# Patient Record
Sex: FEMALE | Race: WHITE | Employment: OTHER | URBAN - METROPOLITAN AREA
[De-identification: names, ages, dates, MRNs, and addresses within clinical notes are randomized per-mention and may not be internally consistent; named-entity substitution may affect disease eponyms.]

---

## 2018-05-06 PROBLEM — R07.9 CHEST PAIN: Status: ACTIVE | Noted: 2018-05-06

## 2018-05-10 ENCOUNTER — TELEPHONE (OUTPATIENT)
Dept: CARDIOLOGY CLINIC | Age: 58
End: 2018-05-10

## 2018-06-06 ENCOUNTER — OFFICE VISIT (OUTPATIENT)
Dept: CARDIOLOGY CLINIC | Age: 58
End: 2018-06-06

## 2018-06-06 VITALS
BODY MASS INDEX: 28.19 KG/M2 | OXYGEN SATURATION: 94 % | HEART RATE: 66 BPM | SYSTOLIC BLOOD PRESSURE: 100 MMHG | WEIGHT: 153.2 LBS | HEIGHT: 62 IN | DIASTOLIC BLOOD PRESSURE: 70 MMHG

## 2018-06-06 DIAGNOSIS — I21.4 NSTEMI (NON-ST ELEVATED MYOCARDIAL INFARCTION) (HCC): Primary | ICD-10-CM

## 2018-06-06 DIAGNOSIS — I47.1 PAROXYSMAL SVT (SUPRAVENTRICULAR TACHYCARDIA) (HCC): ICD-10-CM

## 2018-06-06 DIAGNOSIS — J44.9 CHRONIC OBSTRUCTIVE PULMONARY DISEASE, UNSPECIFIED COPD TYPE (HCC): ICD-10-CM

## 2018-06-06 DIAGNOSIS — R07.9 CHEST PAIN, UNSPECIFIED TYPE: ICD-10-CM

## 2018-06-06 DIAGNOSIS — I10 ESSENTIAL HYPERTENSION: ICD-10-CM

## 2018-06-06 DIAGNOSIS — I95.1 ORTHOSTATIC HYPOTENSION: ICD-10-CM

## 2018-06-06 PROCEDURE — 93000 ELECTROCARDIOGRAM COMPLETE: CPT | Performed by: NURSE PRACTITIONER

## 2018-06-06 PROCEDURE — 3017F COLORECTAL CA SCREEN DOC REV: CPT | Performed by: NURSE PRACTITIONER

## 2018-06-06 PROCEDURE — 99214 OFFICE O/P EST MOD 30 MIN: CPT | Performed by: NURSE PRACTITIONER

## 2018-06-06 PROCEDURE — G8427 DOCREV CUR MEDS BY ELIG CLIN: HCPCS | Performed by: NURSE PRACTITIONER

## 2018-06-06 PROCEDURE — 1036F TOBACCO NON-USER: CPT | Performed by: NURSE PRACTITIONER

## 2018-06-06 PROCEDURE — 3023F SPIROM DOC REV: CPT | Performed by: NURSE PRACTITIONER

## 2018-06-06 PROCEDURE — G8598 ASA/ANTIPLAT THER USED: HCPCS | Performed by: NURSE PRACTITIONER

## 2018-06-06 PROCEDURE — G8419 CALC BMI OUT NRM PARAM NOF/U: HCPCS | Performed by: NURSE PRACTITIONER

## 2018-06-06 PROCEDURE — 1111F DSCHRG MED/CURRENT MED MERGE: CPT | Performed by: NURSE PRACTITIONER

## 2018-06-06 PROCEDURE — G8926 SPIRO NO PERF OR DOC: HCPCS | Performed by: NURSE PRACTITIONER

## 2018-06-06 RX ORDER — METOPROLOL SUCCINATE 25 MG/1
12.5 TABLET, EXTENDED RELEASE ORAL DAILY
Qty: 30 TABLET | Refills: 0
Start: 2018-06-06 | End: 2018-08-03 | Stop reason: SDUPTHER

## 2018-06-20 PROCEDURE — 93228 REMOTE 30 DAY ECG REV/REPORT: CPT | Performed by: INTERNAL MEDICINE

## 2018-07-10 DIAGNOSIS — R07.9 CHEST PAIN, UNSPECIFIED TYPE: Primary | ICD-10-CM

## 2018-08-03 ENCOUNTER — OFFICE VISIT (OUTPATIENT)
Dept: INTERNAL MEDICINE CLINIC | Age: 58
End: 2018-08-03

## 2018-08-03 VITALS
SYSTOLIC BLOOD PRESSURE: 114 MMHG | WEIGHT: 151 LBS | BODY MASS INDEX: 27.79 KG/M2 | RESPIRATION RATE: 16 BRPM | OXYGEN SATURATION: 90 % | HEART RATE: 86 BPM | HEIGHT: 62 IN | DIASTOLIC BLOOD PRESSURE: 66 MMHG

## 2018-08-03 DIAGNOSIS — E78.00 PURE HYPERCHOLESTEROLEMIA: ICD-10-CM

## 2018-08-03 DIAGNOSIS — K21.9 GASTROESOPHAGEAL REFLUX DISEASE WITHOUT ESOPHAGITIS: ICD-10-CM

## 2018-08-03 DIAGNOSIS — I10 ESSENTIAL HYPERTENSION: Primary | ICD-10-CM

## 2018-08-03 DIAGNOSIS — Z13.1 SCREENING FOR DIABETES MELLITUS: ICD-10-CM

## 2018-08-03 DIAGNOSIS — E55.9 VITAMIN D DEFICIENCY: ICD-10-CM

## 2018-08-03 DIAGNOSIS — Z11.59 ENCOUNTER FOR HEPATITIS C SCREENING TEST FOR LOW RISK PATIENT: ICD-10-CM

## 2018-08-03 DIAGNOSIS — J44.9 CHRONIC OBSTRUCTIVE PULMONARY DISEASE, UNSPECIFIED COPD TYPE (HCC): ICD-10-CM

## 2018-08-03 DIAGNOSIS — Z11.4 SCREENING FOR HIV (HUMAN IMMUNODEFICIENCY VIRUS): ICD-10-CM

## 2018-08-03 PROCEDURE — 1036F TOBACCO NON-USER: CPT | Performed by: NURSE PRACTITIONER

## 2018-08-03 PROCEDURE — 99205 OFFICE O/P NEW HI 60 MIN: CPT | Performed by: NURSE PRACTITIONER

## 2018-08-03 PROCEDURE — G8598 ASA/ANTIPLAT THER USED: HCPCS | Performed by: NURSE PRACTITIONER

## 2018-08-03 PROCEDURE — 3017F COLORECTAL CA SCREEN DOC REV: CPT | Performed by: NURSE PRACTITIONER

## 2018-08-03 PROCEDURE — G8427 DOCREV CUR MEDS BY ELIG CLIN: HCPCS | Performed by: NURSE PRACTITIONER

## 2018-08-03 PROCEDURE — 3023F SPIROM DOC REV: CPT | Performed by: NURSE PRACTITIONER

## 2018-08-03 PROCEDURE — G8926 SPIRO NO PERF OR DOC: HCPCS | Performed by: NURSE PRACTITIONER

## 2018-08-03 PROCEDURE — G8419 CALC BMI OUT NRM PARAM NOF/U: HCPCS | Performed by: NURSE PRACTITIONER

## 2018-08-03 RX ORDER — PROMETHAZINE HYDROCHLORIDE 12.5 MG/1
12.5 TABLET ORAL EVERY 6 HOURS PRN
Qty: 30 TABLET | Refills: 0
Start: 2018-08-03 | End: 2018-11-06 | Stop reason: ALTCHOICE

## 2018-08-03 RX ORDER — ATORVASTATIN CALCIUM 40 MG/1
40 TABLET, FILM COATED ORAL NIGHTLY
Qty: 30 TABLET | Refills: 0 | Status: SHIPPED | OUTPATIENT
Start: 2018-08-03 | End: 2018-08-21 | Stop reason: SDUPTHER

## 2018-08-03 RX ORDER — ALBUTEROL SULFATE 90 UG/1
2-4 AEROSOL, METERED RESPIRATORY (INHALATION) EVERY 6 HOURS PRN
Qty: 1 INHALER | Refills: 0 | Status: SHIPPED | OUTPATIENT
Start: 2018-08-03 | End: 2018-09-21 | Stop reason: SDUPTHER

## 2018-08-03 RX ORDER — ERGOCALCIFEROL (VITAMIN D2) 1250 MCG
50000 CAPSULE ORAL WEEKLY
Qty: 4 CAPSULE | Refills: 3 | Status: SHIPPED | OUTPATIENT
Start: 2018-08-03 | End: 2018-11-06 | Stop reason: SDUPTHER

## 2018-08-03 RX ORDER — BUDESONIDE AND FORMOTEROL FUMARATE DIHYDRATE 160; 4.5 UG/1; UG/1
2 AEROSOL RESPIRATORY (INHALATION) 2 TIMES DAILY
Qty: 1 INHALER | Status: CANCELLED | OUTPATIENT
Start: 2018-08-03

## 2018-08-03 RX ORDER — PANTOPRAZOLE SODIUM 40 MG/1
40 TABLET, DELAYED RELEASE ORAL DAILY
Qty: 30 TABLET | Refills: 3 | Status: SHIPPED | OUTPATIENT
Start: 2018-08-03 | End: 2018-11-06 | Stop reason: SDUPTHER

## 2018-08-03 RX ORDER — METOPROLOL SUCCINATE 25 MG/1
12.5 TABLET, EXTENDED RELEASE ORAL DAILY
Qty: 30 TABLET | Refills: 0 | Status: SHIPPED | OUTPATIENT
Start: 2018-08-03 | End: 2018-08-21 | Stop reason: SDUPTHER

## 2018-08-03 RX ORDER — ACETAMINOPHEN 325 MG/1
650 TABLET ORAL EVERY 6 HOURS PRN
Qty: 120 TABLET | Status: CANCELLED | OUTPATIENT
Start: 2018-08-03

## 2018-08-03 ASSESSMENT — ENCOUNTER SYMPTOMS
SHORTNESS OF BREATH: 1
CONSTIPATION: 1
DIARRHEA: 1
BACK PAIN: 1

## 2018-08-03 ASSESSMENT — PATIENT HEALTH QUESTIONNAIRE - PHQ9
SUM OF ALL RESPONSES TO PHQ QUESTIONS 1-9: 0
1. LITTLE INTEREST OR PLEASURE IN DOING THINGS: 0
2. FEELING DOWN, DEPRESSED OR HOPELESS: 0
SUM OF ALL RESPONSES TO PHQ9 QUESTIONS 1 & 2: 0

## 2018-08-03 NOTE — PROGRESS NOTES
18    Chief Complaint   Patient presents with   [address] no longer covered by insurance    Headache     headaches with sharp pain intermittent, chronic    Joint Pain     hips,back neck-chronic pain    Hypertension    Health Maintenance     needs pap and mammogram       HPI:   Murray Casillas is a 61 y.o. female is here today for management of multiple chronic conditions and to establish care with new PCP. PMH:  Previous PCP was Dr Jessica Krueger, located on [facility]    -Patient reports she has problems with hearing; c/o headaches for the past few months, pain usually in the back of her head. Patient also reports she has had frequent falls in the past year, cannot quantify. -Reports she is has been having problems with being short of breath, especially when active/climbing stairs. She is supposed to be on Spiriva but has not taken since May.  -Patient also reports having chronic pain in her knees, hips, back and neck, has not seen an Orthopedic provider before.     Health Maintenance:  Need: mammogram, pap smear, vision check    Past Medical History:   Diagnosis Date    Back pain     COPD (chronic obstructive pulmonary disease) (HCC)     Foot pain     from bone spurs    GERD (gastroesophageal reflux disease)     Headache     sharp pain going through head    Hip pain     Hyperlipidemia     Hypertension      (normal spontaneous vaginal delivery)     Osteoporosis     Vision abnormalities        Reviewed all progress notes and lab results if available from previous providers    Past Medical History:   Diagnosis Date    Back pain     COPD (chronic obstructive pulmonary disease) (HCC)     Foot pain     from bone spurs    GERD (gastroesophageal reflux disease)     Headache     sharp pain going through head    Hip pain     Hyperlipidemia     Hypertension      (normal spontaneous vaginal delivery)     Osteoporosis     Vision abnormalities      Social History     Social History    Marital status:      Spouse name: N/A    Number of children: N/A    Years of education: N/A     Occupational History    Not on file.      Social History Main Topics    Smoking status: Former Smoker     Packs/day: 1.00     Years: 37.00     Types: Cigarettes     Quit date: 1/1/2017    Smokeless tobacco: Never Used    Alcohol use No    Drug use: No    Sexual activity: Not Currently     Other Topics Concern    Not on file     Social History Narrative    No narrative on file     Past Surgical History:   Procedure Laterality Date    CARDIAC CATHETERIZATION      CHOLECYSTECTOMY  1999     Family History   Problem Relation Age of Onset    Heart Failure Mother         heart disease    Hypertension Mother     Emphysema Mother     Heart Failure Father         heart disease    COPD Father     Hypertension Paternal Grandmother      Allergies   Allergen Reactions    Omeprazole     Eggs Or Egg-Derived Products [Eggs Or Egg-Derived Products] Hives and Nausea And Vomiting    Eggs [Egg White] Nausea And Vomiting    Flexeril [Cyclobenzaprine Hcl] Rash    Levofloxacin Rash       Current Outpatient Prescriptions   Medication Sig Dispense Refill    pantoprazole (PROTONIX) 40 MG tablet Take 1 tablet by mouth daily 30 tablet 3    albuterol sulfate  (90 Base) MCG/ACT inhaler Inhale 2-4 puffs into the lungs every 6 hours as needed for Wheezing 1 Inhaler 0    ergocalciferol (ERGOCALCIFEROL) 73548 units capsule Take 1 capsule by mouth once a week 4 capsule 3    promethazine (PHENERGAN) 12.5 MG tablet Take 1 tablet by mouth every 6 hours as needed for Nausea 30 tablet 0    Handicap Placard MISC by Does not apply route Handicap Placard for 5 years 1 each 0    diphenhydrAMINE-APAP, sleep, (TYLENOL PM EXTRA STRENGTH)  MG tablet Take 1 tablet by mouth nightly as needed for Sleep      sertraline (ZOLOFT) 100 MG tablet Take 1 tablet by mouth daily 30 tablet 3    aspirin 81 MG chewable tablet Take 1 tablet by mouth daily 30 tablet 3    atorvastatin (LIPITOR) 40 MG tablet Take 1 tablet by mouth nightly 30 tablet 3    metoprolol succinate (TOPROL XL) 25 MG extended release tablet Take 0.5 tablets by mouth daily 30 tablet 1    sucralfate (CARAFATE) 1 GM tablet Take 1 tablet by mouth 2 times daily 60 tablet 0    acetaminophen (TYLENOL) 325 MG tablet Take 650 mg by mouth every 6 hours as needed for Pain       No current facility-administered medications for this visit. Review of Systems   Respiratory: Positive for shortness of breath. Cardiovascular: Positive for chest pain (chronic, followed by cardiology). Gastrointestinal: Positive for constipation and diarrhea. Musculoskeletal: Positive for arthralgias (knees, hips, hands and shoulders) and back pain. Allergic/Immunologic: Positive for food allergies (eggs). Neurological: Positive for tremors (??about 6 months) and headaches (past few months, almost daily). Psychiatric/Behavioral: Positive for dysphoric mood. /66   Pulse 86   Resp 16   Ht 5' 2\" (1.575 m)   Wt 151 lb (68.5 kg)   SpO2 90%   Breastfeeding? No   BMI 27.62 kg/m²     Physical Exam   Constitutional: She is oriented to person, place, and time. HENT:   Mouth/Throat: Oropharynx is clear and moist.   Eyes:       Cardiovascular: Normal rate and normal heart sounds. Pulmonary/Chest: Effort normal and breath sounds normal.   Abdominal: Soft. Musculoskeletal:        Lumbar back: She exhibits decreased range of motion. Neurological: She is alert and oriented to person, place, and time. Skin: Skin is warm and dry. Psychiatric: Her speech is normal. Her mood appears anxious. ASSESSMENT/PLAN:    1. Essential hypertension  -continue metoprolol as prescribed    2. Chronic obstructive pulmonary disease, unspecified COPD type (HCC)    - albuterol sulfate  (90 Base) MCG/ACT inhaler;  Inhale 2-4

## 2018-08-03 NOTE — PATIENT INSTRUCTIONS
Patient Education        Learning About COPD  What is COPD? COPD is a lung disease that makes it hard to breathe. COPD stands for chronic obstructive pulmonary disease. It is caused by damage to the lungs over many years, usually from smoking. COPD is a mix of two diseases: chronic bronchitis and emphysema. Other things that may put you at risk for COPD include breathing chemical fumes, dust, or air pollution over a long period of time. Secondhand smoke is also bad. In chronic bronchitis, the airways that carry air to the lungs (bronchial tubes) get inflamed and make a lot of mucus. This can narrow or block the airways, making it hard for you to breathe. In emphysema, the air sacs in your lungs are damaged and lose their stretch. Less air gets in and out of your lungs, which makes you feel short of breath. What can you expect when you have COPD? COPD gets worse over time. You cannot undo the damage to your lungs. Over time, you may find that:  · You get short of breath even when you do simple things like get dressed or fix a meal.  · It is hard to eat or exercise. · You lose weight and feel weaker. But there are things you can do to prevent more damage and feel better. What are the symptoms? The main symptoms are:  · A cough that will not go away. · Mucus that comes up when you cough. · Shortness of breath that gets worse with activity. At times, your symptoms may suddenly flare up and get much worse. This is a called a COPD exacerbation (say \"egg-MICHAELA--BAY-julianne\"). When this happens, your usual symptoms quickly get worse and stay bad. This can be dangerous. You may have to go to the hospital.  How can you keep COPD from getting worse? Don't smoke. That is the best way to keep COPD from getting worse. If you already smoke, it is never too late to stop. If you need help quitting, talk to your doctor about stop-smoking programs and medicines.  These can increase your chances of quitting for about your disease and how to manage it, help with diet and other changes, and emotional support. · Eat regular, healthy meals. Use bronchodilators about 1 hour before you eat to make it easier to eat. Eat several small meals instead of three large ones. Drink beverages at the end of the meal. Avoid foods that are hard to chew. Follow-up care is a key part of your treatment and safety. Be sure to make and go to all appointments, and call your doctor if you are having problems. It's also a good idea to know your test results and keep a list of the medicines you take. Where can you learn more? Go to https://Make Meaningpepiceweb.[email or web address] org and sign in to your Justinmind account. Enter V314 in the RIVS box to learn more about \"Learning About COPD. \"     If you do not have an account, please click on the \"Sign Up Now\" link. Current as of: December 6, 2017  Content Version: 11.6  © 3661-7351 [employer]. Care instructions adapted under license by [facility] ([facility]). If you have questions about a medical condition or this instruction, always ask your healthcare professional. [address] any warranty or liability for your use of this information. Patient Education        DASH Diet: Care Instructions  Your Care Instructions    The DASH diet is an eating plan that can help lower your blood pressure. DASH stands for Dietary Approaches to Stop Hypertension. Hypertension is high blood pressure. The DASH diet focuses on eating foods that are high in calcium, potassium, and magnesium. These nutrients can lower blood pressure. The foods that are highest in these nutrients are fruits, vegetables, low-fat dairy products, nuts, seeds, and legumes. But taking calcium, potassium, and magnesium supplements instead of eating foods that are high in those nutrients does not have the same effect. The DASH diet also includes whole grains, fish, and poultry.   The DASH diet is one of several lifestyle changes your doctor may recommend to lower your high blood pressure. Your doctor may also want you to decrease the amount of sodium in your diet. Lowering sodium while following the DASH diet can lower blood pressure even further than just the DASH diet alone. Follow-up care is a key part of your treatment and safety. Be sure to make and go to all appointments, and call your doctor if you are having problems. It's also a good idea to know your test results and keep a list of the medicines you take. How can you care for yourself at home? Following the DASH diet  · Eat 4 to 5 servings of fruit each day. A serving is 1 medium-sized piece of fruit, ½ cup chopped or canned fruit, 1/4 cup dried fruit, or 4 ounces (½ cup) of fruit juice. Choose fruit more often than fruit juice. · Eat 4 to 5 servings of vegetables each day. A serving is 1 cup of lettuce or raw leafy vegetables, ½ cup of chopped or cooked vegetables, or 4 ounces (½ cup) of vegetable juice. Choose vegetables more often than vegetable juice. · Get 2 to 3 servings of low-fat and fat-free dairy each day. A serving is 8 ounces of milk, 1 cup of yogurt, or 1 ½ ounces of cheese. · Eat 6 to 8 servings of grains each day. A serving is 1 slice of bread, 1 ounce of dry cereal, or ½ cup of cooked rice, pasta, or cooked cereal. Try to choose whole-grain products as much as possible. · Limit lean meat, poultry, and fish to 2 servings each day. A serving is 3 ounces, about the size of a deck of cards. · Eat 4 to 5 servings of nuts, seeds, and legumes (cooked dried beans, lentils, and split peas) each week. A serving is 1/3 cup of nuts, 2 tablespoons of seeds, or ½ cup of cooked beans or peas. · Limit fats and oils to 2 to 3 servings each day. A serving is 1 teaspoon of vegetable oil or 2 tablespoons of salad dressing. · Limit sweets and added sugars to 5 servings or less a week.  A serving is 1 tablespoon jelly or jam, ½ cup sorbet, or 1 cup of

## 2018-08-07 DIAGNOSIS — J44.9 CHRONIC OBSTRUCTIVE PULMONARY DISEASE, UNSPECIFIED COPD TYPE (HCC): Primary | ICD-10-CM

## 2018-08-10 DIAGNOSIS — Z11.59 ENCOUNTER FOR HEPATITIS C SCREENING TEST FOR LOW RISK PATIENT: ICD-10-CM

## 2018-08-10 DIAGNOSIS — E55.9 VITAMIN D DEFICIENCY: ICD-10-CM

## 2018-08-10 DIAGNOSIS — Z11.4 SCREENING FOR HIV (HUMAN IMMUNODEFICIENCY VIRUS): ICD-10-CM

## 2018-08-10 DIAGNOSIS — Z13.1 SCREENING FOR DIABETES MELLITUS: ICD-10-CM

## 2018-08-10 DIAGNOSIS — E78.00 PURE HYPERCHOLESTEROLEMIA: ICD-10-CM

## 2018-08-10 LAB
A/G RATIO: 1.9 (ref 1.1–2.2)
ALBUMIN SERPL-MCNC: 4.5 G/DL (ref 3.4–5)
ALP BLD-CCNC: 132 U/L (ref 40–129)
ALT SERPL-CCNC: 15 U/L (ref 10–40)
ANION GAP SERPL CALCULATED.3IONS-SCNC: 11 MMOL/L (ref 3–16)
AST SERPL-CCNC: 16 U/L (ref 15–37)
BILIRUB SERPL-MCNC: 0.4 MG/DL (ref 0–1)
BUN BLDV-MCNC: 7 MG/DL (ref 7–20)
CALCIUM SERPL-MCNC: 9.4 MG/DL (ref 8.3–10.6)
CHLORIDE BLD-SCNC: 100 MMOL/L (ref 99–110)
CHOLESTEROL, FASTING: 202 MG/DL (ref 0–199)
CO2: 30 MMOL/L (ref 21–32)
CREAT SERPL-MCNC: 0.7 MG/DL (ref 0.6–1.1)
GFR AFRICAN AMERICAN: >60
GFR NON-AFRICAN AMERICAN: >60
GLOBULIN: 2.4 G/DL
GLUCOSE FASTING: 110 MG/DL (ref 70–99)
HDLC SERPL-MCNC: 35 MG/DL (ref 40–60)
HEPATITIS C ANTIBODY INTERPRETATION: NORMAL
HIV AG/AB: NORMAL
HIV ANTIGEN: NORMAL
HIV-1 ANTIBODY: NORMAL
HIV-2 AB: NORMAL
LDL CHOLESTEROL CALCULATED: 123 MG/DL
POTASSIUM SERPL-SCNC: 4.4 MMOL/L (ref 3.5–5.1)
SODIUM BLD-SCNC: 141 MMOL/L (ref 136–145)
TOTAL PROTEIN: 6.9 G/DL (ref 6.4–8.2)
TRIGLYCERIDE, FASTING: 221 MG/DL (ref 0–150)
VITAMIN D 25-HYDROXY: 23.6 NG/ML
VLDLC SERPL CALC-MCNC: 44 MG/DL

## 2018-08-21 ENCOUNTER — OFFICE VISIT (OUTPATIENT)
Dept: INTERNAL MEDICINE CLINIC | Age: 58
End: 2018-08-21

## 2018-08-21 VITALS
HEIGHT: 62 IN | HEART RATE: 74 BPM | SYSTOLIC BLOOD PRESSURE: 125 MMHG | DIASTOLIC BLOOD PRESSURE: 67 MMHG | WEIGHT: 151 LBS | OXYGEN SATURATION: 91 % | BODY MASS INDEX: 27.79 KG/M2 | RESPIRATION RATE: 18 BRPM

## 2018-08-21 DIAGNOSIS — Z12.39 SCREENING FOR BREAST CANCER: ICD-10-CM

## 2018-08-21 DIAGNOSIS — H54.7 VISUAL PROBLEMS: ICD-10-CM

## 2018-08-21 DIAGNOSIS — Z12.4 SCREENING FOR CERVICAL CANCER: ICD-10-CM

## 2018-08-21 DIAGNOSIS — I10 ESSENTIAL HYPERTENSION: Primary | ICD-10-CM

## 2018-08-21 DIAGNOSIS — F41.9 ANXIETY: ICD-10-CM

## 2018-08-21 DIAGNOSIS — M17.0 PRIMARY OSTEOARTHRITIS OF BOTH KNEES: ICD-10-CM

## 2018-08-21 DIAGNOSIS — E78.00 PURE HYPERCHOLESTEROLEMIA: ICD-10-CM

## 2018-08-21 PROCEDURE — 1036F TOBACCO NON-USER: CPT | Performed by: NURSE PRACTITIONER

## 2018-08-21 PROCEDURE — G8598 ASA/ANTIPLAT THER USED: HCPCS | Performed by: NURSE PRACTITIONER

## 2018-08-21 PROCEDURE — G8419 CALC BMI OUT NRM PARAM NOF/U: HCPCS | Performed by: NURSE PRACTITIONER

## 2018-08-21 PROCEDURE — 3017F COLORECTAL CA SCREEN DOC REV: CPT | Performed by: NURSE PRACTITIONER

## 2018-08-21 PROCEDURE — G8427 DOCREV CUR MEDS BY ELIG CLIN: HCPCS | Performed by: NURSE PRACTITIONER

## 2018-08-21 PROCEDURE — 99214 OFFICE O/P EST MOD 30 MIN: CPT | Performed by: NURSE PRACTITIONER

## 2018-08-21 RX ORDER — SERTRALINE HYDROCHLORIDE 100 MG/1
100 TABLET, FILM COATED ORAL DAILY
Qty: 30 TABLET | Refills: 3 | Status: SHIPPED | OUTPATIENT
Start: 2018-08-21 | End: 2018-11-06 | Stop reason: SDUPTHER

## 2018-08-21 RX ORDER — ASPIRIN 81 MG/1
81 TABLET, CHEWABLE ORAL DAILY
Qty: 30 TABLET | Refills: 3 | Status: SHIPPED | OUTPATIENT
Start: 2018-08-21 | End: 2019-01-24 | Stop reason: SDUPTHER

## 2018-08-21 RX ORDER — METOPROLOL SUCCINATE 25 MG/1
12.5 TABLET, EXTENDED RELEASE ORAL DAILY
Qty: 30 TABLET | Refills: 1 | Status: SHIPPED | OUTPATIENT
Start: 2018-08-21 | End: 2018-11-06 | Stop reason: SDUPTHER

## 2018-08-21 RX ORDER — ATORVASTATIN CALCIUM 40 MG/1
40 TABLET, FILM COATED ORAL NIGHTLY
Qty: 30 TABLET | Refills: 3 | Status: SHIPPED | OUTPATIENT
Start: 2018-08-21 | End: 2018-11-06 | Stop reason: SDUPTHER

## 2018-08-21 NOTE — PROGRESS NOTES
>60     GFR  08/10/2018 >60     Calcium 08/10/2018 9.4     Total Protein 08/10/2018 6.9     Alb 08/10/2018 4.5     Albumin/Globulin Ratio 08/10/2018 1.9     Total Bilirubin 08/10/2018 0.4     Alkaline Phosphatase 08/10/2018 132*    ALT 08/10/2018 15     AST 08/10/2018 16     Globulin 08/10/2018 2.4     Hep C Ab Interp 08/10/2018 Non-reactive     Cholesterol, Fasting 08/10/2018 202*    Triglyceride, Fasting 08/10/2018 221*    HDL 08/10/2018 35*    LDL Calculated 08/10/2018 123*    VLDL Cholesterol Calcula* 08/10/2018 44     Vit D, 25-Hydroxy 08/10/2018 23.6*    HIV Ag/Ab 08/10/2018 Non-Reactive     HIV-1 Antibody 08/10/2018 Non-Reactive     HIV ANTIGEN 08/10/2018 Non-Reactive     HIV-2 Ab 08/10/2018 Non-Reactive        Physical Exam   Constitutional: She is oriented to person, place, and time. HENT:   Mouth/Throat: Oropharynx is clear and moist.   Cardiovascular: Normal rate and regular rhythm. Pulmonary/Chest: Effort normal and breath sounds normal.   Abdominal: Soft. Bowel sounds are normal.   Musculoskeletal:        Right knee: She exhibits decreased range of motion. Left knee: She exhibits decreased range of motion. Neurological: She is alert and oriented to person, place, and time. Psychiatric: Her speech is normal. Her mood appears anxious. ASSESSMENT/PLAN:    Diagnoses and all orders for this visit:    Essential hypertension  -     aspirin 81 MG chewable tablet; Take 1 tablet by mouth daily  -     metoprolol succinate (TOPROL XL) 25 MG extended release tablet; Take 0.5 tablets by mouth daily    Primary osteoarthritis of both knees  -     Anson Fleming MD (Hip and Knee)    Pure hypercholesterolemia  -     atorvastatin (LIPITOR) 40 MG tablet; Take 1 tablet by mouth nightly    Visual problems  -     [place] [facility] [place] - Bereket Dasilva DO (BEE)    Anxiety  -     sertraline (ZOLOFT) 100 MG tablet;  Take 1 tablet by mouth daily    Screening for breast

## 2018-08-21 NOTE — PATIENT INSTRUCTIONS
meals using beans and peas. Add garbanzo or kidney beans to salads. Make burritos and tacos with mashed cisneros beans or black beans. Where can you learn more? Go to [email or web address] and sign in to your WhatsApp account. Enter K625 in the [employer] box to learn more about \"DASH Diet: Care Instructions. \"     If you do not have an account, please click on the \"Sign Up Now\" link. Current as of: December 6, 2017  Content Version: 11.7  © 5432-0163 [employer]. Care instructions adapted under license by [facility] ([facility]). If you have questions about a medical condition or this instruction, always ask your healthcare professional. [address] any warranty or liability for your use of this information.

## 2018-08-22 ASSESSMENT — ENCOUNTER SYMPTOMS: BACK PAIN: 1

## 2018-08-27 ENCOUNTER — OFFICE VISIT (OUTPATIENT)
Dept: ORTHOPEDIC SURGERY | Age: 58
End: 2018-08-27

## 2018-08-27 ENCOUNTER — HOSPITAL ENCOUNTER (OUTPATIENT)
Dept: WOMENS IMAGING | Age: 58
Discharge: HOME | End: 2018-08-27
Attending: NURSE PRACTITIONER | Admitting: NURSE PRACTITIONER

## 2018-08-27 VITALS
DIASTOLIC BLOOD PRESSURE: 81 MMHG | SYSTOLIC BLOOD PRESSURE: 150 MMHG | BODY MASS INDEX: 27.79 KG/M2 | WEIGHT: 151 LBS | HEIGHT: 62 IN | RESPIRATION RATE: 17 BRPM | HEART RATE: 66 BPM

## 2018-08-27 DIAGNOSIS — M25.552 BILATERAL HIP PAIN: ICD-10-CM

## 2018-08-27 DIAGNOSIS — M25.551 BILATERAL HIP PAIN: ICD-10-CM

## 2018-08-27 DIAGNOSIS — Z12.39 SCREENING FOR BREAST CANCER: ICD-10-CM

## 2018-08-27 DIAGNOSIS — M54.16 LUMBAR RADICULOPATHY: Primary | ICD-10-CM

## 2018-08-27 PROCEDURE — 3017F COLORECTAL CA SCREEN DOC REV: CPT | Performed by: ORTHOPAEDIC SURGERY

## 2018-08-27 PROCEDURE — 99243 OFF/OP CNSLTJ NEW/EST LOW 30: CPT | Performed by: ORTHOPAEDIC SURGERY

## 2018-08-27 PROCEDURE — G8427 DOCREV CUR MEDS BY ELIG CLIN: HCPCS | Performed by: ORTHOPAEDIC SURGERY

## 2018-08-27 PROCEDURE — G8419 CALC BMI OUT NRM PARAM NOF/U: HCPCS | Performed by: ORTHOPAEDIC SURGERY

## 2018-08-27 RX ORDER — METHYLPREDNISOLONE 4 MG/1
TABLET ORAL
Qty: 1 KIT | Refills: 0 | Status: SHIPPED | OUTPATIENT
Start: 2018-08-27 | End: 2018-09-02

## 2018-08-27 NOTE — PROGRESS NOTES
CHIEF COMPLAINT: Bilateral hip pain. History:   Virginia Bonilla is a 60 y.o. White female who presents today complaining of bilateral hip pain. Patient was referred by JOSE ANGEL Cobb CNP. She is a poor historian. She states that the pain began over 1 year ago. Patient did not have a history of injury. Patient rates the pain as a 10/10. She states pain is located buttock, lower back, radiates down her leg. Hip pain is described as aching and shooting. Pain is aggravated by being on her feet, getting up, stairs, sitting for long time. Patient states hip pain is relieved by steroids. Patient does have a history of back pain. She does note burning in her feet. Patient has not had PT. The patient has not taken NSAIDs. The patient has not had an injection. The patient's occupation is on disability      Outside reports reviewed: none.     Past Medical History:   Diagnosis Date    Back pain     COPD (chronic obstructive pulmonary disease) (HCC)     Foot pain     from bone spurs    GERD (gastroesophageal reflux disease)     Headache     sharp pain going through head    Hip pain     Hyperlipidemia     Hypertension      (normal spontaneous vaginal delivery)     Osteoporosis     Vision abnormalities        Past Surgical History:   Procedure Laterality Date    CARDIAC CATHETERIZATION      CHOLECYSTECTOMY         Family History   Problem Relation Age of Onset    Heart Failure Mother         heart disease    Hypertension Mother     Emphysema Mother     Heart Failure Father         heart disease    COPD Father     Hypertension Paternal Grandmother        Social History     Social History    Marital status:      Spouse name: N/A    Number of children: N/A    Years of education: N/A     Social History Main Topics    Smoking status: Former Smoker     Packs/day: 1.00     Years: 37.00     Types: Cigarettes     Quit date: 2017    Smokeless tobacco: Never Used    Alcohol

## 2018-08-31 ENCOUNTER — TELEPHONE (OUTPATIENT)
Dept: INTERNAL MEDICINE CLINIC | Age: 58
End: 2018-08-31

## 2018-09-06 ENCOUNTER — OFFICE VISIT (OUTPATIENT)
Dept: CARDIOLOGY CLINIC | Age: 58
End: 2018-09-06

## 2018-09-06 VITALS
WEIGHT: 151 LBS | HEIGHT: 62 IN | OXYGEN SATURATION: 94 % | BODY MASS INDEX: 27.79 KG/M2 | SYSTOLIC BLOOD PRESSURE: 128 MMHG | HEART RATE: 84 BPM | DIASTOLIC BLOOD PRESSURE: 84 MMHG

## 2018-09-06 DIAGNOSIS — I25.10 CORONARY ARTERY DISEASE INVOLVING NATIVE CORONARY ARTERY OF NATIVE HEART WITHOUT ANGINA PECTORIS: ICD-10-CM

## 2018-09-06 DIAGNOSIS — R07.9 CHEST PAIN, UNSPECIFIED TYPE: Primary | ICD-10-CM

## 2018-09-06 DIAGNOSIS — R42 DIZZINESS: ICD-10-CM

## 2018-09-06 DIAGNOSIS — I47.1 PAROXYSMAL SVT (SUPRAVENTRICULAR TACHYCARDIA) (HCC): ICD-10-CM

## 2018-09-06 DIAGNOSIS — J44.9 CHRONIC OBSTRUCTIVE PULMONARY DISEASE, UNSPECIFIED COPD TYPE (HCC): ICD-10-CM

## 2018-09-06 DIAGNOSIS — I10 ESSENTIAL HYPERTENSION: ICD-10-CM

## 2018-09-06 DIAGNOSIS — R00.2 PALPITATIONS: ICD-10-CM

## 2018-09-06 PROCEDURE — G8419 CALC BMI OUT NRM PARAM NOF/U: HCPCS | Performed by: NURSE PRACTITIONER

## 2018-09-06 PROCEDURE — 99214 OFFICE O/P EST MOD 30 MIN: CPT | Performed by: NURSE PRACTITIONER

## 2018-09-06 PROCEDURE — G8926 SPIRO NO PERF OR DOC: HCPCS | Performed by: NURSE PRACTITIONER

## 2018-09-06 PROCEDURE — G8427 DOCREV CUR MEDS BY ELIG CLIN: HCPCS | Performed by: NURSE PRACTITIONER

## 2018-09-06 PROCEDURE — 1036F TOBACCO NON-USER: CPT | Performed by: NURSE PRACTITIONER

## 2018-09-06 PROCEDURE — 3023F SPIROM DOC REV: CPT | Performed by: NURSE PRACTITIONER

## 2018-09-06 PROCEDURE — 3017F COLORECTAL CA SCREEN DOC REV: CPT | Performed by: NURSE PRACTITIONER

## 2018-09-06 PROCEDURE — G8598 ASA/ANTIPLAT THER USED: HCPCS | Performed by: NURSE PRACTITIONER

## 2018-09-06 NOTE — PROGRESS NOTES
[address]  Office Visit    Bob Barraza  1960 September 6, 2018    CC:   Chief Complaint   Patient presents with    3 Month Follow-Up     copd,hld,htn/ pt states she is have some chest paint that comes and goes,SOB with normal activity     HPI:  The patient is 60 y.o. female with a past medical history significant for CAD/NSTEMI, PSVT,  HTN, hyperlipidemia and COPD who is here for follow up. Hospitalized from 5/6/2018-5/9/2018 with chest pain and elevated troponin and ECG ST-T wave changes. L heart cath showed nonobstructive CAD. Echo showed LVEF 55%. She had event monitor placed d/t recorded -200 bpm in hospital- 30 day event monitor,NSR, no sustained arrhythmia and 2.4 sec pause (asx). . Last seen in 6/2018 in office and noted to be orthostatic. Toprol was decreased at that time. Here for follow up today. Continues to have episodes of transient chest discomfort that is \"here and gone\" and associated with heart racing infrequently. Lasts few seconds and is self limiting. No chest pain with exertion. Has chronic SOBOE which is unchanged (improves with use of inhalers). No cough. Having pain in hips and lower back limiting her activity. Denies orthopnea/PND, cough, dizziness, syncope, edema , weight change or claudication. No regular exercise. Has remained off cigs x 2 years. Scheduled to begin PT next week. Scheduled to see Dr. Hillary Booth and have PFT's performed later this month. Review of Systems:  Constitutional: Denies weakness, night sweats or fever. + chronic fatigue  HEENT: Denies new visual changes, ringing in ears, nosebleeds, nasal congestion  Respiratory: + chronic SOBOE (unchanged)  Cardiovascular: see HPI  GI: Denies N/V, diarrhea, constipation, abdominal pain, change in bowel habits, melena or hematochezia  : Denies urinary frequency, urgency, incontinence, hematuria or dysuria.   Skin: Denies rash, hives, or cyanosis  Musculoskeletal: + generalized muscle/joint pain; R arm/shoulder pain with ROM of R shioulder  Neurological: No TIA/stroke symptoms  Psychiatric: Denies anxiety, insomnia or depression     Past Medical History:   Diagnosis Date    Back pain     COPD (chronic obstructive pulmonary disease) (HCC)     Foot pain     from bone spurs    GERD (gastroesophageal reflux disease)     Headache     sharp pain going through head    Hip pain     Hyperlipidemia     Hypertension      (normal spontaneous vaginal delivery)     Osteoporosis     Vision abnormalities      Past Surgical History:   Procedure Laterality Date    CARDIAC CATHETERIZATION      CHOLECYSTECTOMY       Family History   Problem Relation Age of Onset    Heart Failure Mother         heart disease    Hypertension Mother     Emphysema Mother     Heart Failure Father         heart disease    COPD Father     Hypertension Paternal Grandmother      Social History   Substance Use Topics    Smoking status: Former Smoker     Packs/day: 1.00     Years: 37.00     Types: Cigarettes     Quit date: 2017    Smokeless tobacco: Never Used    Alcohol use No       Allergies   Allergen Reactions    Omeprazole     Eggs Or Egg-Derived Products [Eggs Or Egg-Derived Products] Hives and Nausea And Vomiting    Eggs [Egg White] Nausea And Vomiting    Flexeril [Cyclobenzaprine Hcl] Rash    Levofloxacin Rash     Current Outpatient Prescriptions   Medication Sig Dispense Refill    acetaminophen (TYLENOL) 500 MG tablet Take 2 tablets by mouth every 6 hours as needed for Pain Do not take with other medications containing acetaminophen. 30 tablet 0    lidocaine (LIDODERM) 5 % Place 1 patch onto the skin daily 12 hours on, 12 hours off.  10 patch 0    methocarbamol (ROBAXIN) 500 MG tablet Take 2 tablets by mouth nightly as needed (pain) 30 tablet 0    sertraline (ZOLOFT) 100 MG tablet Take 1 tablet by mouth daily 30 tablet 3    aspirin 81 MG chewable tablet Take 1 tablet by mouth daily 30 tablet 3    atorvastatin (LIPITOR) 40 MG tablet Take 1 tablet by mouth nightly 30 tablet 3    metoprolol succinate (TOPROL XL) 25 MG extended release tablet Take 0.5 tablets by mouth daily 30 tablet 1    pantoprazole (PROTONIX) 40 MG tablet Take 1 tablet by mouth daily 30 tablet 3    albuterol sulfate  (90 Base) MCG/ACT inhaler Inhale 2-4 puffs into the lungs every 6 hours as needed for Wheezing 1 Inhaler 0    ergocalciferol (ERGOCALCIFEROL) 02710 units capsule Take 1 capsule by mouth once a week 4 capsule 3    promethazine (PHENERGAN) 12.5 MG tablet Take 1 tablet by mouth every 6 hours as needed for Nausea 30 tablet 0    Handicap Placard MISC by Does not apply route Handicap Placard for 5 years 1 each 0    sucralfate (CARAFATE) 1 GM tablet Take 1 tablet by mouth 2 times daily 60 tablet 0    diphenhydrAMINE-APAP, sleep, (TYLENOL PM EXTRA STRENGTH)  MG tablet Take 1 tablet by mouth nightly as needed for Sleep       No current facility-administered medications for this visit. Physical Exam:   /84 (Site: Left Arm, Position: Sitting, Cuff Size: Small Adult)   Pulse 84   Ht 5' 2\" (1.575 m)   Wt 151 lb (68.5 kg)   SpO2 94%   BMI 27.62 kg/m²   Wt Readings from Last 2 Encounters:   09/06/18 151 lb (68.5 kg)   09/02/18 152 lb 5.4 oz (69.1 kg)     Constitutional: She is oriented to person, place, and time. She appears anxious and frail in appearance. In no acute distress. Anxious  HEENT: Normocephalic and atraumatic. Sclerae anicteric. No xanthelasmas. EOM's intact. Neck: Neck supple but cervical neck tenderness. No JVD present. Carotids without bruits. No thyromegaly present. No lymphadenopathy present. Cardiovascular: RRR, normal S1 and S2; no murmur/gallop or rub  Pulmonary/Chest: Effort normal.  Lungs clear to auscultation. Chest wall nontender  Abdominal: soft, nontender, nondistended. + bowel sounds; no hepatomegaly  Extremities: No edema or cyanosis.  Pulses are 2+ radial/pedal . Cap refill brisk. R shoulder and forearm pain with ROM R shoulder. R shoulder tender to touch  Neurological: No focal deficit. Skin: Skin is warm and dry. Psychiatric: She has a normal mood and affect. Her speech is normal and behavior is normal.     Lab Review:   Lab Results   Component Value Date    TRIG 124 05/07/2018    HDL 35 08/10/2018    LDLCALC 123 08/10/2018    LABVLDL 44 08/10/2018     Lab Results   Component Value Date     08/10/2018    K 4.4 08/10/2018    K 4.3 05/07/2018     08/10/2018    CO2 30 08/10/2018    BUN 7 08/10/2018    CREATININE 0.7 08/10/2018    GLUCOSE 126 06/12/2018    CALCIUM 9.4 08/10/2018      Lab Results   Component Value Date    WBC 8.4 06/12/2018    HGB 14.4 06/12/2018    HCT 41.0 06/12/2018    MCV 93.2 06/12/2018     06/12/2018 5/2018: 30 day event monitor: no sustained arrhythmia; 2.4 sec pause (asx); SR    Echo 5/9/2018:  Normal left ventricle size, wall thickness and systolic function with an  estimated ejection fraction of 55%.   No regional wall motion abnormalities are seen.   Diastolic filling parameters suggest normal diastolic filing pressure.   Mild mitral regurgitation is present. 5/7/2018 LHC:  1.  Patent right coronary artery with mild disease of the posterior descending artery branch of the RCA. 2.  Patent left main trunk. 3.  Mild nonobstructive plaque buildup in proximal LAD. 4.  Mild 30% plaque of the obtuse marginal branch of the circumflex artery. 5.  Normal left ventricular systolic function with estimated EF of 50% to 65%.     5/6/2018 CTA pulmonary: no evidence of PE; no acute abnormality of thoracic aorta    Assessment:    1. Chest pain, unspecified type  -chest pain atypical for angina  -suspect pain is more musculoskeletal in nature; very atypical for angina    2.  Coronary artery disease involving native coronary artery of native heart without angina pectoris  -NSTEMI in 5/2018  -cardiac cath in 5/2018:

## 2018-09-18 ENCOUNTER — OFFICE VISIT (OUTPATIENT)
Dept: GYNECOLOGY | Age: 58
End: 2018-09-18

## 2018-09-18 ENCOUNTER — TELEPHONE (OUTPATIENT)
Dept: PULMONOLOGY | Age: 58
End: 2018-09-18

## 2018-09-18 VITALS
WEIGHT: 154 LBS | HEIGHT: 62 IN | OXYGEN SATURATION: 98 % | BODY MASS INDEX: 28.34 KG/M2 | HEART RATE: 67 BPM | SYSTOLIC BLOOD PRESSURE: 126 MMHG | RESPIRATION RATE: 16 BRPM | DIASTOLIC BLOOD PRESSURE: 69 MMHG

## 2018-09-18 DIAGNOSIS — Z01.419 WELL WOMAN EXAM WITH ROUTINE GYNECOLOGICAL EXAM: Primary | ICD-10-CM

## 2018-09-18 PROCEDURE — 99386 PREV VISIT NEW AGE 40-64: CPT | Performed by: OBSTETRICS & GYNECOLOGY

## 2018-09-18 NOTE — PROGRESS NOTES
Subjective:      Patient ID: Barbara Archuleta is a 60 y.o. female. Gynecologic Exam     Other     pts here for annual gyn exam.  Pt's up to date regarding mammogram and colonoscopy. No bleeding. Review of Systems Pertinent review of systems items discussed above. All others systems items not discussed above were negative. Objective:   Physical Exam   Constitutional: She is oriented to person, place, and time. She appears well-developed and well-nourished. HENT:   Head: Normocephalic and atraumatic. Neck: No tracheal deviation present. No thyromegaly present. Cardiovascular: Normal rate, regular rhythm and normal heart sounds. No murmur heard. Pulmonary/Chest: Effort normal and breath sounds normal. No respiratory distress. She has no wheezes. She has no rales. Right breast exhibits no mass, no nipple discharge and no skin change. Left breast exhibits no mass, no nipple discharge and no skin change. Abdominal: Soft. She exhibits no distension and no mass. There is no tenderness. There is no rebound. Genitourinary: Rectum normal, vagina normal and uterus normal. Rectal exam shows no external hemorrhoid, no mass and guaiac negative stool. No breast tenderness (no masses), discharge or bleeding. There is no lesion on the right labia. There is no lesion on the left labia. Uterus is not deviated, not enlarged, not fixed and not tender. Cervix exhibits no motion tenderness, no discharge and no friability. Right adnexum displays no mass and no tenderness. Left adnexum displays no mass and no tenderness. No foreign body in the vagina. No vaginal discharge found. Genitourinary Comments: Pap performed. Musculoskeletal: Normal range of motion. Lymphadenopathy:     She has no cervical adenopathy. Neurological: She is alert and oriented to person, place, and time. Assessment:      Normal gyn exam      Plan:      F/u annual gyn exam.  Call with results.         Ricky Khan MD

## 2018-09-24 LAB
HPV COMMENT: NORMAL
HPV TYPE 16: NOT DETECTED
HPV TYPE 18: NOT DETECTED
HPVOH (OTHER TYPES): NOT DETECTED

## 2018-09-25 ENCOUNTER — HOSPITAL ENCOUNTER (OUTPATIENT)
Dept: PHYSICAL THERAPY | Age: 58
Setting detail: THERAPIES SERIES
Discharge: HOME | End: 2018-09-25
Payer: COMMERCIAL

## 2018-09-25 PROCEDURE — 97110 THERAPEUTIC EXERCISES: CPT

## 2018-09-25 PROCEDURE — G0283 ELEC STIM OTHER THAN WOUND: HCPCS

## 2018-09-27 ENCOUNTER — HOSPITAL ENCOUNTER (OUTPATIENT)
Dept: PHYSICAL THERAPY | Age: 58
Setting detail: THERAPIES SERIES
Discharge: HOME | End: 2018-09-27
Payer: COMMERCIAL

## 2018-09-27 PROCEDURE — G0283 ELEC STIM OTHER THAN WOUND: HCPCS

## 2018-09-27 PROCEDURE — 97110 THERAPEUTIC EXERCISES: CPT

## 2018-09-27 NOTE — FLOWSHEET NOTE
Physical Therapy Daily Treatment Note  Date:  2018    Patient Name:  John Wallace    :  1960  MRN: [id number]  Restrictions/Precautions:  COPD, osteopoerosis  Pertinent Medical History:  Medical/Treatment Diagnosis Information:  · Diagnosis: Lumbar radiculopathy  · Treatment Diagnosis: decreased abiolty to do adl's  Insurance/Certification information:  PT Insurance Information: [facility]  Physician Information:  Referring Practitioner: Dr. Villafana [facility] of care signed (Y/N):  routed  Visit# / total visits:   Pain level: -9/10     G-Code (if applicable):  E5154DR,     Date / Visit # G-Code Applied:  /  PT G-Codes  Functional Assessment Tool Used: revised osw  Score: 33  Functional Limitation: Mobility: Walking and moving around  Mobility: Walking and Moving Around Current Status (): At least 60 percent but less than 80 percent impaired, limited or restricted  Mobility: Walking and Moving Around Goal Status (): At least 40 percent but less than 60 percent impaired, limited or restricted    Progress Note: []  Yes  []  No  Next due by: Visit #10      History of Injury[de-identified] Pt is a 59 y/o female with a hx of lbp for years with an increase in pain in her lb and bilateral hips and le's for the past month or so with no cause. She c/o  constant pain in her bilateral lb and into her hips with shooting pain in spine and legs. She can't lay on her sides without severe pain. She is also having shooting  pain in her legs. She also says she feels like she is going to fall. Pt  is waking due to pay. She has taken steroids and they helped a little. She says nothing has helped other then this. She says she is up and down at home and is disabled due to copd. Subjective:     Pt states, \" I have no idea why my back and hips are sore \"  18: States her hips and legs are sore today. Was getting some pain on the R side of her upper thigh earlier.   18 Patient reports back and extremely restless, moving constantly !!     Electronically signed by:  Daria Trinidad PTA

## 2018-10-02 ENCOUNTER — HOSPITAL ENCOUNTER (OUTPATIENT)
Dept: PHYSICAL THERAPY | Age: 58
Setting detail: THERAPIES SERIES
Discharge: HOME | End: 2018-10-02
Payer: COMMERCIAL

## 2018-10-02 ENCOUNTER — APPOINTMENT (OUTPATIENT)
Dept: PHYSICAL THERAPY | Age: 58
End: 2018-10-02
Payer: COMMERCIAL

## 2018-10-02 PROCEDURE — 97110 THERAPEUTIC EXERCISES: CPT

## 2018-10-02 PROCEDURE — G0283 ELEC STIM OTHER THAN WOUND: HCPCS

## 2018-10-02 NOTE — FLOWSHEET NOTE
improves,  Try Shy next rx  Pt also extremely restless, moving constantly !!     Electronically signed by:  Yolanda Lee, PT

## 2018-10-03 ENCOUNTER — OFFICE VISIT (OUTPATIENT)
Dept: PULMONOLOGY | Age: 58
End: 2018-10-03
Payer: COMMERCIAL

## 2018-10-03 ENCOUNTER — HOSPITAL ENCOUNTER (OUTPATIENT)
Dept: PULMONOLOGY | Age: 58
Discharge: HOME | End: 2018-10-03
Payer: COMMERCIAL

## 2018-10-03 VITALS
RESPIRATION RATE: 16 BRPM | SYSTOLIC BLOOD PRESSURE: 110 MMHG | HEART RATE: 82 BPM | DIASTOLIC BLOOD PRESSURE: 78 MMHG | WEIGHT: 151 LBS | HEIGHT: 62 IN | OXYGEN SATURATION: 94 % | BODY MASS INDEX: 27.79 KG/M2 | TEMPERATURE: 98.2 F

## 2018-10-03 DIAGNOSIS — J44.9 CHRONIC OBSTRUCTIVE PULMONARY DISEASE (HCC): ICD-10-CM

## 2018-10-03 DIAGNOSIS — Z87.891 FORMER SMOKER: ICD-10-CM

## 2018-10-03 DIAGNOSIS — J44.9 COPD, SEVERE (HCC): Primary | ICD-10-CM

## 2018-10-03 PROCEDURE — G8484 FLU IMMUNIZE NO ADMIN: HCPCS | Performed by: INTERNAL MEDICINE

## 2018-10-03 PROCEDURE — 3023F SPIROM DOC REV: CPT | Performed by: INTERNAL MEDICINE

## 2018-10-03 PROCEDURE — G8926 SPIRO NO PERF OR DOC: HCPCS | Performed by: INTERNAL MEDICINE

## 2018-10-03 PROCEDURE — 94729 DIFFUSING CAPACITY: CPT | Performed by: INTERNAL MEDICINE

## 2018-10-03 PROCEDURE — 3017F COLORECTAL CA SCREEN DOC REV: CPT | Performed by: INTERNAL MEDICINE

## 2018-10-03 PROCEDURE — 94726 PLETHYSMOGRAPHY LUNG VOLUMES: CPT | Performed by: INTERNAL MEDICINE

## 2018-10-03 PROCEDURE — 94060 EVALUATION OF WHEEZING: CPT | Performed by: INTERNAL MEDICINE

## 2018-10-03 PROCEDURE — 94729 DIFFUSING CAPACITY: CPT

## 2018-10-03 PROCEDURE — 94726 PLETHYSMOGRAPHY LUNG VOLUMES: CPT

## 2018-10-03 PROCEDURE — 94060 EVALUATION OF WHEEZING: CPT

## 2018-10-03 PROCEDURE — G8598 ASA/ANTIPLAT THER USED: HCPCS | Performed by: INTERNAL MEDICINE

## 2018-10-03 PROCEDURE — 94664 DEMO&/EVAL PT USE INHALER: CPT

## 2018-10-03 PROCEDURE — 6370000000 HC RX 637 (ALT 250 FOR IP): Performed by: INTERNAL MEDICINE

## 2018-10-03 PROCEDURE — 1036F TOBACCO NON-USER: CPT | Performed by: INTERNAL MEDICINE

## 2018-10-03 PROCEDURE — G8427 DOCREV CUR MEDS BY ELIG CLIN: HCPCS | Performed by: INTERNAL MEDICINE

## 2018-10-03 PROCEDURE — 94640 AIRWAY INHALATION TREATMENT: CPT

## 2018-10-03 PROCEDURE — G8419 CALC BMI OUT NRM PARAM NOF/U: HCPCS | Performed by: INTERNAL MEDICINE

## 2018-10-03 PROCEDURE — 99204 OFFICE O/P NEW MOD 45 MIN: CPT | Performed by: INTERNAL MEDICINE

## 2018-10-03 RX ORDER — ALBUTEROL SULFATE 90 UG/1
4 AEROSOL, METERED RESPIRATORY (INHALATION) ONCE
Status: COMPLETED | OUTPATIENT
Start: 2018-10-03 | End: 2018-10-03

## 2018-10-03 RX ORDER — ALBUTEROL SULFATE 2.5 MG/3ML
2.5 SOLUTION RESPIRATORY (INHALATION) EVERY 6 HOURS PRN
Qty: 120 VIAL | Refills: 5 | Status: SHIPPED | OUTPATIENT
Start: 2018-10-03 | End: 2018-10-07

## 2018-10-03 RX ADMIN — ALBUTEROL SULFATE 4 PUFF: 90 AEROSOL, METERED RESPIRATORY (INHALATION) at 10:31

## 2018-10-03 NOTE — PROGRESS NOTES
REASON FOR CONSULTATION/CC:    Chief Complaint   Patient presents with    COPD     NP referred by Carina Valdez for COPD        Consult at request of   [facility] [address], APRN - CNP for shortness of breath     PCP: [facility], APRN - CNP    HISTORY OF PRESENT ILLNESS: Myron Goode is a 60y.o. year old female with a history of tobacoco abuse who presents :        \"I have problems with breathing and I have copd\"      dyspnea on exertion, on level ground and stairs. complains of wheezing and cough but not phlegm . She has albuterol daily to bid and using and states Spiriva was not formulary. albuterol is helpful      Tobacco:   Quit 2 years ago. History   Smoking Status    Former Smoker    Packs/day: 1.00    Years: 37.00    Types: Cigarettes    Quit date: 2017   Smokeless Tobacco    Never Used          PAST MEDICAL HISTORY:  Past Medical History:   Diagnosis Date    Back pain     COPD (chronic obstructive pulmonary disease) (HCC)     Foot pain     from bone spurs    GERD (gastroesophageal reflux disease)     Headache     sharp pain going through head    Hip pain     Hyperlipidemia     Hypertension      (normal spontaneous vaginal delivery)     Osteoporosis     Vision abnormalities        PAST SURGICAL HISTORY:  Past Surgical History:   Procedure Laterality Date    CARDIAC CATHETERIZATION      CHOLECYSTECTOMY         FAMILY HISTORY:  family history includes COPD in her father; Emphysema in her mother; Heart Failure in her father and mother; Hypertension in her mother and paternal grandmother. SOCIAL HISTORY:   reports that she quit smoking about 21 months ago. Her smoking use included Cigarettes. She has a 37.00 pack-year smoking history. She has never used smokeless tobacco.      ALLERGIES:  Patient is allergic to omeprazole; other; eggs or egg-derived products [eggs or egg-derived products]; eggs [egg white]; flexeril [cyclobenzaprine hcl]; and levofloxacin.     REVIEW OF SYSTEMS:  Constitutional: Negative for fever    HENT: Negative for sore throat  Eyes: Negative for redness   Respiratory: ++ for dyspnea, - cough  Cardiovascular: Negative for chest pain  Gastrointestinal: Negative for vomiting, diarrhea   Genitourinary: Negative for hematuria   Musculoskeletal: Negative for arthralgias   Skin: Negative for rash  Neurological: Negative for syncope  Hematological: Negative for adenopathy  Psychiatric/Behavorial: Negative for anxiety    Objective:   PHYSICAL EXAM:  Blood pressure 110/78, pulse 82, temperature 98.2 °F (36.8 °C), temperature source Oral, resp. rate 16, height 5' 2\" (1.575 m), weight 151 lb (68.5 kg), SpO2 94 %, not currently breastfeeding.'  Gen: No distress. Eyes: PERRL. No sclera icterus. No conjunctival injection. ENT: No discharge. Pharynx clear. External appearance of ears and nose normal.  Neck: Trachea midline. No obvious mass. Resp: No accessory muscle use. No crackles. No wheezes. No rhonchi. CV: Regular rate. Regular rhythm. No murmur or rub. No edema. GI: Non-tender. Non-distended. No hernia. Skin: Warm, dry, normal texture and turgor. No nodule on exposed extremities. Lymph: No cervical LAD. No supraclavicular LAD. M/S: No cyanosis. No clubbing. No joint deformity. Neuro: Moves all four extremities. Psych: Oriented x 3. No anxiety. Awake. Alert. Intact judgement and insight.     Current Outpatient Prescriptions   Medication Sig Dispense Refill    Fluticasone-Umeclidin-Vilant (TRELEGY ELLIPTA) 100-62.5-25 MCG/INH AEPB Inhale 1 puff into the lungs daily 1 each 5    albuterol (PROVENTIL) (2.5 MG/3ML) 0.083% nebulizer solution Take 3 mLs by nebulization every 6 hours as needed for Wheezing 120 vial 5    VENTOLIN  (90 Base) MCG/ACT inhaler INHALE TWO TO FOUR PUFFS BY MOUTH EVERY 6 HOURS AS NEEDED FOR WHEEZING 1 Inhaler 0    acetaminophen (TYLENOL) 500 MG tablet Take 2 tablets by mouth every 6 hours as needed for Pain Do not take with other medications containing acetaminophen. 30 tablet 0    lidocaine (LIDODERM) 5 % Place 1 patch onto the skin daily 12 hours on, 12 hours off. 10 patch 0    sertraline (ZOLOFT) 100 MG tablet Take 1 tablet by mouth daily 30 tablet 3    aspirin 81 MG chewable tablet Take 1 tablet by mouth daily 30 tablet 3    atorvastatin (LIPITOR) 40 MG tablet Take 1 tablet by mouth nightly 30 tablet 3    metoprolol succinate (TOPROL XL) 25 MG extended release tablet Take 0.5 tablets by mouth daily 30 tablet 1    pantoprazole (PROTONIX) 40 MG tablet Take 1 tablet by mouth daily 30 tablet 3    ergocalciferol (ERGOCALCIFEROL) 11947 units capsule Take 1 capsule by mouth once a week 4 capsule 3    promethazine (PHENERGAN) 12.5 MG tablet Take 1 tablet by mouth every 6 hours as needed for Nausea 30 tablet 0    Handicap Placard MISC by Does not apply route Handicap Placard for 5 years 1 each 0    sucralfate (CARAFATE) 1 GM tablet Take 1 tablet by mouth 2 times daily 60 tablet 0    diphenhydrAMINE-APAP, sleep, (TYLENOL PM EXTRA STRENGTH)  MG tablet Take 1 tablet by mouth nightly as needed for Sleep      methocarbamol (ROBAXIN) 500 MG tablet Take 2 tablets by mouth nightly as needed (pain) 30 tablet 0     No current facility-administered medications for this visit.         Data Reviewed:   CBC and Renal reviewed  Last CBC  Lab Results   Component Value Date    WBC 8.4 06/12/2018    RBC 4.40 06/12/2018    HGB 14.4 06/12/2018    MCV 93.2 06/12/2018     06/12/2018     Last Renal  Lab Results   Component Value Date     08/10/2018    K 4.4 08/10/2018    K 4.3 05/07/2018     08/10/2018    CO2 30 08/10/2018    CO2 28 06/12/2018    CO2 24 05/07/2018    BUN 7 08/10/2018    CREATININE 0.7 08/10/2018    GLUCOSE 126 06/12/2018    CALCIUM 9.4 08/10/2018       Last ABG  POC Blood Gas: No results found for: POCPH, POCPCO2, POCPO2, POCHCO3, NBEA, [id number]  No results for input(s): PH, PCO2, PO2, HCO3, BE, O2SAT

## 2018-10-03 NOTE — LETTER
Problem List Items Addressed This Visit     Former smoker     Quit smoking 1 year ago. Will need lung cancer screening CAT scan yearly. Last CT chest in May 2018. No signs of malignancy. COPD, severe ([address] - Primary     Pulmonary impression test completed today. FEV1 36%. I will start Trelegy, refer to pulmonary rehab and complete a 6 walk to assess for exertional hypoxemia. Nebulizer and albuterol nebulizer given. Relevant Medications    Fluticasone-Umeclidin-Vilant (TRELEGY ELLIPTA) 100-62.5-25 MCG/INH AEPB    albuterol (PROVENTIL) (2.5 MG/3ML) 0.083% nebulizer solution    Other Relevant Orders    Ambulatory referral to Pulmonary Rehab    [address] [facility] [place] [facility] [place] [facility] [place] [address] [facility]  Phone: [phone]  Fax: [phone]    Laineysegundosarah Livia*        October 3, 2018       Patient: Romana Marek   MR Number: [id number]   YOB: 1960   Date of Visit: 10/3/2018       Dear Dr. Aleksandra Dickerson and Mary Woodruff, APRN - CNP     Thank you for the request for consultation for Greta Whiting to me for the evaluation of shortness of breath . Below are the relevant portions of my assessment and plan of care. Problem List Items Addressed This Visit     Former smoker     Quit smoking 1 year ago. Will need lung cancer screening CAT scan yearly. Last CT chest in May 2018. No signs of malignancy. COPD, severe ([address] - Primary     Pulmonary impression test completed today. FEV1 36%. I will start Trelegy, refer to pulmonary rehab and complete a 6 walk to assess for exertional hypoxemia. Nebulizer and albuterol nebulizer given.          Relevant Medications    Fluticasone-Umeclidin-Vilant (TRELEGY ELLIPTA) 100-62.5-25 MCG/INH AEPB    albuterol (PROVENTIL) (2.5 MG/3ML) 0.083% nebulizer solution    Other Relevant Orders    Ambulatory referral to Pulmonary Rehab    6 MIN WALK TEST

## 2018-10-04 ENCOUNTER — HOSPITAL ENCOUNTER (OUTPATIENT)
Dept: PHYSICAL THERAPY | Age: 58
Setting detail: THERAPIES SERIES
Discharge: HOME | End: 2018-10-04
Payer: COMMERCIAL

## 2018-10-04 ENCOUNTER — TELEPHONE (OUTPATIENT)
Dept: PULMONOLOGY | Age: 58
End: 2018-10-04

## 2018-10-04 PROCEDURE — G0283 ELEC STIM OTHER THAN WOUND: HCPCS

## 2018-10-04 PROCEDURE — 97110 THERAPEUTIC EXERCISES: CPT

## 2018-10-04 RX ORDER — BUDESONIDE AND FORMOTEROL FUMARATE DIHYDRATE 160; 4.5 UG/1; UG/1
2 AEROSOL RESPIRATORY (INHALATION) 2 TIMES DAILY
Qty: 1 INHALER | Refills: 6 | Status: SHIPPED | OUTPATIENT
Start: 2018-10-04 | End: 2019-01-10 | Stop reason: SDUPTHER

## 2018-10-07 ENCOUNTER — APPOINTMENT (OUTPATIENT)
Dept: GENERAL RADIOLOGY | Age: 58
End: 2018-10-07
Payer: COMMERCIAL

## 2018-10-07 ENCOUNTER — HOSPITAL ENCOUNTER (EMERGENCY)
Age: 58
Discharge: HOME | End: 2018-10-07
Attending: EMERGENCY MEDICINE
Payer: COMMERCIAL

## 2018-10-07 VITALS
DIASTOLIC BLOOD PRESSURE: 60 MMHG | WEIGHT: 151.24 LBS | HEART RATE: 70 BPM | OXYGEN SATURATION: 95 % | HEIGHT: 62 IN | SYSTOLIC BLOOD PRESSURE: 119 MMHG | TEMPERATURE: 98.7 F | BODY MASS INDEX: 27.83 KG/M2 | RESPIRATION RATE: 17 BRPM

## 2018-10-07 DIAGNOSIS — J44.1 COPD EXACERBATION (HCC): ICD-10-CM

## 2018-10-07 DIAGNOSIS — R07.9 CHEST PAIN, UNSPECIFIED TYPE: Primary | ICD-10-CM

## 2018-10-07 LAB
ANION GAP SERPL CALCULATED.3IONS-SCNC: 14 MMOL/L (ref 3–16)
BASOPHILS ABSOLUTE: 0.1 K/UL (ref 0–0.2)
BASOPHILS RELATIVE PERCENT: 1 %
BUN BLDV-MCNC: 9 MG/DL (ref 7–20)
CALCIUM SERPL-MCNC: 9.3 MG/DL (ref 8.3–10.6)
CHLORIDE BLD-SCNC: 104 MMOL/L (ref 99–110)
CO2: 24 MMOL/L (ref 21–32)
CREAT SERPL-MCNC: <0.5 MG/DL (ref 0.6–1.1)
EOSINOPHILS ABSOLUTE: 0.2 K/UL (ref 0–0.6)
EOSINOPHILS RELATIVE PERCENT: 2.8 %
GFR AFRICAN AMERICAN: >60
GFR NON-AFRICAN AMERICAN: >60
GLUCOSE BLD-MCNC: 109 MG/DL (ref 70–99)
HCT VFR BLD CALC: 40.7 % (ref 36–48)
HEMOGLOBIN: 13.6 G/DL (ref 12–16)
LYMPHOCYTES ABSOLUTE: 1.8 K/UL (ref 1–5.1)
LYMPHOCYTES RELATIVE PERCENT: 19.9 %
MCH RBC QN AUTO: 32.1 PG (ref 26–34)
MCHC RBC AUTO-ENTMCNC: 33.4 G/DL (ref 31–36)
MCV RBC AUTO: 96.2 FL (ref 80–100)
MONOCYTES ABSOLUTE: 0.5 K/UL (ref 0–1.3)
MONOCYTES RELATIVE PERCENT: 6.1 %
NEUTROPHILS ABSOLUTE: 6.3 K/UL (ref 1.7–7.7)
NEUTROPHILS RELATIVE PERCENT: 70.2 %
PDW BLD-RTO: 15.4 % (ref 12.4–15.4)
PLATELET # BLD: 234 K/UL (ref 135–450)
PMV BLD AUTO: 7.8 FL (ref 5–10.5)
POTASSIUM SERPL-SCNC: 4.1 MMOL/L (ref 3.5–5.1)
RBC # BLD: 4.23 M/UL (ref 4–5.2)
SEDIMENTATION RATE, ERYTHROCYTE: 14 MM/HR (ref 0–30)
SODIUM BLD-SCNC: 142 MMOL/L (ref 136–145)
TROPONIN: <0.01 NG/ML
TROPONIN: <0.01 NG/ML
WBC # BLD: 8.9 K/UL (ref 4–11)

## 2018-10-07 PROCEDURE — 80048 BASIC METABOLIC PNL TOTAL CA: CPT

## 2018-10-07 PROCEDURE — 85652 RBC SED RATE AUTOMATED: CPT

## 2018-10-07 PROCEDURE — 85025 COMPLETE CBC W/AUTO DIFF WBC: CPT

## 2018-10-07 PROCEDURE — 6370000000 HC RX 637 (ALT 250 FOR IP): Performed by: PHYSICIAN ASSISTANT

## 2018-10-07 PROCEDURE — 94640 AIRWAY INHALATION TREATMENT: CPT

## 2018-10-07 PROCEDURE — 93010 ELECTROCARDIOGRAM REPORT: CPT | Performed by: INTERNAL MEDICINE

## 2018-10-07 PROCEDURE — 71045 X-RAY EXAM CHEST 1 VIEW: CPT

## 2018-10-07 PROCEDURE — 93005 ELECTROCARDIOGRAM TRACING: CPT | Performed by: EMERGENCY MEDICINE

## 2018-10-07 PROCEDURE — 99285 EMERGENCY DEPT VISIT HI MDM: CPT

## 2018-10-07 PROCEDURE — 94664 DEMO&/EVAL PT USE INHALER: CPT

## 2018-10-07 PROCEDURE — 84484 ASSAY OF TROPONIN QUANT: CPT

## 2018-10-07 PROCEDURE — 36415 COLL VENOUS BLD VENIPUNCTURE: CPT

## 2018-10-07 RX ORDER — ASPIRIN 81 MG/1
324 TABLET, CHEWABLE ORAL ONCE
Status: DISCONTINUED | OUTPATIENT
Start: 2018-10-07 | End: 2018-10-08 | Stop reason: HOSPADM

## 2018-10-07 RX ORDER — IPRATROPIUM BROMIDE AND ALBUTEROL SULFATE 2.5; .5 MG/3ML; MG/3ML
1 SOLUTION RESPIRATORY (INHALATION) ONCE
Status: COMPLETED | OUTPATIENT
Start: 2018-10-07 | End: 2018-10-07

## 2018-10-07 RX ORDER — PREDNISONE 20 MG/1
60 TABLET ORAL ONCE
Status: COMPLETED | OUTPATIENT
Start: 2018-10-07 | End: 2018-10-07

## 2018-10-07 RX ORDER — DOXYCYCLINE HYCLATE 100 MG
100 TABLET ORAL 2 TIMES DAILY
Qty: 20 TABLET | Refills: 0 | Status: SHIPPED | OUTPATIENT
Start: 2018-10-07 | End: 2018-10-17

## 2018-10-07 RX ORDER — PREDNISONE 20 MG/1
40 TABLET ORAL DAILY
Qty: 8 TABLET | Refills: 0 | Status: SHIPPED | OUTPATIENT
Start: 2018-10-07 | End: 2018-10-11

## 2018-10-07 RX ORDER — ALBUTEROL SULFATE 90 UG/1
2 AEROSOL, METERED RESPIRATORY (INHALATION) EVERY 6 HOURS PRN
Qty: 1 INHALER | Refills: 2 | Status: SHIPPED | OUTPATIENT
Start: 2018-10-07 | End: 2019-01-10 | Stop reason: SDUPTHER

## 2018-10-07 RX ADMIN — PREDNISONE 60 MG: 20 TABLET ORAL at 23:12

## 2018-10-07 RX ADMIN — IPRATROPIUM BROMIDE AND ALBUTEROL SULFATE 1 AMPULE: .5; 3 SOLUTION RESPIRATORY (INHALATION) at 21:20

## 2018-10-07 ASSESSMENT — PAIN DESCRIPTION - PAIN TYPE: TYPE: ACUTE PAIN

## 2018-10-07 ASSESSMENT — ENCOUNTER SYMPTOMS
COUGH: 1
SHORTNESS OF BREATH: 1
VOMITING: 0
ABDOMINAL PAIN: 0

## 2018-10-07 ASSESSMENT — PAIN SCALES - GENERAL: PAINLEVEL_OUTOF10: 9

## 2018-10-07 ASSESSMENT — PAIN DESCRIPTION - LOCATION: LOCATION: CHEST

## 2018-10-08 LAB
EKG ATRIAL RATE: 79 BPM
EKG DIAGNOSIS: NORMAL
EKG P AXIS: 55 DEGREES
EKG P-R INTERVAL: 120 MS
EKG Q-T INTERVAL: 374 MS
EKG QRS DURATION: 68 MS
EKG QTC CALCULATION (BAZETT): 428 MS
EKG R AXIS: 69 DEGREES
EKG T AXIS: 79 DEGREES
EKG VENTRICULAR RATE: 79 BPM

## 2018-10-08 NOTE — ED PROVIDER NOTES
lung       [facility]:  New Prescriptions    ALBUTEROL SULFATE HFA (PROVENTIL HFA) 108 (90 BASE) MCG/ACT INHALER    Inhale 2 puffs into the lungs every 6 hours as needed for Wheezing or Shortness of Breath    DOXYCYCLINE HYCLATE (VIBRA-TABS) 100 MG TABLET    Take 1 tablet by mouth 2 times daily for 10 days    PREDNISONE (DELTASONE) 20 MG TABLET    Take 2 tablets by mouth daily for 4 days    SPACER/AERO-HOLDING CHAMBERS (E-Z SPACER) AMITA    1 Device by Does not apply route daily as needed (sob)       (Please note that portions of this note were completed with a voice recognition program.  Efforts were made to edit the dictations but occasionally words are mis-transcribed.)    Jamison Calderon, [address], [address]  10/07/18 [id number]

## 2018-10-26 ENCOUNTER — TELEPHONE (OUTPATIENT)
Dept: CARDIOLOGY CLINIC | Age: 58
End: 2018-10-26

## 2018-10-31 ENCOUNTER — APPOINTMENT (OUTPATIENT)
Dept: GENERAL RADIOLOGY | Age: 58
End: 2018-10-31
Payer: COMMERCIAL

## 2018-10-31 ENCOUNTER — HOSPITAL ENCOUNTER (EMERGENCY)
Age: 58
Discharge: HOME | End: 2018-10-31
Payer: COMMERCIAL

## 2018-10-31 VITALS
SYSTOLIC BLOOD PRESSURE: 102 MMHG | RESPIRATION RATE: 16 BRPM | WEIGHT: 152.34 LBS | HEART RATE: 64 BPM | TEMPERATURE: 97.8 F | OXYGEN SATURATION: 96 % | DIASTOLIC BLOOD PRESSURE: 56 MMHG | BODY MASS INDEX: 28.03 KG/M2 | HEIGHT: 62 IN

## 2018-10-31 DIAGNOSIS — R07.9 CHEST PAIN, UNSPECIFIED TYPE: Primary | ICD-10-CM

## 2018-10-31 LAB
A/G RATIO: 1.6 (ref 1.1–2.2)
ALBUMIN SERPL-MCNC: 4.6 G/DL (ref 3.4–5)
ALP BLD-CCNC: 126 U/L (ref 40–129)
ALT SERPL-CCNC: 20 U/L (ref 10–40)
ANION GAP SERPL CALCULATED.3IONS-SCNC: 12 MMOL/L (ref 3–16)
AST SERPL-CCNC: 20 U/L (ref 15–37)
BACTERIA: ABNORMAL /HPF
BASOPHILS ABSOLUTE: 0 K/UL (ref 0–0.2)
BASOPHILS RELATIVE PERCENT: 0.6 %
BILIRUB SERPL-MCNC: 0.5 MG/DL (ref 0–1)
BILIRUBIN URINE: NEGATIVE
BLOOD, URINE: NEGATIVE
BUN BLDV-MCNC: 8 MG/DL (ref 7–20)
CALCIUM SERPL-MCNC: 9.5 MG/DL (ref 8.3–10.6)
CHLORIDE BLD-SCNC: 102 MMOL/L (ref 99–110)
CLARITY: ABNORMAL
CO2: 27 MMOL/L (ref 21–32)
COLOR: YELLOW
CREAT SERPL-MCNC: 0.6 MG/DL (ref 0.6–1.1)
EKG ATRIAL RATE: 75 BPM
EKG DIAGNOSIS: NORMAL
EKG Q-T INTERVAL: 384 MS
EKG QRS DURATION: 64 MS
EKG QTC CALCULATION (BAZETT): 437 MS
EKG R AXIS: 70 DEGREES
EKG T AXIS: 97 DEGREES
EKG VENTRICULAR RATE: 78 BPM
EOSINOPHILS ABSOLUTE: 0.2 K/UL (ref 0–0.6)
EOSINOPHILS RELATIVE PERCENT: 3.4 %
EPITHELIAL CELLS, UA: ABNORMAL /HPF
GFR AFRICAN AMERICAN: >60
GFR NON-AFRICAN AMERICAN: >60
GLOBULIN: 2.8 G/DL
GLUCOSE BLD-MCNC: 151 MG/DL (ref 70–99)
GLUCOSE URINE: NEGATIVE MG/DL
HCT VFR BLD CALC: 43.6 % (ref 36–48)
HEMOGLOBIN: 14.6 G/DL (ref 12–16)
KETONES, URINE: NEGATIVE MG/DL
LEUKOCYTE ESTERASE, URINE: ABNORMAL
LYMPHOCYTES ABSOLUTE: 1.6 K/UL (ref 1–5.1)
LYMPHOCYTES RELATIVE PERCENT: 22.2 %
MCH RBC QN AUTO: 32.3 PG (ref 26–34)
MCHC RBC AUTO-ENTMCNC: 33.5 G/DL (ref 31–36)
MCV RBC AUTO: 96.3 FL (ref 80–100)
MICROSCOPIC EXAMINATION: YES
MONOCYTES ABSOLUTE: 0.5 K/UL (ref 0–1.3)
MONOCYTES RELATIVE PERCENT: 6.8 %
MUCUS: ABNORMAL /LPF
NEUTROPHILS ABSOLUTE: 4.7 K/UL (ref 1.7–7.7)
NEUTROPHILS RELATIVE PERCENT: 67 %
NITRITE, URINE: NEGATIVE
PDW BLD-RTO: 15.4 % (ref 12.4–15.4)
PH UA: 5
PLATELET # BLD: 242 K/UL (ref 135–450)
PMV BLD AUTO: 8 FL (ref 5–10.5)
POTASSIUM SERPL-SCNC: 4.7 MMOL/L (ref 3.5–5.1)
PRO-BNP: 97 PG/ML (ref 0–124)
PROTEIN UA: NEGATIVE MG/DL
RBC # BLD: 4.53 M/UL (ref 4–5.2)
RENAL EPITHELIAL, UA: ABNORMAL /HPF
SODIUM BLD-SCNC: 141 MMOL/L (ref 136–145)
SPECIFIC GRAVITY UA: 1.02
TOTAL PROTEIN: 7.4 G/DL (ref 6.4–8.2)
TROPONIN: 0.02 NG/ML
TROPONIN: 0.02 NG/ML
TROPONIN: <0.01 NG/ML
URINE REFLEX TO CULTURE: YES
URINE TYPE: ABNORMAL
UROBILINOGEN, URINE: 0.2 E.U./DL
WBC # BLD: 7 K/UL (ref 4–11)
WBC UA: ABNORMAL /HPF (ref 0–5)

## 2018-10-31 PROCEDURE — 84484 ASSAY OF TROPONIN QUANT: CPT

## 2018-10-31 PROCEDURE — 80053 COMPREHEN METABOLIC PANEL: CPT

## 2018-10-31 PROCEDURE — 87086 URINE CULTURE/COLONY COUNT: CPT

## 2018-10-31 PROCEDURE — 93005 ELECTROCARDIOGRAM TRACING: CPT | Performed by: EMERGENCY MEDICINE

## 2018-10-31 PROCEDURE — 85025 COMPLETE CBC W/AUTO DIFF WBC: CPT

## 2018-10-31 PROCEDURE — 83880 ASSAY OF NATRIURETIC PEPTIDE: CPT

## 2018-10-31 PROCEDURE — 93010 ELECTROCARDIOGRAM REPORT: CPT | Performed by: INTERNAL MEDICINE

## 2018-10-31 PROCEDURE — 99285 EMERGENCY DEPT VISIT HI MDM: CPT

## 2018-10-31 PROCEDURE — 71045 X-RAY EXAM CHEST 1 VIEW: CPT

## 2018-10-31 PROCEDURE — 6370000000 HC RX 637 (ALT 250 FOR IP): Performed by: NURSE PRACTITIONER

## 2018-10-31 PROCEDURE — 96375 TX/PRO/DX INJ NEW DRUG ADDON: CPT

## 2018-10-31 PROCEDURE — 93005 ELECTROCARDIOGRAM TRACING: CPT | Performed by: NURSE PRACTITIONER

## 2018-10-31 PROCEDURE — 6360000002 HC RX W HCPCS: Performed by: NURSE PRACTITIONER

## 2018-10-31 PROCEDURE — 81001 URINALYSIS AUTO W/SCOPE: CPT

## 2018-10-31 PROCEDURE — 96374 THER/PROPH/DIAG INJ IV PUSH: CPT

## 2018-10-31 RX ORDER — MORPHINE SULFATE 2 MG/ML
4 INJECTION, SOLUTION INTRAMUSCULAR; INTRAVENOUS ONCE
Status: COMPLETED | OUTPATIENT
Start: 2018-10-31 | End: 2018-10-31

## 2018-10-31 RX ORDER — ONDANSETRON 2 MG/ML
4 INJECTION INTRAMUSCULAR; INTRAVENOUS ONCE
Status: COMPLETED | OUTPATIENT
Start: 2018-10-31 | End: 2018-10-31

## 2018-10-31 RX ADMIN — ONDANSETRON 4 MG: 2 INJECTION INTRAMUSCULAR; INTRAVENOUS at 10:46

## 2018-10-31 RX ADMIN — MORPHINE SULFATE 4 MG: 2 INJECTION, SOLUTION INTRAMUSCULAR; INTRAVENOUS at 10:46

## 2018-10-31 RX ADMIN — LIDOCAINE HYDROCHLORIDE: 20 SOLUTION ORAL; TOPICAL at 10:13

## 2018-10-31 ASSESSMENT — PAIN SCALES - GENERAL
PAINLEVEL_OUTOF10: 0
PAINLEVEL_OUTOF10: 0
PAINLEVEL_OUTOF10: 9
PAINLEVEL_OUTOF10: 10
PAINLEVEL_OUTOF10: 5

## 2018-10-31 ASSESSMENT — PAIN DESCRIPTION - PAIN TYPE: TYPE: ACUTE PAIN

## 2018-10-31 ASSESSMENT — ENCOUNTER SYMPTOMS
COUGH: 1
GASTROINTESTINAL NEGATIVE: 1
SHORTNESS OF BREATH: 1

## 2018-10-31 ASSESSMENT — PAIN DESCRIPTION - LOCATION: LOCATION: CHEST

## 2018-10-31 ASSESSMENT — HEART SCORE: ECG: 0

## 2018-10-31 NOTE — ED PROVIDER NOTES
normal. She has no wheezes. Musculoskeletal: Normal range of motion. Neurological: She is alert and oriented to person, place, and time. Skin: Skin is warm and dry. Capillary refill takes less than 2 seconds. She is not diaphoretic. Nursing note and vitals reviewed. DIAGNOSTIC RESULTS     EKG: All EKG's are interpreted by the Emergency Department Physician who either signs or Co-signs this chart in the absence of a cardiologist.  EKG #1 interpreted per Dr. Fátima Gudino reviewed per myself sinus rhythm. Ventricular rate 78. P waves are buried but present. She has flattening of the T waves. No evidence of STEMI. Compared to October 7, 2018 P waves were very discernible and was a normal sinus rhythm. EKG number 2/13/01 interpreted per Dr. Fátima Gudino reviewed per myself sinus rhythm. No evidence of STEMI. Narrow complex. Ventricular rate 65, WI interval 120, QRS 68, . EKG #3:1610, interpreted per Dr. Rhodes and reviewed per myself sinus rhythm. Narrow complex. Negative for ST elevation or depression. No evidence of T wave inversion. No evidence of J-point elevation. No evidence of STEMI. No evidence of ischemia. Ventricular rate 60, WI interval 124, QRS 70, . RADIOLOGY:   Non-plain film images such as CT, Ultrasound and MRI are read by the radiologist. Plain radiographic images are visualized and preliminarilyinterpreted by the emergency physician with the below findings:    Interpretation per the Radiologist below,if available at the time of this note:    XR CHEST PORTABLE   Final Result   No evidence of acute cardiopulmonary disease.                LABS:  Labs Reviewed   COMPREHENSIVE METABOLIC PANEL - Abnormal; Notable for the following:        Result Value    Glucose 151 (*)     All other components within normal limits    Narrative:     Performed at:  [place] [address] [place], [place]   Phone [phone]   URINE RT REFLEX TO CULTURE - MD  [address] [facility] [address] [facility]    Schedule an appointment as soon as possible for a visit in 1 day      [facility] Emergency Department  [address]  [phone]    As needed, If symptoms worsen      DISCHARGE MEDICATIONS:  Current Discharge Medication List          (Please note that portions of this note were completed with a voice recognition program.  Efforts were made to edit the dictations but occasionally words are mis-transcribed.)    Dayron Leong, [facility], APRN - CNP  10/31/18 [id number]

## 2018-11-01 LAB
EKG ATRIAL RATE: 60 BPM
EKG ATRIAL RATE: 65 BPM
EKG DIAGNOSIS: NORMAL
EKG DIAGNOSIS: NORMAL
EKG P AXIS: 17 DEGREES
EKG P AXIS: 19 DEGREES
EKG P-R INTERVAL: 120 MS
EKG P-R INTERVAL: 124 MS
EKG Q-T INTERVAL: 422 MS
EKG Q-T INTERVAL: 456 MS
EKG QRS DURATION: 68 MS
EKG QRS DURATION: 70 MS
EKG QTC CALCULATION (BAZETT): 438 MS
EKG QTC CALCULATION (BAZETT): 456 MS
EKG R AXIS: 49 DEGREES
EKG R AXIS: 59 DEGREES
EKG T AXIS: 47 DEGREES
EKG T AXIS: 67 DEGREES
EKG VENTRICULAR RATE: 60 BPM
EKG VENTRICULAR RATE: 65 BPM
URINE CULTURE, ROUTINE: NORMAL

## 2018-11-01 PROCEDURE — 93010 ELECTROCARDIOGRAM REPORT: CPT | Performed by: INTERNAL MEDICINE

## 2018-11-02 ENCOUNTER — TELEPHONE (OUTPATIENT)
Dept: INTERNAL MEDICINE CLINIC | Age: 58
End: 2018-11-02

## 2018-11-02 DIAGNOSIS — R11.0 NAUSEA: Primary | ICD-10-CM

## 2018-11-02 RX ORDER — ONDANSETRON 4 MG/1
4 TABLET, FILM COATED ORAL EVERY 8 HOURS PRN
Qty: 20 TABLET | Refills: 0 | Status: SHIPPED | OUTPATIENT
Start: 2018-11-02 | End: 2019-02-06 | Stop reason: SDUPTHER

## 2018-11-02 NOTE — TELEPHONE ENCOUNTER
Will send Zofran to pharmacy. If symptoms worsen, patient needs to return to ED for further evaluation.

## 2018-11-06 ENCOUNTER — OFFICE VISIT (OUTPATIENT)
Dept: INTERNAL MEDICINE CLINIC | Age: 58
End: 2018-11-06
Payer: COMMERCIAL

## 2018-11-06 VITALS
BODY MASS INDEX: 28.22 KG/M2 | SYSTOLIC BLOOD PRESSURE: 116 MMHG | RESPIRATION RATE: 12 BRPM | WEIGHT: 151.8 LBS | HEART RATE: 71 BPM | OXYGEN SATURATION: 94 % | DIASTOLIC BLOOD PRESSURE: 67 MMHG

## 2018-11-06 DIAGNOSIS — K21.9 GASTROESOPHAGEAL REFLUX DISEASE WITHOUT ESOPHAGITIS: ICD-10-CM

## 2018-11-06 DIAGNOSIS — M54.50 CHRONIC MIDLINE LOW BACK PAIN WITHOUT SCIATICA: ICD-10-CM

## 2018-11-06 DIAGNOSIS — G89.29 CHRONIC MIDLINE LOW BACK PAIN WITHOUT SCIATICA: ICD-10-CM

## 2018-11-06 DIAGNOSIS — I10 ESSENTIAL HYPERTENSION: Primary | ICD-10-CM

## 2018-11-06 DIAGNOSIS — F41.9 ANXIETY: ICD-10-CM

## 2018-11-06 DIAGNOSIS — E78.00 PURE HYPERCHOLESTEROLEMIA: ICD-10-CM

## 2018-11-06 PROCEDURE — G8598 ASA/ANTIPLAT THER USED: HCPCS | Performed by: NURSE PRACTITIONER

## 2018-11-06 PROCEDURE — G8427 DOCREV CUR MEDS BY ELIG CLIN: HCPCS | Performed by: NURSE PRACTITIONER

## 2018-11-06 PROCEDURE — 3017F COLORECTAL CA SCREEN DOC REV: CPT | Performed by: NURSE PRACTITIONER

## 2018-11-06 PROCEDURE — G8484 FLU IMMUNIZE NO ADMIN: HCPCS | Performed by: NURSE PRACTITIONER

## 2018-11-06 PROCEDURE — 1036F TOBACCO NON-USER: CPT | Performed by: NURSE PRACTITIONER

## 2018-11-06 PROCEDURE — 99214 OFFICE O/P EST MOD 30 MIN: CPT | Performed by: NURSE PRACTITIONER

## 2018-11-06 PROCEDURE — G8419 CALC BMI OUT NRM PARAM NOF/U: HCPCS | Performed by: NURSE PRACTITIONER

## 2018-11-06 RX ORDER — SERTRALINE HYDROCHLORIDE 100 MG/1
100 TABLET, FILM COATED ORAL DAILY
Qty: 30 TABLET | Refills: 3 | Status: SHIPPED | OUTPATIENT
Start: 2018-11-06 | End: 2019-02-06 | Stop reason: SDUPTHER

## 2018-11-06 RX ORDER — PANTOPRAZOLE SODIUM 40 MG/1
40 TABLET, DELAYED RELEASE ORAL DAILY
Qty: 30 TABLET | Refills: 3 | Status: SHIPPED | OUTPATIENT
Start: 2018-11-06 | End: 2019-02-06 | Stop reason: SDUPTHER

## 2018-11-06 RX ORDER — METOPROLOL SUCCINATE 25 MG/1
12.5 TABLET, EXTENDED RELEASE ORAL DAILY
Qty: 30 TABLET | Refills: 1 | Status: SHIPPED | OUTPATIENT
Start: 2018-11-06 | End: 2019-02-06 | Stop reason: SDUPTHER

## 2018-11-06 RX ORDER — METHOCARBAMOL 500 MG/1
500 TABLET, FILM COATED ORAL NIGHTLY PRN
Qty: 30 TABLET | Refills: 0 | Status: SHIPPED
Start: 2018-11-06 | End: 2019-05-20 | Stop reason: DRUGHIGH

## 2018-11-06 RX ORDER — ATORVASTATIN CALCIUM 40 MG/1
40 TABLET, FILM COATED ORAL NIGHTLY
Qty: 30 TABLET | Refills: 3 | Status: SHIPPED | OUTPATIENT
Start: 2018-11-06 | End: 2019-02-06 | Stop reason: SDUPTHER

## 2018-11-06 RX ORDER — ERGOCALCIFEROL (VITAMIN D2) 1250 MCG
50000 CAPSULE ORAL WEEKLY
Qty: 4 CAPSULE | Refills: 3 | Status: SHIPPED | OUTPATIENT
Start: 2018-11-06 | End: 2019-06-07 | Stop reason: SDUPTHER

## 2018-11-06 RX ORDER — LIDOCAINE 50 MG/G
1 PATCH TOPICAL DAILY
Qty: 10 PATCH | Refills: 0 | Status: ON HOLD | OUTPATIENT
Start: 2018-11-06 | End: 2020-08-25

## 2018-11-06 ASSESSMENT — ENCOUNTER SYMPTOMS
VOMITING: 0
ABDOMINAL PAIN: 1
SORE THROAT: 0
RHINORRHEA: 0
COUGH: 0
BACK PAIN: 1
NAUSEA: 1

## 2018-11-06 NOTE — PROGRESS NOTES
(PROVENTIL HFA) 108 (90 Base) MCG/ACT inhaler Inhale 2 puffs into the lungs every 6 hours as needed for Wheezing or Shortness of Breath Yes Bethany Mortimer, PA   Spacer/Aero-Holding Chambers (E-Z SPACER) AMITA 1 Device by Does not apply route daily as needed (sob) Yes Bethany Mortimer, PA   budesonide-formoterol (SYMBICORT) 160-4.5 MCG/ACT AERO Inhale 2 puffs into the lungs 2 times daily Yes Alexandria Tapia MD   acetaminophen (TYLENOL) 500 MG tablet Take 2 tablets by mouth every 6 hours as needed for Pain Do not take with other medications containing acetaminophen. Yes CARIDAD Rodriges   aspirin 81 MG chewable tablet Take 1 tablet by mouth daily Yes JOSE ANGEL Brown CNP   Handicap Placard MISC by Does not apply route Handicap Placard for 5 years Yes JOSE ANGEL Banks CNP        Social History   Substance Use Topics    Smoking status: Former Smoker     Packs/day: 1.00     Years: 37.00     Types: Cigarettes     Quit date: 1/1/2017    Smokeless tobacco: Never Used    Alcohol use No        Vitals:    11/06/18 1120   BP: 116/67   Pulse: 71   Resp: 12   SpO2: 94%   Weight: 151 lb 12.8 oz (68.9 kg)     Estimated body mass index is 28.22 kg/m² as calculated from the following:    Height as of 10/31/18: 5' 1.5\" (1.562 m). Weight as of this encounter: 151 lb 12.8 oz (68.9 kg). Physical Exam   Constitutional: She is oriented to person, place, and time. She appears well-developed and well-nourished. HENT:   Head: Normocephalic. Mouth/Throat: Oropharynx is clear and moist.   Eyes: Conjunctivae, EOM and lids are normal.   Neck: Normal range of motion. Neck supple. Cardiovascular: Normal rate, regular rhythm, normal heart sounds and normal pulses. Pulmonary/Chest: Effort normal and breath sounds normal.   Abdominal: Soft. Bowel sounds are normal. There is no hepatosplenomegaly. There is tenderness in the epigastric area. Musculoskeletal: Normal range of motion.    FROM X ALL 4 EXTREMITIES   Neurological:

## 2018-11-06 NOTE — PATIENT INSTRUCTIONS
Patient Education        DASH Diet: Care Instructions  Your Care Instructions    The DASH diet is an eating plan that can help lower your blood pressure. DASH stands for Dietary Approaches to Stop Hypertension. Hypertension is high blood pressure. The DASH diet focuses on eating foods that are high in calcium, potassium, and magnesium. These nutrients can lower blood pressure. The foods that are highest in these nutrients are fruits, vegetables, low-fat dairy products, nuts, seeds, and legumes. But taking calcium, potassium, and magnesium supplements instead of eating foods that are high in those nutrients does not have the same effect. The DASH diet also includes whole grains, fish, and poultry. The DASH diet is one of several lifestyle changes your doctor may recommend to lower your high blood pressure. Your doctor may also want you to decrease the amount of sodium in your diet. Lowering sodium while following the DASH diet can lower blood pressure even further than just the DASH diet alone. Follow-up care is a key part of your treatment and safety. Be sure to make and go to all appointments, and call your doctor if you are having problems. It's also a good idea to know your test results and keep a list of the medicines you take. How can you care for yourself at home? Following the DASH diet  · Eat 4 to 5 servings of fruit each day. A serving is 1 medium-sized piece of fruit, ½ cup chopped or canned fruit, 1/4 cup dried fruit, or 4 ounces (½ cup) of fruit juice. Choose fruit more often than fruit juice. · Eat 4 to 5 servings of vegetables each day. A serving is 1 cup of lettuce or raw leafy vegetables, ½ cup of chopped or cooked vegetables, or 4 ounces (½ cup) of vegetable juice. Choose vegetables more often than vegetable juice. · Get 2 to 3 servings of low-fat and fat-free dairy each day. A serving is 8 ounces of milk, 1 cup of yogurt, or 1 ½ ounces of cheese. · Eat 6 to 8 servings of grains each day. meals using beans and peas. Add garbanzo or kidney beans to salads. Make burritos and tacos with mashed cisneros beans or black beans. Where can you learn more? Go to https://marisol.[employer]. org and sign in to your Medication Review account. Enter Z970 in the [facility] box to learn more about \"DASH Diet: Care Instructions. \"     If you do not have an account, please click on the \"Sign Up Now\" link. Current as of: December 6, 2017  Content Version: 11.7  © 2476-2921 [employer]. Care instructions adapted under license by [facility] ([facility]). If you have questions about a medical condition or this instruction, always ask your healthcare professional. [address] any warranty or liability for your use of this information. Patient Education        Gastroesophageal Reflux Disease (GERD): Care Instructions  Your Care Instructions    Gastroesophageal reflux disease (GERD) is the backward flow of stomach acid into the esophagus. The esophagus is the tube that leads from your throat to your stomach. A one-way valve prevents the stomach acid from moving up into this tube. When you have GERD, this valve does not close tightly enough. If you have mild GERD symptoms including heartburn, you may be able to control the problem with antacids or over-the-counter medicine. Changing your diet, losing weight, and making other lifestyle changes can also help reduce symptoms. Follow-up care is a key part of your treatment and safety. Be sure to make and go to all appointments, and call your doctor if you are having problems. It's also a good idea to know your test results and keep a list of the medicines you take. How can you care for yourself at home? · Take your medicines exactly as prescribed. Call your doctor if you think you are having a problem with your medicine. · Your doctor may recommend over-the-counter medicine.  For mild or occasional indigestion, antacids, such as Tums, Gaviscon, Mylanta, or Maalox, may help. Your doctor also may recommend over-the-counter acid reducers, such as Pepcid AC, Tagamet HB, Zantac 75, or Prilosec. Read and follow all instructions on the label. If you use these medicines often, talk with your doctor. · Change your eating habits. ¨ It's best to eat several small meals instead of two or three large meals. ¨ After you eat, wait 2 to 3 hours before you lie down. ¨ Chocolate, mint, and alcohol can make GERD worse. ¨ Spicy foods, foods that have a lot of acid (like tomatoes and oranges), and coffee can make GERD symptoms worse in some people. If your symptoms are worse after you eat a certain food, you may want to stop eating that food to see if your symptoms get better. · Do not smoke or chew tobacco. Smoking can make GERD worse. If you need help quitting, talk to your doctor about stop-smoking programs and medicines. These can increase your chances of quitting for good. · If you have GERD symptoms at night, raise the head of your bed 6 to 8 inches by putting the frame on blocks or placing a foam wedge under the head of your mattress. (Adding extra pillows does not work.)  · Do not wear tight clothing around your middle. · Lose weight if you need to. Losing just 5 to 10 pounds can help. When should you call for help? Call your doctor now or seek immediate medical care if:    · You have new or different belly pain.     · Your stools are black and tarlike or have streaks of blood.    Watch closely for changes in your health, and be sure to contact your doctor if:    · Your symptoms have not improved after 2 days.     · Food seems to catch in your throat or chest.   Where can you learn more? Go to https://[employer]. org and sign in to your [employer] account. Enter L945 in the [place] box to learn more about \"Gastroesophageal Reflux Disease (GERD): Care Instructions. \"     If you do not have an account, please click

## 2018-11-08 ENCOUNTER — OFFICE VISIT (OUTPATIENT)
Dept: CARDIOLOGY CLINIC | Age: 58
End: 2018-11-08
Payer: COMMERCIAL

## 2018-11-08 VITALS
SYSTOLIC BLOOD PRESSURE: 106 MMHG | WEIGHT: 152 LBS | HEIGHT: 62 IN | HEART RATE: 64 BPM | OXYGEN SATURATION: 96 % | BODY MASS INDEX: 27.97 KG/M2 | DIASTOLIC BLOOD PRESSURE: 68 MMHG

## 2018-11-08 DIAGNOSIS — I10 ESSENTIAL HYPERTENSION: ICD-10-CM

## 2018-11-08 DIAGNOSIS — E78.2 MIXED HYPERLIPIDEMIA: ICD-10-CM

## 2018-11-08 DIAGNOSIS — I47.1 PSVT (PAROXYSMAL SUPRAVENTRICULAR TACHYCARDIA) (HCC): ICD-10-CM

## 2018-11-08 DIAGNOSIS — I25.10 CORONARY ARTERY DISEASE INVOLVING NATIVE CORONARY ARTERY OF NATIVE HEART WITHOUT ANGINA PECTORIS: ICD-10-CM

## 2018-11-08 DIAGNOSIS — R07.89 ATYPICAL CHEST PAIN: Primary | ICD-10-CM

## 2018-11-08 PROCEDURE — 3017F COLORECTAL CA SCREEN DOC REV: CPT | Performed by: INTERNAL MEDICINE

## 2018-11-08 PROCEDURE — G8427 DOCREV CUR MEDS BY ELIG CLIN: HCPCS | Performed by: INTERNAL MEDICINE

## 2018-11-08 PROCEDURE — 99214 OFFICE O/P EST MOD 30 MIN: CPT | Performed by: INTERNAL MEDICINE

## 2018-11-08 PROCEDURE — G8598 ASA/ANTIPLAT THER USED: HCPCS | Performed by: INTERNAL MEDICINE

## 2018-11-08 PROCEDURE — 1036F TOBACCO NON-USER: CPT | Performed by: INTERNAL MEDICINE

## 2018-11-08 PROCEDURE — 93000 ELECTROCARDIOGRAM COMPLETE: CPT | Performed by: INTERNAL MEDICINE

## 2018-11-08 PROCEDURE — G8419 CALC BMI OUT NRM PARAM NOF/U: HCPCS | Performed by: INTERNAL MEDICINE

## 2018-11-08 PROCEDURE — G8484 FLU IMMUNIZE NO ADMIN: HCPCS | Performed by: INTERNAL MEDICINE

## 2018-11-09 ENCOUNTER — HOSPITAL ENCOUNTER (OUTPATIENT)
Dept: CARDIAC REHAB | Age: 58
Setting detail: THERAPIES SERIES
Discharge: HOME | End: 2018-11-09
Payer: COMMERCIAL

## 2018-11-09 DIAGNOSIS — J44.9 COPD, SEVERE (HCC): ICD-10-CM

## 2018-11-09 PROCEDURE — 94618 PULMONARY STRESS TESTING: CPT | Performed by: INTERNAL MEDICINE

## 2018-11-09 PROCEDURE — 94618 PULMONARY STRESS TESTING: CPT

## 2018-11-13 ENCOUNTER — HOSPITAL ENCOUNTER (OUTPATIENT)
Dept: CARDIAC REHAB | Age: 58
Setting detail: THERAPIES SERIES
Discharge: HOME | End: 2018-11-13
Payer: COMMERCIAL

## 2018-11-13 VITALS — BODY MASS INDEX: 28.07 KG/M2 | WEIGHT: 151 LBS

## 2018-11-13 PROCEDURE — G0424 PULMONARY REHAB W EXER: HCPCS

## 2018-11-13 NOTE — PROGRESS NOTES
Travel   [] Bronchial Hygiene / Preventing Infection  [] Resistance Training  []  Benefits of Exercise  [] Energy Cons        Education  Individual instruction  [] PLB  [] RPD/RPE   [] O2 use  []  review PFT  [] Inhalers   [] Home Activity/Warm up/ stretches  Attend Classes:  [] Nutrition  []  Panic conntrol, relaxation, managing depression  []  A&P of the Respiratory System   [] Environ. Issues+ Travel   [] Bronchial Hygiene / Preventing Infection  []  Resistance Training  [] Benefits of Exercise  [] Energy Cons    Education  Individual instruction  [] PLB  [] RPD/RPE   []  O2 use  []  review PFT  [] Inhalers   [] Home Activity/Warm up/ stretches  Attend Classes:  [] Nutrition  []  Panic conntrol, relaxation, managing depression  [] A&P of the Respiratory System   [] Environ. Issues+ Travel   [] Bronchial Hygiene / Preventing Infection  []  Resistance Training  [] Benefits of Exercise  [] Energy Cons   Education  []  Addressed pt questions on Education Topics                                                         Physician Interaction / Response:  (If none, continue on present care plan)     Physician Interaction / Response:   (If none, continue on present care plan)   Physician Interaction / Response:   (If none, continue on present care plan)   Physician Interaction / Response:   (If none, continue on present care plan)   Physician Interaction / Response:       Discharge the patient. Rehab Potential:  [x]  Good [] Fair [] Poor    Summary: 11/13/18:  Pedro Burch is a 60 yr old female with COPD who was referred to Pulmonary Rehab for exertional shortness of breath. She presents today for her initial HI evaluation. She has a 4yr history of smoking 1-2ppd, quit 1/1/2017. Her most recent hospitalization 5/6/18 for NSTEMI. She has had no recent hospitalizations for COPD.  Maryann Mora reports that her breathing has become worse over the past year and that she has been finding it harder to perform her ADL'S and

## 2018-11-13 NOTE — PROGRESS NOTES
[facility] PULMONARY REHABILITATION ORDER  Medical Director:  Dr. Tamara Grossman  (X)Phase II Pulmonary Rehabilitation [facility] Facility-based, supervised exercise with SpO2 / HR monitoring and Oxygen Titration (if necessary) each session, 2-3 times weekly, with individualized education sessions. Patient Name: Soha Power  : 1960  Referring Physician: Dr. Sarahi Mckeon  Date: 2018    Medically Necessary Pulmonary Rehab for:  Severe COPD (from my dictation or the PFT report), which is GOLD Stage 3. Physician Prescribed Exercise:  Length of program:  Up to 36 sessions, subject to insurance limitations. Program to include aerobic endurance conditioning, resistance training (RT), step training (ST), flexibility training, and education (all relevant topics including psychosocial assessment). FITT Principles + Progression for Exercise Prescription (also found on the ITP):     Frequency: 2-3x / wk for up to 36 sessions    Intensity:   Set from Initial 6MWT (Feet achieved converted to METs)   Type:          Aerobic and Strength (Treadmill, AD, NuStep, SciFit, UBE, RT, ST)   Time:        15-60 min. of Treatment; Aerobic, RT, ST, Rests and Education  Progression:  1-2 minute increase in Time, per Type, per session, 5-20% increase in Intensity per week if SpO2 >88 AND Ann-Marie RPE/RPD is <14      Note:  These are guidelines. The Pulmonary Rehab staff may adjust the treatment to suit the patient's individual needs, goals, oxygen saturation and functional level. Plan of Care:  Pulmonary Rehab aerobic endurance and strength training sessions for a total of 30-91 min/day, including Education time, 2-3 days/week with suggested supplemented matching minutes of walking at home on most days not participating in Pulmonary Rehab. Patient is willing to cooperate and participate in the plan of care.  Patient is physically able, motivated, and willing to participate in RI, and I expect

## 2018-11-13 NOTE — PROGRESS NOTES
or orthopedic issues:  Interventions:                    Rate your physical   fitness (PF)?:   /10        -30 day PF goal:  /10    EDUCATION  [] Understands proper set/up O2 use  []  Understands SpO2 and RPD? [] Aerobic progression explained? []  Intensity progression explained? GOALS                                                                                           Rate your physical   fitness (PF)?:   /10        -30 day PF goal:  /10    EDUCATION   []  Home Activity education offered  [] Stretching/ Flexibility education offered  [] Attended Benefits of Exercise Class  []  Attended Resistance Training Class                                                    GOALS                                                                                           Rate your physical   fitness (PF)?:   /10    -30 day PF goal:  /10    EDUCATION  [] Attended all individually relevant exercise education sessions? []  Knows current exercise goals and recmndtns?:                                                  Goals Achieved? Rate your physical fitness now?:     /10  Handgrip:  Right:  Left:    Discharge  EDUCATION  []  Attended all individually relevant exercise education sessions?    [] Knows current exercise goals and recmndtns?:                                                                                        PHYSICIAN DIRECTED   EXERCISE RX     RPE : 11 - 14  Titrate O2 to keep SpO2 >89%    Frequency (F): 2-3x/wk in Rehab,         Initial Intensity : 2.0 METs (from 6MWT)   1.2-1.6  (60-80% of walk speed for TM)    Type (T): Aerobic (TM,NS, SF, AE, AB, RB, EL, Arc), RT 1-3#, 8-16 reps    CAN USE ALL EQUIPMENT EXCEPT       Time (Ti): Aerobic:   15-40 min               Progression: 1-2 min total time for TM, AB, NS, SF or Arm ergometer per session or when a steady state has occurred in RPE if  SpO2 and HR levels are acceptable           PHYSICIAN DIRECTED   EXERCISE RX       Titrate O2 to keep SpO2 >89%    Frequency (F): 2-3x/wk in Rehab, 1-3x/wk home walking       Intensity (I):  Initial + 5-10% increase each week or when a steady state has occurred in RPE if  SpO2 and HR levels are acceptable  Type (T)  Aerobic (TM,NS, SFR,SFS, AE, AB, RB), RT   8-16 reps    CAN USE ALL EQUIPMENT EXCEPT        Time (Ti): Aerobic:   30-40 min        Progression:   1-2 min total time for TM, AB, NS, SF or Arm ergometer per session or when a steady state has occurred in RPE if  SpO2 and HR levels are acceptable        Is pt. progressing in rehab?:               PHYSICIAN DIRECTED   EXERCISE RX       Titrate O2 to keep SpO2 >89%    Frequency (F): 2-3x/wk in Rehab, 1-3x/wk home walking       Intensity (I):  Initial + 5-10% increase each week when a steady state has occurred in RPE if  SpO2 and HR levels are acceptable  Type (T)  Aerobic (TM,NS, SFR,SFS, AE, AB, RB), RT   8-16 reps    CAN USE ALL EQUIPMENT EXCEPT        Time (Ti): Aerobic:   30-50 min     Progression:   1-2 min total time for TM, AB, NS, SF or Arm ergometer per session or when a steady state has occurred in RPE if  SpO2 and HR levels are acceptable              Is pt. progressing in rehab?:                 PHYSICIAN DIRECTED   EXERCISE RX       Titrate O2 to keep SpO2 >89%    Frequency (F): 2-3x/wk in Rehab, 1-3x/wk home walking       Intensity (I):  Initial + 5-10% increase each week when a steady state has occurred in RPE if  SpO2 and HR levels are acceptable  Type (T):   Aerobic (TM,NS, SFR,SFS, AE, AB, RB), RT   8-16 reps    CAN USE ALL EQUIPMENT EXCEPT          Time (Ti): Aerobic:   30-58 min         Progression:   1-2 min total time for TM, AB, NS, SF or Arm ergometer per session or when a steady state has occurred in RPE if  SpO2 and HR levels are acceptable            Is pt. progressing in rehab?:               Final Intensity (I): See below and final 6MWT Referral to weightloss/nutrition education     Intervention    [] Pt has had Nutrition class? [] Informal questions asked regarding nutrition/weight control Intervention    []  Pt has had Nutrition class? [] Questions addressed Intervention    []  Pt has had Nutrition class? Intervention    Pt aware of:     [] Pulmonary eating techniques   [] Smart choices, Calories   []Gas-producing foods   [] Wt gain / loss strategies     GOALS    Weight Loss         30 DAY TARGET GOAL:    [x] Decrease portion size by 250-500 calories/day  [x] Increase fluid intake to 6-8 8oz. [x] Eat less sweets  [x] Cut back on amt of soda     Reasonable, achievable 30 day weight goal: 147 lbs   (1-2 lb per week is recommended)            Weight Gain        30 day   Target Goal:    [] increase proteins  [] add nutrition  Supplement  [] 6 small meals      Did pt meet Initial 30 day Target Goal(s)?:     New 30 DAY TARGET GOAL:    [] Decrease saturated fats  [] Decrease gas producing  cruciferous veggies   -  Reasonable, achievable 30 day weight goal:     Did pt meet previous 30 day Target   Goal(s)?:     New 30 DAY TARGET GOAL:    [] Switch to whole grains whenever possible  [] Decrease/ Eliminate carbonated beverages    Reasonable, achievable 30 day weight goal:    Did pt meet previous 30 day Target   Goal(s)?:      New 30 DAY TARGET GOAL:    [] Smaller meals more frequently  [] Decrease portion sizes by 25%      Reasonable, achievable 30 day weight goal:                  Did pt meet previous 30 day Target   Goal(s)?:                         Greta's INDIVIDUAL TREATMENT PLAN  PSYCHOSOCIAL DOMAIN:    Psychosocial   Initial Assessment  Day 1 Psychosocial   Intervention  Day 2-30 Psychosocial  Re-Assessment  Day 31-60 Psychosocial   Re-Assessment  Day 61-90+ Psychosocial Discharge  Final Day   Psychosocial Screening Tools    (X) PHQ-9 score: 14      (X) SF-36: 56       (X) UCSD SOB score: 91         PHQ-9 Score:   If score >or =15, Action: preventing her from being as active as she once was. She will attend 28 sessions of TX twice weekly. Exercise:30-40 min of exercise starting at low levels. Time and intensity to be increased based on RPE. Today she did tolerate 15 min of exercise at low levels. Cardiac monitor displayed NSR throughout. Oxygen:Laine will exercise on room air as demonstrated by 6 mw test.    Medications affecting HR: Toprol xl 12.5mg daily, Albuterol MDI 2 puffs q 6hrs prn    Nutrition:Wt. 150# BMI 28. Bebeto Alvarado states she would like to lose about 10lbs. She admits to drinking 4 cans of pepsi daily. We discussed cutting back on soda, increasing water intake and decreasing portion sizes. She will attend General Nutrition class during TX. Psychosocial including Dyspnea with ADL's, Depression/Anxiety and Social Functioning: Greta lives with her sister and brother in law. She has 3 children with whom she sees often. She rates her anxiety/depression 4/10 which she feels is due to her breathing issues and inability to do much without getting short of breath. She is able to perform her ADL's but is finding it more difficult. She looks forward to increasing her strength and learning how to manage her COPD. Other:                   Referring Physician: Dr. Kyle Castelan                                              Fax #:    Reviewed and electronically signed by Dr Lianne Goldberg.  Dillon Rodriguez, Medical Director

## 2018-11-15 ENCOUNTER — HOSPITAL ENCOUNTER (OUTPATIENT)
Dept: CARDIAC REHAB | Age: 58
Setting detail: THERAPIES SERIES
End: 2018-11-15
Payer: COMMERCIAL

## 2018-11-16 ENCOUNTER — APPOINTMENT (OUTPATIENT)
Dept: GENERAL RADIOLOGY | Age: 58
End: 2018-11-16
Payer: COMMERCIAL

## 2018-11-16 ENCOUNTER — HOSPITAL ENCOUNTER (EMERGENCY)
Age: 58
Discharge: HOME | End: 2018-11-16
Attending: EMERGENCY MEDICINE
Payer: COMMERCIAL

## 2018-11-16 VITALS
DIASTOLIC BLOOD PRESSURE: 73 MMHG | SYSTOLIC BLOOD PRESSURE: 134 MMHG | RESPIRATION RATE: 14 BRPM | BODY MASS INDEX: 27.87 KG/M2 | TEMPERATURE: 98.8 F | HEIGHT: 62 IN | HEART RATE: 63 BPM | OXYGEN SATURATION: 94 % | WEIGHT: 151.46 LBS

## 2018-11-16 DIAGNOSIS — R07.9 CHEST PAIN, UNSPECIFIED TYPE: Primary | ICD-10-CM

## 2018-11-16 DIAGNOSIS — R10.13 EPIGASTRIC PAIN: ICD-10-CM

## 2018-11-16 DIAGNOSIS — R11.0 NAUSEA: ICD-10-CM

## 2018-11-16 LAB
A/G RATIO: 1.9 (ref 1.1–2.2)
ALBUMIN SERPL-MCNC: 4.8 G/DL (ref 3.4–5)
ALP BLD-CCNC: 127 U/L (ref 40–129)
ALT SERPL-CCNC: 15 U/L (ref 10–40)
ANION GAP SERPL CALCULATED.3IONS-SCNC: 14 MMOL/L (ref 3–16)
AST SERPL-CCNC: 17 U/L (ref 15–37)
BASOPHILS ABSOLUTE: 0.1 K/UL (ref 0–0.2)
BASOPHILS RELATIVE PERCENT: 0.8 %
BILIRUB SERPL-MCNC: 0.6 MG/DL (ref 0–1)
BUN BLDV-MCNC: 9 MG/DL (ref 7–20)
CALCIUM SERPL-MCNC: 9.7 MG/DL (ref 8.3–10.6)
CHLORIDE BLD-SCNC: 103 MMOL/L (ref 99–110)
CO2: 26 MMOL/L (ref 21–32)
CREAT SERPL-MCNC: 0.6 MG/DL (ref 0.6–1.1)
EOSINOPHILS ABSOLUTE: 0.3 K/UL (ref 0–0.6)
EOSINOPHILS RELATIVE PERCENT: 3.9 %
GFR AFRICAN AMERICAN: >60
GFR NON-AFRICAN AMERICAN: >60
GLOBULIN: 2.5 G/DL
GLUCOSE BLD-MCNC: 129 MG/DL (ref 70–99)
HCT VFR BLD CALC: 43.5 % (ref 36–48)
HEMOGLOBIN: 14.7 G/DL (ref 12–16)
LIPASE: 23 U/L (ref 13–60)
LYMPHOCYTES ABSOLUTE: 2.3 K/UL (ref 1–5.1)
LYMPHOCYTES RELATIVE PERCENT: 25.4 %
MCH RBC QN AUTO: 32.5 PG (ref 26–34)
MCHC RBC AUTO-ENTMCNC: 33.7 G/DL (ref 31–36)
MCV RBC AUTO: 96.6 FL (ref 80–100)
MONOCYTES ABSOLUTE: 0.7 K/UL (ref 0–1.3)
MONOCYTES RELATIVE PERCENT: 7.4 %
NEUTROPHILS ABSOLUTE: 5.6 K/UL (ref 1.7–7.7)
NEUTROPHILS RELATIVE PERCENT: 62.5 %
PDW BLD-RTO: 15.3 % (ref 12.4–15.4)
PLATELET # BLD: 255 K/UL (ref 135–450)
PMV BLD AUTO: 8.5 FL (ref 5–10.5)
POTASSIUM SERPL-SCNC: 4.2 MMOL/L (ref 3.5–5.1)
PRO-BNP: 180 PG/ML (ref 0–124)
RBC # BLD: 4.5 M/UL (ref 4–5.2)
SODIUM BLD-SCNC: 143 MMOL/L (ref 136–145)
TOTAL PROTEIN: 7.3 G/DL (ref 6.4–8.2)
TROPONIN: <0.01 NG/ML
TROPONIN: <0.01 NG/ML
WBC # BLD: 8.9 K/UL (ref 4–11)

## 2018-11-16 PROCEDURE — 6360000002 HC RX W HCPCS: Performed by: PHYSICIAN ASSISTANT

## 2018-11-16 PROCEDURE — 93005 ELECTROCARDIOGRAM TRACING: CPT | Performed by: PHYSICIAN ASSISTANT

## 2018-11-16 PROCEDURE — 84484 ASSAY OF TROPONIN QUANT: CPT

## 2018-11-16 PROCEDURE — 36415 COLL VENOUS BLD VENIPUNCTURE: CPT

## 2018-11-16 PROCEDURE — 85025 COMPLETE CBC W/AUTO DIFF WBC: CPT

## 2018-11-16 PROCEDURE — 71046 X-RAY EXAM CHEST 2 VIEWS: CPT

## 2018-11-16 PROCEDURE — 2500000003 HC RX 250 WO HCPCS: Performed by: EMERGENCY MEDICINE

## 2018-11-16 PROCEDURE — 83690 ASSAY OF LIPASE: CPT

## 2018-11-16 PROCEDURE — 99284 EMERGENCY DEPT VISIT MOD MDM: CPT

## 2018-11-16 PROCEDURE — 96374 THER/PROPH/DIAG INJ IV PUSH: CPT

## 2018-11-16 PROCEDURE — 6370000000 HC RX 637 (ALT 250 FOR IP): Performed by: PHYSICIAN ASSISTANT

## 2018-11-16 PROCEDURE — 80053 COMPREHEN METABOLIC PANEL: CPT

## 2018-11-16 PROCEDURE — 83880 ASSAY OF NATRIURETIC PEPTIDE: CPT

## 2018-11-16 PROCEDURE — S0028 INJECTION, FAMOTIDINE, 20 MG: HCPCS | Performed by: EMERGENCY MEDICINE

## 2018-11-16 RX ORDER — ONDANSETRON 4 MG/1
4 TABLET, ORALLY DISINTEGRATING ORAL ONCE
Status: COMPLETED | OUTPATIENT
Start: 2018-11-16 | End: 2018-11-16

## 2018-11-16 RX ORDER — ONDANSETRON 2 MG/ML
4 INJECTION INTRAMUSCULAR; INTRAVENOUS ONCE
Status: DISCONTINUED | OUTPATIENT
Start: 2018-11-16 | End: 2018-11-16

## 2018-11-16 RX ORDER — FAMOTIDINE 20 MG/1
20 TABLET, FILM COATED ORAL 2 TIMES DAILY
Qty: 10 TABLET | Refills: 0 | Status: SHIPPED | OUTPATIENT
Start: 2018-11-16 | End: 2018-12-16 | Stop reason: ALTCHOICE

## 2018-11-16 RX ADMIN — FAMOTIDINE 20 MG: 10 INJECTION, SOLUTION INTRAVENOUS at 14:43

## 2018-11-16 RX ADMIN — LIDOCAINE HYDROCHLORIDE: 20 SOLUTION ORAL; TOPICAL at 13:18

## 2018-11-16 RX ADMIN — ONDANSETRON 4 MG: 4 TABLET, ORALLY DISINTEGRATING ORAL at 13:37

## 2018-11-16 ASSESSMENT — ENCOUNTER SYMPTOMS
ABDOMINAL PAIN: 1
NAUSEA: 1
WHEEZING: 1
VOMITING: 0
SHORTNESS OF BREATH: 1
COUGH: 0

## 2018-11-16 ASSESSMENT — PAIN DESCRIPTION - PAIN TYPE: TYPE: ACUTE PAIN

## 2018-11-16 ASSESSMENT — PAIN DESCRIPTION - LOCATION: LOCATION: CHEST

## 2018-11-16 ASSESSMENT — PAIN SCALES - GENERAL
PAINLEVEL_OUTOF10: 10
PAINLEVEL_OUTOF10: 4

## 2018-11-16 ASSESSMENT — PAIN DESCRIPTION - ORIENTATION: ORIENTATION: UPPER;MID

## 2018-11-16 NOTE — ED PROVIDER NOTES
[facility]  eMERGENCY dEPARTMENT eNCOUnter      Pt Name: Alia Watt  MRN: [id number]  [place] 1960  Date of evaluation: 11/16/2018  Provider: Krunal Isbell Dr       Chief Complaint   Patient presents with    Heartburn     x 40min, woke her up from her sleep    Chest Pain     substernal         HISTORY OF PRESENT ILLNESS  (Location/Symptom, Timing/Onset, Context/Setting, Quality, Duration, Modifying Factors, Severity.)   Patti Whiting is a 60 y.o. female who presents to the emergency department For heartburn and substernal chest pain that woke her up at 12 noon today. She reports has been constant since then. Reports prior episodes in the past but unsure what is from. She does have acid reflux. She  some candy around 2 or 3 AM.  Also had pizza yesterday. She reports nausea but denies vomiting. Also has epigastric abdominal pain. Has associated shortness of breath. Reports chills but denies fever. Denies any cough. Has COPD and has been wheezing a little more than normal.  She does have a history of NSTEMI. Did have a heart cath in May. She has diabetes hypertension hyperlipidemia. Former smoker who quit 2 years ago. Smoked 2-3 packs a day for 40 years. Simpson shave a  family history of MI. Denies any history of VTE. Denies alcohol use. Has had a cholecystectomy. From cardiology OV note 11/8/18 Dr. Hoang Query:  5/2018: 30 day event monitor: no sustained arrhythmia; 2.4 sec pause (asx); SR     Echo 5/9/2018:  Normal left ventricle size, wall thickness and systolic function with an  estimated ejection fraction of 55%. No regional wall motion abnormalities are seen. Diastolic filling parameters suggest normal diastolic filing pressure. Mild mitral regurgitation is present.     5/7/2018 LHC:  1.  Patent right coronary artery with mild disease of the posterior descending artery branch of the RCA.   2.  Patent left main

## 2018-11-16 NOTE — ED NOTES
Bed: S-42  Expected date:   Expected time:   Means of arrival:   Comments:  Aleksander Peacock [id number], RN  11/16/18 [id number]

## 2018-11-17 PROCEDURE — 93010 ELECTROCARDIOGRAM REPORT: CPT | Performed by: INTERNAL MEDICINE

## 2018-11-20 ENCOUNTER — HOSPITAL ENCOUNTER (OUTPATIENT)
Dept: CARDIAC REHAB | Age: 58
Setting detail: THERAPIES SERIES
Discharge: HOME | End: 2018-11-20
Payer: COMMERCIAL

## 2018-11-20 VITALS — WEIGHT: 149 LBS | BODY MASS INDEX: 27.7 KG/M2

## 2018-11-20 PROCEDURE — G0424 PULMONARY REHAB W EXER: HCPCS

## 2018-11-27 ENCOUNTER — HOSPITAL ENCOUNTER (OUTPATIENT)
Dept: CARDIAC REHAB | Age: 58
Setting detail: THERAPIES SERIES
Discharge: HOME | End: 2018-11-27
Payer: COMMERCIAL

## 2018-11-27 VITALS — WEIGHT: 149.9 LBS | BODY MASS INDEX: 27.86 KG/M2

## 2018-11-27 LAB
EKG ATRIAL RATE: 73 BPM
EKG DIAGNOSIS: NORMAL
EKG P AXIS: 74 DEGREES
EKG P-R INTERVAL: 120 MS
EKG Q-T INTERVAL: 382 MS
EKG QRS DURATION: 76 MS
EKG QTC CALCULATION (BAZETT): 420 MS
EKG R AXIS: 69 DEGREES
EKG T AXIS: 83 DEGREES
EKG VENTRICULAR RATE: 73 BPM

## 2018-11-27 PROCEDURE — G0424 PULMONARY REHAB W EXER: HCPCS

## 2018-11-29 ENCOUNTER — HOSPITAL ENCOUNTER (OUTPATIENT)
Dept: CARDIAC REHAB | Age: 58
Setting detail: THERAPIES SERIES
Discharge: HOME | End: 2018-11-29
Payer: COMMERCIAL

## 2018-11-29 VITALS — BODY MASS INDEX: 27.99 KG/M2 | WEIGHT: 150.6 LBS

## 2018-11-29 PROCEDURE — G0424 PULMONARY REHAB W EXER: HCPCS

## 2018-12-04 ENCOUNTER — APPOINTMENT (OUTPATIENT)
Dept: CARDIAC REHAB | Age: 58
End: 2018-12-04
Payer: COMMERCIAL

## 2018-12-06 ENCOUNTER — HOSPITAL ENCOUNTER (OUTPATIENT)
Dept: CARDIAC REHAB | Age: 58
Setting detail: THERAPIES SERIES
Discharge: HOME | End: 2018-12-06
Payer: COMMERCIAL

## 2018-12-06 VITALS — WEIGHT: 151 LBS | BODY MASS INDEX: 28.07 KG/M2

## 2018-12-06 PROCEDURE — G0424 PULMONARY REHAB W EXER: HCPCS

## 2018-12-11 ENCOUNTER — HOSPITAL ENCOUNTER (OUTPATIENT)
Dept: CARDIAC REHAB | Age: 58
Setting detail: THERAPIES SERIES
Discharge: HOME | End: 2018-12-11
Payer: COMMERCIAL

## 2018-12-11 VITALS — WEIGHT: 152 LBS | BODY MASS INDEX: 28.26 KG/M2

## 2018-12-11 PROCEDURE — G0424 PULMONARY REHAB W EXER: HCPCS

## 2018-12-11 NOTE — PROGRESS NOTES
receive adequate portable system  []  Compliant with use as prescribed  []  Learn O2 safety guidelines           Medications  []  Uses as prescribed  [] Non- compliant with prescribed meds    Respiratory Medications    []  Proper use and technique of MDI, DPI and/or nebs  [] Incorrect use and technique of MDI, DPI and /or nebs    Hypoxemia  Problem:      Current Oxygen Prescription:    l/min rest   l/min exercise   titration plan/weaning plan:    Goals:   []  Manage Hypoxemia  []  receive adequate portable system  []  Compliant with use as prescribed  [] Learn O2 safety guidelines           Medications  []  Uses as prescribed  []  Non- compliant with prescribed meds    Respiratory Medications    []  Proper use and technique of MDI, DPI and/or nebs  []  Incorrect use and technique of MDI, DPI and /or nebs    Hypoxemia  Problem:      Current Oxygen Prescription:    l/min rest   l/min exercise   titration plan/weaning plan:    Goals:   []  Manage Hypoxemia  []  receive adequate portable system  [] Compliant with use as prescribed  []  Learn O2 safety guidelines     Medications    [] Compliant  []  Noncompliant       Respiratory Medications    []  Compliant  [] Noncompliant              Hypoxemia  Problem:    []  Compliant  []  Noncompliant    Final Oxygen Prescription:    l/min rest   l/min exercise                                                           Greta's INDIVIDUAL TREATMENT PLAN  NUTRITION DOMAIN:        NUTRITION   Initial Assessment  Day 1 NUTRITION   Intervention  Day 2-30 NUTRITION   Re-Assessment  Day 31-60 NUTRITION   Re-Assessment Day 61-90+ NUTRITION Discharge  Final Day   Weight Management:  151 lbs  Current BMI 28 Weight Management:  153 lbs   Weight Management:    lbs  Current BMI Weight Management:    lbs  Current BMI Weight Management:    lbs  Current BMI   Diabetes?: N/A  A1C   Fasting BS:   Insulin?:    Oral agent?     Diabetes:  If y, has patient seen a positive trend in FBS?: PHQ-9 score: 14      (X) SF-36: 56       (X) [facility] SOB score: 91         PHQ-9 Score: If score >or =15, Action:     SF-36 Mental Score:     [facility]SD Score: Any action on Screening Tools?:                  Psychosocial Screening   Tools    Repeat PHQ-9 Score if mely:    Repeat SF-36      Repeat UCSD SOB Score:       Assessment  []  S/S of depression identified? []  PCP notified of PHQ-9 results  Yes    []  On Anti-anxiety / anti-depression meds?: Zoloft 100mg daily           Intervention  If S/S of depression present, action taken:     Is the patient receiving support for the AL program at home?:  [x]  Y    [] N    Is the patient tolerating the intensity and duration of the AL program?:     [x] Y   [] N Re-Assessment  [] S/S of depression present? If [x] , action:         Med Changes?:   [] Y   [] N        Is the patient tolerating the intensity and duration of the AL program?:    []  Y    [] N  Re-Assessment  Psych Issue notes:        Is the patient receiving support for the AL program at home?:  []  Y  [] N    Is the patient tolerating the intensity and duration of the AL program?:  []  Y   []  N     Final Assessment                       Results of any Physician/ Counselor Intervention?:     [] Y   [] N         GOALS        30 DAY TARGET GOAL    [x]  Assess Mental Score using   SF-36 and PHQ-9   [x]  Teach PLB  [x]  Reassure pt that (s)he can stop and rest, adjust the speeds, and/or switch modalities from our suggestions  -    (0 being 'None', 10 being 'Very'),  -How would you rate your Anxiety Level: 4      -How would you rate your level of  depression: 4    -How would you rate your mood: (0 is negative all the time, 10 is positive all the time):  4          ADL's  Assess PF score on SF-36    Score= 15    From [facility], ADL pt struggles with most: Going up/down steps to laundry    Out of 10, rate your ability to do that ADL now.   3     GOALS        Did pt meet Initial 30 day   Target Goal(s)?:       30 DAY NEW TARGET GOAL    [x] Decrease/Maintain anxiety and depression level  [x] Increase ability to complete ADL  [x] Encourage pt to rate exercises truthfully to encourage correct intensity / duration prescription  -  (0 being 'None', 10 being 'Very'),  -How would you rate your Anxiety Level: 3/10      -How would you rate your level of depression: 3/10      -How would you rate your mood: 5    ADL's 4/10 GOALS        Did pt meet previous 30 day Target Goal(s)?:      RE- ASSESSMENT GOAL    [] Decrease/  Maintain anxiety and depression level  [] Increase ability to complete ADL  -          (0 being 'None', 10 being 'Very'),  -How would you rate your Anxiety Level:      -How would you rate your level of depression:    -How would you rate your mood: GOALS        Did pt meet previous 30 day Target Goal(s)?:      RE- ASSESSMENT GOAL    [] Decrease/  Maintain anxiety and depression level  [] Increase ability to complete ADL  -          (0 being 'None', 10 being 'Very'),  -How would you rate your Anxiety Level:      -How would you rate your level of depression:    -How would you rate your mood:     Discharge            Did pts SF-36 Mental Score increase?:          [] Pt is aware of the s/s of depression    [] Received Psychosocial Education    Any follow up with physicians  necessary?:      [] Y    [] N               Greta's INDIVIDUAL TREATMENT PLAN  EDUCATION DOMAIN:    EDUCATION   Initial Assessment  Day 1 EDUCATION   Intervention  Day 2-30 EDUCATION   Re-Assessment  Day 31-60 EDUCATION   ReAssessment Day 61-90+ EDUCATION Discharge  Final Day                      Education  [x] Equipment/Staff Orientation  [x] Breathing Retraining  [x] Importance of Clean Hands    Chronic Cough?:   [] Y   [x]  N  Spacer?:   [x]   Y   []  N     Sleep Apnea?:  [] Y    [x] N     [x] Education Book given       Education  Individual instruction  [x] PLB  [x] RPD/RPE   [] O2 use  []  review PFT  [] Inhalers   []Home Activity/Warm up/ states the pepcid has helped and she has had no further pain. Nutrition:  Weight maintained at 152#  Psychosocial:  Aisha Marroquin is pleasant. Rates her anxiety is 3 / depression 3  We will continue to progress her exercise time and intensity as tolerated. -MD [facility]                   Referring Physician: Dr. Gene Meyer                                              Fax #:    Reviewed and electronically signed by Dr Shannan Kerr.  Michelle Mata, Medical Director

## 2018-12-11 NOTE — PROGRESS NOTES
NEW TARGET GOAL    [x] Decrease/Maintain anxiety and depression level  [x] Increase ability to complete ADL  [x] Encourage pt to rate exercises truthfully to encourage correct intensity / duration prescription  -  (0 being 'None', 10 being 'Very'),  -How would you rate your Anxiety Level: 3/10      -How would you rate your level of depression: 3/10      -How would you rate your mood: 5    ADL's 4/10 GOALS        Did pt meet previous 30 day Target Goal(s)?:      RE- ASSESSMENT GOAL    [] Decrease/  Maintain anxiety and depression level  [] Increase ability to complete ADL  -          (0 being 'None', 10 being 'Very'),  -How would you rate your Anxiety Level:      -How would you rate your level of depression:    -How would you rate your mood: GOALS        Did pt meet previous 30 day Target Goal(s)?:      RE- ASSESSMENT GOAL    [] Decrease/  Maintain anxiety and depression level  [] Increase ability to complete ADL  -          (0 being 'None', 10 being 'Very'),  -How would you rate your Anxiety Level:      -How would you rate your level of depression:    -How would you rate your mood:     Discharge            Did pts SF-36 Mental Score increase?:          [] Pt is aware of the s/s of depression    [] Received Psychosocial Education    Any follow up with physicians  necessary?:      [] Y    [] N               Greta's INDIVIDUAL TREATMENT PLAN  EDUCATION DOMAIN:    EDUCATION   Initial Assessment  Day 1 EDUCATION   Intervention  Day 2-30 EDUCATION   Re-Assessment  Day 31-60 EDUCATION   ReAssessment Day 61-90+ EDUCATION Discharge  Final Day                      Education  [x] Equipment/Staff Orientation  [x] Breathing Retraining  [x] Importance of Clean Hands    Chronic Cough?:   [] Y   [x]  N  Spacer?:   [x]   Y   []  N     Sleep Apnea?:  [] Y    [x] N     [x] Education Book given       Education  Individual instruction  [x] PLB  [x] RPD/RPE   [] O2 use  []  review PFT  [] Inhalers   []Home Activity/Warm up/ stretches  Attend Classes:  [] Nutrition  [x] Panic control, relaxation, managing depression  [] A&P of the Respiratory System   [] Environ. Issues+ Travel   [x] Bronchial Hygiene / Preventing Infection  [] Resistance Training  []  Benefits of Exercise  [] Energy Cons        Education  Individual instruction  [] PLB  [] RPD/RPE   [] O2 use  []  review PFT  [] Inhalers   [] Home Activity/Warm up/ stretches  Attend Classes:  [] Nutrition  []  Panic conntrol, relaxation, managing depression  []  A&P of the Respiratory System   [] Environ. Issues+ Travel   [] Bronchial Hygiene / Preventing Infection  []  Resistance Training  [] Benefits of Exercise  [] Energy Cons    Education  Individual instruction  [] PLB  [] RPD/RPE   []  O2 use  []  review PFT  [] Inhalers   [] Home Activity/Warm up/ stretches  Attend Classes:  [] Nutrition  []  Panic conntrol, relaxation, managing depression  [] A&P of the Respiratory System   [] Environ. Issues+ Travel   [] Bronchial Hygiene / Preventing Infection  []  Resistance Training  [] Benefits of Exercise  [] Energy Cons   Education  []  Addressed pt questions on Education Topics                                                         Physician Interaction / Response:  (If none, continue on present care plan)     Physician Interaction / Response:   (If none, continue on present care plan)   Physician Interaction / Response:   (If none, continue on present care plan)   Physician Interaction / Response:   (If none, continue on present care plan)   Physician Interaction / Response:       Discharge the patient. Rehab Potential:  [x]  Good [] Fair [] Poor    Summary: 11/13/18:  Shena Hurtado is a 60 yr old female with COPD who was referred to Pulmonary Rehab for exertional shortness of breath. She presents today for her initial MI evaluation. She has a 4yr history of smoking 1-2ppd, quit 1/1/2017. Her most recent hospitalization 5/6/18 for NSTEMI.  She has had no recent hospitalizations for COPD. Estrella Hayes reports that her breathing has become worse over the past year and that she has been finding it harder to perform her ADL'S and preventing her from being as active as she once was. She will attend 28 sessions of HI twice weekly. Exercise:30-40 min of exercise starting at low levels. Time and intensity to be increased based on RPE. Today she did tolerate 15 min of exercise at low levels. Cardiac monitor displayed NSR throughout. Oxygen:Laine will exercise on room air as demonstrated by 6 mw test.    Medications affecting HR: Toprol xl 12.5mg daily, Albuterol MDI 2 puffs q 6hrs prn    Nutrition:Wt. 150# BMI 28. Luis Alfredo Vegas states she would like to lose about 10lbs. She admits to drinking 4 cans of pepsi daily. We discussed cutting back on soda, increasing water intake and decreasing portion sizes. She will attend General Nutrition class during HI. Psychosocial including Dyspnea with ADL's, Depression/Anxiety and Social Functioning: Greta lives with her sister and brother in law. She has 3 children with whom she sees often. She rates her anxiety/depression 4/10 which she feels is due to her breathing issues and inability to do much without getting short of breath. She is able to perform her ADL's but is finding it more difficult. She looks forward to increasing her strength and learning how to manage her COPD.     30 Day Progress Report:12/11/2018  Attendance:  Cortez Glassron has attended 6 exercise sessions / 2 education classes  Exercise:  Ginny Stevens completes 35-40 min of exercise at low levels. She does occasionally complain of back and neck discomfort. We have reviewed proper positioning and safety on the exercise equipment. She is doing warm-ups at home. Oxygen:  She completes all exercise on room air and maintains an adequate SaO2 94-97%. We have reviewed proper breathing techniques. Medications: Ginny Stevens states she was started on Pepcid following an episode of CP that was evaluated in the ED.  She

## 2018-12-13 ENCOUNTER — HOSPITAL ENCOUNTER (OUTPATIENT)
Dept: CARDIAC REHAB | Age: 58
Setting detail: THERAPIES SERIES
Discharge: HOME | End: 2018-12-13
Payer: COMMERCIAL

## 2018-12-13 VITALS — BODY MASS INDEX: 28.07 KG/M2 | WEIGHT: 151 LBS

## 2018-12-13 PROCEDURE — G0424 PULMONARY REHAB W EXER: HCPCS

## 2018-12-16 ENCOUNTER — HOSPITAL ENCOUNTER (EMERGENCY)
Age: 58
Discharge: HOME | End: 2018-12-16
Payer: COMMERCIAL

## 2018-12-16 VITALS
HEIGHT: 61 IN | DIASTOLIC BLOOD PRESSURE: 62 MMHG | WEIGHT: 149.47 LBS | TEMPERATURE: 98.1 F | SYSTOLIC BLOOD PRESSURE: 115 MMHG | OXYGEN SATURATION: 95 % | HEART RATE: 76 BPM | BODY MASS INDEX: 28.22 KG/M2 | RESPIRATION RATE: 16 BRPM

## 2018-12-16 DIAGNOSIS — R21 RASH: Primary | ICD-10-CM

## 2018-12-16 PROCEDURE — 6370000000 HC RX 637 (ALT 250 FOR IP): Performed by: PHYSICIAN ASSISTANT

## 2018-12-16 PROCEDURE — 99282 EMERGENCY DEPT VISIT SF MDM: CPT

## 2018-12-16 RX ORDER — CLOTRIMAZOLE 1 %
CREAM (GRAM) TOPICAL
Qty: 40 G | Refills: 0 | Status: SHIPPED | OUTPATIENT
Start: 2018-12-16 | End: 2019-08-24

## 2018-12-16 RX ORDER — FAMOTIDINE 20 MG/1
20 TABLET, FILM COATED ORAL 2 TIMES DAILY
Qty: 10 TABLET | Refills: 0 | Status: SHIPPED | OUTPATIENT
Start: 2018-12-16 | End: 2019-05-20

## 2018-12-16 RX ORDER — PREDNISONE 20 MG/1
60 TABLET ORAL ONCE
Status: COMPLETED | OUTPATIENT
Start: 2018-12-16 | End: 2018-12-16

## 2018-12-16 RX ORDER — PREDNISONE 50 MG/1
50 TABLET ORAL DAILY
Qty: 5 TABLET | Refills: 0 | Status: SHIPPED | OUTPATIENT
Start: 2018-12-16 | End: 2018-12-21

## 2018-12-16 RX ORDER — HYDROXYZINE 50 MG/1
50 TABLET, FILM COATED ORAL 3 TIMES DAILY PRN
Qty: 30 TABLET | Refills: 0 | Status: SHIPPED | OUTPATIENT
Start: 2018-12-16 | End: 2018-12-26

## 2018-12-16 RX ADMIN — PREDNISONE 60 MG: 20 TABLET ORAL at 09:40

## 2018-12-16 ASSESSMENT — PAIN SCALES - GENERAL: PAINLEVEL_OUTOF10: 4

## 2018-12-16 ASSESSMENT — PAIN DESCRIPTION - DESCRIPTORS: DESCRIPTORS: ITCHING

## 2018-12-16 NOTE — ED PROVIDER NOTES
[address]  [address] [facility] [address]  Dept: [address] [address]: [phone]    eMERGENCY dEPARTMENT eNCOUnter    Evaluated by the Advanced Practice Provider    [address] [place] [address]    Chief Complaint   Patient presents with    Rash     itchy rash under breasts for a few days;       HPI    Ghassan Salguero is a 60 y.o. female who presents with a rash. Onset was 1 week ago. The duration has been constant. The quality is redness with bumps. The location is the upper abdomen. Patient complains of associated itching. The patient denies any new foods, medications, soaps, lotions or detergents. REVIEW OF SYSTEMS    Pulmonary: No difficulty breathing or chest tightness  Skin: see HPI  ENT: No throat swelling or tongue swelling  General: No fevers or syncope    PAST MEDICAL & SURGICAL HISTORY    Past Medical History:   Diagnosis Date    Back pain     COPD (chronic obstructive pulmonary disease) (HCC)     Foot pain     from bone spurs    GERD (gastroesophageal reflux disease)     Headache     sharp pain going through head    Hip pain     Hyperlipidemia     Hypertension      (normal spontaneous vaginal delivery)     Osteoporosis     Vision abnormalities      Past Surgical History:   Procedure Laterality Date    CARDIAC CATHETERIZATION      CHOLECYSTECTOMY         CURRENT MEDICATIONS  (may include discharge medications prescribed in the ED)  Current Outpatient Rx   Medication Sig Dispense Refill    famotidine (PEPCID) 20 MG tablet Take 1 tablet by mouth 2 times daily for 5 days 10 tablet 0    methocarbamol (ROBAXIN) 500 MG tablet Take 1 tablet by mouth nightly as needed (pain) 30 tablet 0    ergocalciferol (ERGOCALCIFEROL) 61136 units capsule Take 1 capsule by mouth once a week 4 capsule 3    lidocaine (LIDODERM) 5 % Place 1 patch onto the skin daily 12 hours on, 12 hours off.  10 patch 0    pantoprazole (PROTONIX)

## 2018-12-18 ENCOUNTER — APPOINTMENT (OUTPATIENT)
Dept: CARDIAC REHAB | Age: 58
End: 2018-12-18
Payer: COMMERCIAL

## 2018-12-21 ENCOUNTER — HOSPITAL ENCOUNTER (OUTPATIENT)
Dept: CARDIAC REHAB | Age: 58
Setting detail: THERAPIES SERIES
Discharge: HOME | End: 2018-12-21
Payer: COMMERCIAL

## 2018-12-27 ENCOUNTER — HOSPITAL ENCOUNTER (OUTPATIENT)
Dept: CARDIAC REHAB | Age: 58
Setting detail: THERAPIES SERIES
Discharge: HOME | End: 2018-12-27
Payer: COMMERCIAL

## 2018-12-27 VITALS — WEIGHT: 151.9 LBS | BODY MASS INDEX: 28.7 KG/M2

## 2018-12-27 PROCEDURE — G0424 PULMONARY REHAB W EXER: HCPCS

## 2019-01-03 ENCOUNTER — HOSPITAL ENCOUNTER (OUTPATIENT)
Dept: CARDIAC REHAB | Age: 59
Setting detail: THERAPIES SERIES
Discharge: HOME | End: 2019-01-03
Payer: COMMERCIAL

## 2019-01-03 VITALS — BODY MASS INDEX: 28.89 KG/M2 | WEIGHT: 152.9 LBS

## 2019-01-03 PROCEDURE — G0424 PULMONARY REHAB W EXER: HCPCS

## 2019-01-08 ENCOUNTER — HOSPITAL ENCOUNTER (OUTPATIENT)
Dept: CARDIAC REHAB | Age: 59
Setting detail: THERAPIES SERIES
Discharge: HOME | End: 2019-01-08
Payer: COMMERCIAL

## 2019-01-08 ENCOUNTER — TELEPHONE (OUTPATIENT)
Dept: ADMINISTRATIVE | Age: 59
End: 2019-01-08

## 2019-01-10 ENCOUNTER — OFFICE VISIT (OUTPATIENT)
Dept: PULMONOLOGY | Age: 59
End: 2019-01-10
Payer: COMMERCIAL

## 2019-01-10 ENCOUNTER — HOSPITAL ENCOUNTER (OUTPATIENT)
Dept: CARDIAC REHAB | Age: 59
Setting detail: THERAPIES SERIES
Discharge: HOME | End: 2019-01-10
Payer: COMMERCIAL

## 2019-01-10 VITALS — BODY MASS INDEX: 29.02 KG/M2 | WEIGHT: 153.6 LBS

## 2019-01-10 VITALS
SYSTOLIC BLOOD PRESSURE: 122 MMHG | TEMPERATURE: 98.1 F | DIASTOLIC BLOOD PRESSURE: 70 MMHG | RESPIRATION RATE: 16 BRPM | BODY MASS INDEX: 28.89 KG/M2 | HEART RATE: 86 BPM | WEIGHT: 153 LBS | HEIGHT: 61 IN | OXYGEN SATURATION: 92 %

## 2019-01-10 DIAGNOSIS — Z23 NEED FOR VACCINATION FOR PNEUMOCOCCUS: ICD-10-CM

## 2019-01-10 DIAGNOSIS — J44.9 COPD, SEVERE (HCC): Primary | ICD-10-CM

## 2019-01-10 PROCEDURE — 3023F SPIROM DOC REV: CPT | Performed by: INTERNAL MEDICINE

## 2019-01-10 PROCEDURE — G0424 PULMONARY REHAB W EXER: HCPCS

## 2019-01-10 PROCEDURE — G8926 SPIRO NO PERF OR DOC: HCPCS | Performed by: INTERNAL MEDICINE

## 2019-01-10 PROCEDURE — 99213 OFFICE O/P EST LOW 20 MIN: CPT | Performed by: INTERNAL MEDICINE

## 2019-01-10 PROCEDURE — 3017F COLORECTAL CA SCREEN DOC REV: CPT | Performed by: INTERNAL MEDICINE

## 2019-01-10 PROCEDURE — G8427 DOCREV CUR MEDS BY ELIG CLIN: HCPCS | Performed by: INTERNAL MEDICINE

## 2019-01-10 PROCEDURE — G8484 FLU IMMUNIZE NO ADMIN: HCPCS | Performed by: INTERNAL MEDICINE

## 2019-01-10 PROCEDURE — 90471 IMMUNIZATION ADMIN: CPT | Performed by: INTERNAL MEDICINE

## 2019-01-10 PROCEDURE — 1036F TOBACCO NON-USER: CPT | Performed by: INTERNAL MEDICINE

## 2019-01-10 PROCEDURE — G8599 NO ASA/ANTIPLAT THER USE RNG: HCPCS | Performed by: INTERNAL MEDICINE

## 2019-01-10 PROCEDURE — G8419 CALC BMI OUT NRM PARAM NOF/U: HCPCS | Performed by: INTERNAL MEDICINE

## 2019-01-10 PROCEDURE — 90670 PCV13 VACCINE IM: CPT | Performed by: INTERNAL MEDICINE

## 2019-01-10 RX ORDER — BUDESONIDE AND FORMOTEROL FUMARATE DIHYDRATE 160; 4.5 UG/1; UG/1
2 AEROSOL RESPIRATORY (INHALATION) 2 TIMES DAILY
Qty: 1 INHALER | Refills: 6 | Status: SHIPPED | OUTPATIENT
Start: 2019-01-10 | End: 2019-06-07 | Stop reason: SDUPTHER

## 2019-01-10 RX ORDER — ALBUTEROL SULFATE 90 UG/1
2 AEROSOL, METERED RESPIRATORY (INHALATION) EVERY 6 HOURS PRN
Qty: 1 INHALER | Refills: 5 | Status: SHIPPED | OUTPATIENT
Start: 2019-01-10 | End: 2019-06-07 | Stop reason: SDUPTHER

## 2019-01-15 ENCOUNTER — HOSPITAL ENCOUNTER (OUTPATIENT)
Dept: CARDIAC REHAB | Age: 59
Setting detail: THERAPIES SERIES
Discharge: HOME | End: 2019-01-15
Payer: COMMERCIAL

## 2019-01-15 VITALS — BODY MASS INDEX: 28.91 KG/M2 | WEIGHT: 153 LBS

## 2019-01-15 PROCEDURE — G0424 PULMONARY REHAB W EXER: HCPCS

## 2019-01-22 ENCOUNTER — HOSPITAL ENCOUNTER (OUTPATIENT)
Dept: CARDIAC REHAB | Age: 59
Setting detail: THERAPIES SERIES
Discharge: HOME | End: 2019-01-22
Payer: COMMERCIAL

## 2019-01-24 ENCOUNTER — OFFICE VISIT (OUTPATIENT)
Dept: ORTHOPEDIC SURGERY | Age: 59
End: 2019-01-24
Payer: COMMERCIAL

## 2019-01-24 ENCOUNTER — TELEPHONE (OUTPATIENT)
Dept: ORTHOPEDIC SURGERY | Age: 59
End: 2019-01-24

## 2019-01-24 ENCOUNTER — HOSPITAL ENCOUNTER (OUTPATIENT)
Dept: CARDIAC REHAB | Age: 59
Setting detail: THERAPIES SERIES
Discharge: HOME | End: 2019-01-24
Payer: COMMERCIAL

## 2019-01-24 VITALS
BODY MASS INDEX: 27.97 KG/M2 | DIASTOLIC BLOOD PRESSURE: 79 MMHG | HEIGHT: 62 IN | WEIGHT: 152 LBS | HEART RATE: 66 BPM | SYSTOLIC BLOOD PRESSURE: 139 MMHG

## 2019-01-24 DIAGNOSIS — M51.36 DDD (DEGENERATIVE DISC DISEASE), LUMBAR: Primary | ICD-10-CM

## 2019-01-24 DIAGNOSIS — I10 ESSENTIAL HYPERTENSION: ICD-10-CM

## 2019-01-24 PROCEDURE — G8599 NO ASA/ANTIPLAT THER USE RNG: HCPCS | Performed by: NURSE PRACTITIONER

## 2019-01-24 PROCEDURE — G8419 CALC BMI OUT NRM PARAM NOF/U: HCPCS | Performed by: NURSE PRACTITIONER

## 2019-01-24 PROCEDURE — 1036F TOBACCO NON-USER: CPT | Performed by: NURSE PRACTITIONER

## 2019-01-24 PROCEDURE — G0424 PULMONARY REHAB W EXER: HCPCS

## 2019-01-24 PROCEDURE — G8427 DOCREV CUR MEDS BY ELIG CLIN: HCPCS | Performed by: NURSE PRACTITIONER

## 2019-01-24 PROCEDURE — G8484 FLU IMMUNIZE NO ADMIN: HCPCS | Performed by: NURSE PRACTITIONER

## 2019-01-24 PROCEDURE — 3017F COLORECTAL CA SCREEN DOC REV: CPT | Performed by: NURSE PRACTITIONER

## 2019-01-24 PROCEDURE — 99213 OFFICE O/P EST LOW 20 MIN: CPT | Performed by: NURSE PRACTITIONER

## 2019-01-25 RX ORDER — ASPIRIN 81 MG/1
TABLET, CHEWABLE ORAL
Qty: 30 TABLET | Refills: 5 | Status: SHIPPED | OUTPATIENT
Start: 2019-01-25 | End: 2019-06-07 | Stop reason: SDUPTHER

## 2019-01-29 ENCOUNTER — HOSPITAL ENCOUNTER (OUTPATIENT)
Dept: CARDIAC REHAB | Age: 59
Setting detail: THERAPIES SERIES
Discharge: HOME | End: 2019-01-29
Payer: COMMERCIAL

## 2019-01-31 ENCOUNTER — HOSPITAL ENCOUNTER (OUTPATIENT)
Dept: CARDIAC REHAB | Age: 59
Setting detail: THERAPIES SERIES
Discharge: HOME | End: 2019-01-31
Payer: COMMERCIAL

## 2019-01-31 VITALS — BODY MASS INDEX: 28.06 KG/M2 | WEIGHT: 153.4 LBS

## 2019-01-31 PROCEDURE — G0424 PULMONARY REHAB W EXER: HCPCS

## 2019-02-05 ENCOUNTER — HOSPITAL ENCOUNTER (OUTPATIENT)
Dept: CARDIAC REHAB | Age: 59
Setting detail: THERAPIES SERIES
Discharge: HOME | End: 2019-02-05
Payer: COMMERCIAL

## 2019-02-06 ENCOUNTER — OFFICE VISIT (OUTPATIENT)
Dept: INTERNAL MEDICINE CLINIC | Age: 59
End: 2019-02-06
Payer: COMMERCIAL

## 2019-02-06 VITALS
OXYGEN SATURATION: 97 % | HEART RATE: 82 BPM | BODY MASS INDEX: 28.13 KG/M2 | WEIGHT: 153.8 LBS | SYSTOLIC BLOOD PRESSURE: 129 MMHG | DIASTOLIC BLOOD PRESSURE: 76 MMHG

## 2019-02-06 DIAGNOSIS — R13.10 DYSPHAGIA, UNSPECIFIED TYPE: ICD-10-CM

## 2019-02-06 DIAGNOSIS — I10 ESSENTIAL HYPERTENSION: Primary | ICD-10-CM

## 2019-02-06 DIAGNOSIS — E78.00 PURE HYPERCHOLESTEROLEMIA: ICD-10-CM

## 2019-02-06 DIAGNOSIS — L24.9 IRRITANT CONTACT DERMATITIS, UNSPECIFIED TRIGGER: ICD-10-CM

## 2019-02-06 DIAGNOSIS — R11.0 NAUSEA: ICD-10-CM

## 2019-02-06 DIAGNOSIS — K21.00 GASTROESOPHAGEAL REFLUX DISEASE WITH ESOPHAGITIS: ICD-10-CM

## 2019-02-06 DIAGNOSIS — F41.9 ANXIETY: ICD-10-CM

## 2019-02-06 PROCEDURE — 1036F TOBACCO NON-USER: CPT | Performed by: NURSE PRACTITIONER

## 2019-02-06 PROCEDURE — G8419 CALC BMI OUT NRM PARAM NOF/U: HCPCS | Performed by: NURSE PRACTITIONER

## 2019-02-06 PROCEDURE — 3017F COLORECTAL CA SCREEN DOC REV: CPT | Performed by: NURSE PRACTITIONER

## 2019-02-06 PROCEDURE — G8598 ASA/ANTIPLAT THER USED: HCPCS | Performed by: NURSE PRACTITIONER

## 2019-02-06 PROCEDURE — G8427 DOCREV CUR MEDS BY ELIG CLIN: HCPCS | Performed by: NURSE PRACTITIONER

## 2019-02-06 PROCEDURE — 99215 OFFICE O/P EST HI 40 MIN: CPT | Performed by: NURSE PRACTITIONER

## 2019-02-06 PROCEDURE — G8484 FLU IMMUNIZE NO ADMIN: HCPCS | Performed by: NURSE PRACTITIONER

## 2019-02-06 RX ORDER — METOPROLOL SUCCINATE 25 MG/1
12.5 TABLET, EXTENDED RELEASE ORAL DAILY
Qty: 30 TABLET | Refills: 1 | Status: SHIPPED | OUTPATIENT
Start: 2019-02-06 | End: 2019-06-07 | Stop reason: SDUPTHER

## 2019-02-06 RX ORDER — ONDANSETRON 4 MG/1
4 TABLET, FILM COATED ORAL EVERY 8 HOURS PRN
Qty: 20 TABLET | Refills: 0 | Status: SHIPPED | OUTPATIENT
Start: 2019-02-06 | End: 2019-08-24

## 2019-02-06 RX ORDER — PANTOPRAZOLE SODIUM 40 MG/1
40 TABLET, DELAYED RELEASE ORAL DAILY
Qty: 30 TABLET | Refills: 3 | Status: ON HOLD | OUTPATIENT
Start: 2019-02-06 | End: 2019-08-24 | Stop reason: SDUPTHER

## 2019-02-06 RX ORDER — ATORVASTATIN CALCIUM 40 MG/1
40 TABLET, FILM COATED ORAL NIGHTLY
Qty: 30 TABLET | Refills: 3 | Status: SHIPPED | OUTPATIENT
Start: 2019-02-06 | End: 2019-08-28 | Stop reason: SDUPTHER

## 2019-02-06 RX ORDER — SERTRALINE HYDROCHLORIDE 100 MG/1
100 TABLET, FILM COATED ORAL DAILY
Qty: 30 TABLET | Refills: 3 | Status: SHIPPED | OUTPATIENT
Start: 2019-02-06 | End: 2019-08-28 | Stop reason: SDUPTHER

## 2019-02-06 ASSESSMENT — PATIENT HEALTH QUESTIONNAIRE - PHQ9
SUM OF ALL RESPONSES TO PHQ QUESTIONS 1-9: 2
SUM OF ALL RESPONSES TO PHQ9 QUESTIONS 1 & 2: 2
1. LITTLE INTEREST OR PLEASURE IN DOING THINGS: 1
SUM OF ALL RESPONSES TO PHQ QUESTIONS 1-9: 2
2. FEELING DOWN, DEPRESSED OR HOPELESS: 1

## 2019-02-07 ENCOUNTER — TELEPHONE (OUTPATIENT)
Dept: PULMONOLOGY | Age: 59
End: 2019-02-07

## 2019-02-11 ASSESSMENT — ENCOUNTER SYMPTOMS
RHINORRHEA: 0
SORE THROAT: 0
NAUSEA: 0
DIARRHEA: 0
COUGH: 0
VOMITING: 0
BACK PAIN: 0

## 2019-02-12 ENCOUNTER — HOSPITAL ENCOUNTER (OUTPATIENT)
Dept: CARDIAC REHAB | Age: 59
Setting detail: THERAPIES SERIES
Discharge: HOME | End: 2019-02-12
Payer: COMMERCIAL

## 2019-02-12 NOTE — PROGRESS NOTES
[facility] Facility-Based Therapy for COPD  INDIVIDUAL TREATMENT PLAN (ITP)     NAME: Darshana Whiting  CELESTE VENTURAB: 1960  Account Number: [de-identified]  AGE: 60 y.o. Diagnosis: Severe  COPD which is GOLD Stage 3. PFT: Post Bronch FEV1/FVC : 59, FEV1: 36 FVC: 61  Notes: Medicare requirements for Pulmonary Rehabilitation include a PFT within the last 12 months revealing: FEV1/FVC <70, and FEV1 <80%, and documentation from the referring physician stating that the patient has quit smoking or will participate in smoking cessation activities prior to, or during the Pulmonary Rehab program.  A 6 Minute Walk Test (6 MWT) at the beginning and end of the program is also indicated.   GLOSSARY: PF= Physical Fitness  TM=Treadmill  AD= [employer] AirDyne Bike  UBE=Upperbody Ergometry LBE=Lowerbody Ergometer  NS=NuStep SF= SciFit  ST=Step Training  RT=Resistance Training   PLB=Pursed Lip Breathing   DY= Dynabands  HW= Handweights   Cybex RT= Cybex Mult istation weight machine   RB=Recumbent    REFERRING PHYSICIAN: Dr. Stephanie Post History:     Past Medical History:   Diagnosis Date    Back pain     COPD (chronic obstructive pulmonary disease) (HCC)     Foot pain     from bone spurs    GERD (gastroesophageal reflux disease)     Headache     sharp pain going through head    Hip pain     Hyperlipidemia     Hypertension      (normal spontaneous vaginal delivery)     Osteoporosis     Vision abnormalities        Surgical History:     Past Surgical History:   Procedure Laterality Date    CARDIAC CATHETERIZATION      CHOLECYSTECTOMY         Major Symptoms:     Cough/sputum            Occasional/ Non productive      Wheezing  No     Chest Discomfort  No     Orthopnea  No     Sleep Disturbances Yes-frequently has difficulty falling asleep     Edema   No     Dyspnea  Yes-exertional    Oxygen Therapy: N/A     Home O2    lpm  []  Prn  [] continuous  []  HS     [] CPAP []  BiPAP     Company:          Comments:    Occupational/ Recreational Activities:    Employment Status:  []  FT  []  PT  [x] Disabled [] Retired    Comments:       Occupation: [employer]          Hobbies/Leisure activities: Shopping, watches videos and games on phone, TV    Social/Spiritual:        Social History     Social History    Marital status:      Spouse name: N/A    Number of children: N/A    Years of education: N/A     Social History Main Topics    Smoking status: Former Smoker     Packs/day: 1.00     Years: 37.00     Types: Cigarettes     Quit date: 1/1/2017    Smokeless tobacco: Never Used    Alcohol use No    Drug use: No    Sexual activity: Not Currently     Other Topics Concern    Not on file     Social History Narrative    No narrative on file          Do you live alone: []  Alone [x]  with spouse/family- sister and brother in law. Do you have stairs in your home  []  no [x]  yes        Comment:6 stairs up to bedroom, 6 to family room and basement laundry. Transportation  []  drive self [x]  family/friend     Comment:       Cultural/Spiritual Influences: (Faith groups, etc)       Any Cultural or Jain practices we should be aware of? Fall Risk Assessment:      []  Cognitive Impairment [x]  Balance/Gait Disturbance   []  Fall History   []  Visual Difficulty   [x]  Dizziness- occasional episodes of dizziness but denies falls.    []  Other Comments:    Physical Assessment:    Color: [x]  natural []  pale [] cyanotic []  flushed []  jaundice    Skin Integrity [x]  intact [] other:    Heart Rate 78  Resp Rate 18      Breath Sounds: Diminished throughout    Blood Pressures Sitting: Rt arm 118/68  Left arm: 120/60       Standing Rt arm   Left arm: 118/60    Resting SpO2: 96% ra  Resp effort/pattern: Easy/regular      Peripheral pulses: Yes    Edema:  No    Numbness/tingling:  No    Orthopedic/exercise limitations:  Yes- osteoporosis in hips/knees, chronic back pain with sciatica    Psychosocial assessment:    Support System:Sister, 2 daughters 1 son    Physical/Behavioral signs of abuse/neglect: No    Advance Directives:  No  [] info given-declined    Learning Preferences: Primary Language:English          Preferred method of learning []  video []  handouts        [] listening/lecture   [x]  all    Memory impairment? No     EXERCISE  Initial Assessment  Day 1 EXERCISE  Intervention  Day 2-30 EXERCISE  Re-Assessment  Day 31-60 EXERCISE  Re-Assessment  Day 61-90+ EXERCISE  Last Day   Date:   11/13/18 Date:   12/11/2018 Date:  1/10/2019  Date:  Date:           Pre Rehab  6 MWT  725 ft = 44% pred  2.0 METs  SpO2: 92%    1.4  MPH   HR: 87bpm      RPD: 3, RPE: 2-3                                           Post Rehab   6 MWT  ft = % pred   METs  SpO2: %  MPH  HR:  bpm      RPD:  , RPE:                                        GOALS  List at least 2 patient specific goals    [x]Improve activity for tolerance for routine tasks and walking    [x]Learn proper breathing techniques including PLB    []Learn proper pacing with activities    []Learn proper use of oxygen, systems and safety    []Learn proper exercise guidelines    [x]Learn proper nutrition for my diagnosis                              Learning Barrier(s)  [] Speech           [] Literacy              [] Hearing          [] Cognitive            [] Vision             [x]  Ready to Learn          Balance Issues  [] Y  [x] N          Orthopedic Issues  [x]  Y[]  N-Chronic back pain with bilateral sciatica        Rate your Physical   Fitness (PF)?    4/10    Handgrip:  Right:8  Left:8    EDUCATION  [x]  Staff Introduction  [x]  Proper setup/O2 use  [x]  Equipment [place]'n  [x]  RPD/RPE Explain  [x]  SpO2/HR Explain  [x]  Breathing Retraining  [x]  Explain Intensity  [x] Initial Levels set GOALS          1) \"A little bit\"            2) \"A little bit. \"                  If Patient has a Learning Barrier, action:               Pending    If pt has balance or orthopedic issues:  Interventions:                    Rate your physical   fitness (PF)?:   5/10        -30 day PF goal:  6/10    EDUCATION  [] Understands proper set/up O2 use  N/A  []  Understands SpO2 and RPD? N/A  [x] Aerobic progression explained? [x]  Intensity progression explained? GOALS          1) \"Yes, I move around better. \"           2) \"Yes, I'm breathing better. \"                                   3) Nutrition class scheduled for January                                    Rate your physical   fitness (PF)?:   5.5 /10        -30 day PF goal:  6 /10    EDUCATION   [x]  Home Activity education offered  [x] Stretching/ Flexibility education offered  [x] Attended Benefits of Exercise Class / information packet given  [x]  Attended Resistance Training Class                                                    GOALS                                                                                           Rate your physical   fitness (PF)?:   /10    -30 day PF goal:  /10    EDUCATION  [] Attended all individually relevant exercise education sessions? []  Knows current exercise goals and recmndtns?:                                                  Goals Achieved? Rate your physical fitness now?:     /10  Handgrip:  Right:  Left:    Discharge  EDUCATION  []  Attended all individually relevant exercise education sessions?    [] Knows current exercise goals and recmndtns?:                                                                                        PHYSICIAN DIRECTED   EXERCISE RX     RPE : 11 - 14  Titrate O2 to keep SpO2 >89%    Frequency (F): 2-3x/wk in Rehab,         Initial Intensity : 2.0 METs (from 6MWT)   1.2-1.6  (60-80% of walk speed for TM)    Type (T): Aerobic (TM,NS, SF, AE, AB, RB, EL, Arc), RT 1-3#, 8-16 reps    CAN USE ALL EQUIPMENT EXCEPT       Time (Ti): Aerobic:   15-40 min               Progression: 1-2 min total time for TM, AB, NS, SF or Arm ergometer per session or when a steady state has occurred in RPE if  SpO2 and HR levels are acceptable           PHYSICIAN DIRECTED   EXERCISE RX       Titrate O2 to keep SpO2 >89%    Frequency (F): 2-3x/wk in Rehab, 1-3x/wk home walking       Intensity (I):  Initial + 5-10% increase each week or when a steady state has occurred in RPE if  SpO2 and HR levels are acceptable  Type (T)  Aerobic (TM,NS, SFR,SFS, AE, AB, RB), RT   8-16 reps    CAN USE ALL EQUIPMENT EXCEPT        Time (Ti): Aerobic:   30-40 min        Progression:   1-2 min total time for TM, AB, NS, SF or Arm ergometer per session or when a steady state has occurred in RPE if  SpO2 and HR levels are acceptable        Is pt. progressing in rehab?:  Yes             PHYSICIAN DIRECTED   EXERCISE RX       Titrate O2 to keep SpO2 >89%    Frequency (F): 2-3x/wk in Rehab, 1-3x/wk home walking       Intensity (I):  Initial + 5-10% increase each week when a steady state has occurred in RPE if  SpO2 and HR levels are acceptable  Type (T)  Aerobic (TM,NS, SFR,SFS, AE, AB, RB), RT   8-16 reps    CAN USE ALL EQUIPMENT EXCEPT        Time (Ti): Aerobic:   30-50 min     Progression:   1-2 min total time for TM, AB, NS, SF or Arm ergometer per session or when a steady state has occurred in RPE if  SpO2 and HR levels are acceptable              Is pt. progressing in rehab?:  Yes, slowly               PHYSICIAN DIRECTED   EXERCISE RX       Titrate O2 to keep SpO2 >89%    Frequency (F): 2-3x/wk in Rehab, 1-3x/wk home walking       Intensity (I):  Initial + 5-10% increase each week when a steady state has occurred in RPE if  SpO2 and HR levels are acceptable  Type (T):   Aerobic (TM,NS, SFR,SFS, AE, AB, RB), RT   8-16 reps    CAN USE ALL EQUIPMENT EXCEPT          Time (Ti): Aerobic:   30-58 min         Progression:   1-2 min total time for TM, AB, NS, SF or Arm ergometer per session or when a steady state has occurred in RPE if  SpO2 and HR levels are acceptable            Is pt. progressing in rehab?:               Final Intensity (I): See below and final 6MWT above            ACHIEVED TOTAL AEROBIC EXERCISE TIME:  min         FINAL LEVELS:  [] TM: min  [] Nustep: level  min  [] Arm ergometer: moore min  [] Scifit: level min  [] HW :  # x  reps  [] Dy:    X   reps  [] Cybex RT:    # x   Reps  [] Eliptical:level  X  Min  [] Arc: level   X    mins  [] RB:  Level  X   mins  [] AB: level   X     Min              Did pt. progress in rehab?:     30 DAY TARGET GOAL  [x] Start slowly but progress each week  [x] Increase duration on the equipment  [x] Start resistance training    -30 day PF goal: 5/10                 Current Home Exercise : None    Frequency:      Type:                     Health Knowledge   Test Score:  20/25     Current Home Exercise:   Frequency:  Everyday    Type: warm-ups / stairs        Time: 10 min                                  Current Home Exercise:   Frequency:  everyday     Type: warm-ups / stairs         Time: 10-15 min                                Current Home Exercise:   Frequency:       Type:         Time:  min                                                      Discharge exercise plan                                  Repeat Health Knowledge Test Score :    /25     [facility] INDIVIDUAL TREATMENT PLAN  SMOKING AND   OTHER COMPONENTS    Smoking/Other  Components   Initial Assessment  Day 1 Smoking/Other  Components  Intervention  Day 2-30 Smoking/Other  Components  Re-Assessment  Day 31-60 Smoking/Other  Components  Re-Assessment Day 61-90+ Smoking/Other  Components  Discharge  Final Day   Tobacco Use Hx  [x] Y  [] N?:   Quit Date: 2017  Cig/Day: 40  Years: 40  Tobacco Use  Any change in Smoking Status?:   []  not smoking  Quit Date:   (if applicable)  Intervention  []  Information/ed material given on cessation techniques  [] smoking cessation class schedule given Tobacco Use  Any change in Smoking Status?:  Non-smoking    Quit Date:   (if applicable)          Intervention  [] Referral to Tobacco Cessation Specialist (TCS) Tobacco Use  Any change in Smoking Status?:  Non-smoking     Quit Date:   (if applicable)        Intervention Tobacco Use  Any change in Smoking Status?:     Quit Date:   (if applicable)          Intervention Tobacco Use  Any change in Smoking Status?:   Quit Date:   (if applicable)            Intervention  [] Pt used TCS counseling           Other  Components:    [x]  Environmental Factors    [x]  Prevention Management of Exacerbations    []  CHF Management    []  Hypertension Management     Intervention/  Education                [facility] INDIVIDUAL TREATMENT PLAN  OXYGEN AND MEDICATIONS    OXYGEN  MEDICATIONS   Initial Assessment  Day 1 OXYGEN  MEDICATIONS  Intervention  Day 2-30 OXYGEN  MEDICATION  Re-Assessment  Day 31-60 OXYGEN  MEDICATION  ReAssessment Day 61-90+ OXYGEN  MEDICATION  Discharge  Final Day                    Medications  [x]  Uses as prescribed  []  Non- compliant with prescribed meds    Respiratory Medications    []  Proper use   and technique of MDI, DPI and/or nebs  []  Incorrect use and technique of MDI, DPI and /or nebs    Hypoxemia  Problem:    [x]  No problem  [] Noncompliant with O2 use  []  Oxygen in SD only  []  No home O2  []  No portable O2  []  Needs O2 prescription and O2 qualifying data sent for setup   ()Poor knowledge of O2 use/safety    Current Oxygen Prescription:      l/min rest     l/min exercise     titration   plan/weaning plan:  CPAP/BIPAP:n/a    Goals:     []  Manage Hypoxemia  []  receive adequate portable system  []  Compliant with use as prescribed  []  Learn O2 safety guidelines   Medications  [x]  Uses as prescribed  []  Non- compliant with prescribed meds    Respiratory Medications    [x] Proper use and technique of MDI, DPI and/or nebs  []  Incorrect use and technique of MDI, DPI and /or nebs    Hypoxemia  Problem:  Not using oxygen      Current Oxygen Prescription:    l/min rest   l/min exercise   titration plan/weaning plan:    Goals:   []  Manage Hypoxemia  []  receive adequate portable system  []  Compliant with use as prescribed  []  Learn O2 safety guidelines           Medications  [x]  Uses as prescribed  [] Non- compliant with prescribed meds    Respiratory Medications    [x]  Proper use and technique of MDI, DPI and/or nebs  [] Incorrect use and technique of MDI, DPI and /or nebs    Hypoxemia  Problem:  Not using oxygen    Current Oxygen Prescription:    l/min rest   l/min exercise   titration plan/weaning plan:    Goals:   []  Manage Hypoxemia  []  receive adequate portable system  []  Compliant with use as prescribed  [] Learn O2 safety guidelines           Medications  []  Uses as prescribed  []  Non- compliant with prescribed meds    Respiratory Medications    []  Proper use and technique of MDI, DPI and/or nebs  []  Incorrect use and technique of MDI, DPI and /or nebs    Hypoxemia  Problem:      Current Oxygen Prescription:    l/min rest   l/min exercise   titration plan/weaning plan:    Goals:   []  Manage Hypoxemia  []  receive adequate portable system  [] Compliant with use as prescribed  []  Learn O2 safety guidelines     Medications    [] Compliant  []  Noncompliant       Respiratory Medications    []  Compliant  [] Noncompliant              Hypoxemia  Problem:    []  Compliant  []  Noncompliant    Final Oxygen Prescription:    l/min rest   l/min exercise                                                           [facility] INDIVIDUAL TREATMENT PLAN  NUTRITION DOMAIN:        NUTRITION   Initial Assessment  Day 1 NUTRITION   Intervention  Day 2-30 NUTRITION   Re-Assessment  Day 31-60 NUTRITION   Re-Assessment Day 61-90+ NUTRITION Discharge  Final Day   Weight Management:  151 lbs  Current BMI 28 Weight Management:  153 lbs   Weight Management:   153.6 lbs   Weight Management:    lbs  Current BMI Weight Management:    lbs  Current BMI   Diabetes?: N/A  A1C   Fasting BS:   Insulin?:    Oral agent? Diabetes:  If y, has patient seen a positive trend in FBS?:      Intervention    [x] Basic nutrition education   [] Referral to Diabetes Education? [] Referral to weightloss/nutrition education     Intervention    [] Pt has had Nutrition class? [] Informal questions asked regarding nutrition/weight control Intervention    []  Pt has had Nutrition class? Scheduled this month  [] Questions addressed Intervention    []  Pt has had Nutrition class? Intervention    Pt aware of:     [] Pulmonary eating techniques   [] Smart choices, Calories   []Gas-producing foods   [] Wt gain / loss strategies     GOALS    Weight Loss         30 DAY TARGET GOAL:    [x] Decrease portion size by 250-500 calories/day  [x] Increase fluid intake to 6-8 8oz. [x] Eat less sweets  [x] Cut back on amt of soda     Reasonable, achievable 30 day weight goal: 147 lbs   (1-2 lb per week is recommended)            Weight Gain        30 day   Target Goal:    [] increase proteins  [] add nutrition  Supplement  [] 6 small meals      Did pt meet Initial 30 day Target Goal(s)?:     New 30 DAY TARGET GOAL:    [x] Decrease saturated fats  [x] Decrease gas producing  cruciferous veggies   -  Reasonable, achievable 30 day weight goal:  Maintain current weight   Did pt meet previous 30 day Target   Goal(s)?:     New 30 DAY TARGET GOAL:    [x] Switch to whole grains whenever possible / uses all fresh vegis  [] Decrease/ Eliminate carbonated beverages / drinks 2 sodas /day    Reasonable, achievable 30 day weight goal: maintain    Magdi Edmundo states she continues to have difficulties with swallowing.   She has an appointment to have this evaluated in early February   Did pt meet previous 30 day Target   Goal(s)?:      New 30 DAY TARGET GOAL:    [] Smaller meals more frequently  [] Decrease portion sizes by 25%      Reasonable, achievable 30 day weight goal:                  Did pt meet previous 30 day Target   Goal(s)?:                         [facility] INDIVIDUAL TREATMENT PLAN  PSYCHOSOCIAL DOMAIN:    Psychosocial   Initial Assessment  Day 1 Psychosocial   Intervention  Day 2-30 Psychosocial  Re-Assessment  Day 31-60 Psychosocial   Re-Assessment  Day 61-90+ Psychosocial Discharge  Final Day   Psychosocial Screening Tools    (X) PHQ-9 score: 14      (X) SF-36: 56       (X) UCSD SOB score: 91         PHQ-9 Score: If score >or =15, Action:     SF-36 Mental Score:     UCSD Score: Any action on Screening Tools?:                  Psychosocial Screening   Tools    Repeat PHQ-9 Score if mely:    Repeat SF-36      Repeat UCSD SOB Score:       Assessment  []  S/S of depression identified? []  PCP notified of PHQ-9 results  Yes    []  On Anti-anxiety / anti-depression meds?: Zoloft 100mg daily           Intervention  If S/S of depression present, action taken:     Is the patient receiving support for the IN program at home?:  [x]  Y    [] N    Is the patient tolerating the intensity and duration of the IN program?:     [x] Y   [] N Re-Assessment  [] S/S of depression present?   If [x] , action:         Med Changes?:   [] Y   [x] N  Continues with Zoloft        Is the patient tolerating the intensity and duration of the IN program?:    [x]  Y    [] N  Re-Assessment  Psych Issue notes:        Is the patient receiving support for the IN program at home?:  []  Y  [] N    Is the patient tolerating the intensity and duration of the IN program?:  []  Y   []  N     Final Assessment                       Results of any Physician/ Counselor Intervention?:     [] Y   [] N         GOALS        30 DAY TARGET GOAL    [x]  Assess Mental Score using   SF-36 and PHQ-9   [x]  Teach PLB  [x]  Reassure pt that (s)he can stop and rest, adjust the speeds, and/or switch modalities from our suggestions  -    (0 being 'None', 10 being 2-30 EDUCATION   Re-Assessment  Day 31-60 EDUCATION   ReAssessment Day 61-90+ EDUCATION Discharge  Final Day                      Education  [x] Equipment/Staff Orientation  [x] Breathing Retraining  [x] Importance of Clean Hands    Chronic Cough?:   [] Y   [x]  N  Spacer?:   [x]   Y   []  N     Sleep Apnea?:  [] Y    [x] N     [x] Education Book given       Education  Individual instruction  [x] PLB  [x] RPD/RPE   [] O2 use  []  review PFT  [] Inhalers   []Home Activity/Warm up/ stretches  Attend Classes:  [] Nutrition  [x] Panic control, relaxation, managing depression  [] A&P of the Respiratory System   [] Environ. Issues+ Travel   [x] Bronchial Hygiene / Preventing Infection  [] Resistance Training  []  Benefits of Exercise  [] Energy Cons        Education  Individual instruction  [] PLB  [] RPD/RPE   [] O2 use  []  review PFT  [] Inhalers   [] Home Activity/Warm up/ stretches  Attend Classes:  [] Nutrition  []  Panic conntrol, relaxation, managing depression  []  A&P of the Respiratory System   [] Environ. Issues+ Travel   [] Bronchial Hygiene / Preventing Infection  []  Resistance Training  [] Benefits of Exercise  [] Energy Cons    Education  Individual instruction  [] PLB  [] RPD/RPE   []  O2 use  []  review PFT  [] Inhalers   [] Home Activity/Warm up/ stretches  Attend Classes:  [] Nutrition  []  Panic conntrol, relaxation, managing depression  [] A&P of the Respiratory System   [] Environ. Issues+ Travel   [] Bronchial Hygiene / Preventing Infection  []  Resistance Training  [] Benefits of Exercise  [] Energy Cons   Education  []  Addressed pt questions on Education Topics                                                         Physician Interaction / Response:  (If none, continue on present care plan)     Physician Interaction / Response:   (If none, continue on present care plan)   Physician Interaction / Response:   (If none, continue on present care plan)   Physician Interaction / Response:   (If none, continue on present care plan)   Physician Interaction / Response:       Discharge the patient. Rehab Potential:  [x]  Good [] Fair [] Poor    Summary: 11/13/18:  Jessica Mullen is a 60 yr old female with COPD who was referred to Pulmonary Rehab for exertional shortness of breath. She presents today for her initial ME evaluation. She has a 4yr history of smoking 1-2ppd, quit 1/1/2017. Her most recent hospitalization 5/6/18 for NSTEMI. She has had no recent hospitalizations for COPD. Haim Villa reports that her breathing has become worse over the past year and that she has been finding it harder to perform her ADL'S and preventing her from being as active as she once was. She will attend 28 sessions of ME twice weekly. Exercise:30-40 min of exercise starting at low levels. Time and intensity to be increased based on RPE. Today she did tolerate 15 min of exercise at low levels. Cardiac monitor displayed NSR throughout. Oxygen:Laine will exercise on room air as demonstrated by 6 mw test.    Medications affecting HR: Toprol xl 12.5mg daily, Albuterol MDI 2 puffs q 6hrs prn    Nutrition:Wt. 150# BMI 28. Mela Gill states she would like to lose about 10lbs. She admits to drinking 4 cans of pepsi daily. We discussed cutting back on soda, increasing water intake and decreasing portion sizes. She will attend General Nutrition class during ME. Psychosocial including Dyspnea with ADL's, Depression/Anxiety and Social Functioning: Greta lives with her sister and brother in law. She has 3 children with whom she sees often. She rates her anxiety/depression 4/10 which she feels is due to her breathing issues and inability to do much without getting short of breath. She is able to perform her ADL's but is finding it more difficult.  She looks forward to increasing her strength and learning how to manage her COPD.     30 Day Progress Report:12/11/2018  Attendance:  Jessica Mullen has attended 6 exercise sessions / 2 education classes  Exercise:  Jimmy Gowers completes 35-40 min of exercise at low levels. She does occasionally complain of back and neck discomfort. We have reviewed proper positioning and safety on the exercise equipment. She is doing warm-ups at home. Oxygen:  She completes all exercise on room air and maintains an adequate SaO2 94-97%. We have reviewed proper breathing techniques. Medications: Jimmy Gowers states she was started on Pepcid following an episode of CP that was evaluated in the ED. She states the pepcid has helped and she has had no further pain. Nutrition:  Weight maintained at 152#  Psychosocial:  Jimmy Gowers is pleasant. Rates her anxiety is 3 / depression 3  We will continue to progress her exercise time and intensity as tolerated. -MD Stephens    60 Day Progress Report:  1/11/19  Attendance: Myranda Marie has attended 11 education session / 2 education classes  Exercise: Completes 40 min of exercise at low levels. She continues to complain of back and neck discomfort. She continues doing warm-ups at home. Oxygen: All exercise is completed on room air and she maintains an SaO2 at 96-97%. Medications:  Her only medication addition was a cream for a rash on her abdomen under her left breast and neck. Received a Pneumonia shot today at MD office. Nutrition:  Weight maintained at 153#  Psychosocial:   Myranda Marie states she is feeling well, just tired lately. She rates her anxiety at 3 / depression at 3  We will continue to progress her exercise time and increase her levels as tolerated. We will instruct her in resistance training, she was not present when the class was presented. -Doris    90 Day Progress Note 2/12/2019  Myranda Whiting has not attended rehab since 1/31/19. She has been experiencing back pain and she has been very uncomfortable and unable to attend rehab. She is not present to review her progress today. We will contact her to determine when she will be able to return to exercise. -[address]                   Referring Physician: Dr. Ad Casanova                                              Fax #:    Reviewed and electronically signed by Dr Ivet Cain.  Isidoro Najera, Medical Director

## 2019-02-12 NOTE — PROGRESS NOTES
[facility] Facility-Based Therapy for COPD  INDIVIDUAL TREATMENT PLAN (ITP)     NAME: Katherine Barrios Henok VENTURAB: 1960  Account Number: [de-identified]  AGE: 60 y.o. Diagnosis: Severe  COPD which is GOLD Stage 3. PFT: Post Bronch FEV1/FVC : 59, FEV1: 36 FVC: 61  Notes: Medicare requirements for Pulmonary Rehabilitation include a PFT within the last 12 months revealing: FEV1/FVC <70, and FEV1 <80%, and documentation from the referring physician stating that the patient has quit smoking or will participate in smoking cessation activities prior to, or during the Pulmonary Rehab program.  A 6 Minute Walk Test (6 MWT) at the beginning and end of the program is also indicated.   GLOSSARY: PF= Physical Fitness  TM=Treadmill  AD= [employer] AirDyne Bike  UBE=Upperbody Ergometry LBE=Lowerbody Ergometer  NS=NuStep SF= SciFit  ST=Step Training  RT=Resistance Training   PLB=Pursed Lip Breathing   DY= Dynabands  HW= Handweights   Cybex RT= Cybex Mult istation weight machine   RB=Recumbent    REFERRING PHYSICIAN: Dr. Jessica Red History:     Past Medical History:   Diagnosis Date    Back pain     COPD (chronic obstructive pulmonary disease) (HCC)     Foot pain     from bone spurs    GERD (gastroesophageal reflux disease)     Headache     sharp pain going through head    Hip pain     Hyperlipidemia     Hypertension      (normal spontaneous vaginal delivery)     Osteoporosis     Vision abnormalities        Surgical History:     Past Surgical History:   Procedure Laterality Date    CARDIAC CATHETERIZATION      CHOLECYSTECTOMY         Major Symptoms:     Cough/sputum            Occasional/ Non productive      Wheezing  No     Chest Discomfort  No     Orthopnea  No     Sleep Disturbances Yes-frequently has difficulty falling asleep     Edema   No     Dyspnea  Yes-exertional    Oxygen Therapy: N/A     Home O2    lpm  []  Prn  [] osteoporosis in hips/knees, chronic back pain with sciatica    Psychosocial assessment:    Support System:Sister, 2 daughters 1 son    Physical/Behavioral signs of abuse/neglect: No    Advance Directives:  No  [] info given-declined    Learning Preferences: Primary Language:English          Preferred method of learning []  video []  handouts        [] listening/lecture   [x]  all    Memory impairment? No     EXERCISE  Initial Assessment  Day 1 EXERCISE  Intervention  Day 2-30 EXERCISE  Re-Assessment  Day 31-60 EXERCISE  Re-Assessment  Day 61-90+ EXERCISE  Last Day   Date:   11/13/18 Date:   12/11/2018 Date:  1/10/2019  Date:  Date:           Pre Rehab  6 MWT  725 ft = 44% pred  2.0 METs  SpO2: 92%    1.4  MPH   HR: 87bpm      RPD: 3, RPE: 2-3                                           Post Rehab   6 MWT  ft = % pred   METs  SpO2: %  MPH  HR:  bpm      RPD:  , RPE:                                        GOALS  List at least 2 patient specific goals    [x]Improve activity for tolerance for routine tasks and walking    [x]Learn proper breathing techniques including PLB    []Learn proper pacing with activities    []Learn proper use of oxygen, systems and safety    []Learn proper exercise guidelines    [x]Learn proper nutrition for my diagnosis                              Learning Barrier(s)  [] Speech           [] Literacy              [] Hearing          [] Cognitive            [] Vision             [x]  Ready to Learn          Balance Issues  [] Y  [x] N          Orthopedic Issues  [x]  Y[]  N-Chronic back pain with bilateral sciatica        Rate your Physical   Fitness (PF)?    4/10    Handgrip:  Right:8  Left:8    EDUCATION  [x]  Staff Introduction  [x]  Proper setup/O2 use  [x]  Equipment [place]  [x]  RPD/RPE Explain  [x]  SpO2/HR Explain  [x]  Breathing Retraining  [x]  Explain Intensity  [x] Initial Levels set GOALS          1) \"A little bit\"            2) \"A little bit. \"                  If Patient has a EQUIPMENT EXCEPT       Time (Ti): Aerobic:   15-40 min               Progression: 1-2 min total time for TM, AB, NS, SF or Arm ergometer per session or when a steady state has occurred in RPE if  SpO2 and HR levels are acceptable           PHYSICIAN DIRECTED   EXERCISE RX       Titrate O2 to keep SpO2 >89%    Frequency (F): 2-3x/wk in Rehab, 1-3x/wk home walking       Intensity (I):  Initial + 5-10% increase each week or when a steady state has occurred in RPE if  SpO2 and HR levels are acceptable  Type (T)  Aerobic (TM,NS, SFR,SFS, AE, AB, RB), RT   8-16 reps    CAN USE ALL EQUIPMENT EXCEPT        Time (Ti): Aerobic:   30-40 min        Progression:   1-2 min total time for TM, AB, NS, SF or Arm ergometer per session or when a steady state has occurred in RPE if  SpO2 and HR levels are acceptable        Is pt. progressing in rehab?:  Yes             PHYSICIAN DIRECTED   EXERCISE RX       Titrate O2 to keep SpO2 >89%    Frequency (F): 2-3x/wk in Rehab, 1-3x/wk home walking       Intensity (I):  Initial + 5-10% increase each week when a steady state has occurred in RPE if  SpO2 and HR levels are acceptable  Type (T)  Aerobic (TM,NS, SFR,SFS, AE, AB, RB), RT   8-16 reps    CAN USE ALL EQUIPMENT EXCEPT        Time (Ti): Aerobic:   30-50 min     Progression:   1-2 min total time for TM, AB, NS, SF or Arm ergometer per session or when a steady state has occurred in RPE if  SpO2 and HR levels are acceptable              Is pt. progressing in rehab?:  Yes, slowly               PHYSICIAN DIRECTED   EXERCISE RX       Titrate O2 to keep SpO2 >89%    Frequency (F): 2-3x/wk in Rehab, 1-3x/wk home walking       Intensity (I):  Initial + 5-10% increase each week when a steady state has occurred in RPE if  SpO2 and HR levels are acceptable  Type (T):   Aerobic (TM,NS, SFR,SFS, AE, AB, RB), RT   8-16 reps    CAN USE ALL EQUIPMENT EXCEPT          Time (Ti): Aerobic:   30-58 min         Progression:   1-2 min total time for TM, AB, NS, SF or Arm ergometer per session or when a steady state has occurred in RPE if  SpO2 and HR levels are acceptable            Is pt. progressing in rehab?:               Final Intensity (I): See below and final 6MWT above            ACHIEVED TOTAL AEROBIC EXERCISE TIME:  min         FINAL LEVELS:  [] TM: min  [] Nustep: level  min  [] Arm ergometer: moore min  [] Scifit: level min  [] HW :  # x  reps  [] Dy:    X   reps  [] Cybex RT:    # x   Reps  [] Eliptical:level  X  Min  [] Arc: level   X    mins  [] RB:  Level  X   mins  [] AB: level   X     Min              Did pt. progress in rehab?:     30 DAY TARGET GOAL  [x] Start slowly but progress each week  [x] Increase duration on the equipment  [x] Start resistance training    -30 day PF goal: 5/10                 Current Home Exercise : None    Frequency:      Type:                     Health Knowledge   Test Score:  20/25     Current Home Exercise:   Frequency:  Everyday    Type: warm-ups / stairs        Time: 10 min                                  Current Home Exercise:   Frequency:  everyday     Type: warm-ups / stairs         Time: 10-15 min                                Current Home Exercise:   Frequency:       Type:         Time:  min                                                      Discharge exercise plan                                  Repeat Health Knowledge Test Score :    /25     Greta's INDIVIDUAL TREATMENT PLAN  SMOKING AND   OTHER COMPONENTS    Smoking/Other  Components   Initial Assessment  Day 1 Smoking/Other  Components  Intervention  Day 2-30 Smoking/Other  Components  Re-Assessment  Day 31-60 Smoking/Other  Components  Re-Assessment Day 61-90+ Smoking/Other  Components  Discharge  Final Day   Tobacco Use Hx  [x] Y  [] N?:   Quit Date: 2017  Cig/Day: 40  Years: 40  Tobacco Use  Any change in Smoking Status?:   []  not smoking  Quit Date:   (if applicable)  Intervention  []  Information/ed material given on cessation techniques  [] smoking cessation class schedule given Tobacco Use  Any change in Smoking Status?:  Non-smoking    Quit Date:   (if applicable)          Intervention  [] Referral to Tobacco Cessation Specialist (TCS) Tobacco Use  Any change in Smoking Status?:  Non-smoking     Quit Date:   (if applicable)        Intervention Tobacco Use  Any change in Smoking Status?:     Quit Date:   (if applicable)          Intervention Tobacco Use  Any change in Smoking Status?:   Quit Date:   (if applicable)            Intervention  [] Pt used TCS counseling           Other  Components:    [x]  Environmental Factors    [x]  Prevention Management of Exacerbations    []  CHF Management    []  Hypertension Management     Intervention/  Education                Greta's INDIVIDUAL TREATMENT PLAN  OXYGEN AND MEDICATIONS    OXYGEN  MEDICATIONS   Initial Assessment  Day 1 OXYGEN  MEDICATIONS  Intervention  Day 2-30 OXYGEN  MEDICATION  Re-Assessment  Day 31-60 OXYGEN  MEDICATION  ReAssessment Day 61-90+ OXYGEN  MEDICATION  Discharge  Final Day                    Medications  [x]  Uses as prescribed  []  Non- compliant with prescribed meds    Respiratory Medications    []  Proper use   and technique of MDI, DPI and/or nebs  []  Incorrect use and technique of MDI, DPI and /or nebs    Hypoxemia  Problem:    [x]  No problem  [] Noncompliant with O2 use  []  Oxygen in MO only  []  No home O2  []  No portable O2  []  Needs O2 prescription and O2 qualifying data sent for setup   ()Poor knowledge of O2 use/safety    Current Oxygen Prescription:      l/min rest     l/min exercise     titration   plan/weaning plan:  CPAP/BIPAP:n/a    Goals:     []  Manage Hypoxemia  []  receive adequate portable system  []  Compliant with use as prescribed  []  Learn O2 safety guidelines   Medications  [x]  Uses as prescribed  []  Non- compliant with prescribed meds    Respiratory Medications    [x] Proper use and technique of MDI, DPI and/or nebs  []  Incorrect use and technique of MDI, DPI and /or nebs    Hypoxemia  Problem:  Not using oxygen      Current Oxygen Prescription:    l/min rest   l/min exercise   titration plan/weaning plan:    Goals:   []  Manage Hypoxemia  []  receive adequate portable system  []  Compliant with use as prescribed  []  Learn O2 safety guidelines           Medications  [x]  Uses as prescribed  [] Non- compliant with prescribed meds    Respiratory Medications    [x]  Proper use and technique of MDI, DPI and/or nebs  [] Incorrect use and technique of MDI, DPI and /or nebs    Hypoxemia  Problem:  Not using oxygen    Current Oxygen Prescription:    l/min rest   l/min exercise   titration plan/weaning plan:    Goals:   []  Manage Hypoxemia  []  receive adequate portable system  []  Compliant with use as prescribed  [] Learn O2 safety guidelines           Medications  []  Uses as prescribed  []  Non- compliant with prescribed meds    Respiratory Medications    []  Proper use and technique of MDI, DPI and/or nebs  []  Incorrect use and technique of MDI, DPI and /or nebs    Hypoxemia  Problem:      Current Oxygen Prescription:    l/min rest   l/min exercise   titration plan/weaning plan:    Goals:   []  Manage Hypoxemia  []  receive adequate portable system  [] Compliant with use as prescribed  []  Learn O2 safety guidelines     Medications    [] Compliant  []  Noncompliant       Respiratory Medications    []  Compliant  [] Noncompliant              Hypoxemia  Problem:    []  Compliant  []  Noncompliant    Final Oxygen Prescription:    l/min rest   l/min exercise                                                           Greta's INDIVIDUAL TREATMENT PLAN  NUTRITION DOMAIN:        NUTRITION   Initial Assessment  Day 1 NUTRITION   Intervention  Day 2-30 NUTRITION   Re-Assessment  Day 31-60 NUTRITION   Re-Assessment Day 61-90+ NUTRITION Discharge  Final Day   Weight Management:  151 lbs  Current BMI 28 Weight Management:  153 lbs   Weight Management:   153.6 lbs   Weight Management:    lbs  Current BMI Weight Management:    lbs  Current BMI   Diabetes?: N/A  A1C   Fasting BS:   Insulin?:    Oral agent? Diabetes:  If y, has patient seen a positive trend in FBS?:      Intervention    [x] Basic nutrition education   [] Referral to Diabetes Education? [] Referral to weightloss/nutrition education     Intervention    [] Pt has had Nutrition class? [] Informal questions asked regarding nutrition/weight control Intervention    []  Pt has had Nutrition class? Scheduled this month  [] Questions addressed Intervention    []  Pt has had Nutrition class? Intervention    Pt aware of:     [] Pulmonary eating techniques   [] Smart choices, Calories   []Gas-producing foods   [] Wt gain / loss strategies     GOALS    Weight Loss         30 DAY TARGET GOAL:    [x] Decrease portion size by 250-500 calories/day  [x] Increase fluid intake to 6-8 8oz. [x] Eat less sweets  [x] Cut back on amt of soda     Reasonable, achievable 30 day weight goal: 147 lbs   (1-2 lb per week is recommended)            Weight Gain        30 day   Target Goal:    [] increase proteins  [] add nutrition  Supplement  [] 6 small meals      Did pt meet Initial 30 day Target Goal(s)?:     New 30 DAY TARGET GOAL:    [x] Decrease saturated fats  [x] Decrease gas producing  cruciferous veggies   -  Reasonable, achievable 30 day weight goal:  Maintain current weight   Did pt meet previous 30 day Target   Goal(s)?:     New 30 DAY TARGET GOAL:    [x] Switch to whole grains whenever possible / uses all fresh vegis  [] Decrease/ Eliminate carbonated beverages / drinks 2 sodas /day    Reasonable, achievable 30 day weight goal: maintain    Luisito Varela states she continues to have difficulties with swallowing.   She has an appointment to have this evaluated in early February   Did pt meet previous 30 day Target   Goal(s)?:      New 30 DAY TARGET GOAL:    [] Smaller meals more frequently  [] Decrease portion sizes by 25%      Reasonable, achievable 30 day weight goal:                  Did pt meet previous 30 day Target   Goal(s)?:                         Greta's INDIVIDUAL TREATMENT PLAN  PSYCHOSOCIAL DOMAIN:    Psychosocial   Initial Assessment  Day 1 Psychosocial   Intervention  Day 2-30 Psychosocial  Re-Assessment  Day 31-60 Psychosocial   Re-Assessment  Day 61-90+ Psychosocial Discharge  Final Day   Psychosocial Screening Tools    (X) PHQ-9 score: 14      (X) SF-36: 56       (X) UCSD SOB score: 91         PHQ-9 Score: If score >or =15, Action:     SF-36 Mental Score:     UCSD Score: Any action on Screening Tools?:                  Psychosocial Screening   Tools    Repeat PHQ-9 Score if mely:    Repeat SF-36      Repeat UCSD SOB Score:       Assessment  []  S/S of depression identified? []  PCP notified of PHQ-9 results  Yes    []  On Anti-anxiety / anti-depression meds?: Zoloft 100mg daily           Intervention  If S/S of depression present, action taken:     Is the patient receiving support for the ME program at home?:  [x]  Y    [] N    Is the patient tolerating the intensity and duration of the ME program?:     [x] Y   [] N Re-Assessment  [] S/S of depression present?   If [x] , action:         Med Changes?:   [] Y   [x] N  Continues with Zoloft        Is the patient tolerating the intensity and duration of the ME program?:    [x]  Y    [] N  Re-Assessment  Psych Issue notes:        Is the patient receiving support for the ME program at home?:  []  Y  [] N    Is the patient tolerating the intensity and duration of the ME program?:  []  Y   []  N     Final Assessment                       Results of any Physician/ Counselor Intervention?:     [] Y   [] N         GOALS        30 DAY TARGET GOAL    [x]  Assess Mental Score using   SF-36 and PHQ-9   [x]  Teach PLB  [x]  Reassure pt that (s)he can stop and rest, adjust the speeds, and/or switch modalities from our suggestions  -    (0 being 'None', 10 being continue on present care plan)   Physician Interaction / Response:       Discharge the patient. Rehab Potential:  [x]  Good [] Fair [] Poor    Summary: 11/13/18:  Edgar Hutchinson is a 60 yr old female with COPD who was referred to Pulmonary Rehab for exertional shortness of breath. She presents today for her initial VT evaluation. She has a 4yr history of smoking 1-2ppd, quit 1/1/2017. Her most recent hospitalization 5/6/18 for NSTEMI. She has had no recent hospitalizations for COPD. Felicia Cullen reports that her breathing has become worse over the past year and that she has been finding it harder to perform her ADL'S and preventing her from being as active as she once was. She will attend 28 sessions of VT twice weekly. Exercise:30-40 min of exercise starting at low levels. Time and intensity to be increased based on RPE. Today she did tolerate 15 min of exercise at low levels. Cardiac monitor displayed NSR throughout. Oxygen:Laine will exercise on room air as demonstrated by 6 mw test.    Medications affecting HR: Toprol xl 12.5mg daily, Albuterol MDI 2 puffs q 6hrs prn    Nutrition:Wt. 150# BMI 28. Selin Seek states she would like to lose about 10lbs. She admits to drinking 4 cans of pepsi daily. We discussed cutting back on soda, increasing water intake and decreasing portion sizes. She will attend General Nutrition class during VT. Psychosocial including Dyspnea with ADL's, Depression/Anxiety and Social Functioning: Greta lives with her sister and brother in law. She has 3 children with whom she sees often. She rates her anxiety/depression 4/10 which she feels is due to her breathing issues and inability to do much without getting short of breath. She is able to perform her ADL's but is finding it more difficult.  She looks forward to increasing her strength and learning how to manage her COPD.     30 Day Progress Report:12/11/2018  Attendance:  Edgar Hutchinson has attended 6 exercise sessions / 2 education classes  Exercise:  Sohail Rosa completes 35-40 min of exercise at low levels. She does occasionally complain of back and neck discomfort. We have reviewed proper positioning and safety on the exercise equipment. She is doing warm-ups at home. Oxygen:  She completes all exercise on room air and maintains an adequate SaO2 94-97%. We have reviewed proper breathing techniques. Medications: Sohail Rosa states she was started on Pepcid following an episode of CP that was evaluated in the ED. She states the pepcid has helped and she has had no further pain. Nutrition:  Weight maintained at 152#  Psychosocial:  Sohail Rosa is pleasant. Rates her anxiety is 3 / depression 3  We will continue to progress her exercise time and intensity as tolerated. -MD Stephens    60 Day Progress Report:  1/11/19  Attendance: Prateekelizabeth Samano has attended 11 education session / 2 education classes  Exercise: Completes 40 min of exercise at low levels. She continues to complain of back and neck discomfort. She continues doing warm-ups at home. Oxygen: All exercise is completed on room air and she maintains an SaO2 at 96-97%. Medications:  Her only medication addition was a cream for a rash on her abdomen under her left breast and neck. Received a Pneumonia shot today at MD office. Nutrition:  Weight maintained at 153#  Psychosocial:   Niyah Samano states she is feeling well, just tired lately. She rates her anxiety at 3 / depression at 3  We will continue to progress her exercise time and increase her levels as tolerated. We will instruct her in resistance training, she was not present when the class was presented. -Doris    90 Day Progress Note 2/12/2019  Niyah Colone Whiting has not attended rehab since 1/31/19. She has been experiencing back pain and she has been very uncomfortable and unable to attend rehab. She is not present to review her progress today. We will contact her to determine when she will be able to return to exercise. -25[facility]                   Referring Physician: Dr. Radha Winters                                              Fax #:    Reviewed and electronically signed by Dr Jayla Manzanares.  Rubi Anderson, Medical Director

## 2019-02-15 ENCOUNTER — TELEPHONE (OUTPATIENT)
Dept: ORTHOPEDIC SURGERY | Age: 59
End: 2019-02-15

## 2019-02-18 ENCOUNTER — HOSPITAL ENCOUNTER (OUTPATIENT)
Dept: MRI IMAGING | Age: 59
Discharge: HOME | End: 2019-02-18
Payer: COMMERCIAL

## 2019-02-18 DIAGNOSIS — M51.36 DDD (DEGENERATIVE DISC DISEASE), LUMBAR: ICD-10-CM

## 2019-02-18 PROCEDURE — 72148 MRI LUMBAR SPINE W/O DYE: CPT

## 2019-02-28 ENCOUNTER — OFFICE VISIT (OUTPATIENT)
Dept: ORTHOPEDIC SURGERY | Age: 59
End: 2019-02-28
Payer: COMMERCIAL

## 2019-02-28 VITALS
SYSTOLIC BLOOD PRESSURE: 111 MMHG | WEIGHT: 152 LBS | DIASTOLIC BLOOD PRESSURE: 55 MMHG | HEART RATE: 73 BPM | BODY MASS INDEX: 27.97 KG/M2 | HEIGHT: 62 IN

## 2019-02-28 DIAGNOSIS — M51.36 DDD (DEGENERATIVE DISC DISEASE), LUMBAR: Primary | ICD-10-CM

## 2019-02-28 PROCEDURE — G8419 CALC BMI OUT NRM PARAM NOF/U: HCPCS | Performed by: NURSE PRACTITIONER

## 2019-02-28 PROCEDURE — G8427 DOCREV CUR MEDS BY ELIG CLIN: HCPCS | Performed by: NURSE PRACTITIONER

## 2019-02-28 PROCEDURE — 99214 OFFICE O/P EST MOD 30 MIN: CPT | Performed by: NURSE PRACTITIONER

## 2019-02-28 PROCEDURE — G8484 FLU IMMUNIZE NO ADMIN: HCPCS | Performed by: NURSE PRACTITIONER

## 2019-02-28 PROCEDURE — 3017F COLORECTAL CA SCREEN DOC REV: CPT | Performed by: NURSE PRACTITIONER

## 2019-02-28 PROCEDURE — 1036F TOBACCO NON-USER: CPT | Performed by: NURSE PRACTITIONER

## 2019-02-28 PROCEDURE — G8598 ASA/ANTIPLAT THER USED: HCPCS | Performed by: NURSE PRACTITIONER

## 2019-03-12 ENCOUNTER — APPOINTMENT (OUTPATIENT)
Dept: CARDIAC REHAB | Age: 59
End: 2019-03-12
Payer: COMMERCIAL

## 2019-03-12 ENCOUNTER — HOSPITAL ENCOUNTER (OUTPATIENT)
Dept: CARDIAC REHAB | Age: 59
Setting detail: THERAPIES SERIES
Discharge: HOME | End: 2019-03-12
Payer: COMMERCIAL

## 2019-03-12 NOTE — PROGRESS NOTES
[facility] Facility-Based Therapy for COPD  INDIVIDUAL TREATMENT PLAN (ITP)     NAME: Ellie Avelar Henok VENTURAB: 1960  Account Number: [de-identified]  AGE: 60 y.o. Diagnosis: Severe  COPD which is GOLD Stage 3. PFT: Post Bronch FEV1/FVC : 59, FEV1: 36 FVC: 61  Notes: Medicare requirements for Pulmonary Rehabilitation include a PFT within the last 12 months revealing: FEV1/FVC <70, and FEV1 <80%, and documentation from the referring physician stating that the patient has quit smoking or will participate in smoking cessation activities prior to, or during the Pulmonary Rehab program.  A 6 Minute Walk Test (6 MWT) at the beginning and end of the program is also indicated.   GLOSSARY: PF= Physical Fitness  TM=Treadmill  AD= [employer] AirDyne Bike  UBE=Upperbody Ergometry LBE=Lowerbody Ergometer  NS=NuStep SF= SciFit  ST=Step Training  RT=Resistance Training   PLB=Pursed Lip Breathing   DY= Dynabands  HW= Handweights   Cybex RT= Cybex Mult istation weight machine   RB=Recumbent    REFERRING PHYSICIAN: Dr. Jack Calero History:     Past Medical History:   Diagnosis Date    Back pain     COPD (chronic obstructive pulmonary disease) (HCC)     Foot pain     from bone spurs    GERD (gastroesophageal reflux disease)     Headache     sharp pain going through head    Hip pain     Hyperlipidemia     Hypertension      (normal spontaneous vaginal delivery)     Osteoporosis     Vision abnormalities        Surgical History:     Past Surgical History:   Procedure Laterality Date    CARDIAC CATHETERIZATION      CHOLECYSTECTOMY         Major Symptoms:     Cough/sputum            Occasional/ Non productive      Wheezing  No     Chest Discomfort  No     Orthopnea  No     Sleep Disturbances Yes-frequently has difficulty falling asleep     Edema   No     Dyspnea  Yes-exertional    Oxygen Therapy: N/A     Home O2    lpm  []  Prn  [] continuous  []  HS     [] CPAP []  BiPAP     Company:          Comments:    Occupational/ Recreational Activities:    Employment Status:  []  FT  []  PT  [x] Disabled [] Retired    Comments:       Occupation: [employer]          Hobbies/Leisure activities: Shopping, watches videos and games on phone, TV    Social/Spiritual:        Social History     Socioeconomic History    Marital status:      Spouse name: Not on file    Number of children: Not on file    Years of education: Not on file    Highest education level: Not on file   Occupational History    Not on file   Social Needs    Financial resource strain: Not on file    Food insecurity:     Worry: Not on file     Inability: Not on file    Transportation needs:     Medical: Not on file     Non-medical: Not on file   Tobacco Use    Smoking status: Former Smoker     Packs/day: 1.00     Years: 37.00     Pack years: 37.00     Types: Cigarettes     Last attempt to quit: 2017     Years since quittin.1    Smokeless tobacco: Never Used   Substance and Sexual Activity    Alcohol use: No     Alcohol/week: 0.0 oz    Drug use: No    Sexual activity: Not Currently   Lifestyle    Physical activity:     Days per week: Not on file     Minutes per session: Not on file    Stress: Not on file   Relationships    Social connections:     Talks on phone: Not on file     Gets together: Not on file     Attends Gnosticist service: Not on file     Active member of club or organization: Not on file     Attends meetings of clubs or organizations: Not on file     Relationship status: Not on file    Intimate partner violence:     Fear of current or ex partner: Not on file     Emotionally abused: Not on file     Physically abused: Not on file     Forced sexual activity: Not on file   Other Topics Concern    Not on file   Social History Narrative    Not on file          Do you live alone: []  Alone [x]  with spouse/family- sister and brother in law.        Do you have stairs in your home  []  no [x]  yes        Comment:6 stairs up to bedroom, 6 to family room and basement laundry. Transportation  []  drive self [x]  family/friend     Comment:       Cultural/Spiritual Influences: (Anabaptism groups, etc)       Any Cultural or Episcopalian practices we should be aware of? Fall Risk Assessment:      []  Cognitive Impairment [x]  Balance/Gait Disturbance   []  Fall History   []  Visual Difficulty   [x]  Dizziness- occasional episodes of dizziness but denies falls. []  Other Comments:    Physical Assessment:    Color: [x]  natural []  pale [] cyanotic []  flushed []  jaundice    Skin Integrity [x]  intact [] other:    Heart Rate 78  Resp Rate 18      Breath Sounds: Diminished throughout    Blood Pressures Sitting: Rt arm 118/68  Left arm: 120/60       Standing Rt arm   Left arm: 118/60    Resting SpO2: 96% ra  Resp effort/pattern: Easy/regular      Peripheral pulses: Yes    Edema:  No    Numbness/tingling:  No    Orthopedic/exercise limitations:  Yes- osteoporosis in hips/knees, chronic back pain with sciatica    Psychosocial assessment:    Support System:Sister, 2 daughters 1 son    Physical/Behavioral signs of abuse/neglect: No    Advance Directives:  No  [] info given-declined    Learning Preferences: Primary Language:English          Preferred method of learning []  video []  handouts        [] listening/lecture   [x]  all    Memory impairment?   No     EXERCISE  Initial Assessment  Day 1 EXERCISE  Intervention  Day 2-30 EXERCISE  Re-Assessment  Day 31-60 EXERCISE  Re-Assessment  Day 61-90+ EXERCISE  Last Day   Date:   11/13/18 Date:   12/11/2018 Date:  1/10/2019  Date:  Date:           Pre Rehab  6 MWT  725 ft = 44% pred  2.0 METs  SpO2: 92%    1.4  MPH   HR: 87bpm      RPD: 3, RPE: 2-3                                           Post Rehab   6 MWT  ft = % pred   METs  SpO2: %  MPH  HR:  bpm      RPD:  , RPE:                                        GOALS  List at GOALS                                                                                           Rate your physical   fitness (PF)?:   /10    -30 day PF goal:  /10    EDUCATION  [] Attended all individually relevant exercise education sessions? []  Knows current exercise goals and recmndtns?:                                                  Goals Achieved? Rate your physical fitness now?:     /10  Handgrip:  Right:  Left:    Discharge  EDUCATION  []  Attended all individually relevant exercise education sessions?    [] Knows current exercise goals and recmndtns?:                                                                                        PHYSICIAN DIRECTED   EXERCISE RX     RPE : 11 - 14  Titrate O2 to keep SpO2 >89%    Frequency (F): 2-3x/wk in Rehab,         Initial Intensity : 2.0 METs (from 6MWT)   1.2-1.6  (60-80% of walk speed for TM)    Type (T): Aerobic (TM,NS, SF, AE, AB, RB, EL, Arc), RT 1-3#, 8-16 reps    CAN USE ALL EQUIPMENT EXCEPT       Time (Ti): Aerobic:   15-40 min               Progression: 1-2 min total time for TM, AB, NS, SF or Arm ergometer per session or when a steady state has occurred in RPE if  SpO2 and HR levels are acceptable           PHYSICIAN DIRECTED   EXERCISE RX       Titrate O2 to keep SpO2 >89%    Frequency (F): 2-3x/wk in Rehab, 1-3x/wk home walking       Intensity (I):  Initial + 5-10% increase each week or when a steady state has occurred in RPE if  SpO2 and HR levels are acceptable  Type (T)  Aerobic (TM,NS, SFR,SFS, AE, AB, RB), RT   8-16 reps    CAN USE ALL EQUIPMENT EXCEPT        Time (Ti): Aerobic:   30-40 min        Progression:   1-2 min total time for TM, AB, NS, SF or Arm ergometer per session or when a steady state has occurred in RPE if  SpO2 and HR levels are acceptable        Is pt. progressing in rehab?:  Yes             PHYSICIAN DIRECTED   EXERCISE RX       Titrate O2 to keep SpO2 >89%    Frequency (F): 2-3x/wk in Rehab, 1-3x/wk home walking       Intensity (I):  Initial + 5-10% increase each week when a steady state has occurred in RPE if  SpO2 and HR levels are acceptable  Type (T)  Aerobic (TM,NS, SFR,SFS, AE, AB, RB), RT   8-16 reps    CAN USE ALL EQUIPMENT EXCEPT        Time (Ti): Aerobic:   30-50 min     Progression:   1-2 min total time for TM, AB, NS, SF or Arm ergometer per session or when a steady state has occurred in RPE if  SpO2 and HR levels are acceptable              Is pt. progressing in rehab?:  Yes, slowly               PHYSICIAN DIRECTED   EXERCISE RX       Titrate O2 to keep SpO2 >89%    Frequency (F): 2-3x/wk in Rehab, 1-3x/wk home walking       Intensity (I):  Initial + 5-10% increase each week when a steady state has occurred in RPE if  SpO2 and HR levels are acceptable  Type (T):   Aerobic (TM,NS, SFR,SFS, AE, AB, RB), RT   8-16 reps    CAN USE ALL EQUIPMENT EXCEPT          Time (Ti): Aerobic:   30-58 min         Progression:   1-2 min total time for TM, AB, NS, SF or Arm ergometer per session or when a steady state has occurred in RPE if  SpO2 and HR levels are acceptable            Is pt. progressing in rehab?:               Final Intensity (I): See below and final 6MWT above            ACHIEVED TOTAL AEROBIC EXERCISE TIME:  min         FINAL LEVELS:  [] TM: min  [] Nustep: level  min  [] Arm ergometer: moore min  [] Scifit: level min  [] HW :  # x  reps  [] Dy:    X   reps  [] Cybex RT:    # x   Reps  [] Eliptical:level  X  Min  [] Arc: level   X    mins  [] RB:  Level  X   mins  [] AB: level   X     Min              Did pt. progress in rehab?:     30 DAY TARGET GOAL  [x] Start slowly but progress each week  [x] Increase duration on the equipment  [x] Start resistance training    -30 day PF goal: 5/10                 Current Home Exercise : None    Frequency:      Type:                     Health Knowledge   Test Score:  20/25     Current Home Exercise:   Frequency:  Everyday    Type: warm-ups / stairs        Time: 10 min                                  Current Home Exercise:   Frequency:  everyday     Type: warm-ups / stairs         Time: 10-15 min                                Current Home Exercise:   Frequency:       Type:         Time:  min                                                      Discharge exercise plan                                  Repeat Health Knowledge Test Score :    /25     [facility]'s INDIVIDUAL TREATMENT PLAN  SMOKING AND   OTHER COMPONENTS    Smoking/Other  Components   Initial Assessment  Day 1 Smoking/Other  Components  Intervention  Day 2-30 Smoking/Other  Components  Re-Assessment  Day 31-60 Smoking/Other  Components  Re-Assessment Day 61-90+ Smoking/Other  Components  Discharge  Final Day   Tobacco Use Hx  [x] Y  [] N?:   Quit Date: 2017  Cig/Day: 40  Years: 40  Tobacco Use  Any change in Smoking Status?:   []  not smoking  Quit Date:   (if applicable)  Intervention  []  Information/ed material given on cessation techniques  []  smoking cessation class schedule given Tobacco Use  Any change in Smoking Status?:  Non-smoking    Quit Date:   (if applicable)          Intervention  [] Referral to Tobacco Cessation Specialist (TCS) Tobacco Use  Any change in Smoking Status?:  Non-smoking     Quit Date:   (if applicable)        Intervention Tobacco Use  Any change in Smoking Status?:     Quit Date:   (if applicable)          Intervention Tobacco Use  Any change in Smoking Status?:   Quit Date:   (if applicable)            Intervention  [] Pt used TCS counseling           Other  Components:    [x]  Environmental Factors    [x]  Prevention Management of Exacerbations    []  CHF Management    []  Hypertension Management     Intervention/  Education                Debbie INDIVIDUAL TREATMENT PLAN  OXYGEN AND MEDICATIONS    OXYGEN  MEDICATIONS   Initial Assessment  Day 1 OXYGEN  MEDICATIONS  Intervention  Day 2-30 OXYGEN  MEDICATION  Re-Assessment  Day 31-60 OXYGEN  MEDICATION  ReAssessment Day 61-90+ OXYGEN  MEDICATION  Discharge  Final Day                    Medications  [x]  Uses as prescribed  []  Non- compliant with prescribed meds    Respiratory Medications    []  Proper use   and technique of MDI, DPI and/or nebs  []  Incorrect use and technique of MDI, DPI and /or nebs    Hypoxemia  Problem:    [x]  No problem  [] Noncompliant with O2 use  []  Oxygen in SD only  []  No home O2  []  No portable O2  []  Needs O2 prescription and O2 qualifying data sent for setup   ()Poor knowledge of O2 use/safety    Current Oxygen Prescription:      l/min rest     l/min exercise     titration   plan/weaning plan:  CPAP/BIPAP:n/a    Goals:     []  Manage Hypoxemia  []  receive adequate portable system  []  Compliant with use as prescribed  []  Learn O2 safety guidelines   Medications  [x]  Uses as prescribed  []  Non- compliant with prescribed meds    Respiratory Medications    [x] Proper use and technique of MDI, DPI and/or nebs  []  Incorrect use and technique of MDI, DPI and /or nebs    Hypoxemia  Problem:  Not using oxygen      Current Oxygen Prescription:    l/min rest   l/min exercise   titration plan/weaning plan:    Goals:   []  Manage Hypoxemia  []  receive adequate portable system  []  Compliant with use as prescribed  []  Learn O2 safety guidelines           Medications  [x]  Uses as prescribed  [] Non- compliant with prescribed meds    Respiratory Medications    [x]  Proper use and technique of MDI, DPI and/or nebs  [] Incorrect use and technique of MDI, DPI and /or nebs    Hypoxemia  Problem:  Not using oxygen    Current Oxygen Prescription:    l/min rest   l/min exercise   titration plan/weaning plan:    Goals:   []  Manage Hypoxemia  []  receive adequate portable system  []  Compliant with use as prescribed  [] Learn O2 safety guidelines           Medications  []  Uses as prescribed  []  Non- compliant with prescribed meds    Respiratory Medications    []  Proper use and technique of MDI, DPI and/or nebs  []  Incorrect use and technique of MDI, DPI and /or nebs    Hypoxemia  Problem:      Current Oxygen Prescription:    l/min rest   l/min exercise   titration plan/weaning plan:    Goals:   []  Manage Hypoxemia  []  receive adequate portable system  [] Compliant with use as prescribed  []  Learn O2 safety guidelines     Medications    [] Compliant  []  Noncompliant       Respiratory Medications    []  Compliant  [] Noncompliant              Hypoxemia  Problem:    []  Compliant  []  Noncompliant    Final Oxygen Prescription:    l/min rest   l/min exercise                                                           [facility] INDIVIDUAL TREATMENT PLAN  NUTRITION DOMAIN:        NUTRITION   Initial Assessment  Day 1 NUTRITION   Intervention  Day 2-30 NUTRITION   Re-Assessment  Day 31-60 NUTRITION   Re-Assessment Day 61-90+ NUTRITION Discharge  Final Day   Weight Management:  151 lbs  Current BMI 28 Weight Management:  153 lbs   Weight Management:   153.6 lbs   Weight Management:    lbs  Current BMI Weight Management:    lbs  Current BMI   Diabetes?: N/A  A1C   Fasting BS:   Insulin?:    Oral agent? Diabetes:  If y, has patient seen a positive trend in FBS?:      Intervention    [x] Basic nutrition education   [] Referral to Diabetes Education? [] Referral to weightloss/nutrition education     Intervention    [] Pt has had Nutrition class? [] Informal questions asked regarding nutrition/weight control Intervention    []  Pt has had Nutrition class? Scheduled this month  [] Questions addressed Intervention    []  Pt has had Nutrition class? Intervention    Pt aware of:     [] Pulmonary eating techniques   [] Smart choices, Calories   []Gas-producing foods   [] Wt gain / loss strategies     GOALS    Weight Loss         30 DAY TARGET GOAL:    [x] Decrease portion size by 250-500 calories/day  [x] Increase fluid intake to 6-8 8oz.   [x] Eat less sweets  [x] Cut back on amt of soda     Reasonable, achievable 30 day weight goal: 147 lbs   (1-2 lb per week is recommended)            Weight Gain        30 day   Target Goal:    [] increase proteins  [] add nutrition  Supplement  [] 6 small meals      Did pt meet Initial 30 day Target Goal(s)?:     New 30 DAY TARGET GOAL:    [x] Decrease saturated fats  [x] Decrease gas producing  cruciferous veggies   -  Reasonable, achievable 30 day weight goal:  Maintain current weight   Did pt meet previous 30 day Target   Goal(s)?:     New 30 DAY TARGET GOAL:    [x] Switch to whole grains whenever possible / uses all fresh vegis  [] Decrease/ Eliminate carbonated beverages / drinks 2 sodas /day    Reasonable, achievable 30 day weight goal: maintain    Lavonsarahi Aguirre states she continues to have difficulties with swallowing. She has an appointment to have this evaluated in early February   Did pt meet previous 30 day Target   Goal(s)?:      New 30 DAY TARGET GOAL:    [] Smaller meals more frequently  [] Decrease portion sizes by 25%      Reasonable, achievable 30 day weight goal:                  Did pt meet previous 30 day Target   Goal(s)?:                         [facility] INDIVIDUAL TREATMENT PLAN  PSYCHOSOCIAL DOMAIN:    Psychosocial   Initial Assessment  Day 1 Psychosocial   Intervention  Day 2-30 Psychosocial  Re-Assessment  Day 31-60 Psychosocial   Re-Assessment  Day 61-90+ Psychosocial Discharge  Final Day   Psychosocial Screening Tools    (X) PHQ-9 score: 14      (X) SF-36: 56       (X) UCSD SOB score: 91         PHQ-9 Score: If score >or =15, Action:     SF-36 Mental Score:     UCSD Score: Any action on Screening Tools?:                  Psychosocial Screening   Tools    Repeat PHQ-9 Score if mely:    Repeat SF-36      Repeat UCSD SOB Score:       Assessment  []  S/S of depression identified?     []  PCP notified of PHQ-9 results  Yes    []  On Anti-anxiety / anti-depression meds?: Zoloft 100mg daily           Intervention  If S/S of depression present, action taken:     Is the patient receiving support for the KS program at home?:  [x]  Y    [] N    Is the patient tolerating the intensity and duration of the KS program?:     [x] Y   [] N Re-Assessment  [] S/S of depression present? If [x] , action:         Med Changes?:   [] Y   [x] N  Continues with Zoloft        Is the patient tolerating the intensity and duration of the KS program?:    [x]  Y    [] N  Re-Assessment  Psych Issue notes:        Is the patient receiving support for the KS program at home?:  []  Y  [] N    Is the patient tolerating the intensity and duration of the KS program?:  []  Y   []  N     Final Assessment                       Results of any Physician/ Counselor Intervention?:     [] Y   [] N         GOALS        30 DAY TARGET GOAL    [x]  Assess Mental Score using   SF-36 and PHQ-9   [x]  Teach PLB  [x]  Reassure pt that (s)he can stop and rest, adjust the speeds, and/or switch modalities from our suggestions  -    (0 being 'None', 10 being 'Very'),  -How would you rate your Anxiety Level: 4      -How would you rate your level of  depression: 4    -How would you rate your mood: (0 is negative all the time, 10 is positive all the time):  4          ADL's  Assess PF score on SF-36    Score= 15    From [facility], ADL pt struggles with most: Going up/down steps to laundry    Out of 10, rate your ability to do that ADL now.   3     GOALS        Did pt meet Initial 30 day   Target Goal(s)?:       30 DAY NEW TARGET GOAL    [x] Decrease/Maintain anxiety and depression level  [x] Increase ability to complete ADL  [x] Encourage pt to rate exercises truthfully to encourage correct intensity / duration prescription  -  (0 being 'None', 10 being 'Very'),  -How would you rate your Anxiety Level: 3/10      -How would you rate your level of depression: 3/10      -How would you rate your mood: 5    ADL's 4/10 GOALS        Did pt meet previous 30 day Classes:  [] Nutrition  []  Panic conntrol, relaxation, managing depression  []  A&P of the Respiratory System   [] Environ. Issues+ Travel   [] Bronchial Hygiene / Preventing Infection  []  Resistance Training  [] Benefits of Exercise  [] Energy Cons    Education  Individual instruction  [] PLB  [] RPD/RPE   []  O2 use  []  review PFT  [] Inhalers   [] Home Activity/Warm up/ stretches  Attend Classes:  [] Nutrition  []  Panic conntrol, relaxation, managing depression  [] A&P of the Respiratory System   [] Environ. Issues+ Travel   [] Bronchial Hygiene / Preventing Infection  []  Resistance Training  [] Benefits of Exercise  [] Energy Cons   Education  []  Addressed pt questions on Education Topics                                                         Physician Interaction / Response:  (If none, continue on present care plan)     Physician Interaction / Response:   (If none, continue on present care plan)   Physician Interaction / Response:   (If none, continue on present care plan)   Physician Interaction / Response:   (If none, continue on present care plan)   Physician Interaction / Response:       Discharge the patient. Rehab Potential:  [x]  Good [] Fair [] Poor    Summary: 11/13/18:  Thurmond Duane is a 60 yr old female with COPD who was referred to Pulmonary Rehab for exertional shortness of breath. She presents today for her initial [place] evaluation. She has a 4yr history of smoking 1-2ppd, quit 1/1/2017. Her most recent hospitalization 5/6/18 for NSTEMI. She has had no recent hospitalizations for COPD. Arelis Lefort reports that her breathing has become worse over the past year and that she has been finding it harder to perform her ADL'S and preventing her from being as active as she once was. She will attend 28 sessions of [place] twice weekly. Exercise:30-40 min of exercise starting at low levels. Time and intensity to be increased based on RPE. Today she did tolerate 15 min of exercise at low levels.  Cardiac monitor displayed NSR throughout. Oxygen:Laine will exercise on room air as demonstrated by 6 mw test.    Medications affecting HR: Toprol xl 12.5mg daily, Albuterol MDI 2 puffs q 6hrs prn    Nutrition:Wt. 150# BMI 28. Reena Patel states she would like to lose about 10lbs. She admits to drinking 4 cans of pepsi daily. We discussed cutting back on soda, increasing water intake and decreasing portion sizes. She will attend General Nutrition class during ME. Psychosocial including Dyspnea with ADL's, Depression/Anxiety and Social Functioning: Greta lives with her sister and brother in law. She has 3 children with whom she sees often. She rates her anxiety/depression 4/10 which she feels is due to her breathing issues and inability to do much without getting short of breath. She is able to perform her ADL's but is finding it more difficult. She looks forward to increasing her strength and learning how to manage her COPD.     30 Day Progress Report:12/11/2018  Attendance:  Toni Mirza has attended 6 exercise sessions / 2 education classes  Exercise:  Caron Toure completes 35-40 min of exercise at low levels. She does occasionally complain of back and neck discomfort. We have reviewed proper positioning and safety on the exercise equipment. She is doing warm-ups at home. Oxygen:  She completes all exercise on room air and maintains an adequate SaO2 94-97%. We have reviewed proper breathing techniques. Medications: Caron Toure states she was started on Pepcid following an episode of CP that was evaluated in the ED. She states the pepcid has helped and she has had no further pain. Nutrition:  Weight maintained at 152#  Psychosocial:  Caron Toure is pleasant. Rates her anxiety is 3 / depression 3  We will continue to progress her exercise time and intensity as tolerated. -MD Stephens    60 Day Progress Report:  1/11/19  Attendance: Alber Anderson has attended 11 education session / 2 education classes  Exercise: Completes 40 min of exercise at

## 2019-03-12 NOTE — PROGRESS NOTES
[facility] Facility-Based Therapy for COPD  INDIVIDUAL TREATMENT PLAN (ITP)     NAME: Shyam Whiting  CELESTE VENTURAB: 1960  Account Number: [de-identified]  AGE: 60 y.o. Diagnosis: Severe  COPD which is GOLD Stage 3. PFT: Post Bronch FEV1/FVC : 59, FEV1: 36 FVC: 61  Notes: Medicare requirements for Pulmonary Rehabilitation include a PFT within the last 12 months revealing: FEV1/FVC <70, and FEV1 <80%, and documentation from the referring physician stating that the patient has quit smoking or will participate in smoking cessation activities prior to, or during the Pulmonary Rehab program.  A 6 Minute Walk Test (6 MWT) at the beginning and end of the program is also indicated.   GLOSSARY: PF= Physical Fitness  TM=Treadmill  AD= Schwinn AirDyne Bike  UBE=Upperbody Ergometry LBE=Lowerbody Ergometer  NS=NuStep SF= SciFit  ST=Step Training  RT=Resistance Training   PLB=Pursed Lip Breathing   DY= Dynabands  HW= Handweights   Cybex RT= Cybex Mult istation weight machine   RB=Recumbent    REFERRING PHYSICIAN: Dr. Kayleigh Deng History:     Past Medical History:   Diagnosis Date    Back pain     COPD (chronic obstructive pulmonary disease) (HCC)     Foot pain     from bone spurs    GERD (gastroesophageal reflux disease)     Headache     sharp pain going through head    Hip pain     Hyperlipidemia     Hypertension      (normal spontaneous vaginal delivery)     Osteoporosis     Vision abnormalities        Surgical History:     Past Surgical History:   Procedure Laterality Date    CARDIAC CATHETERIZATION      CHOLECYSTECTOMY         Major Symptoms:     Cough/sputum            Occasional/ Non productive      Wheezing  No     Chest Discomfort  No     Orthopnea  No     Sleep Disturbances Yes-frequently has difficulty falling asleep     Edema   No     Dyspnea  Yes-exertional    Oxygen Therapy: N/A     Home O2    lpm  []  Prn  [] continuous  []  HS     [] CPAP []  BiPAP     Company:          Comments:    Occupational/ Recreational Activities:    Employment Status:  []  FT  []  PT  [x] Disabled [] Retired    Comments:       Occupation: [employer]          Hobbies/Leisure activities: Shopping, watches videos and games on phone, TV    Social/Spiritual:        Social History     Socioeconomic History    Marital status:      Spouse name: Not on file    Number of children: Not on file    Years of education: Not on file    Highest education level: Not on file   Occupational History    Not on file   Social Needs    Financial resource strain: Not on file    Food insecurity:     Worry: Not on file     Inability: Not on file    Transportation needs:     Medical: Not on file     Non-medical: Not on file   Tobacco Use    Smoking status: Former Smoker     Packs/day: 1.00     Years: 37.00     Pack years: 37.00     Types: Cigarettes     Last attempt to quit: 2017     Years since quittin.1    Smokeless tobacco: Never Used   Substance and Sexual Activity    Alcohol use: No     Alcohol/week: 0.0 oz    Drug use: No    Sexual activity: Not Currently   Lifestyle    Physical activity:     Days per week: Not on file     Minutes per session: Not on file    Stress: Not on file   Relationships    Social connections:     Talks on phone: Not on file     Gets together: Not on file     Attends Advent service: Not on file     Active member of club or organization: Not on file     Attends meetings of clubs or organizations: Not on file     Relationship status: Not on file    Intimate partner violence:     Fear of current or ex partner: Not on file     Emotionally abused: Not on file     Physically abused: Not on file     Forced sexual activity: Not on file   Other Topics Concern    Not on file   Social History Narrative    Not on file          Do you live alone: []  Alone [x]  with spouse/family- sister and brother in law.        Do you have stairs in your home  []  no [x]  yes        Comment:6 stairs up to bedroom, 6 to family room and basement laundry. Transportation  []  drive self [x]  family/friend     Comment:       Cultural/Spiritual Influences: (Denominational groups, etc)       Any Cultural or Baptist practices we should be aware of? Fall Risk Assessment:      []  Cognitive Impairment [x]  Balance/Gait Disturbance   []  Fall History   []  Visual Difficulty   [x]  Dizziness- occasional episodes of dizziness but denies falls. []  Other Comments:    Physical Assessment:    Color: [x]  natural []  pale [] cyanotic []  flushed []  jaundice    Skin Integrity [x]  intact [] other:    Heart Rate 78  Resp Rate 18      Breath Sounds: Diminished throughout    Blood Pressures Sitting: Rt arm 118/68  Left arm: 120/60       Standing Rt arm   Left arm: 118/60    Resting SpO2: 96% ra  Resp effort/pattern: Easy/regular      Peripheral pulses: Yes    Edema:  No    Numbness/tingling:  No    Orthopedic/exercise limitations:  Yes- osteoporosis in hips/knees, chronic back pain with sciatica    Psychosocial assessment:    Support System:Sister, 2 daughters 1 son    Physical/Behavioral signs of abuse/neglect: No    Advance Directives:  No  [] info given-declined    Learning Preferences: Primary Language:English          Preferred method of learning []  video []  handouts        [] listening/lecture   [x]  all    Memory impairment?   No     EXERCISE  Initial Assessment  Day 1 EXERCISE  Intervention  Day 2-30 EXERCISE  Re-Assessment  Day 31-60 EXERCISE  Re-Assessment  Day 61-90+ EXERCISE  Last Day   Date:   11/13/18 Date:   12/11/2018 Date:  1/10/2019  Date:  Date:           Pre Rehab  6 MWT  725 ft = 44% pred  2.0 METs  SpO2: 92%    1.4  MPH   HR: 87bpm      RPD: 3, RPE: 2-3                                           Post Rehab   6 MWT  ft = % pred   METs  SpO2: %  MPH  HR:  bpm      RPD:  , RPE:                                        GOALS  List at least 2 patient specific goals    [x]Improve activity for tolerance for routine tasks and walking    [x]Learn proper breathing techniques including PLB    []Learn proper pacing with activities    []Learn proper use of oxygen, systems and safety    []Learn proper exercise guidelines    [x]Learn proper nutrition for my diagnosis                              Learning Barrier(s)  [] Speech           [] Literacy              [] Hearing          [] Cognitive            [] Vision             [x]  Ready to Learn          Balance Issues  [] Y  [x] N          Orthopedic Issues  [x]  Y[]  N-Chronic back pain with bilateral sciatica        Rate your Physical   Fitness (PF)?    4/10    Handgrip:  Right:8  Left:8    EDUCATION  [x]  Staff Introduction  [x]  Proper setup/O2 use  [x]  Equipment [place]'n  [x]  RPD/RPE Explain  [x]  SpO2/HR Explain  [x]  Breathing Retraining  [x]  Explain Intensity  [x] Initial Levels set GOALS          1) \"A little bit\"            2) \"A little bit. \"                  If Patient has a Learning Barrier, action:               Pending    If pt has balance or orthopedic issues:  Interventions:                    Rate your physical   fitness (PF)?:   5/10        -30 day PF goal:  6/10    EDUCATION  [] Understands proper set/up O2 use  N/A  []  Understands SpO2 and RPD? N/A  [x] Aerobic progression explained? [x]  Intensity progression explained? GOALS          1) \"Yes, I move around better. \"           2) \"Yes, I'm breathing better. \"                                   3) Nutrition class scheduled for January                                    Rate your physical   fitness (PF)?:   5.5 /10        -30 day PF goal:  6 /10    EDUCATION   [x]  Home Activity education offered  [x] Stretching/ Flexibility education offered  [x] Attended Benefits of Exercise Class / information packet given  [x]  Attended Resistance Training Class GOALS                                                                                           Rate your physical   fitness (PF)?:   /10    -30 day PF goal:  /10    EDUCATION  [] Attended all individually relevant exercise education sessions? []  Knows current exercise goals and recmndtns?:                                                  Goals Achieved? Rate your physical fitness now?:     /10  Handgrip:  Right:  Left:    Discharge  EDUCATION  []  Attended all individually relevant exercise education sessions?    [] Knows current exercise goals and recmndtns?:                                                                                        PHYSICIAN DIRECTED   EXERCISE RX     RPE : 11 - 14  Titrate O2 to keep SpO2 >89%    Frequency (F): 2-3x/wk in Rehab,         Initial Intensity : 2.0 METs (from 6MWT)   1.2-1.6  (60-80% of walk speed for TM)    Type (T): Aerobic (TM,NS, SF, AE, AB, RB, EL, Arc), RT 1-3#, 8-16 reps    CAN USE ALL EQUIPMENT EXCEPT       Time (Ti): Aerobic:   15-40 min               Progression: 1-2 min total time for TM, AB, NS, SF or Arm ergometer per session or when a steady state has occurred in RPE if  SpO2 and HR levels are acceptable           PHYSICIAN DIRECTED   EXERCISE RX       Titrate O2 to keep SpO2 >89%    Frequency (F): 2-3x/wk in Rehab, 1-3x/wk home walking       Intensity (I):  Initial + 5-10% increase each week or when a steady state has occurred in RPE if  SpO2 and HR levels are acceptable  Type (T)  Aerobic (TM,NS, SFR,SFS, AE, AB, RB), RT   8-16 reps    CAN USE ALL EQUIPMENT EXCEPT        Time (Ti): Aerobic:   30-40 min        Progression:   1-2 min total time for TM, AB, NS, SF or Arm ergometer per session or when a steady state has occurred in RPE if  SpO2 and HR levels are acceptable        Is pt. progressing in rehab?:  Yes             PHYSICIAN DIRECTED   EXERCISE RX       Titrate Eat less sweets  [x] Cut back on amt of soda     Reasonable, achievable 30 day weight goal: 147 lbs   (1-2 lb per week is recommended)            Weight Gain        30 day   Target Goal:    [] increase proteins  [] add nutrition  Supplement  [] 6 small meals      Did pt meet Initial 30 day Target Goal(s)?:     New 30 DAY TARGET GOAL:    [x] Decrease saturated fats  [x] Decrease gas producing  cruciferous veggies   -  Reasonable, achievable 30 day weight goal:  Maintain current weight   Did pt meet previous 30 day Target   Goal(s)?:     New 30 DAY TARGET GOAL:    [x] Switch to whole grains whenever possible / uses all fresh vegis  [] Decrease/ Eliminate carbonated beverages / drinks 2 sodas /day    Reasonable, achievable 30 day weight goal: maintain    Felicia Cullen states she continues to have difficulties with swallowing. She has an appointment to have this evaluated in early February   Did pt meet previous 30 day Target   Goal(s)?:      New 30 DAY TARGET GOAL:    [] Smaller meals more frequently  [] Decrease portion sizes by 25%      Reasonable, achievable 30 day weight goal:                  Did pt meet previous 30 day Target   Goal(s)?:                         [facility] INDIVIDUAL TREATMENT PLAN  PSYCHOSOCIAL DOMAIN:    Psychosocial   Initial Assessment  Day 1 Psychosocial   Intervention  Day 2-30 Psychosocial  Re-Assessment  Day 31-60 Psychosocial   Re-Assessment  Day 61-90+ Psychosocial Discharge  Final Day   Psychosocial Screening Tools    (X) PHQ-9 score: 14      (X) SF-36: 56       (X) UCSD SOB score: 91         PHQ-9 Score: If score >or =15, Action:     SF-36 Mental Score:     UCSD Score: Any action on Screening Tools?:                  Psychosocial Screening   Tools    Repeat PHQ-9 Score if mely:    Repeat SF-36      Repeat UCSD SOB Score:       Assessment  []  S/S of depression identified?     []  PCP notified of PHQ-9 results  Yes    []  On Anti-anxiety / anti-depression meds?: Zoloft 100mg Target Goal(s)?:      RE- ASSESSMENT GOAL    [x] Decrease/  Maintain anxiety and depression level  [x] Increase ability to complete ADL  Rates ADL's at 5/10  -          (0 being 'None', 10 being 'Very'),  -How would you rate your Anxiety Level:3/10      -How would you rate your level of depression:3/10    -How would you rate your mood: 5 GOALS        Did pt meet previous 30 day Target Goal(s)?:      RE- ASSESSMENT GOAL    [] Decrease/  Maintain anxiety and depression level  [] Increase ability to complete ADL  -          (0 being 'None', 10 being 'Very'),  -How would you rate your Anxiety Level:      -How would you rate your level of depression:    -How would you rate your mood:     Discharge            Did pts SF-36 Mental Score increase?:          [] Pt is aware of the s/s of depression    [] Received Psychosocial Education    Any follow up with physicians  necessary?:      [] Y    [] N               [facility] INDIVIDUAL TREATMENT PLAN  EDUCATION DOMAIN:    EDUCATION   Initial Assessment  Day 1 EDUCATION   Intervention  Day 2-30 EDUCATION   Re-Assessment  Day 31-60 EDUCATION   ReAssessment Day 61-90+ EDUCATION Discharge  Final Day                      Education  [x] Equipment/Staff Orientation  [x] Breathing Retraining  [x] Importance of Clean Hands    Chronic Cough?:   [] Y   [x]  N  Spacer?:   [x]   Y   []  N     Sleep Apnea?:  [] Y    [x] N     [x] Education Book given       Education  Individual instruction  [x] PLB  [x] RPD/RPE   [] O2 use  []  review PFT  [] Inhalers   []Home Activity/Warm up/ stretches  Attend Classes:  [] Nutrition  [x] Panic control, relaxation, managing depression  [] A&P of the Respiratory System   [] Environ. Issues+ Travel   [x] Bronchial Hygiene / Preventing Infection  [] Resistance Training  []  Benefits of Exercise  [] Energy Cons        Education  Individual instruction  [] PLB  [] RPD/RPE   [] O2 use  []  review PFT  [] Inhalers   [] Home Activity/Warm up/ stretches  Attend Classes:  [] Nutrition  []  Panic conntrol, relaxation, managing depression  []  A&P of the Respiratory System   [] Environ. Issues+ Travel   [] Bronchial Hygiene / Preventing Infection  []  Resistance Training  [] Benefits of Exercise  [] Energy Cons    Education  Individual instruction  [] PLB  [] RPD/RPE   []  O2 use  []  review PFT  [] Inhalers   [] Home Activity/Warm up/ stretches  Attend Classes:  [] Nutrition  []  Panic conntrol, relaxation, managing depression  [] A&P of the Respiratory System   [] Environ. Issues+ Travel   [] Bronchial Hygiene / Preventing Infection  []  Resistance Training  [] Benefits of Exercise  [] Energy Cons   Education  []  Addressed pt questions on Education Topics                                                         Physician Interaction / Response:  (If none, continue on present care plan)     Physician Interaction / Response:   (If none, continue on present care plan)   Physician Interaction / Response:   (If none, continue on present care plan)   Physician Interaction / Response:   (If none, continue on present care plan)   Physician Interaction / Response:       Discharge the patient. Rehab Potential:  [x]  Good [] Fair [] Poor    Summary: 11/13/18:  Chuck Mejía is a 60 yr old female with COPD who was referred to Pulmonary Rehab for exertional shortness of breath. She presents today for her initial NM evaluation. She has a 4yr history of smoking 1-2ppd, quit 1/1/2017. Her most recent hospitalization 5/6/18 for NSTEMI. She has had no recent hospitalizations for COPD. Andi Jacobsen reports that her breathing has become worse over the past year and that she has been finding it harder to perform her ADL'S and preventing her from being as active as she once was. She will attend 28 sessions of NM twice weekly. Exercise:30-40 min of exercise starting at low levels. Time and intensity to be increased based on RPE. Today she did tolerate 15 min of exercise at low levels.  Cardiac low levels. She continues to complain of back and neck discomfort. She continues doing warm-ups at home. Oxygen: All exercise is completed on room air and she maintains an SaO2 at 96-97%. Medications:  Her only medication addition was a cream for a rash on her abdomen under her left breast and neck. Received a Pneumonia shot today at MD office. Nutrition:  Weight maintained at 153#  Psychosocial:   Andi Jacobsen states she is feeling well, just tired lately. She rates her anxiety at 3 / depression at 3  We will continue to progress her exercise time and increase her levels as tolerated. We will instruct her in resistance training, she was not present when the class was presented. -Doris    90 Day Progress Note 2/12/2019  Andi Whiting has not attended rehab since 1/31/19. She has been experiencing back pain and she has been very uncomfortable and unable to attend rehab. She is not present to review her progress today. We will contact her to determine when she will be able to return to exercise. -Doris    120 Day Progress Note: 3/12/19: Pt has not been in rehab since 1/31/19. She has had difficulty with recurrent back pain. Pt returned phone call today. States she plans on returning Thursday. She is enrolled in aquatic therapy [facility]. [facility]. Referring Physician: Dr. Lula Diaz                                              Fax #:    Reviewed and electronically signed by Dr Ivone Salazar.  Kamari Delarosa, Medical Director

## 2019-03-12 NOTE — PROGRESS NOTES
Pt returned phone call.  States she plans on returning Thursday even though she is enrolled in aquatic therapy M-W-F.

## 2019-03-14 ENCOUNTER — APPOINTMENT (OUTPATIENT)
Dept: CARDIAC REHAB | Age: 59
End: 2019-03-14
Payer: COMMERCIAL

## 2019-03-14 ENCOUNTER — HOSPITAL ENCOUNTER (OUTPATIENT)
Dept: CARDIAC REHAB | Age: 59
Setting detail: THERAPIES SERIES
Discharge: HOME | End: 2019-03-14
Payer: COMMERCIAL

## 2019-03-14 VITALS — WEIGHT: 156.6 LBS | BODY MASS INDEX: 28.64 KG/M2

## 2019-03-14 PROCEDURE — G0424 PULMONARY REHAB W EXER: HCPCS

## 2019-03-22 ENCOUNTER — HOSPITAL ENCOUNTER (EMERGENCY)
Age: 59
Discharge: HOME | End: 2019-03-22
Attending: EMERGENCY MEDICINE
Payer: COMMERCIAL

## 2019-03-22 ENCOUNTER — APPOINTMENT (OUTPATIENT)
Dept: GENERAL RADIOLOGY | Age: 59
End: 2019-03-22
Payer: COMMERCIAL

## 2019-03-22 VITALS
BODY MASS INDEX: 28.63 KG/M2 | DIASTOLIC BLOOD PRESSURE: 64 MMHG | WEIGHT: 156.53 LBS | RESPIRATION RATE: 16 BRPM | HEART RATE: 75 BPM | TEMPERATURE: 97.4 F | OXYGEN SATURATION: 92 % | SYSTOLIC BLOOD PRESSURE: 123 MMHG

## 2019-03-22 DIAGNOSIS — M79.601 RIGHT ARM PAIN: Primary | ICD-10-CM

## 2019-03-22 PROCEDURE — 73100 X-RAY EXAM OF WRIST: CPT

## 2019-03-22 PROCEDURE — 99283 EMERGENCY DEPT VISIT LOW MDM: CPT

## 2019-03-22 ASSESSMENT — PAIN - FUNCTIONAL ASSESSMENT: PAIN_FUNCTIONAL_ASSESSMENT: ACTIVITIES ARE NOT PREVENTED

## 2019-03-22 ASSESSMENT — ENCOUNTER SYMPTOMS
APNEA: 0
EYE PAIN: 0
WHEEZING: 0
PHOTOPHOBIA: 0
STRIDOR: 0
ABDOMINAL DISTENTION: 0
SHORTNESS OF BREATH: 0
EYE DISCHARGE: 0
CHEST TIGHTNESS: 0
CHOKING: 0
COUGH: 0
EYE ITCHING: 0
EYE REDNESS: 0

## 2019-03-22 ASSESSMENT — PAIN DESCRIPTION - FREQUENCY
FREQUENCY: CONTINUOUS
FREQUENCY: CONTINUOUS

## 2019-03-22 ASSESSMENT — PAIN DESCRIPTION - ORIENTATION
ORIENTATION: RIGHT
ORIENTATION: RIGHT

## 2019-03-22 ASSESSMENT — PAIN DESCRIPTION - DESCRIPTORS
DESCRIPTORS: ACHING
DESCRIPTORS: SHARP

## 2019-03-22 ASSESSMENT — PAIN DESCRIPTION - PAIN TYPE
TYPE: ACUTE PAIN
TYPE: ACUTE PAIN

## 2019-03-22 ASSESSMENT — PAIN DESCRIPTION - PROGRESSION
CLINICAL_PROGRESSION: NOT CHANGED
CLINICAL_PROGRESSION: NOT CHANGED

## 2019-03-22 ASSESSMENT — PAIN SCALES - GENERAL
PAINLEVEL_OUTOF10: 8
PAINLEVEL_OUTOF10: 5

## 2019-03-22 ASSESSMENT — PAIN DESCRIPTION - LOCATION
LOCATION: ANKLE
LOCATION: WRIST

## 2019-03-28 ENCOUNTER — HOSPITAL ENCOUNTER (OUTPATIENT)
Dept: CARDIAC REHAB | Age: 59
Setting detail: THERAPIES SERIES
Discharge: HOME | End: 2019-03-28
Payer: COMMERCIAL

## 2019-03-28 VITALS — BODY MASS INDEX: 28.35 KG/M2 | WEIGHT: 155 LBS

## 2019-03-28 PROCEDURE — G0424 PULMONARY REHAB W EXER: HCPCS

## 2019-04-10 ENCOUNTER — OFFICE VISIT (OUTPATIENT)
Dept: INTERNAL MEDICINE CLINIC | Age: 59
End: 2019-04-10
Payer: COMMERCIAL

## 2019-04-10 VITALS
SYSTOLIC BLOOD PRESSURE: 110 MMHG | HEART RATE: 67 BPM | OXYGEN SATURATION: 95 % | WEIGHT: 154.2 LBS | RESPIRATION RATE: 12 BRPM | BODY MASS INDEX: 28.2 KG/M2 | DIASTOLIC BLOOD PRESSURE: 55 MMHG

## 2019-04-10 DIAGNOSIS — M16.0 PRIMARY OSTEOARTHRITIS OF BOTH HIPS: ICD-10-CM

## 2019-04-10 DIAGNOSIS — F60.3 EMOTIONAL INSTABILITY (HCC): ICD-10-CM

## 2019-04-10 DIAGNOSIS — M62.830 MUSCLE SPASM OF BACK: Primary | ICD-10-CM

## 2019-04-10 PROCEDURE — 1036F TOBACCO NON-USER: CPT | Performed by: NURSE PRACTITIONER

## 2019-04-10 PROCEDURE — 3017F COLORECTAL CA SCREEN DOC REV: CPT | Performed by: NURSE PRACTITIONER

## 2019-04-10 PROCEDURE — G8419 CALC BMI OUT NRM PARAM NOF/U: HCPCS | Performed by: NURSE PRACTITIONER

## 2019-04-10 PROCEDURE — 99213 OFFICE O/P EST LOW 20 MIN: CPT | Performed by: NURSE PRACTITIONER

## 2019-04-10 PROCEDURE — G8598 ASA/ANTIPLAT THER USED: HCPCS | Performed by: NURSE PRACTITIONER

## 2019-04-10 PROCEDURE — G8427 DOCREV CUR MEDS BY ELIG CLIN: HCPCS | Performed by: NURSE PRACTITIONER

## 2019-04-10 RX ORDER — METHOCARBAMOL 500 MG/1
500 TABLET, FILM COATED ORAL 2 TIMES DAILY
Qty: 60 TABLET | Refills: 1 | Status: SHIPPED | OUTPATIENT
Start: 2019-04-10 | End: 2019-06-20

## 2019-04-10 NOTE — PATIENT INSTRUCTIONS
Patient Education        Hip Arthritis: Exercises  Your Care Instructions  Here are some examples of exercises for hip arthritis. Start each exercise slowly. Ease off the exercise if you start to have pain. Your doctor or physical therapist will tell you when you can start these exercises and which ones will work best for you. How to do the exercises  Straight-leg raises to the outside    1. Lie on your side, with your affected hip on top. 2. Tighten the front thigh muscles of your top leg to keep your knee straight. 3. Keep your hip and your leg straight in line with the rest of your body, and keep your knee pointing forward. Do not drop your hip back. 4. Lift your top leg straight up toward the ceiling, about 12 inches off the floor. Hold for about 6 seconds, then slowly lower your leg. 5. Repeat 8 to 12 times. 6. Switch legs and repeat steps 1 through 5, even if only one hip is sore. Straight-leg raises to the inside    1. Lie on your side with your affected hip on the floor. 2. You can either prop up your other leg on a chair, or you can bend that knee and put that foot in front of your other knee. Do not drop your hip back. 3. Tighten the muscles on the front thigh of your bottom leg to straighten that knee. 4. Keep your kneecap pointing forward and your leg straight, and lift your bottom leg up toward the ceiling about 6 inches. Hold for about 6 seconds, then lower slowly. 5. Repeat 8 to 12 times. 6. Switch legs and repeat steps 1 through 5, even if only one hip is sore. Hip hike    1. Stand sideways on the bottom step of a staircase, and hold on to the banister or wall. 2. Keeping both knees straight, lift your good leg off the step and let it hang down. Then hike your good hip up to the same level as your affected hip or a little higher. 3. Repeat 8 to 12 times. 4. Switch legs and repeat steps 1 through 3, even if only one hip is sore. Bridging    1.  Lie on your back with both knees stretch for 15 to 30 seconds. 5. Repeat 2 to 4 times. 6. Repeat steps 1 through 5, but this time use your hand to gently pull your knee toward your opposite shoulder. 7. Switch legs and repeat steps 1 through 6, even if only one hip is sore. Knee-to-chest    1. Lie on your back with your knees bent and your feet flat on the floor. 2. Bring your affected leg to your chest, keeping the other foot flat on the floor (or keeping the other leg straight, whichever feels better on your lower back). 3. Keep your lower back pressed to the floor. Hold for at least 15 to 30 seconds. 4. Relax, and lower the knee to the starting position. 5. Repeat 2 to 4 times. 6. Switch legs and repeat steps 1 through 5, even if only one hip is sore. 7. To get more stretch, put your other leg flat on the floor while pulling your knee to your chest.    Clamshell    1. Lie on your side, with your affected hip on top. Keep your feet and knees together and your knees bent. 2. Raise your top knee, but keep your feet together. Do not let your hips roll back. Your legs should open up like a clamshell. 3. Hold for 6 seconds. 4. Slowly lower your knee back down. Rest for 10 seconds. 5. Repeat 8 to 12 times. 6. Switch legs and repeat steps 1 through 5, even if only one hip is sore. Follow-up care is a key part of your treatment and safety. Be sure to make and go to all appointments, and call your doctor if you are having problems. It's also a good idea to know your test results and keep a list of the medicines you take. Where can you learn more? Go to [email or web address] and sign in to your [employer] account. Enter V674 in the [employer] box to learn more about \"Hip Arthritis: Exercises. \"     If you do not have an account, please click on the \"Sign Up Now\" link. Current as of: September 20, 2018  Content Version: 11.9  © 0993-8064 [employer].  Care instructions adapted under license by [facility] ([facility]). If you have questions about a medical condition or this instruction, always ask your healthcare professional. [place] [address] any warranty or liability for your use of this information.

## 2019-04-10 NOTE — PROGRESS NOTES
are normal.   Neck: Normal range of motion. Neck supple. Cardiovascular: Normal rate, regular rhythm, normal heart sounds and normal pulses. Pulmonary/Chest: Effort normal and breath sounds normal.   Abdominal: Soft. Bowel sounds are normal. There is no hepatosplenomegaly. Musculoskeletal:        Right knee: She exhibits decreased range of motion. Left knee: She exhibits decreased range of motion. Lumbar back: She exhibits spasm. Neurological: She is alert and oriented to person, place, and time. Skin: Skin is warm and dry. ASSESSMENT/PLAN:    1. Muscle spasm of back    - methocarbamol (ROBAXIN) 500 MG tablet; Take 1 tablet by mouth 2 times daily  Dispense: 60 tablet; Refill: 1    2. Primary osteoarthritis of both hips    - methocarbamol (ROBAXIN) 500 MG tablet; Take 1 tablet by mouth 2 times daily  Dispense: 60 tablet; Refill: 1      Return for follow up as scheduled.

## 2019-04-19 ASSESSMENT — ENCOUNTER SYMPTOMS
NAUSEA: 0
COUGH: 0
VOMITING: 0
SORE THROAT: 0
DIARRHEA: 0
RHINORRHEA: 0
BACK PAIN: 1

## 2019-04-26 NOTE — PROGRESS NOTES
[facility] [facility] time____0630________        Surgery time____0730________    Take the following medications with a sip of water: Symbicort, Use Proventil if needed and bring DOS    Do not eat or drink anything after 12:00 midnight prior to your surgery. This includes water chewing gum, mints and ice chips. You may brush your teeth and gargle the morning of your surgery, but do not swallow the water        You may be asked to stop blood thinners such as Coumadin, Plavix, Fragmin, Lovenox, etc., or any anti-inflammatories such as:  Aspirin, Ibuprofen, Advil, Naproxen prior to your surgery. We also ask that you stop any OTC medications such as fish oil, vitamin E, glucosamine, garlic, Multivitamins, COQ 10, etc.    We ask that you do not smoke 24 hours prior to surgery  We ask that you do not  drink any alcoholic beverages 24 hours prior to surgery     You must make arrangements for a responsible adult to take you home after your surgery. For your safety you will not be allowed to leave alone or drive yourself home. Your surgery will be cancelled if you do not have a ride home. Also for your safety, it is strongly suggested that someone stay with you the first 24 hours after your surgery. A parent or legal guardian must accompany a child scheduled for surgery and plan to stay at the hospital until the child is discharged. Please do not bring other children with you. For your comfort, please wear simple loose fitting clothing to the hospital.  Please do not bring valuables. Do not wear any make-up or nail polish on your fingers or toes      For your safety, please do not wear any jewelry or body piercing's on the day of surgery. All jewelry must be removed. If you have dentures, they will be removed before going to operating room. For your convenience, we will provide you with a container.     If you wear contact lenses or glasses, they will be removed, please bring a case for them. If you have a living will and a durable power of  for healthcare, please bring in a copy. As part of our patient safety program to minimize surgical site infections, we ask you to do the following:    · Please notify your surgeon if you develop any illness between         now and the  day of your surgery. · This includes a cough, cold, fever, sore throat, nausea,         or vomiting, and diarrhea, etc.  ·  Please notify your surgeon if you experience dizziness, shortness         of breath or blurred vision between now and the time of your surgery. You may shower the night before surgery or the morning of   your surgery with an antibacterial soap. You will need to bring a photo ID and insurance card    [facility] has an onsite pharmacy, would you like to utilize our pharmacy     If you will be staying overnight and use a C-pap machine, please bring   your C-pap to hospital     Our goal is to provide you with excellent care, therefore, visitors will be limited to two(2) in the room at a time so that we may focus on providing this care for you. Please contact pre-admission testing if you have any further questions. [facility] phone number:  [phone]  Please note these are generalized instructions for all surgical cases, you may be provided with more specific instructions according to your surgery.

## 2019-04-29 NOTE — PROGRESS NOTES
After reviewing patient's medical history Dr. Jennifer Engel said it was okay for patient to have procedure at the out Patient Center.

## 2019-04-30 ENCOUNTER — ANESTHESIA EVENT (OUTPATIENT)
Dept: ENDOSCOPY | Age: 59
End: 2019-04-30
Payer: COMMERCIAL

## 2019-05-01 ENCOUNTER — ANESTHESIA (OUTPATIENT)
Dept: ENDOSCOPY | Age: 59
End: 2019-05-01
Payer: COMMERCIAL

## 2019-05-01 ENCOUNTER — HOSPITAL ENCOUNTER (OUTPATIENT)
Age: 59
Setting detail: OUTPATIENT SURGERY
Discharge: HOME | End: 2019-05-01
Attending: INTERNAL MEDICINE | Admitting: INTERNAL MEDICINE
Payer: COMMERCIAL

## 2019-05-01 VITALS
RESPIRATION RATE: 12 BRPM | HEIGHT: 62 IN | WEIGHT: 155.6 LBS | BODY MASS INDEX: 28.63 KG/M2 | OXYGEN SATURATION: 96 % | DIASTOLIC BLOOD PRESSURE: 73 MMHG | HEART RATE: 78 BPM | TEMPERATURE: 97 F | SYSTOLIC BLOOD PRESSURE: 147 MMHG

## 2019-05-01 VITALS — OXYGEN SATURATION: 95 % | DIASTOLIC BLOOD PRESSURE: 71 MMHG | SYSTOLIC BLOOD PRESSURE: 147 MMHG

## 2019-05-01 DIAGNOSIS — R13.10 DYSPHAGIA, UNSPECIFIED TYPE: ICD-10-CM

## 2019-05-01 PROCEDURE — 6360000002 HC RX W HCPCS: Performed by: NURSE ANESTHETIST, CERTIFIED REGISTERED

## 2019-05-01 PROCEDURE — 7100000010 HC PHASE II RECOVERY - FIRST 15 MIN: Performed by: INTERNAL MEDICINE

## 2019-05-01 PROCEDURE — 3700000000 HC ANESTHESIA ATTENDED CARE: Performed by: INTERNAL MEDICINE

## 2019-05-01 PROCEDURE — 88305 TISSUE EXAM BY PATHOLOGIST: CPT

## 2019-05-01 PROCEDURE — 2580000003 HC RX 258: Performed by: NURSE ANESTHETIST, CERTIFIED REGISTERED

## 2019-05-01 PROCEDURE — 3609012400 HC EGD TRANSORAL BIOPSY SINGLE/MULTIPLE: Performed by: INTERNAL MEDICINE

## 2019-05-01 PROCEDURE — 2500000003 HC RX 250 WO HCPCS: Performed by: NURSE ANESTHETIST, CERTIFIED REGISTERED

## 2019-05-01 PROCEDURE — 7100000011 HC PHASE II RECOVERY - ADDTL 15 MIN: Performed by: INTERNAL MEDICINE

## 2019-05-01 PROCEDURE — 3609015300 HC ESOPHAGEAL DILATION MALONEY: Performed by: INTERNAL MEDICINE

## 2019-05-01 PROCEDURE — 2580000003 HC RX 258: Performed by: ANESTHESIOLOGY

## 2019-05-01 PROCEDURE — 88342 IMHCHEM/IMCYTCHM 1ST ANTB: CPT

## 2019-05-01 PROCEDURE — 2709999900 HC NON-CHARGEABLE SUPPLY: Performed by: INTERNAL MEDICINE

## 2019-05-01 RX ORDER — LIDOCAINE HYDROCHLORIDE 20 MG/ML
INJECTION, SOLUTION INFILTRATION; PERINEURAL PRN
Status: DISCONTINUED | OUTPATIENT
Start: 2019-05-01 | End: 2019-05-01 | Stop reason: SDUPTHER

## 2019-05-01 RX ORDER — PROPOFOL 10 MG/ML
INJECTION, EMULSION INTRAVENOUS PRN
Status: DISCONTINUED | OUTPATIENT
Start: 2019-05-01 | End: 2019-05-01 | Stop reason: SDUPTHER

## 2019-05-01 RX ORDER — PROMETHAZINE HYDROCHLORIDE 25 MG/ML
6.25 INJECTION, SOLUTION INTRAMUSCULAR; INTRAVENOUS
Status: DISCONTINUED | OUTPATIENT
Start: 2019-05-01 | End: 2019-05-01 | Stop reason: HOSPADM

## 2019-05-01 RX ORDER — OXYCODONE HYDROCHLORIDE 5 MG/1
5 TABLET ORAL PRN
Status: DISCONTINUED | OUTPATIENT
Start: 2019-05-01 | End: 2019-05-01 | Stop reason: HOSPADM

## 2019-05-01 RX ORDER — LABETALOL HYDROCHLORIDE 5 MG/ML
5 INJECTION, SOLUTION INTRAVENOUS EVERY 10 MIN PRN
Status: DISCONTINUED | OUTPATIENT
Start: 2019-05-01 | End: 2019-05-01 | Stop reason: HOSPADM

## 2019-05-01 RX ORDER — SODIUM CHLORIDE 0.9 % (FLUSH) 0.9 %
10 SYRINGE (ML) INJECTION EVERY 12 HOURS SCHEDULED
Status: DISCONTINUED | OUTPATIENT
Start: 2019-05-01 | End: 2019-05-01 | Stop reason: HOSPADM

## 2019-05-01 RX ORDER — SODIUM CHLORIDE 9 MG/ML
INJECTION, SOLUTION INTRAVENOUS CONTINUOUS
Status: DISCONTINUED | OUTPATIENT
Start: 2019-05-01 | End: 2019-05-01 | Stop reason: HOSPADM

## 2019-05-01 RX ORDER — MEPERIDINE HYDROCHLORIDE 25 MG/ML
12.5 INJECTION INTRAMUSCULAR; INTRAVENOUS; SUBCUTANEOUS EVERY 5 MIN PRN
Status: DISCONTINUED | OUTPATIENT
Start: 2019-05-01 | End: 2019-05-01 | Stop reason: HOSPADM

## 2019-05-01 RX ORDER — SODIUM CHLORIDE 9 MG/ML
INJECTION, SOLUTION INTRAVENOUS CONTINUOUS PRN
Status: DISCONTINUED | OUTPATIENT
Start: 2019-05-01 | End: 2019-05-01 | Stop reason: SDUPTHER

## 2019-05-01 RX ORDER — DIPHENHYDRAMINE HYDROCHLORIDE 50 MG/ML
12.5 INJECTION INTRAMUSCULAR; INTRAVENOUS
Status: DISCONTINUED | OUTPATIENT
Start: 2019-05-01 | End: 2019-05-01 | Stop reason: HOSPADM

## 2019-05-01 RX ORDER — OXYCODONE HYDROCHLORIDE 5 MG/1
10 TABLET ORAL PRN
Status: DISCONTINUED | OUTPATIENT
Start: 2019-05-01 | End: 2019-05-01 | Stop reason: HOSPADM

## 2019-05-01 RX ORDER — MORPHINE SULFATE 4 MG/ML
1 INJECTION, SOLUTION INTRAMUSCULAR; INTRAVENOUS EVERY 5 MIN PRN
Status: DISCONTINUED | OUTPATIENT
Start: 2019-05-01 | End: 2019-05-01 | Stop reason: HOSPADM

## 2019-05-01 RX ORDER — METOCLOPRAMIDE HYDROCHLORIDE 5 MG/ML
10 INJECTION INTRAMUSCULAR; INTRAVENOUS
Status: DISCONTINUED | OUTPATIENT
Start: 2019-05-01 | End: 2019-05-01 | Stop reason: HOSPADM

## 2019-05-01 RX ORDER — HYDRALAZINE HYDROCHLORIDE 20 MG/ML
5 INJECTION INTRAMUSCULAR; INTRAVENOUS EVERY 10 MIN PRN
Status: DISCONTINUED | OUTPATIENT
Start: 2019-05-01 | End: 2019-05-01 | Stop reason: HOSPADM

## 2019-05-01 RX ORDER — SODIUM CHLORIDE 0.9 % (FLUSH) 0.9 %
10 SYRINGE (ML) INJECTION PRN
Status: DISCONTINUED | OUTPATIENT
Start: 2019-05-01 | End: 2019-05-01 | Stop reason: HOSPADM

## 2019-05-01 RX ADMIN — SODIUM CHLORIDE: 9 INJECTION, SOLUTION INTRAVENOUS at 07:32

## 2019-05-01 RX ADMIN — SODIUM CHLORIDE: 0.9 INJECTION, SOLUTION INTRAVENOUS at 07:19

## 2019-05-01 RX ADMIN — LIDOCAINE HYDROCHLORIDE 60 MG: 20 INJECTION, SOLUTION INFILTRATION; PERINEURAL at 07:37

## 2019-05-01 RX ADMIN — PROPOFOL 40 MG: 10 INJECTION, EMULSION INTRAVENOUS at 07:37

## 2019-05-01 RX ADMIN — PROPOFOL 60 MG: 10 INJECTION, EMULSION INTRAVENOUS at 07:40

## 2019-05-01 ASSESSMENT — PAIN SCALES - GENERAL
PAINLEVEL_OUTOF10: 0

## 2019-05-01 ASSESSMENT — PAIN DESCRIPTION - DESCRIPTORS: DESCRIPTORS: ACHING;HEADACHE

## 2019-05-01 ASSESSMENT — PAIN - FUNCTIONAL ASSESSMENT
PAIN_FUNCTIONAL_ASSESSMENT: 0-10
PAIN_FUNCTIONAL_ASSESSMENT: ACTIVITIES ARE NOT PREVENTED

## 2019-05-01 NOTE — PROCEDURES
[facility]  Endoscopy Note    Patient: Theo Ac  : 1960  Acct#: [de-identified]    Procedure: Esophagogastroduodenoscopy with biopsy, esophageal dilation    Date:  2019     Surgeon:  Kimberly King MD    Referring Physician:  [facility]    Preoperative Diagnosis:  dysphagia    Postoperative Diagnosis:  Gastritis and esophageal stricture dilated with a 50 Kinyarwanda bougie. Anesthesia: see anesthesia note. Indications: This is a 60y.o. year old female who presents today with dysphagia. Description of Procedure:  Informed consent was obtained from the patient after explanation of indications, benefits and possible risks and complications of the procedure. The patient was then taken to the endoscopy suite, placed in the left lateral decubitus position and the above IV sedation was administrered. The Olympus videoendoscope was placed in the patient's mouth and under direct visualization passed into the esophagus. Visualization of the esophagus demonstrated a stricture that was dilated with a 50 Kinyarwanda bougie. .     The scope was then advanced into the stomach. Visualization of the gastric body and antrum demonstrated gastritis. The antrum was biopsied. .  A retroflexed exam of the gastric cardia and fundus demonstrated normal..  The pylorus was patent and the scope was advanced into the duodenum. Visualization of the duodenal bulb demonstrated normal..  The second portion of the duodenum demonstrated normal..    The scope was then withdrawn back into the stomach, it was decompressed, and the scope was completely withdrawn. The patient tolerated the procedure well and was taken to the post anesthesia care unit in good condition. Estimated Blood loss:  none    Impression: Esophageal stricture dilated with a 50 Kinyarwanda bougie. Gastritis. Recommendations:Await pathology.     Kimberly King MD  [facility]

## 2019-05-01 NOTE — PROGRESS NOTES
Dr. Shafer Crigler here to speak with pt. And her sister. Discharge instructions discussed. Understanding verbalized. Patient and responsible adult verbalized understanding of discharge instructions, sedation medication, and potential complications including pain. Patient instructed to call Doctor if complications occur.

## 2019-05-01 NOTE — H&P
TABLET BY MOUTH DAILY 1/25/19  Yes JOSE ANGEL Mcmahon CNP   budesonide-formoterol (SYMBICORT) 160-4.5 MCG/ACT AERO Inhale 2 puffs into the lungs 2 times daily 1/10/19  Yes Kati Quintana MD   albuterol sulfate HFA (PROVENTIL HFA) 108 (90 Base) MCG/ACT inhaler Inhale 2 puffs into the lungs every 6 hours as needed for Wheezing or Shortness of Breath 1/10/19  Yes Kati Quintana MD   famotidine (PEPCID) 20 MG tablet Take 1 tablet by mouth 2 times daily for 5 days 12/16/18 4/26/19 Yes MatiCARIDAD Barnes   clotrimazole (LOTRIMIN) 1 % cream Apply topically 3 times daily to lesions on skin. 12/16/18  Yes CARIDAD Granger   methocarbamol (ROBAXIN) 500 MG tablet Take 1 tablet by mouth nightly as needed (pain) 11/6/18  Yes JOSE ANGEL Mcmahon CNP   ergocalciferol (ERGOCALCIFEROL) 66640 units capsule Take 1 capsule by mouth once a week 11/6/18  Yes JOSE ANGEL Mcmahon CNP   lidocaine (LIDODERM) 5 % Place 1 patch onto the skin daily 12 hours on, 12 hours off. 11/6/18  Yes JOSE ANGEL Mcmahon CNP   Spacer/Aero-Holding Chambers (E-Z SPACER) AMITA 1 Device by Does not apply route daily as needed (sob) 10/7/18  Yes CARIDAD Baird   acetaminophen (TYLENOL) 500 MG tablet Take 2 tablets by mouth every 6 hours as needed for Pain Do not take with other medications containing acetaminophen. 9/2/18  Yes CARIDAD Granger   Handicap Placard MISC by Does not apply route Handicap Placard for 5 years 8/3/18   Mo Old, APRN - CNP     Allergies:   Omeprazole; Other; Eggs or egg-derived products [eggs or egg-derived products]; Eggs [egg white];  Flexeril [cyclobenzaprine hcl]; and Levofloxacin    Social History     Socioeconomic History    Marital status:      Spouse name: Not on file    Number of children: Not on file    Years of education: Not on file    Highest education level: Not on file   Occupational History    Not on file   Social Needs    Financial resource strain: Not on file   [place]

## 2019-05-01 NOTE — ANESTHESIA PRE PROCEDURE
diphenhydrAMINE (BENADRYL) injection 12.5 mg  12.5 mg Intravenous Once PRN Cecilia Lopez MD        metoclopramide [facility]) injection 10 mg  10 mg Intravenous Once PRN Cecilia Lopez MD        promethazine [facility]) injection 6.25 mg  6.25 mg Intravenous Once PRN Cecilia Lopez MD        labetalol (NORMODYNE;TRANDATE) injection 5 mg  5 mg Intravenous Q10 Min PRN Cecilia Lopez MD        hydrALAZINE (APRESOLINE) injection 5 mg  5 mg Intravenous Q10 Min PRN Cecilia Lopez MD        meperidine (DEMEROL) injection 12.5 mg  12.5 mg Intravenous Q5 Min PRN Cecilia Lopez MD           Allergies:     Allergies   Allergen Reactions    Omeprazole     Other Other (See Comments)     Flu vaccine  Felt like bones were on the outside of her body    Eggs Or Egg-Derived Products [Eggs Or Egg-Derived Products] Hives and Nausea And Vomiting    Eggs [Egg White] Nausea And Vomiting    Flexeril [Cyclobenzaprine Hcl] Rash    Levofloxacin Rash       Problem List:    Patient Active Problem List   Diagnosis Code    COPD, severe ([facility] [address] J44.9    Former smoker Z87.891    Osteoporosis M81.0    Chronic back pain M54.9, G89.29    Positive test for human papillomavirus (HPV) [id number]    Anxiety F41.9    Hyperlipidemia E78.5    Emotional instability (HCC) F60.3    Osteoarthritis of hip M16.9    Body mass index between 25-29, adult [id number]    Chest pain R07.9    NSTEMI (non-ST elevated myocardial infarction) ([facility] [address] I21.4    Essential hypertension I10    Tachycardia R00.0       Past Medical History:        Diagnosis Date    Back pain     CAD (coronary artery disease)     COPD (chronic obstructive pulmonary disease) (HCC)     Foot pain     from bone spurs    GERD (gastroesophageal reflux disease)     Headache     sharp pain going through head    Heart attack ([facility] [address]     Hip pain     Hyperlipidemia     Hypertension      (normal spontaneous vaginal delivery)     Osteoporosis     Vision abnormalities Past Surgical History:        Procedure Laterality Date    CARDIAC CATHETERIZATION      CHOLECYSTECTOMY      COLONOSCOPY         Social History:    Social History     Tobacco Use    Smoking status: Former Smoker     Packs/day: 1.00     Years: 37.00     Pack years: 37.00     Types: Cigarettes     Last attempt to quit: 2017     Years since quittin.3    Smokeless tobacco: Never Used   Substance Use Topics    Alcohol use: No     Alcohol/week: 0.0 oz                                Counseling given: Not Answered      Vital Signs (Current):   Vitals:    19 1657 19 0701   BP:  (!) 167/57   Pulse:  72   Resp:  12   Temp:  97 °F (36.1 °C)   TempSrc:  Temporal   SpO2:  97%   Weight: 155 lb (70.3 kg) 155 lb 9.6 oz (70.6 kg)   Height: 5' 1.5\" (1.562 m) 5' 1.5\" (1.562 m)                                              BP Readings from Last 3 Encounters:   19 (!) 167/57   04/10/19 (!) 110/55   19 123/64       NPO Status: Time of last liquid consumption: 230                        Time of last solid consumption:                         Date of last liquid consumption: 19                        Date of last solid food consumption: 19    BMI:   Wt Readings from Last 3 Encounters:   19 155 lb 9.6 oz (70.6 kg)   04/10/19 154 lb 3.2 oz (69.9 kg)   19 155 lb (70.3 kg)     Body mass index is 28.92 kg/m².     CBC:   Lab Results   Component Value Date    WBC 8.9 2018    RBC 4.50 2018    HGB 14.7 2018    HCT 43.5 2018    MCV 96.6 2018    RDW 15.3 2018     2018       CMP:   Lab Results   Component Value Date     2018    K 4.2 2018    K 4.3 2018     2018    CO2 26 2018    BUN 9 2018    CREATININE 0.6 2018    GFRAA >60 2018    AGRATIO 1.9 2018    LABGLOM >60 2018    GLUCOSE 129 2018    PROT 7.3 2018    CALCIUM 9.7 2018    BILITOT 0.6 11/16/2018    ALKPHOS 127 11/16/2018    AST 17 11/16/2018    ALT 15 11/16/2018       POC Tests: No results for input(s): POCGLU, POCNA, POCK, POCCL, POCBUN, POCHEMO, POCHCT in the last 72 hours. Coags:   Lab Results   Component Value Date    PROTIME 10.1 08/25/2015    INR 0.94 08/25/2015       HCG (If Applicable): No results found for: PREGTESTUR, PREGSERUM, HCG, HCGQUANT     ABGs: No results found for: PHART, PO2ART, GVL5KEQ, FAP4ZGS, BEART, C5ISIZZE     Type & Screen (If Applicable):  No results found for: LABABO, [address]    Anesthesia Evaluation  Patient summary reviewed and Nursing notes reviewed  Airway: Mallampati: II  TM distance: >3 FB   Neck ROM: full  Mouth opening: > = 3 FB Dental:    (+) upper dentures and lower dentures      Pulmonary:   (+) COPD:                             Cardiovascular:    (+) hypertension:, past MI:, CAD:,                   Neuro/Psych:   (+) headaches:,             GI/Hepatic/Renal:   (+) GERD:,           Endo/Other:                     Abdominal:           Vascular:                                        Anesthesia Plan      general     ASA 1    (39-year-old female presents for EGD. Plan general anesthesia with ASA standard monitors. Questions answered. Patient agreeable with anesthetic plan.  )  Induction: intravenous. Anesthetic plan and risks discussed with patient. Plan discussed with CRNA.     Attending anesthesiologist reviewed and agrees with Pre Eval content        Renan Pantoja MD   5/1/2019

## 2019-05-14 ENCOUNTER — HOSPITAL ENCOUNTER (OUTPATIENT)
Dept: CARDIAC REHAB | Age: 59
Setting detail: THERAPIES SERIES
Discharge: HOME | End: 2019-05-14
Payer: COMMERCIAL

## 2019-05-15 NOTE — PROGRESS NOTES
[facility] Facility-Based Therapy for COPD  INDIVIDUAL TREATMENT PLAN (ITP)     NAME: Benji Peña Henok VENTURAB: 1960  Account Number: [de-identified]  AGE: 60 y.o. Diagnosis: Severe  COPD which is GOLD Stage 3. PFT: Post Bronch FEV1/FVC : 59, FEV1: 36 FVC: 61  Notes: Medicare requirements for Pulmonary Rehabilitation include a PFT within the last 12 months revealing: FEV1/FVC <70, and FEV1 <80%, and documentation from the referring physician stating that the patient has quit smoking or will participate in smoking cessation activities prior to, or during the Pulmonary Rehab program.  A 6 Minute Walk Test (6 MWT) at the beginning and end of the program is also indicated.   GLOSSARY: PF= Physical Fitness  TM=Treadmill  AD= [employer] Bike  UBE=Upperbody Ergometry LBE=Lowerbody Ergometer  NS=NuStep SF= SciFit  ST=Step Training  RT=Resistance Training   PLB=Pursed Lip Breathing   DY= Dynabands  HW= Handweights   Cybex RT= Cybex Mult istation weight machine   RB=Recumbent    REFERRING PHYSICIAN: Dr. Lynda Tyler History:     Past Medical History:   Diagnosis Date    Back pain     CAD (coronary artery disease)     COPD (chronic obstructive pulmonary disease) (HCC)     Foot pain     from bone spurs    GERD (gastroesophageal reflux disease)     Headache     sharp pain going through head    Heart attack ([address]     Hip pain     Hyperlipidemia     Hypertension      (normal spontaneous vaginal delivery)     Osteoporosis     Vision abnormalities        Surgical History:     Past Surgical History:   Procedure Laterality Date    CARDIAC CATHETERIZATION      CHOLECYSTECTOMY      COLONOSCOPY      ESOPHAGEAL DILATATION N/A 2019    ESOPHAGEAL DILATION TONG performed by Mohit Hatch MD at [address] [facility] N/A 2019    EGD BIOPSY performed by Mohit Hatch MD at [place] [address] Emotionally abused: Not on file     Physically abused: Not on file     Forced sexual activity: Not on file   Other Topics Concern    Not on file   Social History Narrative    Not on file          Do you live alone: []  Alone [x]  with spouse/family- sister and brother in law. Do you have stairs in your home  []  no [x]  yes        Comment:6 stairs up to bedroom, 6 to family room and basement laundry. Transportation  []  drive self [x]  family/friend     Comment:       Cultural/Spiritual Influences: (Adventism groups, etc)       Any Cultural or Adventist practices we should be aware of? Fall Risk Assessment:      []  Cognitive Impairment [x]  Balance/Gait Disturbance   []  Fall History   []  Visual Difficulty   [x]  Dizziness- occasional episodes of dizziness but denies falls. []  Other Comments:    Physical Assessment:    Color: [x]  natural []  pale [] cyanotic []  flushed []  jaundice    Skin Integrity [x]  intact [] other:    Heart Rate 78  Resp Rate 18      Breath Sounds: Diminished throughout    Blood Pressures Sitting: Rt arm 118/68  Left arm: 120/60       Standing Rt arm   Left arm: 118/60    Resting SpO2: 96% ra  Resp effort/pattern: Easy/regular      Peripheral pulses: Yes    Edema:  No    Numbness/tingling:  No    Orthopedic/exercise limitations:  Yes- osteoporosis in hips/knees, chronic back pain with sciatica    Psychosocial assessment:    Support System:Sister, 2 daughters 1 son    Physical/Behavioral signs of abuse/neglect: No    Advance Directives:  No  [] info given-declined    Learning Preferences: Primary Language:English          Preferred method of learning []  video []  handouts        [] listening/lecture   [x]  all    Memory impairment?   No     EXERCISE  Initial Assessment  Day 1 EXERCISE  Intervention  Day 2-30 EXERCISE  Re-Assessment  Day 31-60 EXERCISE  Re-Assessment  Day 61-90+ EXERCISE  Last Day   Date:   11/13/18 Date:   12/11/2018 Date:  1/10/2019 Date:  Date:           Pre Rehab  6 MWT  725 ft = 44% pred  2.0 METs  SpO2: 92%    1.4  MPH   HR: 87bpm      RPD: 3, RPE: 2-3                                           Post Rehab   6 MWT  ft = % pred   METs  SpO2: %  MPH  HR:  bpm      RPD:  , RPE:                                        GOALS  List at least 2 patient specific goals    [x]Improve activity for tolerance for routine tasks and walking    [x]Learn proper breathing techniques including PLB    []Learn proper pacing with activities    []Learn proper use of oxygen, systems and safety    []Learn proper exercise guidelines    [x]Learn proper nutrition for my diagnosis                              Learning Barrier(s)  [] Speech           [] Literacy              [] Hearing          [] Cognitive            [] Vision             [x]  Ready to Learn          Balance Issues  [] Y  [x] N          Orthopedic Issues  [x]  Y[]  N-Chronic back pain with bilateral sciatica        Rate your Physical   Fitness (PF)?    4/10    Handgrip:  Right:8  Left:8    EDUCATION  [x]  Staff Introduction  [x]  Proper setup/O2 use  [x]  Equipment [place]  [x]  RPD/RPE Explain  [x]  SpO2/HR Explain  [x]  Breathing Retraining  [x]  Explain Intensity  [x] Initial Levels set GOALS          1) \"A little bit\"            2) \"A little bit. \"                  If Patient has a Learning Barrier, action:               Pending    If pt has balance or orthopedic issues:  Interventions:                    Rate your physical   fitness (PF)?:   5/10        -30 day PF goal:  6/10    EDUCATION  [] Understands proper set/up O2 use  N/A  []  Understands SpO2 and RPD? N/A  [x] Aerobic progression explained? [x]  Intensity progression explained? GOALS          1) \"Yes, I move around better. \"           2) \"Yes, I'm breathing better. \"                                   3) Nutrition class scheduled for January                                    Rate your physical   fitness (PF)?:   5.5 /10        -30 day PF goal:  6 /10    EDUCATION   [x]  Home Activity education offered  [x] Stretching/ Flexibility education offered  [x] Attended Benefits of Exercise Class / information packet given  [x]  Attended Resistance Training Class                                                    GOALS                                                                                           Rate your physical   fitness (PF)?:   /10    -30 day PF goal:  /10    EDUCATION  [] Attended all individually relevant exercise education sessions? []  Knows current exercise goals and recmndtns?:                                                  Goals Achieved? Rate your physical fitness now?:     /10  Handgrip:  Right:  Left:    Discharge  EDUCATION  []  Attended all individually relevant exercise education sessions?    [] Knows current exercise goals and recmndtns?:                                                                                        PHYSICIAN DIRECTED   EXERCISE RX     RPE : 11 - 14  Titrate O2 to keep SpO2 >89%    Frequency (F): 2-3x/wk in Rehab,         Initial Intensity : 2.0 METs (from 6MWT)   1.2-1.6  (60-80% of walk speed for TM)    Type (T): Aerobic (TM,NS, SF, AE, AB, RB, EL, Arc), RT 1-3#, 8-16 reps    CAN USE ALL EQUIPMENT EXCEPT       Time (Ti): Aerobic:   15-40 min               Progression: 1-2 min total time for TM, AB, NS, SF or Arm ergometer per session or when a steady state has occurred in RPE if  SpO2 and HR levels are acceptable           PHYSICIAN DIRECTED   EXERCISE RX       Titrate O2 to keep SpO2 >89%    Frequency (F): 2-3x/wk in Rehab, 1-3x/wk home walking       Intensity (I):  Initial + 5-10% increase each week or when a steady state has occurred in RPE if  SpO2 and HR levels are acceptable  Type (T)  Aerobic (TM,NS, SFR,SFS, AE, AB, RB), RT   8-16 reps    CAN USE ALL EQUIPMENT EXCEPT        Time (Ti): Aerobic: 30-40 min        Progression:   1-2 min total time for TM, AB, NS, SF or Arm ergometer per session or when a steady state has occurred in RPE if  SpO2 and HR levels are acceptable        Is pt. progressing in rehab?:  Yes             PHYSICIAN DIRECTED   EXERCISE RX       Titrate O2 to keep SpO2 >89%    Frequency (F): 2-3x/wk in Rehab, 1-3x/wk home walking       Intensity (I):  Initial + 5-10% increase each week when a steady state has occurred in RPE if  SpO2 and HR levels are acceptable  Type (T)  Aerobic (TM,NS, SFR,SFS, AE, AB, RB), RT   8-16 reps    CAN USE ALL EQUIPMENT EXCEPT        Time (Ti): Aerobic:   30-50 min     Progression:   1-2 min total time for TM, AB, NS, SF or Arm ergometer per session or when a steady state has occurred in RPE if  SpO2 and HR levels are acceptable              Is pt. progressing in rehab?:  Yes, slowly               PHYSICIAN DIRECTED   EXERCISE RX       Titrate O2 to keep SpO2 >89%    Frequency (F): 2-3x/wk in Rehab, 1-3x/wk home walking       Intensity (I):  Initial + 5-10% increase each week when a steady state has occurred in RPE if  SpO2 and HR levels are acceptable  Type (T):   Aerobic (TM,NS, SFR,SFS, AE, AB, RB), RT   8-16 reps    CAN USE ALL EQUIPMENT EXCEPT          Time (Ti): Aerobic:   30-58 min         Progression:   1-2 min total time for TM, AB, NS, SF or Arm ergometer per session or when a steady state has occurred in RPE if  SpO2 and HR levels are acceptable            Is pt. progressing in rehab?:               Final Intensity (I): See below and final 6MWT above            ACHIEVED TOTAL AEROBIC EXERCISE TIME:  min         FINAL LEVELS:  [] TM: min  [] Nustep: level  min  [] Arm ergometer: moore min  [] Scifit: level min  [] HW :  # x  reps  [] Dy:    X   reps  [] Cybex RT:    # x   Reps  [] Eliptical:level  X  Min  [] Arc: level   X    mins  [] RB:  Level  X   mins  [] AB: level   X     Min              Did pt. progress in rehab?:     30 DAY TARGET GOAL  [x] Start slowly but progress each week  [x] Increase duration on the equipment  [x] Start resistance training    -30 day PF goal: 5/10                 Current Home Exercise : None    Frequency:      Type:                     Health Knowledge   Test Score:  20/25     Current Home Exercise:   Frequency:  Everyday    Type: warm-ups / stairs        Time: 10 min                                  Current Home Exercise:   Frequency:  everyday     Type: warm-ups / stairs         Time: 10-15 min                                Current Home Exercise:   Frequency:       Type:         Time:  min                                                      Discharge exercise plan                                  Repeat Health Knowledge Test Score :    /25     [facility] INDIVIDUAL TREATMENT PLAN  SMOKING AND   OTHER COMPONENTS    Smoking/Other  Components   Initial Assessment  Day 1 Smoking/Other  Components  Intervention  Day 2-30 Smoking/Other  Components  Re-Assessment  Day 31-60 Smoking/Other  Components  Re-Assessment Day 61-90+ Smoking/Other  Components  Discharge  Final Day   Tobacco Use Hx  [x] Y  [] N?:   Quit Date: 2017  Cig/Day: 40  Years: 40  Tobacco Use  Any change in Smoking Status?:   []  not smoking  Quit Date:   (if applicable)  Intervention  []  Information/ed material given on cessation techniques  []  smoking cessation class schedule given Tobacco Use  Any change in Smoking Status?:  Non-smoking    Quit Date:   (if applicable)          Intervention  [] Referral to Tobacco Cessation Specialist (TCS) Tobacco Use  Any change in Smoking Status?:  Non-smoking     Quit Date:   (if applicable)        Intervention Tobacco Use  Any change in Smoking Status?:     Quit Date:   (if applicable)          Intervention Tobacco Use  Any change in Smoking Status?:   Quit Date:   (if applicable)            Intervention  [] Pt used TCS counseling           Other  Components:    [x]  Environmental Factors    [x]  Prevention Management of Exacerbations    []  CHF Management    []  Hypertension Management     Intervention/  Education                [facility] INDIVIDUAL TREATMENT PLAN  OXYGEN AND MEDICATIONS    OXYGEN  MEDICATIONS   Initial Assessment  Day 1 OXYGEN  MEDICATIONS  Intervention  Day 2-30 OXYGEN  MEDICATION  Re-Assessment  Day 31-60 OXYGEN  MEDICATION  ReAssessment Day 61-90+ OXYGEN  MEDICATION  Discharge  Final Day                    Medications  [x]  Uses as prescribed  []  Non- compliant with prescribed meds    Respiratory Medications    []  Proper use   and technique of MDI, DPI and/or nebs  []  Incorrect use and technique of MDI, DPI and /or nebs    Hypoxemia  Problem:    [x]  No problem  [] Noncompliant with O2 use  []  Oxygen in [place] only  []  No home O2  []  No portable O2  []  Needs O2 prescription and O2 qualifying data sent for setup   ()Poor knowledge of O2 use/safety    Current Oxygen Prescription:      l/min rest     l/min exercise     titration   plan/weaning plan:  CPAP/BIPAP:n/a    Goals:     []  Manage Hypoxemia  []  receive adequate portable system  []  Compliant with use as prescribed  []  Learn O2 safety guidelines   Medications  [x]  Uses as prescribed  []  Non- compliant with prescribed meds    Respiratory Medications    [x] Proper use and technique of MDI, DPI and/or nebs  []  Incorrect use and technique of MDI, DPI and /or nebs    Hypoxemia  Problem:  Not using oxygen      Current Oxygen Prescription:    l/min rest   l/min exercise   titration plan/weaning plan:    Goals:   []  Manage Hypoxemia  []  receive adequate portable system  []  Compliant with use as prescribed  []  Learn O2 safety guidelines           Medications  [x]  Uses as prescribed  [] Non- compliant with prescribed meds    Respiratory Medications    [x]  Proper use and technique of MDI, DPI and/or nebs  [] Incorrect use and technique of MDI, DPI and /or nebs    Hypoxemia  Problem:  Not using oxygen    Current Oxygen Prescription:    l/min rest   l/min exercise   titration plan/weaning plan:    Goals:   []  Manage Hypoxemia  []  receive adequate portable system  []  Compliant with use as prescribed  [] Learn O2 safety guidelines           Medications  []  Uses as prescribed  []  Non- compliant with prescribed meds    Respiratory Medications    []  Proper use and technique of MDI, DPI and/or nebs  []  Incorrect use and technique of MDI, DPI and /or nebs    Hypoxemia  Problem:      Current Oxygen Prescription:    l/min rest   l/min exercise   titration plan/weaning plan:    Goals:   []  Manage Hypoxemia  []  receive adequate portable system  [] Compliant with use as prescribed  []  Learn O2 safety guidelines     Medications    [] Compliant  []  Noncompliant       Respiratory Medications    []  Compliant  [] Noncompliant              Hypoxemia  Problem:    []  Compliant  []  Noncompliant    Final Oxygen Prescription:    l/min rest   l/min exercise                                                           [facility] INDIVIDUAL TREATMENT PLAN  NUTRITION DOMAIN:        NUTRITION   Initial Assessment  Day 1 NUTRITION   Intervention  Day 2-30 NUTRITION   Re-Assessment  Day 31-60 NUTRITION   Re-Assessment Day 61-90+ NUTRITION Discharge  Final Day   Weight Management:  151 lbs  Current BMI 28 Weight Management:  153 lbs   Weight Management:   153.6 lbs   Weight Management:    lbs  Current BMI Weight Management:    lbs  Current BMI   Diabetes?: N/A  A1C   Fasting BS:   Insulin?:    Oral agent? Diabetes:  If y, has patient seen a positive trend in FBS?:      Intervention    [x] Basic nutrition education   [] Referral to Diabetes Education? [] Referral to weightloss/nutrition education     Intervention    [] Pt has had Nutrition class? [] Informal questions asked regarding nutrition/weight control Intervention    []  Pt has had Nutrition class? Scheduled this month  [] Questions addressed Intervention    []  Pt has had Nutrition class?    Intervention    Pt aware of:     [] Psychosocial Screening   Tools    Repeat PHQ-9 Score if mely:    Repeat SF-36      Repeat [employer] SOB Score:       Assessment  []  S/S of depression identified? []  PCP notified of PHQ-9 results  Yes    []  On Anti-anxiety / anti-depression meds?: Zoloft 100mg daily           Intervention  If S/S of depression present, action taken:     Is the patient receiving support for the NE program at home?:  [x]  Y    [] N    Is the patient tolerating the intensity and duration of the NE program?:     [x] Y   [] N Re-Assessment  [] S/S of depression present? If [x] , action:         Med Changes?:   [] Y   [x] N  Continues with Zoloft        Is the patient tolerating the intensity and duration of the NE program?:    [x]  Y    [] N  Re-Assessment  Psych Issue notes:        Is the patient receiving support for the NE program at home?:  []  Y  [] N    Is the patient tolerating the intensity and duration of the NE program?:  []  Y   []  N     Final Assessment                       Results of any Physician/ Counselor Intervention?:     [] Y   [] N         GOALS        30 DAY TARGET GOAL    [x]  Assess Mental Score using   SF-36 and PHQ-9   [x]  Teach PLB  [x]  Reassure pt that (s)he can stop and rest, adjust the speeds, and/or switch modalities from our suggestions  -    (0 being 'None', 10 being 'Very'),  -How would you rate your Anxiety Level: 4      -How would you rate your level of  depression: 4    -How would you rate your mood: (0 is negative all the time, 10 is positive all the time):  4          ADL's  Assess PF score on SF-36    Score= 15    From [employer], ADL pt struggles with most: Going up/down steps to laundry    Out of 10, rate your ability to do that ADL now.   3     GOALS        Did pt meet Initial 30 day   Target Goal(s)?:       30 DAY NEW TARGET GOAL    [x] Decrease/Maintain anxiety and depression level  [x] Increase ability to complete ADL  [x] Encourage pt to rate exercises truthfully to encourage correct intensity / duration prescription  -  (0 being 'None', 10 being 'Very'),  -How would you rate your Anxiety Level: 3/10      -How would you rate your level of depression: 3/10      -How would you rate your mood: 5    ADL's 4/10 GOALS        Did pt meet previous 30 day Target Goal(s)?:      RE- ASSESSMENT GOAL    [x] Decrease/  Maintain anxiety and depression level  [x] Increase ability to complete ADL  Rates ADL's at 5/10  -          (0 being 'None', 10 being 'Very'),  -How would you rate your Anxiety Level:3/10      -How would you rate your level of depression:3/10    -How would you rate your mood: 5 GOALS        Did pt meet previous 30 day Target Goal(s)?:      RE- ASSESSMENT GOAL    [] Decrease/  Maintain anxiety and depression level  [] Increase ability to complete ADL  -          (0 being 'None', 10 being 'Very'),  -How would you rate your Anxiety Level:      -How would you rate your level of depression:    -How would you rate your mood:     Discharge            Did pts SF-36 Mental Score increase?:          [] Pt is aware of the s/s of depression    [] Received Psychosocial Education    Any follow up with physicians  necessary?:      [] Y    [] N               [facility] INDIVIDUAL TREATMENT PLAN  EDUCATION DOMAIN:    EDUCATION   Initial Assessment  Day 1 EDUCATION   Intervention  Day 2-30 EDUCATION   Re-Assessment  Day 31-60 EDUCATION   ReAssessment Day 61-90+ EDUCATION Discharge  Final Day                      Education  [x] Equipment/Staff Orientation  [x] Breathing Retraining  [x] Importance of Clean Hands    Chronic Cough?:   [] Y   [x]  N  Spacer?:   [x]   Y   []  N     Sleep Apnea?:  [] Y    [x] N     [x] Education Book given       Education  Individual instruction  [x] PLB  [x] RPD/RPE   [] O2 use  []  review PFT  [] Inhalers   []Home Activity/Warm up/ stretches  Attend Classes:  [] Nutrition  [x] Panic control, relaxation, managing depression  [] A&P of the Respiratory System   [] Environ. Issues+ Travel   [x] Bronchial Hygiene / Preventing Infection  [] Resistance Training  []  Benefits of Exercise  [] Energy Cons        Education  Individual instruction  [] PLB  [] RPD/RPE   [] O2 use  []  review PFT  [] Inhalers   [] Home Activity/Warm up/ stretches  Attend Classes:  [] Nutrition  []  Panic conntrol, relaxation, managing depression  []  A&P of the Respiratory System   [] Environ. Issues+ Travel   [] Bronchial Hygiene / Preventing Infection  []  Resistance Training  [] Benefits of Exercise  [] Energy Cons    Education  Individual instruction  [] PLB  [] RPD/RPE   []  O2 use  []  review PFT  [] Inhalers   [] Home Activity/Warm up/ stretches  Attend Classes:  [] Nutrition  []  Panic conntrol, relaxation, managing depression  [] A&P of the Respiratory System   [] Environ. Issues+ Travel   [] Bronchial Hygiene / Preventing Infection  []  Resistance Training  [] Benefits of Exercise  [] Energy Cons   Education  []  Addressed pt questions on Education Topics                                                         Physician Interaction / Response:  (If none, continue on present care plan)     Physician Interaction / Response:   (If none, continue on present care plan)   Physician Interaction / Response:   (If none, continue on present care plan)   Physician Interaction / Response:   (If none, continue on present care plan)   Physician Interaction / Response:       Discharge the patient. Rehab Potential:  [x]  Good [] Fair [] Poor    Summary: 11/13/18:  Suni Szymanski is a 60 yr old female with COPD who was referred to Pulmonary Rehab for exertional shortness of breath. She presents today for her initial MS evaluation. She has a 4yr history of smoking 1-2ppd, quit 1/1/2017. Her most recent hospitalization 5/6/18 for NSTEMI. She has had no recent hospitalizations for COPD.  Lavon Aguirre reports that her breathing has become worse over the past year and that she has been finding it harder to perform her ADL'S and preventing her from being as active as she once was. She will attend 28 sessions of WI twice weekly. Exercise:30-40 min of exercise starting at low levels. Time and intensity to be increased based on RPE. Today she did tolerate 15 min of exercise at low levels. Cardiac monitor displayed NSR throughout. Oxygen:Laine will exercise on room air as demonstrated by 6 mw test.    Medications affecting HR: Toprol xl 12.5mg daily, Albuterol MDI 2 puffs q 6hrs prn    Nutrition:Wt. 150# BMI 28. Selin Seek states she would like to lose about 10lbs. She admits to drinking 4 cans of pepsi daily. We discussed cutting back on soda, increasing water intake and decreasing portion sizes. She will attend General Nutrition class during WI. Psychosocial including Dyspnea with ADL's, Depression/Anxiety and Social Functioning: Greta lives with her sister and brother in law. She has 3 children with whom she sees often. She rates her anxiety/depression 4/10 which she feels is due to her breathing issues and inability to do much without getting short of breath. She is able to perform her ADL's but is finding it more difficult. She looks forward to increasing her strength and learning how to manage her COPD.     30 Day Progress Report:12/11/2018  Attendance:  Edgar Hutchinson has attended 6 exercise sessions / 2 education classes  Exercise:  Khanh Blanc completes 35-40 min of exercise at low levels. She does occasionally complain of back and neck discomfort. We have reviewed proper positioning and safety on the exercise equipment. She is doing warm-ups at home. Oxygen:  She completes all exercise on room air and maintains an adequate SaO2 94-97%. We have reviewed proper breathing techniques. Medications: Khanh Blanc states she was started on Pepcid following an episode of CP that was evaluated in the ED. She states the pepcid has helped and she has had no further pain. Nutrition:  Weight maintained at 152#  Psychosocial:  Khanh Blanc is pleasant.   Rates her anxiety is 3 / Medical Director

## 2019-05-15 NOTE — PROGRESS NOTES
[facility] Facility-Based Therapy for COPD  INDIVIDUAL TREATMENT PLAN (ITP)     NAME: Sheela Wayne Henok VENTURAB: 1960  Account Number: [de-identified]  AGE: 60 y.o. Diagnosis: Severe  COPD which is GOLD Stage 3. PFT: Post Bronch FEV1/FVC : 59, FEV1: 36 FVC: 61  Notes: Medicare requirements for Pulmonary Rehabilitation include a PFT within the last 12 months revealing: FEV1/FVC <70, and FEV1 <80%, and documentation from the referring physician stating that the patient has quit smoking or will participate in smoking cessation activities prior to, or during the Pulmonary Rehab program.  A 6 Minute Walk Test (6 MWT) at the beginning and end of the program is also indicated.   GLOSSARY: PF= Physical Fitness  TM=Treadmill  AD= [employer] AirDyne Bike  UBE=Upperbody Ergometry LBE=Lowerbody Ergometer  NS=NuStep SF= SciFit  ST=Step Training  RT=Resistance Training   PLB=Pursed Lip Breathing   DY= Dynabands  HW= Handweights   Cybex RT= Cybex Mult istation weight machine   RB=Recumbent    REFERRING PHYSICIAN: Dr. Juana Stanley History:     Past Medical History:   Diagnosis Date    Back pain     CAD (coronary artery disease)     COPD (chronic obstructive pulmonary disease) (HCC)     Foot pain     from bone spurs    GERD (gastroesophageal reflux disease)     Headache     sharp pain going through head    Heart attack ([address]     Hip pain     Hyperlipidemia     Hypertension      (normal spontaneous vaginal delivery)     Osteoporosis     Vision abnormalities        Surgical History:     Past Surgical History:   Procedure Laterality Date    CARDIAC CATHETERIZATION      CHOLECYSTECTOMY      COLONOSCOPY      ESOPHAGEAL DILATATION N/A 2019    ESOPHAGEAL DILATION TONG performed by Geraldo Weinstein MD at [address] [place] [address] N/A 2019    EGD BIOPSY performed by Geraldo Weinstein MD at [place] [address] Major Symptoms:     Cough/sputum            Occasional/ Non productive      Wheezing  No     Chest Discomfort  No     Orthopnea  No     Sleep Disturbances Yes-frequently has difficulty falling asleep     Edema   No     Dyspnea  Yes-exertional    Oxygen Therapy: N/A     Home O2    lpm  []  Prn  [] continuous  []  HS     [] CPAP []  BiPAP     Company:          Comments:    Occupational/ Recreational Activities:    Employment Status:  []  FT  []  PT  [x] Disabled [] Retired    Comments:       Occupation: [employer]          Hobbies/Leisure activities: Shopping, watches videos and games on phone, TV    Social/Spiritual:        Social History     Socioeconomic History    Marital status:      Spouse name: Not on file    Number of children: Not on file    Years of education: Not on file    Highest education level: Not on file   Occupational History    Not on file   Social Needs    Financial resource strain: Not on file    Food insecurity:     Worry: Not on file     Inability: Not on file    Transportation needs:     Medical: Not on file     Non-medical: Not on file   Tobacco Use    Smoking status: Former Smoker     Packs/day: 1.00     Years: 37.00     Pack years: 37.00     Types: Cigarettes     Last attempt to quit: 2017     Years since quittin.3    Smokeless tobacco: Never Used   Substance and Sexual Activity    Alcohol use: No     Alcohol/week: 0.0 oz    Drug use: No    Sexual activity: Not Currently   Lifestyle    Physical activity:     Days per week: Not on file     Minutes per session: Not on file    Stress: Not on file   Relationships    Social connections:     Talks on phone: Not on file     Gets together: Not on file     Attends Episcopalian service: Not on file     Active member of club or organization: Not on file     Attends meetings of clubs or organizations: Not on file     Relationship status: Not on file    Intimate partner violence:     Fear of current or ex partner: Not on file Emotionally abused: Not on file     Physically abused: Not on file     Forced sexual activity: Not on file   Other Topics Concern    Not on file   Social History Narrative    Not on file          Do you live alone: []  Alone [x]  with spouse/family- sister and brother in law. Do you have stairs in your home  []  no [x]  yes        Comment:6 stairs up to bedroom, 6 to family room and basement laundry. Transportation  []  drive self [x]  family/friend     Comment:       Cultural/Spiritual Influences: (Latter-day groups, etc)       Any Cultural or Gnosticism practices we should be aware of? Fall Risk Assessment:      []  Cognitive Impairment [x]  Balance/Gait Disturbance   []  Fall History   []  Visual Difficulty   [x]  Dizziness- occasional episodes of dizziness but denies falls. []  Other Comments:    Physical Assessment:    Color: [x]  natural []  pale [] cyanotic []  flushed []  jaundice    Skin Integrity [x]  intact [] other:    Heart Rate 78  Resp Rate 18      Breath Sounds: Diminished throughout    Blood Pressures Sitting: Rt arm 118/68  Left arm: 120/60       Standing Rt arm   Left arm: 118/60    Resting SpO2: 96% ra  Resp effort/pattern: Easy/regular      Peripheral pulses: Yes    Edema:  No    Numbness/tingling:  No    Orthopedic/exercise limitations:  Yes- osteoporosis in hips/knees, chronic back pain with sciatica    Psychosocial assessment:    Support System:Sister, 2 daughters 1 son    Physical/Behavioral signs of abuse/neglect: No    Advance Directives:  No  [] info given-declined    Learning Preferences: Primary Language:English          Preferred method of learning []  video []  handouts        [] listening/lecture   [x]  all    Memory impairment?   No     EXERCISE  Initial Assessment  Day 1 EXERCISE  Intervention  Day 2-30 EXERCISE  Re-Assessment  Day 31-60 EXERCISE  Re-Assessment  Day 61-90+ EXERCISE  Last Day   Date:   11/13/18 Date:   12/11/2018 Date:  1/10/2019 Date:  Date:           Pre Rehab  6 MWT  725 ft = 44% pred  2.0 METs  SpO2: 92%    1.4  MPH   HR: 87bpm      RPD: 3, RPE: 2-3                                           Post Rehab   6 MWT  ft = % pred   METs  SpO2: %  MPH  HR:  bpm      RPD:  , RPE:                                        GOALS  List at least 2 patient specific goals    [x]Improve activity for tolerance for routine tasks and walking    [x]Learn proper breathing techniques including PLB    []Learn proper pacing with activities    []Learn proper use of oxygen, systems and safety    []Learn proper exercise guidelines    [x]Learn proper nutrition for my diagnosis                              Learning Barrier(s)  [] Speech           [] Literacy              [] Hearing          [] Cognitive            [] Vision             [x]  Ready to Learn          Balance Issues  [] Y  [x] N          Orthopedic Issues  [x]  Y[]  N-Chronic back pain with bilateral sciatica        Rate your Physical   Fitness (PF)?    4/10    Handgrip:  Right:8  Left:8    EDUCATION  [x]  Staff Introduction  [x]  Proper setup/O2 use  [x]  Equipment [place]'n  [x]  RPD/RPE Explain  [x]  SpO2/HR Explain  [x]  Breathing Retraining  [x]  Explain Intensity  [x] Initial Levels set GOALS          1) \"A little bit\"            2) \"A little bit. \"                  If Patient has a Learning Barrier, action:               Pending    If pt has balance or orthopedic issues:  Interventions:                    Rate your physical   fitness (PF)?:   5/10        -30 day PF goal:  6/10    EDUCATION  [] Understands proper set/up O2 use  N/A  []  Understands SpO2 and RPD? N/A  [x] Aerobic progression explained? [x]  Intensity progression explained? GOALS          1) \"Yes, I move around better. \"           2) \"Yes, I'm breathing better. \"                                   3) Nutrition class scheduled for January                                    Rate your physical   fitness (PF)?:   5.5 /10        -30 day PF goal:  6 /10    EDUCATION   [x]  Home Activity education offered  [x] Stretching/ Flexibility education offered  [x] Attended Benefits of Exercise Class / information packet given  [x]  Attended Resistance Training Class                                                    GOALS                                                                                           Rate your physical   fitness (PF)?:   /10    -30 day PF goal:  /10    EDUCATION  [] Attended all individually relevant exercise education sessions? []  Knows current exercise goals and recmndtns?:                                                  Goals Achieved? Rate your physical fitness now?:     /10  Handgrip:  Right:  Left:    Discharge  EDUCATION  []  Attended all individually relevant exercise education sessions?    [] Knows current exercise goals and recmndtns?:                                                                                        PHYSICIAN DIRECTED   EXERCISE RX     RPE : 11 - 14  Titrate O2 to keep SpO2 >89%    Frequency (F): 2-3x/wk in Rehab,         Initial Intensity : 2.0 METs (from 6MWT)   1.2-1.6  (60-80% of walk speed for TM)    Type (T): Aerobic (TM,NS, SF, AE, AB, RB, EL, Arc), RT 1-3#, 8-16 reps    CAN USE ALL EQUIPMENT EXCEPT       Time (Ti): Aerobic:   15-40 min               Progression: 1-2 min total time for TM, AB, NS, SF or Arm ergometer per session or when a steady state has occurred in RPE if  SpO2 and HR levels are acceptable           PHYSICIAN DIRECTED   EXERCISE RX       Titrate O2 to keep SpO2 >89%    Frequency (F): 2-3x/wk in Rehab, 1-3x/wk home walking       Intensity (I):  Initial + 5-10% increase each week or when a steady state has occurred in RPE if  SpO2 and HR levels are acceptable  Type (T)  Aerobic (TM,NS, SFR,SFS, AE, AB, RB), RT   8-16 reps    CAN USE ALL EQUIPMENT EXCEPT        Time (Ti): Aerobic: 30-40 min        Progression:   1-2 min total time for TM, AB, NS, SF or Arm ergometer per session or when a steady state has occurred in RPE if  SpO2 and HR levels are acceptable        Is pt. progressing in rehab?:  Yes             PHYSICIAN DIRECTED   EXERCISE RX       Titrate O2 to keep SpO2 >89%    Frequency (F): 2-3x/wk in Rehab, 1-3x/wk home walking       Intensity (I):  Initial + 5-10% increase each week when a steady state has occurred in RPE if  SpO2 and HR levels are acceptable  Type (T)  Aerobic (TM,NS, SFR,SFS, AE, AB, RB), RT   8-16 reps    CAN USE ALL EQUIPMENT EXCEPT        Time (Ti): Aerobic:   30-50 min     Progression:   1-2 min total time for TM, AB, NS, SF or Arm ergometer per session or when a steady state has occurred in RPE if  SpO2 and HR levels are acceptable              Is pt. progressing in rehab?:  Yes, slowly               PHYSICIAN DIRECTED   EXERCISE RX       Titrate O2 to keep SpO2 >89%    Frequency (F): 2-3x/wk in Rehab, 1-3x/wk home walking       Intensity (I):  Initial + 5-10% increase each week when a steady state has occurred in RPE if  SpO2 and HR levels are acceptable  Type (T):   Aerobic (TM,NS, SFR,SFS, AE, AB, RB), RT   8-16 reps    CAN USE ALL EQUIPMENT EXCEPT          Time (Ti): Aerobic:   30-58 min         Progression:   1-2 min total time for TM, AB, NS, SF or Arm ergometer per session or when a steady state has occurred in RPE if  SpO2 and HR levels are acceptable            Is pt. progressing in rehab?:               Final Intensity (I): See below and final 6MWT above            ACHIEVED TOTAL AEROBIC EXERCISE TIME:  min         FINAL LEVELS:  [] TM: min  [] Nustep: level  min  [] Arm ergometer: moore min  [] Scifit: level min  [] HW :  # x  reps  [] Dy:    X   reps  [] Cybex RT:    # x   Reps  [] Eliptical:level  X  Min  [] Arc: level   X    mins  [] RB:  Level  X   mins  [] AB: level   X     Min              Did pt. progress in rehab?:     30 DAY TARGET GOAL  [x] Start slowly but progress each week  [x] Increase duration on the equipment  [x] Start resistance training    -30 day PF goal: 5/10                 Current Home Exercise : None    Frequency:      Type:                     Health Knowledge   Test Score:  20/25     Current Home Exercise:   Frequency:  Everyday    Type: warm-ups / stairs        Time: 10 min                                  Current Home Exercise:   Frequency:  everyday     Type: warm-ups / stairs         Time: 10-15 min                                Current Home Exercise:   Frequency:       Type:         Time:  min                                                      Discharge exercise plan                                  Repeat Health Knowledge Test Score :    /25     [facility] INDIVIDUAL TREATMENT PLAN  SMOKING AND   OTHER COMPONENTS    Smoking/Other  Components   Initial Assessment  Day 1 Smoking/Other  Components  Intervention  Day 2-30 Smoking/Other  Components  Re-Assessment  Day 31-60 Smoking/Other  Components  Re-Assessment Day 61-90+ Smoking/Other  Components  Discharge  Final Day   Tobacco Use Hx  [x] Y  [] N?:   Quit Date: 2017  Cig/Day: 40  Years: 40  Tobacco Use  Any change in Smoking Status?:   []  not smoking  Quit Date:   (if applicable)  Intervention  []  Information/ed material given on cessation techniques  []  smoking cessation class schedule given Tobacco Use  Any change in Smoking Status?:  Non-smoking    Quit Date:   (if applicable)          Intervention  [] Referral to Tobacco Cessation Specialist (TCS) Tobacco Use  Any change in Smoking Status?:  Non-smoking     Quit Date:   (if applicable)        Intervention Tobacco Use  Any change in Smoking Status?:     Quit Date:   (if applicable)          Intervention Tobacco Use  Any change in Smoking Status?:   Quit Date:   (if applicable)            Intervention  [] Pt used TCS counseling           Other  Components:    [x]  Environmental Factors    [x]  Prevention Management of Exacerbations    []  CHF Management    []  Hypertension Management     Intervention/  Education                [facility] INDIVIDUAL TREATMENT PLAN  OXYGEN AND MEDICATIONS    OXYGEN  MEDICATIONS   Initial Assessment  Day 1 OXYGEN  MEDICATIONS  Intervention  Day 2-30 OXYGEN  MEDICATION  Re-Assessment  Day 31-60 OXYGEN  MEDICATION  ReAssessment Day 61-90+ OXYGEN  MEDICATION  Discharge  Final Day                    Medications  [x]  Uses as prescribed  []  Non- compliant with prescribed meds    Respiratory Medications    []  Proper use   and technique of MDI, DPI and/or nebs  []  Incorrect use and technique of MDI, DPI and /or nebs    Hypoxemia  Problem:    [x]  No problem  [] Noncompliant with O2 use  []  Oxygen in ID only  []  No home O2  []  No portable O2  []  Needs O2 prescription and O2 qualifying data sent for setup   ()Poor knowledge of O2 use/safety    Current Oxygen Prescription:      l/min rest     l/min exercise     titration   plan/weaning plan:  CPAP/BIPAP:n/a    Goals:     []  Manage Hypoxemia  []  receive adequate portable system  []  Compliant with use as prescribed  []  Learn O2 safety guidelines   Medications  [x]  Uses as prescribed  []  Non- compliant with prescribed meds    Respiratory Medications    [x] Proper use and technique of MDI, DPI and/or nebs  []  Incorrect use and technique of MDI, DPI and /or nebs    Hypoxemia  Problem:  Not using oxygen      Current Oxygen Prescription:    l/min rest   l/min exercise   titration plan/weaning plan:    Goals:   []  Manage Hypoxemia  []  receive adequate portable system  []  Compliant with use as prescribed  []  Learn O2 safety guidelines           Medications  [x]  Uses as prescribed  [] Non- compliant with prescribed meds    Respiratory Medications    [x]  Proper use and technique of MDI, DPI and/or nebs  [] Incorrect use and technique of MDI, DPI and /or nebs    Hypoxemia  Problem:  Not using oxygen    Current Oxygen Prescription:    l/min rest   l/min exercise   titration plan/weaning plan:    Goals:   []  Manage Hypoxemia  []  receive adequate portable system  []  Compliant with use as prescribed  [] Learn O2 safety guidelines           Medications  []  Uses as prescribed  []  Non- compliant with prescribed meds    Respiratory Medications    []  Proper use and technique of MDI, DPI and/or nebs  []  Incorrect use and technique of MDI, DPI and /or nebs    Hypoxemia  Problem:      Current Oxygen Prescription:    l/min rest   l/min exercise   titration plan/weaning plan:    Goals:   []  Manage Hypoxemia  []  receive adequate portable system  [] Compliant with use as prescribed  []  Learn O2 safety guidelines     Medications    [] Compliant  []  Noncompliant       Respiratory Medications    []  Compliant  [] Noncompliant              Hypoxemia  Problem:    []  Compliant  []  Noncompliant    Final Oxygen Prescription:    l/min rest   l/min exercise                                                           [facility] INDIVIDUAL TREATMENT PLAN  NUTRITION DOMAIN:        NUTRITION   Initial Assessment  Day 1 NUTRITION   Intervention  Day 2-30 NUTRITION   Re-Assessment  Day 31-60 NUTRITION   Re-Assessment Day 61-90+ NUTRITION Discharge  Final Day   Weight Management:  151 lbs  Current BMI 28 Weight Management:  153 lbs   Weight Management:   153.6 lbs   Weight Management:    lbs  Current BMI Weight Management:    lbs  Current BMI   Diabetes?: N/A  A1C   Fasting BS:   Insulin?:    Oral agent? Diabetes:  If y, has patient seen a positive trend in FBS?:      Intervention    [x] Basic nutrition education   [] Referral to Diabetes Education? [] Referral to weightloss/nutrition education     Intervention    [] Pt has had Nutrition class? [] Informal questions asked regarding nutrition/weight control Intervention    []  Pt has had Nutrition class? Scheduled this month  [] Questions addressed Intervention    []  Pt has had Nutrition class?    Intervention    Pt aware of:     [] Pulmonary eating techniques   [] Smart choices, Calories   []Gas-producing foods   [] Wt gain / loss strategies     GOALS    Weight Loss         30 DAY TARGET GOAL:    [x] Decrease portion size by 250-500 calories/day  [x] Increase fluid intake to 6-8 8oz. [x] Eat less sweets  [x] Cut back on amt of soda     Reasonable, achievable 30 day weight goal: 147 lbs   (1-2 lb per week is recommended)            Weight Gain        30 day   Target Goal:    [] increase proteins  [] add nutrition  Supplement  [] 6 small meals      Did pt meet Initial 30 day Target Goal(s)?:     New 30 DAY TARGET GOAL:    [x] Decrease saturated fats  [x] Decrease gas producing  cruciferous veggies   -  Reasonable, achievable 30 day weight goal:  Maintain current weight   Did pt meet previous 30 day Target   Goal(s)?:     New 30 DAY TARGET GOAL:    [x] Switch to whole grains whenever possible / uses all fresh vegis  [] Decrease/ Eliminate carbonated beverages / drinks 2 sodas /day    Reasonable, achievable 30 day weight goal: maintain    [place] states she continues to have difficulties with swallowing. She has an appointment to have this evaluated in early February   Did pt meet previous 30 day Target   Goal(s)?:      New 30 DAY TARGET GOAL:    [] Smaller meals more frequently  [] Decrease portion sizes by 25%      Reasonable, achievable 30 day weight goal:                  Did pt meet previous 30 day Target   Goal(s)?:                         [facility] INDIVIDUAL TREAT[facility] PLAN  PSYCHOSOCIAL DOMAIN:    Psychosocial   Initial Assessment  Day 1 Psychosocial   Intervention  Day 2-30 Psychosocial  Re-Assessment  Day 31-60 Psychosocial   Re-Assessment  Day 61-90+ Psychosocial Discharge  Final Day   Psychosocial Screening Tools    (X) PHQ-9 score: 14      (X) SF-36: 56       (X) UCSD SOB score: 91         PHQ-9 Score: If score >or =15, Action:     SF-36 Mental Score:     UCSD Score:             Any action on Screening Tools?: Psychosocial Screening   Tools    Repeat PHQ-9 Score if mely:    Repeat SF-36      Repeat UCSD SOB Score:       Assessment  []  S/S of depression identified? []  PCP notified of PHQ-9 results  Yes    []  On Anti-anxiety / anti-depression meds?: Zoloft 100mg daily           Intervention  If S/S of depression present, action taken:     Is the patient receiving support for the UT program at home?:  [x]  Y    [] N    Is the patient tolerating the intensity and duration of the UT program?:     [x] Y   [] N Re-Assessment  [] S/S of depression present? If [x] , action:         Med Changes?:   [] Y   [x] N  Continues with Zoloft        Is the patient tolerating the intensity and duration of the UT program?:    [x]  Y    [] N  Re-Assessment  Psych Issue notes:        Is the patient receiving support for the UT program at home?:  []  Y  [] N    Is the patient tolerating the intensity and duration of the UT program?:  []  Y   []  N     Final Assessment                       Results of any Physician/ Counselor Intervention?:     [] Y   [] N         GOALS        30 DAY TARGET GOAL    [x]  Assess Mental Score using   SF-36 and PHQ-9   [x]  Teach PLB  [x]  Reassure pt that (s)he can stop and rest, adjust the speeds, and/or switch modalities from our suggestions  -    (0 being 'None', 10 being 'Very'),  -How would you rate your Anxiety Level: 4      -How would you rate your level of  depression: 4    -How would you rate your mood: (0 is negative all the time, 10 is positive all the time):  4          ADL's  Assess PF score on SF-36    Score= 15    From [facility], ADL pt struggles with most: Going up/down steps to laundry    Out of 10, rate your ability to do that ADL now.   3     GOALS        Did pt meet Initial 30 day   Target Goal(s)?:       30 DAY NEW TARGET GOAL    [x] Decrease/Maintain anxiety and depression level  [x] Increase ability to complete ADL  [x] Encourage pt to rate exercises truthfully to encourage correct intensity / duration prescription  -  (0 being 'None', 10 being 'Very'),  -How would you rate your Anxiety Level: 3/10      -How would you rate your level of depression: 3/10      -How would you rate your mood: 5    ADL's 4/10 GOALS        Did pt meet previous 30 day Target Goal(s)?:      RE- ASSESSMENT GOAL    [x] Decrease/  Maintain anxiety and depression level  [x] Increase ability to complete ADL  Rates ADL's at 5/10  -          (0 being 'None', 10 being 'Very'),  -How would you rate your Anxiety Level:3/10      -How would you rate your level of depression:3/10    -How would you rate your mood: 5 GOALS        Did pt meet previous 30 day Target Goal(s)?:      RE- ASSESSMENT GOAL    [] Decrease/  Maintain anxiety and depression level  [] Increase ability to complete ADL  -          (0 being 'None', 10 being 'Very'),  -How would you rate your Anxiety Level:      -How would you rate your level of depression:    -How would you rate your mood:     Discharge            Did pts SF-36 Mental Score increase?:          [] Pt is aware of the s/s of depression    [] Received Psychosocial Education    Any follow up with physicians  necessary?:      [] Y    [] N               [facility] INDIVIDUAL TREATMENT PLAN  EDUCATION DOMAIN:    EDUCATION   Initial Assessment  Day 1 EDUCATION   Intervention  Day 2-30 EDUCATION   Re-Assessment  Day 31-60 EDUCATION   ReAssessment Day 61-90+ EDUCATION Discharge  Final Day                      Education  [x] Equipment/Staff Orientation  [x] Breathing Retraining  [x] Importance of Clean Hands    Chronic Cough?:   [] Y   [x]  N  Spacer?:   [x]   Y   []  N     Sleep Apnea?:  [] Y    [x] N     [x] Education Book given       Education  Individual instruction  [x] PLB  [x] RPD/RPE   [] O2 use  []  review PFT  [] Inhalers   []Home Activity/Warm up/ stretches  Attend Classes:  [] Nutrition  [x] Panic control, relaxation, managing depression  [] A&P of the Respiratory System   [] Environ. Issues+ Travel   [x] Bronchial Hygiene / Preventing Infection  [] Resistance Training  []  Benefits of Exercise  [] Energy Cons        Education  Individual instruction  [] PLB  [] RPD/RPE   [] O2 use  []  review PFT  [] Inhalers   [] Home Activity/Warm up/ stretches  Attend Classes:  [] Nutrition  []  Panic conntrol, relaxation, managing depression  []  A&P of the Respiratory System   [] Environ. Issues+ Travel   [] Bronchial Hygiene / Preventing Infection  []  Resistance Training  [] Benefits of Exercise  [] Energy Cons    Education  Individual instruction  [] PLB  [] RPD/RPE   []  O2 use  []  review PFT  [] Inhalers   [] Home Activity/Warm up/ stretches  Attend Classes:  [] Nutrition  []  Panic conntrol, relaxation, managing depression  [] A&P of the Respiratory System   [] Environ. Issues+ Travel   [] Bronchial Hygiene / Preventing Infection  []  Resistance Training  [] Benefits of Exercise  [] Energy Cons   Education  []  Addressed pt questions on Education Topics                                                         Physician Interaction / Response:  (If none, continue on present care plan)     Physician Interaction / Response:   (If none, continue on present care plan)   Physician Interaction / Response:   (If none, continue on present care plan)   Physician Interaction / Response:   (If none, continue on present care plan)   Physician Interaction / Response:       Discharge the patient. Rehab Potential:  [x]  Good [] Fair [] Poor    Summary: 11/13/18:  George Mendez is a 60 yr old female with COPD who was referred to Pulmonary Rehab for exertional shortness of breath. She presents today for her initial MD evaluation. She has a 4yr history of smoking 1-2ppd, quit 1/1/2017. Her most recent hospitalization 5/6/18 for NSTEMI. She has had no recent hospitalizations for COPD.  Saad Quentin reports that her breathing has become worse over the past year and that she has been finding it harder to perform her ADL'S and preventing her from being as active as she once was. She will attend 28 sessions of AK twice weekly. Exercise:30-40 min of exercise starting at low levels. Time and intensity to be increased based on RPE. Today she did tolerate 15 min of exercise at low levels. Cardiac monitor displayed NSR throughout. Oxygen:Laine will exercise on room air as demonstrated by 6 mw test.    Medications affecting HR: Toprol xl 12.5mg daily, Albuterol MDI 2 puffs q 6hrs prn    Nutrition:Wt. 150# BMI 28. Elo Camejo states she would like to lose about 10lbs. She admits to drinking 4 cans of pepsi daily. We discussed cutting back on soda, increasing water intake and decreasing portion sizes. She will attend General Nutrition class during AK. Psychosocial including Dyspnea with ADL's, Depression/Anxiety and Social Functioning: Greta lives with her sister and brother in law. She has 3 children with whom she sees often. She rates her anxiety/depression 4/10 which she feels is due to her breathing issues and inability to do much without getting short of breath. She is able to perform her ADL's but is finding it more difficult. She looks forward to increasing her strength and learning how to manage her COPD.     30 Day Progress Report:12/11/2018  Attendance:  Margy Brunner has attended 6 exercise sessions / 2 education classes  Exercise:  Abril Chapa completes 35-40 min of exercise at low levels. She does occasionally complain of back and neck discomfort. We have reviewed proper positioning and safety on the exercise equipment. She is doing warm-ups at home. Oxygen:  She completes all exercise on room air and maintains an adequate SaO2 94-97%. We have reviewed proper breathing techniques. Medications: Arbil Chapa states she was started on Pepcid following an episode of CP that was evaluated in the ED. She states the pepcid has helped and she has had no further pain. Nutrition:  Weight maintained at 152#  Psychosocial:  Abril Chapa is pleasant.   Rates her anxiety is 3 / depression 3  We will continue to progress her exercise time and intensity as tolerated. -MD Stephens    60 Day Progress Report:  1/11/19  Attendance: Lavon Aguirre has attended 11 education session / 2 education classes  Exercise: Completes 40 min of exercise at low levels. She continues to complain of back and neck discomfort. She continues doing warm-ups at home. Oxygen: All exercise is completed on room air and she maintains an SaO2 at 96-97%. Medications:  Her only medication addition was a cream for a rash on her abdomen under her left breast and neck. Received a Pneumonia shot today at MD office. Nutrition:  Weight maintained at 153#  Psychosocial:   Lavon Aguirre states she is feeling well, just tired lately. She rates her anxiety at 3 / depression at 3  We will continue to progress her exercise time and increase her levels as tolerated. We will instruct her in resistance training, she was not present when the class was presented. -Doris    90 Day Progress Note 2/12/2019  Lavon Whiting has not attended rehab since 1/31/19. She has been experiencing back pain and she has been very uncomfortable and unable to attend rehab. She is not present to review her progress today. We will contact her to determine when she will be able to return to exercise. -Doris    120 Day Progress Note: 3/12/19: Pt has not been in rehab since 1/31/19. She has had difficulty with recurrent back pain. Pt returned phone call today. States she plans on returning Thursday. She is enrolled in aquatic therapy [facility]. Ed. Final Progress Note 5/14/19: Lavon Aguirre has not been in for a NH session since 3/28/19. She has not called to cancel her sessions and has not returned the last call/message left on her phone.  Unfortunately, she is discharged from the program  Luciano Fernandez RCP                  Referring Physician: Dr. Sajan Barnes                                              Fax #:    Reviewed and electronically signed by Dr Lima Aguirre.  Frank Martinez, Medical Director

## 2019-05-20 ENCOUNTER — OFFICE VISIT (OUTPATIENT)
Dept: INTERNAL MEDICINE CLINIC | Age: 59
End: 2019-05-20
Payer: COMMERCIAL

## 2019-05-20 VITALS
OXYGEN SATURATION: 93 % | DIASTOLIC BLOOD PRESSURE: 74 MMHG | HEART RATE: 76 BPM | HEIGHT: 63 IN | WEIGHT: 152 LBS | SYSTOLIC BLOOD PRESSURE: 123 MMHG | TEMPERATURE: 98.9 F | BODY MASS INDEX: 26.93 KG/M2

## 2019-05-20 DIAGNOSIS — K52.9 GASTROENTERITIS: Primary | ICD-10-CM

## 2019-05-20 PROBLEM — E55.9 VITAMIN D DEFICIENCY: Status: ACTIVE | Noted: 2017-08-02

## 2019-05-20 PROCEDURE — G8419 CALC BMI OUT NRM PARAM NOF/U: HCPCS | Performed by: NURSE PRACTITIONER

## 2019-05-20 PROCEDURE — 99213 OFFICE O/P EST LOW 20 MIN: CPT | Performed by: NURSE PRACTITIONER

## 2019-05-20 PROCEDURE — G8427 DOCREV CUR MEDS BY ELIG CLIN: HCPCS | Performed by: NURSE PRACTITIONER

## 2019-05-20 PROCEDURE — 1036F TOBACCO NON-USER: CPT | Performed by: NURSE PRACTITIONER

## 2019-05-20 PROCEDURE — G8598 ASA/ANTIPLAT THER USED: HCPCS | Performed by: NURSE PRACTITIONER

## 2019-05-20 PROCEDURE — 3017F COLORECTAL CA SCREEN DOC REV: CPT | Performed by: NURSE PRACTITIONER

## 2019-05-20 RX ORDER — FAMOTIDINE 40 MG/1
TABLET, FILM COATED ORAL
COMMUNITY
Start: 2019-04-26 | End: 2019-06-07

## 2019-05-20 RX ORDER — ACETAMINOPHEN,DIPHENHYDRAMINE HCL 500; 25 MG/1; MG/1
1 TABLET, FILM COATED ORAL NIGHTLY PRN
COMMUNITY

## 2019-05-20 ASSESSMENT — ENCOUNTER SYMPTOMS
DIARRHEA: 0
NAUSEA: 1
COUGH: 0
VOMITING: 0
SORE THROAT: 0

## 2019-05-20 NOTE — PROGRESS NOTES
19    Chief Complaint   Patient presents with    Nausea     started yesterday morning per pt/ pt states family has virus     Abdominal Pain       HPI:   Tika Hanks is a 60 y.o. female is here today for c/o feeling nauseated x 1 day and also having some upper abdominal pain as well. Slight decrease in appetite but denies vomiting or diarrhea.     Patient Active Problem List   Diagnosis    COPD, severe ([address]    Former smoker    Osteoporosis    Chronic back pain    Positive test for human papillomavirus (HPV)    Anxiety    Hyperlipidemia    Emotional instability ([address]    Osteoarthritis of hip    Body mass index between 25-29, adult    Chest pain    NSTEMI (non-ST elevated myocardial infarction) ([address]    Essential hypertension    Tachycardia    Vitamin D deficiency       Past Medical History:   Diagnosis Date    Back pain     CAD (coronary artery disease)     COPD (chronic obstructive pulmonary disease) (HCC)     Foot pain     from bone spurs    GERD (gastroesophageal reflux disease)     Headache     sharp pain going through head    Heart attack ([address]     Hip pain     Hyperlipidemia     Hypertension      (normal spontaneous vaginal delivery)     Osteoporosis     Vision abnormalities        Family History   Problem Relation Age of Onset    Heart Failure Mother         heart disease    Hypertension Mother     Emphysema Mother     Heart Failure Father         heart disease    COPD Father     Hypertension Paternal Grandmother        Allergies   Allergen Reactions    Iodine Hives    Other Other (See Comments)     Flu vaccine  Felt like bones were on the outside of her body    Eggs Or Egg-Derived Products [Eggs Or Egg-Derived Products] Hives and Nausea And Vomiting    Eggs [Egg White] Nausea And Vomiting    Flexeril [Cyclobenzaprine Hcl] Rash    Levofloxacin Rash    Omeprazole Nausea And Vomiting and Other (See Comments)     Burns my stomach up          Current Outpatient  BUN 11/16/2018 9     CREATININE 11/16/2018 0.6     GFR Non- 11/16/2018 >60     GFR  11/16/2018 >60     Calcium 11/16/2018 9.7     Total Protein 11/16/2018 7.3     Alb 11/16/2018 4.8     Albumin/Globulin Ratio 11/16/2018 1.9     Total Bilirubin 11/16/2018 0.6     Alkaline Phosphatase 11/16/2018 127     ALT 11/16/2018 15     AST 11/16/2018 17     Globulin 11/16/2018 2.5     Ventricular Rate 11/16/2018 73     Atrial Rate 11/16/2018 73     P-R Interval 11/16/2018 120     QRS Duration 11/16/2018 76     Q-T Interval 11/16/2018 382     QTc Calculation (Bazett) 11/16/2018 420     P Axis 11/16/2018 74     R Axis 11/16/2018 69     T Axis 11/16/2018 83     Diagnosis 11/16/2018 Normal sinus rhythmNormal ECGWhen compared with ECG of 31-OCT-2018 16:10,No significant change was foundConfirmed by [facility] ([id number]) on 11/17/2018 11:46:29 AM     Troponin 11/16/2018 <0.01     Lipase 11/16/2018 23.0     Troponin 11/16/2018 <0.01        Physical Exam   Constitutional: She appears well-developed and well-nourished. HENT:   Head: Normocephalic. Eyes: Conjunctivae, EOM and lids are normal.   Neck: Normal range of motion. Neck supple. Cardiovascular: Normal rate, regular rhythm, normal heart sounds and normal pulses. Pulmonary/Chest: Effort normal and breath sounds normal.   Abdominal: Soft. Bowel sounds are normal. There is no hepatosplenomegaly. There is tenderness in the epigastric area. Musculoskeletal:        Neurological: She is alert. ASSESSMENT/PLAN:    Billie Paiz was seen today for nausea and abdominal pain. Diagnoses and all orders for this visit:    Gastroenteritis  -symptomatic treatment, call for worsening symptoms.

## 2019-05-20 NOTE — PATIENT INSTRUCTIONS
Patient Education        Gastroenteritis: Care Instructions  Your Care Instructions    Gastroenteritis is an illness that may cause nausea, vomiting, and diarrhea. It is sometimes called \"stomach flu. \" It can be caused by bacteria or a virus. You will probably begin to feel better in 1 to 2 days. In the meantime, get plenty of rest and make sure you do not become dehydrated. Dehydration occurs when your body loses too much fluid. Follow-up care is a key part of your treatment and safety. Be sure to make and go to all appointments, and call your doctor if you are having problems. It's also a good idea to know your test results and keep a list of the medicines you take. How can you care for yourself at home? · If your doctor prescribed antibiotics, take them as directed. Do not stop taking them just because you feel better. You need to take the full course of antibiotics. · Drink plenty of fluids to prevent dehydration, enough so that your urine is light yellow or clear like water. Choose water and other caffeine-free clear liquids until you feel better. If you have kidney, heart, or liver disease and have to limit fluids, talk with your doctor before you increase your fluid intake. · Drink fluids slowly, in frequent, small amounts, because drinking too much too fast can cause vomiting. · Begin eating mild foods, such as dry toast, yogurt, applesauce, bananas, and rice. Avoid spicy, hot, or high-fat foods, and do not drink alcohol or caffeine for a day or two. Do not drink milk or eat ice cream until you are feeling better. How to prevent gastroenteritis  · Keep hot foods hot and cold foods cold. · Do not eat meats, dressings, salads, or other foods that have been kept at room temperature for more than 2 hours. · Use a thermometer to check your refrigerator. It should be between 34°F and 40°F.  · Defrost meats in the refrigerator or microwave, not on the kitchen counter.   · Keep your hands and your kitchen clean. Wash your hands, cutting boards, and countertops with hot soapy water frequently. · Cook meat until it is well done. · Do not eat raw eggs or uncooked sauces made with raw eggs. · Do not take chances. If food looks or tastes spoiled, throw it out. When should you call for help? Call 911 anytime you think you may need emergency care. For example, call if:    · You vomit blood or what looks like coffee grounds.     · You passed out (lost consciousness).     · You pass maroon or very bloody stools.    Call your doctor now or seek immediate medical care if:    · You have severe belly pain.     · You have signs of needing more fluids. You have sunken eyes, a dry mouth, and pass only a little dark urine.     · You feel like you are going to faint.     · You have increased belly pain that does not go away in 1 to 2 days.     · You have new or increased nausea, or you are vomiting.     · You have a new or higher fever.     · Your stools are black and tarlike or have streaks of blood.    Watch closely for changes in your health, and be sure to contact your doctor if:    · You are dizzy or lightheaded.     · You urinate less than usual, or your urine is dark yellow or brown.     · You do not feel better with each day that goes by. Where can you learn more? Go to [email or web address] and sign in to your Sendbloom account. Enter N142 in the [facility] box to learn more about \"Gastroenteritis: Care Instructions. \"     If you do not have an account, please click on the \"Sign Up Now\" link. Current as of: July 30, 2018  Content Version: 12.0  © 1601-1744 [employer], [employer]. Care instructions adapted under license by [facility] [place] ([facility]). If you have questions about a medical condition or this instruction, always ask your healthcare professional. [address]  any warranty or liability for your use of this information.

## 2019-06-07 ENCOUNTER — OFFICE VISIT (OUTPATIENT)
Dept: INTERNAL MEDICINE CLINIC | Age: 59
End: 2019-06-07
Payer: COMMERCIAL

## 2019-06-07 VITALS
HEIGHT: 62 IN | SYSTOLIC BLOOD PRESSURE: 119 MMHG | DIASTOLIC BLOOD PRESSURE: 65 MMHG | RESPIRATION RATE: 18 BRPM | WEIGHT: 154 LBS | HEART RATE: 73 BPM | BODY MASS INDEX: 28.34 KG/M2 | OXYGEN SATURATION: 95 %

## 2019-06-07 DIAGNOSIS — F41.9 ANXIETY: ICD-10-CM

## 2019-06-07 DIAGNOSIS — J44.9 COPD, SEVERE (HCC): ICD-10-CM

## 2019-06-07 DIAGNOSIS — E55.9 VITAMIN D DEFICIENCY: ICD-10-CM

## 2019-06-07 DIAGNOSIS — I10 ESSENTIAL HYPERTENSION: Primary | ICD-10-CM

## 2019-06-07 DIAGNOSIS — K21.9 GASTROESOPHAGEAL REFLUX DISEASE WITHOUT ESOPHAGITIS: ICD-10-CM

## 2019-06-07 PROCEDURE — G8926 SPIRO NO PERF OR DOC: HCPCS | Performed by: NURSE PRACTITIONER

## 2019-06-07 PROCEDURE — 1036F TOBACCO NON-USER: CPT | Performed by: NURSE PRACTITIONER

## 2019-06-07 PROCEDURE — 99214 OFFICE O/P EST MOD 30 MIN: CPT | Performed by: NURSE PRACTITIONER

## 2019-06-07 PROCEDURE — G8419 CALC BMI OUT NRM PARAM NOF/U: HCPCS | Performed by: NURSE PRACTITIONER

## 2019-06-07 PROCEDURE — 3023F SPIROM DOC REV: CPT | Performed by: NURSE PRACTITIONER

## 2019-06-07 PROCEDURE — 3017F COLORECTAL CA SCREEN DOC REV: CPT | Performed by: NURSE PRACTITIONER

## 2019-06-07 PROCEDURE — G8598 ASA/ANTIPLAT THER USED: HCPCS | Performed by: NURSE PRACTITIONER

## 2019-06-07 PROCEDURE — G8427 DOCREV CUR MEDS BY ELIG CLIN: HCPCS | Performed by: NURSE PRACTITIONER

## 2019-06-07 RX ORDER — ASPIRIN 81 MG/1
TABLET, CHEWABLE ORAL
Qty: 90 TABLET | Refills: 1 | Status: SHIPPED | OUTPATIENT
Start: 2019-06-07

## 2019-06-07 RX ORDER — ERGOCALCIFEROL (VITAMIN D2) 1250 MCG
50000 CAPSULE ORAL WEEKLY
Qty: 4 CAPSULE | Refills: 3 | Status: ON HOLD | OUTPATIENT
Start: 2019-06-07 | End: 2020-08-25

## 2019-06-07 RX ORDER — METOPROLOL SUCCINATE 25 MG/1
12.5 TABLET, EXTENDED RELEASE ORAL DAILY
Qty: 30 TABLET | Refills: 1 | Status: SHIPPED | OUTPATIENT
Start: 2019-06-07 | End: 2019-08-28 | Stop reason: SDUPTHER

## 2019-06-07 RX ORDER — BUDESONIDE AND FORMOTEROL FUMARATE DIHYDRATE 160; 4.5 UG/1; UG/1
2 AEROSOL RESPIRATORY (INHALATION) 2 TIMES DAILY
Qty: 1 INHALER | Refills: 2 | Status: SHIPPED | OUTPATIENT
Start: 2019-06-07 | End: 2019-11-02

## 2019-06-07 RX ORDER — ALBUTEROL SULFATE 90 UG/1
4-6 AEROSOL, METERED RESPIRATORY (INHALATION) EVERY 4 HOURS PRN
Qty: 1 INHALER | Refills: 2 | Status: SHIPPED | OUTPATIENT
Start: 2019-06-07 | End: 2020-02-13 | Stop reason: SDUPTHER

## 2019-06-07 ASSESSMENT — ENCOUNTER SYMPTOMS
RHINORRHEA: 0
SORE THROAT: 0
COUGH: 0
VOMITING: 0
DIARRHEA: 0
NAUSEA: 0
BACK PAIN: 0

## 2019-06-07 NOTE — PROGRESS NOTES
2019     Greta Whiting (:  1960) is a 60 y.o. female, here for evaluation of the following medical concerns:     Patient here for f/u for GERD, HTN, anxiety. GERD  Paitent complains of heartburn. This has been associated with heartburn. She denies cough and shortness of breath. Symptoms have been present for several months. She denies dysphagia. She has not lost weight. She denies melena, hematochezia, hematemesis, and coffee ground emesis. Medical therapy in the past has included proton pump inhibitors. Treatment Adherence:   Medication compliance:  compliant most of the time  Diet compliance:  noncompliant: not following low fat diet  Weight trend: stable  Current exercise: no regular exercise  Barriers: lack of motivation    Hypertension:  Home blood pressure monitoring: No. Patient denies shortness of breath, headache, peripheral edema and palpitations. Antihypertensive medication side effects: no medication side effects noted. Use of agents associated with hypertension: none. Sodium (mmol/L)   Date Value   2018 143    BUN (mg/dL)   Date Value   2018 9    Glucose (mg/dL)   Date Value   2018 129 (H)      Potassium (mmol/L)   Date Value   2018 4.2     Potassium reflex Magnesium (mmol/L)   Date Value   2018 4.3    CREATININE (mg/dL)   Date Value   2018 0.6           Lab Results   Component Value Date    CHOL 230 (H) 2018    TRIG 124 2018    HDL 35 (L) 08/10/2018    LDLCALC 123 (H) 08/10/2018     Lab Results   Component Value Date    ALT 15 2018    AST 17 2018        Anxiety  Patient is here for evaluation of anxiety. She has the following anxiety symptoms: difficulty concentrating, feelings of losing control. Onset of symptoms was approximately several years ago. Symptoms have been controlled since that time. She denies current suicidal and homicidal ideation.  Family history significant for no psychiatric illness. Possible organic causes contributing are: none. Risk factors: previous episode of depression     Review of Systems   Constitutional: Negative for activity change and appetite change. HENT: Negative for congestion, rhinorrhea, sneezing and sore throat. Respiratory: Negative for cough. Cardiovascular: Negative for chest pain. Gastrointestinal: Negative for diarrhea, nausea and vomiting. Genitourinary: Negative for dysuria. Musculoskeletal: Negative for back pain. Neurological: Negative for headaches. Prior to Visit Medications    Medication Sig Taking?  Authorizing Provider   budesonide-formoterol (SYMBICORT) 160-4.5 MCG/ACT AERO Inhale 2 puffs into the lungs 2 times daily Yes JOSE ANGEL Porras CNP   albuterol sulfate HFA (PROVENTIL HFA) 108 (90 Base) MCG/ACT inhaler Inhale 4-6 puffs into the lungs every 4 hours as needed for Wheezing or Shortness of Breath Yes JOSE ANGEL Lofton CNP   ergocalciferol (ERGOCALCIFEROL) 65703 units capsule Take 1 capsule by mouth once a week Yes JOSE ANGEL Lofton CNP   metoprolol succinate (TOPROL XL) 25 MG extended release tablet Take 0.5 tablets by mouth daily Yes JOSE ANGEL Lofton CNP   aspirin 81 MG chewable tablet CHEW ONE TABLET BY MOUTH DAILY Yes JOSE ANGEL Porras CNP   diphenhydrAMINE-APAP, sleep, (TYLENOL PM EXTRA STRENGTH)  MG tablet Take 1 tablet by mouth Yes Historical Provider, MD   methocarbamol (ROBAXIN) 500 MG tablet Take 1 tablet by mouth 2 times daily Yes JOSE ANGEL Lofton CNP   atorvastatin (LIPITOR) 40 MG tablet Take 1 tablet by mouth nightly Yes JOSE ANGEL Lofton CNP   pantoprazole (PROTONIX) 40 MG tablet Take 1 tablet by mouth daily Yes JOSE ANGEL Lofton CNP   sertraline (ZOLOFT) 100 MG tablet Take 1 tablet by mouth daily Yes JOSE ANGEL Lofton CNP   ondansetron (ZOFRAN) 4 MG tablet Take 1 tablet by mouth every 8 hours as needed for Nausea or Vomiting Yes Maryanne JOSE ANGEL Cheek CNP   hydrocortisone 2.5 % cream Apply topically 2 times daily. Yes JOSE ANGEL Mcbride CNP   mineral oil-hydrophilic petrolatum (AQUAPHOR) ointment Apply topically as needed. Yes JOSE ANGEL Mcbride CNP   clotrimazole (LOTRIMIN) 1 % cream Apply topically 3 times daily to lesions on skin. Yes CARIDAD Calvo   lidocaine (LIDODERM) 5 % Place 1 patch onto the skin daily 12 hours on, 12 hours off. Yes JOSE ANGEL Mcbride CNP   Spacer/Aero-Holding Chambers (E-Z SPACER) AMITA 1 Device by Does not apply route daily as needed (sob) Yes CARIDAD Herndon   acetaminophen (TYLENOL) 500 MG tablet Take 2 tablets by mouth every 6 hours as needed for Pain Do not take with other medications containing acetaminophen. Yes CARIDAD Calvo   Handicap Placard MISC by Does not apply route Handicap Placard for 5 years Yes JOSE ANGEL Mcbride CNP        Social History     Tobacco Use    Smoking status: Former Smoker     Packs/day: 1.00     Years: 37.00     Pack years: 37.00     Types: Cigarettes     Last attempt to quit: 2017     Years since quittin.4    Smokeless tobacco: Never Used   Substance Use Topics    Alcohol use: No     Alcohol/week: 0.0 oz        Vitals:    19 1540   BP: 119/65   Site: Right Upper Arm   Position: Sitting   Cuff Size: Small Adult   Pulse: 73   Resp: 18   SpO2: 95%   Weight: 154 lb (69.9 kg)   Height: 5' 1.5\" (1.562 m)     Estimated body mass index is 28.63 kg/m² as calculated from the following:    Height as of this encounter: 5' 1.5\" (1.562 m). Weight as of this encounter: 154 lb (69.9 kg). Physical Exam   Constitutional: She is oriented to person, place, and time. She appears well-developed and well-nourished. HENT:   Head: Normocephalic. Eyes: Conjunctivae, EOM and lids are normal.   Neck: Normal range of motion. Neck supple. Cardiovascular: Normal rate, regular rhythm, normal heart sounds and normal pulses.    Pulmonary/Chest: Effort normal and breath sounds normal.   Abdominal: Soft. Bowel sounds are normal. There is no hepatosplenomegaly. There is tenderness in the epigastric area. Musculoskeletal: Normal range of motion. FROM X 4 EXT     Neurological: She is alert and oriented to person, place, and time. Skin: Skin is warm and dry. There is erythema. Psychiatric: She has a normal mood and affect. Her speech is normal.       ASSESSMENT/PLAN:      1. Essential hypertension  Stable; continue medication and current dose as prescribed  - metoprolol succinate (TOPROL XL) 25 MG extended release tablet; Take 0.5 tablets by mouth daily  Dispense: 30 tablet; Refill: 1  - aspirin 81 MG chewable tablet; CHEW ONE TABLET BY MOUTH DAILY  Dispense: 90 tablet; Refill: 1    2. Anxiety  Stable; continue medication and current dose as prescribed    3. Gastroesophageal reflux disease without esophagitis  -Uncontrolled; patient to continue with Protonix and Zofran only as needed for nausea. -will follow up with GI to see if patient needs any further evaluation or treatment. 4. COPD, severe ([address]  continue medication and current dose as prescribed  - budesonide-formoterol (SYMBICORT) 160-4.5 MCG/ACT AERO; Inhale 2 puffs into the lungs 2 times daily  Dispense: 1 Inhaler; Refill: 2  - albuterol sulfate HFA (PROVENTIL HFA) 108 (90 Base) MCG/ACT inhaler; Inhale 4-6 puffs into the lungs every 4 hours as needed for Wheezing or Shortness of Breath  Dispense: 1 Inhaler; Refill: 2    5. Vitamin D deficiency  Uncontrolled; continue medication and current dose as prescribed  - ergocalciferol (ERGOCALCIFEROL) 37268 units capsule; Take 1 capsule by mouth once a week  Dispense: 4 capsule; Refill: 3        Return in about 3 months (around 9/7/2019) for f/u with Flakito Stewart CNP extended visit, HTN, GERD, cholesterol and labs. An electronic signature was used to authenticate this note.     --JOSE ANGEL Regan - CNP on 6/7/2019 at 4:45 PM

## 2019-06-07 NOTE — PATIENT INSTRUCTIONS
8 ounces of milk, 1 cup of yogurt, or 1 ½ ounces of cheese. · Eat 6 to 8 servings of grains each day. A serving is 1 slice of bread, 1 ounce of dry cereal, or ½ cup of cooked rice, pasta, or cooked cereal. Try to choose whole-grain products as much as possible. · Limit lean meat, poultry, and fish to 2 servings each day. A serving is 3 ounces, about the size of a deck of cards. · Eat 4 to 5 servings of nuts, seeds, and legumes (cooked dried beans, lentils, and split peas) each week. A serving is 1/3 cup of nuts, 2 tablespoons of seeds, or ½ cup of cooked beans or peas. · Limit fats and oils to 2 to 3 servings each day. A serving is 1 teaspoon of vegetable oil or 2 tablespoons of salad dressing. · Limit sweets and added sugars to 5 servings or less a week. A serving is 1 tablespoon jelly or jam, ½ cup sorbet, or 1 cup of lemonade. · Eat less than 2,300 milligrams (mg) of sodium a day. If you limit your sodium to 1,500 mg a day, you can lower your blood pressure even more. Tips for success  · Start small. Do not try to make dramatic changes to your diet all at once. You might feel that you are missing out on your favorite foods and then be more likely to not follow the plan. Make small changes, and stick with them. Once those changes become habit, add a few more changes. · Try some of the following:  ? Make it a goal to eat a fruit or vegetable at every meal and at snacks. This will make it easy to get the recommended amount of fruits and vegetables each day. ? Try yogurt topped with fruit and nuts for a snack or healthy dessert. ? Add lettuce, tomato, cucumber, and onion to sandwiches. ? Combine a ready-made pizza crust with low-fat mozzarella cheese and lots of vegetable toppings. Try using tomatoes, squash, spinach, broccoli, carrots, cauliflower, and onions. ? Have a variety of cut-up vegetables with a low-fat dip as an appetizer instead of chips and dip. ?  Sprinkle sunflower seeds or chopped almonds over salads. Or try adding chopped walnuts or almonds to cooked vegetables. ? Try some vegetarian meals using beans and peas. Add garbanzo or kidney beans to salads. Make burritos and tacos with mashed cisneros beans or black beans. Where can you learn more? Go to https://chyungewsea.Mayomi. org and sign in to your Invest[employer] account. Enter X384 in the [employer] box to learn more about \"DASH Diet: Care Instructions. \"     If you do not have an account, please click on the \"Sign Up Now\" link. Current as of: July 22, 2018  Content Version: 12.0  © 0995-7792 [employer]. Care instructions adapted under license by [facility] ([facility]). If you have questions about a medical condition or this instruction, always ask your healthcare professional. [place] [address] any warranty or liability for your use of this information. Patient Education        Chronic Obstructive Pulmonary Disease (COPD) Flare-Ups: Care Instructions  Your Care Instructions    Chronic obstructive pulmonary disease (COPD) is a lung disease that makes it hard to breathe. It is caused by damage to the lungs over many years, usually from smoking. COPD is often a mix of two diseases:  · Chronic bronchitis: The airways that carry air to the lungs (bronchial tubes) get inflamed and make a lot of mucus. This can narrow or block the airways. · Emphysema: In a healthy person, the tiny air sacs in the lungs are like balloons. As you breathe in and out, they get bigger and smaller to move air through your lungs. But with emphysema, these air sacs are damaged and lose their stretch. Less air gets in and out of the lungs. Many people with COPD have attacks called flare-ups or exacerbations. This is when your usual symptoms quickly get worse and stay worse. The doctor has checked you carefully. But problems can develop later. If you notice any problems or new symptoms, get medical treatment right away.   Follow-up care is a key part of your treatment and safety. Be sure to make and go to all appointments, and call your doctor if you are having problems. It's also a good idea to know your test results and keep a list of the medicines you take. How can you care for yourself at home? · Be safe with medicines. Take your medicines exactly as prescribed. Call your doctor if you think you are having a problem with your medicine. You may be taking medicines such as:  ? Bronchodilators. These help open your airways and make breathing easier. ? Corticosteroids. These reduce airway inflammation. They may be given as pills, in a vein, or in an inhaled form. You may go home with pills in addition to an inhaler that you already use. · A spacer may help you get more inhaled medicine to your lungs. Ask your doctor or pharmacist if a spacer is right for you. If it is, ask how to use it properly. · If your doctor prescribed antibiotics, take them as directed. Do not stop taking them just because you feel better. You need to take the full course of antibiotics. · If your doctor prescribed oxygen, use the flow rate your doctor has recommended. Do not change it without talking to your doctor first.  · Do not smoke. Smoking makes COPD worse. If you need help quitting, talk to your doctor about stop-smoking programs and medicines. These can increase your chances of quitting for good. When should you call for help? Call 911 anytime you think you may need emergency care.  For example, call if:    · You have severe trouble breathing.    Call your doctor now or seek immediate medical care if:    · You have new or worse trouble breathing.     · Your coughing or wheezing gets worse.     · You cough up dark brown or bloody mucus (sputum).     · You have a new or higher fever.    Watch closely for changes in your health, and be sure to contact your doctor if:    · You notice more mucus or a change in the color of your mucus.     · You need to use your antibiotic or steroid pills.     · You do not get better as expected. Where can you learn more? Go to https://chpepiceweb.Sidewalk. org and sign in to your [employer] account. Enter N887 in the [employer] box to learn more about \"Chronic Obstructive Pulmonary Disease (COPD) Flare-Ups: Care Instructions. \"     If you do not have an account, please click on the \"Sign Up Now\" link. Current as of: September 5, 2018  Content Version: 12.0  © 1700-9544 [employer]. Care instructions adapted under license by [facility] ([facility]). If you have questions about a medical condition or this instruction, always ask your healthcare professional. [address] any warranty or liability for your use of this information.

## 2019-06-25 ENCOUNTER — OFFICE VISIT (OUTPATIENT)
Dept: ORTHOPEDIC SURGERY | Age: 59
End: 2019-06-25
Payer: COMMERCIAL

## 2019-06-25 VITALS
SYSTOLIC BLOOD PRESSURE: 112 MMHG | BODY MASS INDEX: 29.09 KG/M2 | HEIGHT: 61 IN | DIASTOLIC BLOOD PRESSURE: 80 MMHG | WEIGHT: 154.1 LBS | HEART RATE: 67 BPM

## 2019-06-25 DIAGNOSIS — M47.816 LUMBAR SPONDYLOSIS: Primary | ICD-10-CM

## 2019-06-25 PROCEDURE — 1036F TOBACCO NON-USER: CPT | Performed by: PHYSICIAN ASSISTANT

## 2019-06-25 PROCEDURE — G8419 CALC BMI OUT NRM PARAM NOF/U: HCPCS | Performed by: PHYSICIAN ASSISTANT

## 2019-06-25 PROCEDURE — G8598 ASA/ANTIPLAT THER USED: HCPCS | Performed by: PHYSICIAN ASSISTANT

## 2019-06-25 PROCEDURE — G8427 DOCREV CUR MEDS BY ELIG CLIN: HCPCS | Performed by: PHYSICIAN ASSISTANT

## 2019-06-25 PROCEDURE — 99213 OFFICE O/P EST LOW 20 MIN: CPT | Performed by: PHYSICIAN ASSISTANT

## 2019-06-25 PROCEDURE — 3017F COLORECTAL CA SCREEN DOC REV: CPT | Performed by: PHYSICIAN ASSISTANT

## 2019-06-25 NOTE — PROGRESS NOTES
History of present illness:   Ms. Debra Miller is a pleasant 60 y.o. female with a PMH significant for COPD, osteoporosis, HTN, and CAD, kindly referred by Moi Mcclellan CNP for her low back and bilateral leg pain. The pain originates in her low back and radiates into the lateral aspect of her thighs bilaterally to her knees. She notes intermittent numbness and tingling in a similar distribution. She notes occasional urinary urgency, but denies bowel or bladder dysfunction or saddle numbness. She has tried multiple NSAIDs. She has tried oral steroids and muscle relaxers. She rates her back pain 9/10 today. She has tried physical therapy previously without relief. She has been doing pool exercises on her own and is interested in aquatic therapy. Past history:  Her past medical, surgical and social history has been reviewed. Her  medications and allergies were reviewed. Review of symptoms:  Pertinent items are noted in HPI. Review of systems reviewed from Patient History Form dated on 8/27/18 and available in the patient's chart under the Media tab. Physical examination:  Ms. Laura Whiting's most recent vitals:  Vitals  BP: 112/80  Pulse: 67  Height: 5' 1.5\" (156.2 cm)  Weight: 154 lb 1.6 oz (69.9 kg)  Body mass index is 28.65 kg/m². She is well-developed and well-nourished, is in no obvious pain and alert and oriented to person, place, and time. She demonstrates appropriate mood and affect. Her skin is warm and dry. Her gait is normal and she walks without assistive devices. She stands with slight lumbar flexion. Her lumbar flexion, extension and lateral bending are moderately reduced with pain. She has mild tenderness over her lumbar spine without obvious muscle spasm. The skin over her lumbar spine is normal without a surgical scar. She has 5/5 motor strength of bilateral lower extremities. She has a negative straight leg raise, bilaterally. 1+ deep tendon reflexes at knees.  Sensation is intact to light touch L3 to S1 bilaterally. She has no clonus. Hip range of motion painless. Imaging:  I reviewed MRI images of the lumbar spine from 2/18/19 in the office today. There is moderate right foraminal/extraforaminal stenosis L5-S1 secondary to broad-based disc bulge. Assessment:  Lumbar spondylosis with radiculopathy    Plan:  We discussed treatment options including observation, additional physical therapy or aquatic therapy, and epidural injections. She wishes to try aquatic therapy. She was also given information for Linda exercises to try at home. She will consider seeing Dr. Osman Casey for evaluation for additional conservative care or possible epidural injection if her symptoms persist after that.      Merlin Mcdonough PA-C

## 2019-08-16 ENCOUNTER — TELEPHONE (OUTPATIENT)
Dept: INTERNAL MEDICINE CLINIC | Age: 59
End: 2019-08-16

## 2019-08-16 DIAGNOSIS — Z13.1 SCREENING FOR DIABETES MELLITUS: Primary | ICD-10-CM

## 2019-08-16 DIAGNOSIS — I10 ESSENTIAL HYPERTENSION: ICD-10-CM

## 2019-08-16 DIAGNOSIS — E78.00 PURE HYPERCHOLESTEROLEMIA: ICD-10-CM

## 2019-08-16 NOTE — TELEPHONE ENCOUNTER
Spoke to the scheduling line and states that the patient is on the line requesting orders for labs. Informed that the msg would be sent to the provider and the patient would be called once a response is received. Verbalized understanding.

## 2019-08-24 ENCOUNTER — APPOINTMENT (OUTPATIENT)
Dept: GENERAL RADIOLOGY | Age: 59
End: 2019-08-24
Payer: COMMERCIAL

## 2019-08-24 ENCOUNTER — HOSPITAL ENCOUNTER (OUTPATIENT)
Age: 59
Setting detail: OBSERVATION
Discharge: HOME | End: 2019-08-24
Attending: EMERGENCY MEDICINE | Admitting: INTERNAL MEDICINE
Payer: COMMERCIAL

## 2019-08-24 VITALS
RESPIRATION RATE: 16 BRPM | SYSTOLIC BLOOD PRESSURE: 125 MMHG | HEIGHT: 61 IN | OXYGEN SATURATION: 97 % | HEART RATE: 59 BPM | TEMPERATURE: 98.2 F | DIASTOLIC BLOOD PRESSURE: 64 MMHG | WEIGHT: 151.9 LBS | BODY MASS INDEX: 28.68 KG/M2

## 2019-08-24 DIAGNOSIS — R07.9 CHEST PAIN, UNSPECIFIED TYPE: Primary | ICD-10-CM

## 2019-08-24 DIAGNOSIS — K21.00 GASTROESOPHAGEAL REFLUX DISEASE WITH ESOPHAGITIS: ICD-10-CM

## 2019-08-24 LAB
A/G RATIO: 1.8 (ref 1.1–2.2)
ALBUMIN SERPL-MCNC: 4.3 G/DL (ref 3.4–5)
ALP BLD-CCNC: 115 U/L (ref 40–129)
ALT SERPL-CCNC: 13 U/L (ref 10–40)
ANION GAP SERPL CALCULATED.3IONS-SCNC: 14 MMOL/L (ref 3–16)
AST SERPL-CCNC: 14 U/L (ref 15–37)
BASOPHILS ABSOLUTE: 0 K/UL (ref 0–0.2)
BASOPHILS RELATIVE PERCENT: 0.6 %
BILIRUB SERPL-MCNC: 0.5 MG/DL (ref 0–1)
BUN BLDV-MCNC: 7 MG/DL (ref 7–20)
CALCIUM SERPL-MCNC: 9.4 MG/DL (ref 8.3–10.6)
CHLORIDE BLD-SCNC: 103 MMOL/L (ref 99–110)
CO2: 24 MMOL/L (ref 21–32)
CREAT SERPL-MCNC: 0.6 MG/DL (ref 0.6–1.1)
EOSINOPHILS ABSOLUTE: 0.3 K/UL (ref 0–0.6)
EOSINOPHILS RELATIVE PERCENT: 3.8 %
GFR AFRICAN AMERICAN: >60
GFR NON-AFRICAN AMERICAN: >60
GLOBULIN: 2.4 G/DL
GLUCOSE BLD-MCNC: 188 MG/DL (ref 70–99)
HCT VFR BLD CALC: 40.1 % (ref 36–48)
HEMOGLOBIN: 13.5 G/DL (ref 12–16)
LIPASE: 15 U/L (ref 13–60)
LYMPHOCYTES ABSOLUTE: 1.5 K/UL (ref 1–5.1)
LYMPHOCYTES RELATIVE PERCENT: 18.2 %
MCH RBC QN AUTO: 32.1 PG (ref 26–34)
MCHC RBC AUTO-ENTMCNC: 33.7 G/DL (ref 31–36)
MCV RBC AUTO: 95.2 FL (ref 80–100)
MONOCYTES ABSOLUTE: 0.4 K/UL (ref 0–1.3)
MONOCYTES RELATIVE PERCENT: 4.9 %
NEUTROPHILS ABSOLUTE: 5.8 K/UL (ref 1.7–7.7)
NEUTROPHILS RELATIVE PERCENT: 72.5 %
PDW BLD-RTO: 14.5 % (ref 12.4–15.4)
PLATELET # BLD: 193 K/UL (ref 135–450)
PMV BLD AUTO: 8.1 FL (ref 5–10.5)
POTASSIUM SERPL-SCNC: 3.6 MMOL/L (ref 3.5–5.1)
PRO-BNP: 226 PG/ML (ref 0–124)
RBC # BLD: 4.21 M/UL (ref 4–5.2)
SODIUM BLD-SCNC: 141 MMOL/L (ref 136–145)
TOTAL PROTEIN: 6.7 G/DL (ref 6.4–8.2)
TROPONIN: <0.01 NG/ML
TROPONIN: <0.01 NG/ML
WBC # BLD: 8 K/UL (ref 4–11)

## 2019-08-24 PROCEDURE — 80053 COMPREHEN METABOLIC PANEL: CPT

## 2019-08-24 PROCEDURE — 6370000000 HC RX 637 (ALT 250 FOR IP): Performed by: PHYSICIAN ASSISTANT

## 2019-08-24 PROCEDURE — 93005 ELECTROCARDIOGRAM TRACING: CPT | Performed by: PHYSICIAN ASSISTANT

## 2019-08-24 PROCEDURE — 71046 X-RAY EXAM CHEST 2 VIEWS: CPT

## 2019-08-24 PROCEDURE — 36415 COLL VENOUS BLD VENIPUNCTURE: CPT

## 2019-08-24 PROCEDURE — G0378 HOSPITAL OBSERVATION PER HR: HCPCS

## 2019-08-24 PROCEDURE — 85025 COMPLETE CBC W/AUTO DIFF WBC: CPT

## 2019-08-24 PROCEDURE — 99285 EMERGENCY DEPT VISIT HI MDM: CPT

## 2019-08-24 PROCEDURE — 83690 ASSAY OF LIPASE: CPT

## 2019-08-24 PROCEDURE — 84484 ASSAY OF TROPONIN QUANT: CPT

## 2019-08-24 PROCEDURE — 83880 ASSAY OF NATRIURETIC PEPTIDE: CPT

## 2019-08-24 RX ORDER — METOPROLOL SUCCINATE 25 MG/1
12.5 TABLET, EXTENDED RELEASE ORAL DAILY
Status: DISCONTINUED | OUTPATIENT
Start: 2019-08-25 | End: 2019-08-24 | Stop reason: HOSPADM

## 2019-08-24 RX ORDER — SODIUM CHLORIDE 0.9 % (FLUSH) 0.9 %
10 SYRINGE (ML) INJECTION EVERY 12 HOURS SCHEDULED
Status: DISCONTINUED | OUTPATIENT
Start: 2019-08-24 | End: 2019-08-24 | Stop reason: HOSPADM

## 2019-08-24 RX ORDER — PANTOPRAZOLE SODIUM 40 MG/1
40 TABLET, DELAYED RELEASE ORAL DAILY
Status: DISCONTINUED | OUTPATIENT
Start: 2019-08-24 | End: 2019-08-24 | Stop reason: SDUPTHER

## 2019-08-24 RX ORDER — ONDANSETRON 2 MG/ML
4 INJECTION INTRAMUSCULAR; INTRAVENOUS EVERY 6 HOURS PRN
Status: DISCONTINUED | OUTPATIENT
Start: 2019-08-24 | End: 2019-08-24 | Stop reason: HOSPADM

## 2019-08-24 RX ORDER — PANTOPRAZOLE SODIUM 40 MG/1
40 TABLET, DELAYED RELEASE ORAL
Status: DISCONTINUED | OUTPATIENT
Start: 2019-08-25 | End: 2019-08-24 | Stop reason: HOSPADM

## 2019-08-24 RX ORDER — ALBUTEROL SULFATE 90 UG/1
2 AEROSOL, METERED RESPIRATORY (INHALATION) EVERY 4 HOURS PRN
Status: DISCONTINUED | OUTPATIENT
Start: 2019-08-24 | End: 2019-08-24 | Stop reason: HOSPADM

## 2019-08-24 RX ORDER — ATORVASTATIN CALCIUM 40 MG/1
40 TABLET, FILM COATED ORAL NIGHTLY
Status: DISCONTINUED | OUTPATIENT
Start: 2019-08-24 | End: 2019-08-24 | Stop reason: HOSPADM

## 2019-08-24 RX ORDER — ASPIRIN 81 MG/1
81 TABLET, CHEWABLE ORAL DAILY
Status: DISCONTINUED | OUTPATIENT
Start: 2019-08-25 | End: 2019-08-24 | Stop reason: HOSPADM

## 2019-08-24 RX ORDER — ASPIRIN 81 MG/1
324 TABLET, CHEWABLE ORAL ONCE
Status: COMPLETED | OUTPATIENT
Start: 2019-08-24 | End: 2019-08-24

## 2019-08-24 RX ORDER — SODIUM CHLORIDE 0.9 % (FLUSH) 0.9 %
10 SYRINGE (ML) INJECTION PRN
Status: DISCONTINUED | OUTPATIENT
Start: 2019-08-24 | End: 2019-08-24 | Stop reason: HOSPADM

## 2019-08-24 RX ORDER — PREGABALIN 75 MG/1
75 CAPSULE ORAL 2 TIMES DAILY
COMMUNITY
End: 2019-08-28 | Stop reason: SDUPTHER

## 2019-08-24 RX ORDER — LIDOCAINE 4 G/G
1 PATCH TOPICAL DAILY
Status: DISCONTINUED | OUTPATIENT
Start: 2019-08-24 | End: 2019-08-24 | Stop reason: HOSPADM

## 2019-08-24 RX ORDER — PANTOPRAZOLE SODIUM 40 MG/1
40 TABLET, DELAYED RELEASE ORAL DAILY
Qty: 30 TABLET | Refills: 3 | Status: SHIPPED | OUTPATIENT
Start: 2019-08-24 | End: 2019-11-06 | Stop reason: SDUPTHER

## 2019-08-24 RX ORDER — PREGABALIN 75 MG/1
75 CAPSULE ORAL 2 TIMES DAILY
Status: DISCONTINUED | OUTPATIENT
Start: 2019-08-24 | End: 2019-08-24 | Stop reason: HOSPADM

## 2019-08-24 RX ORDER — SERTRALINE HYDROCHLORIDE 100 MG/1
100 TABLET, FILM COATED ORAL DAILY
Status: DISCONTINUED | OUTPATIENT
Start: 2019-08-25 | End: 2019-08-24 | Stop reason: HOSPADM

## 2019-08-24 RX ADMIN — LIDOCAINE HYDROCHLORIDE: 20 SOLUTION ORAL; TOPICAL at 10:13

## 2019-08-24 RX ADMIN — ASPIRIN 81 MG 324 MG: 81 TABLET ORAL at 10:13

## 2019-08-24 ASSESSMENT — ENCOUNTER SYMPTOMS
VOMITING: 0
BACK PAIN: 0
SHORTNESS OF BREATH: 0
NAUSEA: 0
COLOR CHANGE: 0
ABDOMINAL PAIN: 0

## 2019-08-24 ASSESSMENT — PAIN DESCRIPTION - ORIENTATION
ORIENTATION: UPPER
ORIENTATION: UPPER

## 2019-08-24 ASSESSMENT — PAIN DESCRIPTION - LOCATION
LOCATION: ABDOMEN
LOCATION: ABDOMEN

## 2019-08-24 ASSESSMENT — PAIN DESCRIPTION - PROGRESSION: CLINICAL_PROGRESSION: NOT CHANGED

## 2019-08-24 ASSESSMENT — PAIN DESCRIPTION - DESCRIPTORS
DESCRIPTORS: TIGHTNESS;PRESSURE
DESCRIPTORS: TIGHTNESS

## 2019-08-24 ASSESSMENT — PAIN DESCRIPTION - PAIN TYPE
TYPE: ACUTE PAIN
TYPE: ACUTE PAIN

## 2019-08-24 ASSESSMENT — PAIN SCALES - GENERAL
PAINLEVEL_OUTOF10: 4
PAINLEVEL_OUTOF10: 8
PAINLEVEL_OUTOF10: 6
PAINLEVEL_OUTOF10: 0

## 2019-08-24 ASSESSMENT — HEART SCORE: ECG: 1

## 2019-08-24 ASSESSMENT — PAIN - FUNCTIONAL ASSESSMENT: PAIN_FUNCTIONAL_ASSESSMENT: ACTIVITIES ARE NOT PREVENTED

## 2019-08-24 ASSESSMENT — PAIN DESCRIPTION - ONSET: ONSET: ON-GOING

## 2019-08-24 ASSESSMENT — PAIN DESCRIPTION - FREQUENCY: FREQUENCY: CONTINUOUS

## 2019-08-24 NOTE — PROGRESS NOTES
4 Eyes Skin Assessment     The patient is being assess for  Admission    I agree that 2 RN's have performed a thorough Head to Toe Skin Assessment on the patient. ALL assessment sites listed below have been assessed. Areas assessed by both nurses: yes  [x]   Head, Face, and Ears   [x]   Shoulders, Back, and Chest  [x]   Arms, Elbows, and Hands   [x]   Coccyx, Sacrum, and IschIum  [x]   Legs, Feet, and Heels        Does the Patient have Skin Breakdown?   No         Berhane Prevention initiated:  No   Wound Care Orders initiated:  No      [facility] nurse consulted for Pressure Injury (Stage 3,4, Unstageable, DTI, NWPT, and Complex wounds), New and Established Ostomies:  No      Nurse 1 eSignature: Electronically signed by Keli Alcala RN on 8/24/19 at 3:21 PM    **SHARE this note so that the co-signing nurse is able to place an eSignature**    Nurse 2 eSignature: Electronically signed by Tameka Alvarado RN on 8/24/19 at 3:23 PM

## 2019-08-24 NOTE — ED PROVIDER NOTES
well-developed and well-nourished. No distress. HENT:   Head: Normocephalic and atraumatic. Eyes: Pupils are equal, round, and reactive to light. Neck: Normal range of motion. Cardiovascular: Normal rate, regular rhythm and normal heart sounds. Pulmonary/Chest: Effort normal. No respiratory distress. Abdominal: Soft. There is tenderness in the epigastric area. There is no rigidity, no rebound and no guarding. Musculoskeletal: Normal range of motion. She exhibits no edema or tenderness. Neurological: She is alert and oriented to person, place, and time. No cranial nerve deficit. Skin: Skin is warm. Psychiatric: She has a normal mood and affect. Her behavior is normal.   Nursing note and vitals reviewed. DIAGNOSTIC RESULTS     EKG: All EKG's are interpreted by CARIDAD Brothers in the absence of a cardiologist.    EKG interpreted by myself - please refer to attending physician's note for complete EKG interpretation:    Rhythm: sinus rhythm   No evidence of acute ischemia or injury. RADIOLOGY:   Non-plain film images such as CT, Ultrasound and MRI are read by the radiologist. Plain radiographic images are visualized and preliminarily interpreted by CARIDAD Brothers with the below findings:    Reviewed radiologist dictation. Interpretation per the Radiologist below, if available at the time of this note:    XR CHEST STANDARD (2 VW)   Final Result   No acute findings.                LABS:  Labs Reviewed   COMPREHENSIVE METABOLIC PANEL - Abnormal; Notable for the following components:       Result Value    Glucose 188 (*)     AST 14 (*)     All other components within normal limits    Narrative:     Performed at:  [address] [address]   Phone [phone]   BRAIN NATRIURETIC PEPTIDE - Abnormal; Notable for the following components:    Pro- (*)     All other components within normal limits    Narrative:     Performed recognition program.  Efforts were made to edit the dictations but occasionally words are mis-transcribed.)    Kalpesh Duval [place]  08/24/19 2580

## 2019-08-24 NOTE — H&P
gallops, Peripheral pulses good, Cap refill <3 sec, no carotid bruits  Abdomen: soft, non-tender, non-distended, normal bowel sounds, no masses or organomegaly  Extremities: no cyanosis, clubbing or edema  Musculoskeletal: normal range of motion, no joint swelling, deformity or tenderness  Neurologic: reflexes normal and symmetric, no cranial nerve deficit, gait, coordination and speech normal      LABS:        EKG:  I have reviewed the EKG with the following interpretation: Sinus with no acute ST-T wave  CBC   Recent Labs     08/24/19  1011   WBC 8.0   HGB 13.5   HCT 40.1         RENAL  Recent Labs     08/24/19  1011      K 3.6      CO2 24   BUN 7   CREATININE 0.6     LFT'S  Recent Labs     08/24/19  1011   AST 14*   ALT 13   BILITOT 0.5   ALKPHOS 115     COAG  No results for input(s): INR in the last 72 hours. CARDIAC ENZYMES  Recent Labs     08/24/19  1011   TROPONINI <0.01       U/A:    Lab Results   Component Value Date    COLORU YELLOW 10/31/2018    WBCUA 3-5 10/31/2018    MUCUS Rare 10/31/2018    BACTERIA 1+ 10/31/2018    CLARITYU CLOUDY 10/31/2018    SPECGRAV 1.018 10/31/2018    LEUKOCYTESUR SMALL 10/31/2018    BLOODU Negative 10/31/2018    GLUCOSEU Negative 10/31/2018       ABG  No results found for: IJL3QET, BEART, K2PHJNPK, PHART, THGBART, GHO2MAE, PO2ART, FQN2QYM    UA:No results for input(s): NITRITE, COLORU, PHUR, LABCAST, WBCUA, RBCUA, MUCUS, TRICHOMONAS, YEAST, BACTERIA, CLARITYU, SPECGRAV, LEUKOCYTESUR, UROBILINOGEN, BILIRUBINUR, BLOODU, GLUCOSEU, KETUA, AMORPHOUS in the last 72 hours. Microbiology:  No results for input(s): LABGRAM, LABANAE, ORG, CXSURG in the last 72 hours. Nasal Culture: No results for input(s): ORG, MRSAPCR in the last 72 hours. Blood Culture: No results for input(s): BC, BLOODCULT2, ORG in the last 72 hours. Fungal Culture:   No results for input(s): FUNGSM in the last 72 hours. No results for input(s): FUNCXBLD in the last 72 hours. CSF Culture:   No results for input(s): COLORCSF, APPEARCSF, CFTUBE, CLOTCSF, WBCCSF, RBCCSF, NEUTCSF, NUMCELLSCSF, LYMPHSCSF, MONOCSF, GLUCCSF, VOLCSF in the last 72 hours. Respiratory Culture:  No results for input(s): Italo Slider in the last 72 hours. AFB:No results for input(s): AFBSMEAR in the last 72 hours. Urine Culture  No results for input(s): LABURIN in the last 72 hours. RADIOLOGY:    XR CHEST STANDARD (2 VW)   Final Result   No acute findings. NM Cardiac Stress Test Nuclear Imaging    (Results Pending)       Previous medical records personally reviewed and analyzed         PHYSICIAN CERTIFICATION    I certify that [facility] [address] is expected to be hospitalized for < 2  midnights based on the following assessment and plan:    ASSESSMENT/PLAN:  Active Hospital Problems    Diagnosis Date Noted    Chest pain [R07.9] 05/06/2018     Patient is a 62-year-old female with mild coronary artery disease, COPD, hypertension, hyperlipidemia who we were called to admit for chest pain for ACS rule out. Initially patient was admitted for it. Chart review showed that patient had a cardiac catheterization done on 5/2019 which showed very mild coronary artery disease with no obstruction. Patient also did see GI and had esophageal stricture with gastritis. Her EKG is negative and her first troponin is unremarkable. If a second troponin is negative patient should be able to go home and follow-up as an outpatient. I will prescribe her Protonix on discharge. DVT Prophylaxis: lovenox  Diet: DIET GENERAL; No Caffeine  Diet NPO, After Midnight  Code Status: Full Code      Dispo -DC home today second troponin is negative       Nallely Bernal MD  The note was completed using EMR. Every effort was made to ensure accuracy; however, inadvertent computerized transcription errors may be present. Thank you JOSE ANGEL Galarza - DEEPTI for the opportunity to be involved in this patient's care.  If you have any

## 2019-08-26 ENCOUNTER — TELEPHONE (OUTPATIENT)
Dept: INTERNAL MEDICINE CLINIC | Age: 59
End: 2019-08-26

## 2019-08-26 LAB
EKG ATRIAL RATE: 66 BPM
EKG DIAGNOSIS: NORMAL
EKG P AXIS: 42 DEGREES
EKG P-R INTERVAL: 114 MS
EKG Q-T INTERVAL: 422 MS
EKG QRS DURATION: 76 MS
EKG QTC CALCULATION (BAZETT): 442 MS
EKG R AXIS: 60 DEGREES
EKG T AXIS: 82 DEGREES
EKG VENTRICULAR RATE: 66 BPM

## 2019-08-26 PROCEDURE — 93010 ELECTROCARDIOGRAM REPORT: CPT | Performed by: INTERNAL MEDICINE

## 2019-08-28 ENCOUNTER — HOSPITAL ENCOUNTER (OUTPATIENT)
Dept: WOMENS IMAGING | Age: 59
Discharge: HOME | End: 2019-08-28
Payer: COMMERCIAL

## 2019-08-28 ENCOUNTER — OFFICE VISIT (OUTPATIENT)
Dept: INTERNAL MEDICINE CLINIC | Age: 59
End: 2019-08-28
Payer: COMMERCIAL

## 2019-08-28 VITALS
HEART RATE: 79 BPM | SYSTOLIC BLOOD PRESSURE: 107 MMHG | RESPIRATION RATE: 18 BRPM | DIASTOLIC BLOOD PRESSURE: 65 MMHG | OXYGEN SATURATION: 96 % | BODY MASS INDEX: 28.34 KG/M2 | WEIGHT: 150 LBS

## 2019-08-28 DIAGNOSIS — I10 ESSENTIAL HYPERTENSION: ICD-10-CM

## 2019-08-28 DIAGNOSIS — M79.7 FIBROMYALGIA: ICD-10-CM

## 2019-08-28 DIAGNOSIS — Z09 HOSPITAL DISCHARGE FOLLOW-UP: Primary | ICD-10-CM

## 2019-08-28 DIAGNOSIS — Z23 NEED FOR SHINGLES VACCINE: ICD-10-CM

## 2019-08-28 DIAGNOSIS — F41.9 ANXIETY: ICD-10-CM

## 2019-08-28 DIAGNOSIS — E78.00 PURE HYPERCHOLESTEROLEMIA: ICD-10-CM

## 2019-08-28 DIAGNOSIS — Z12.39 BREAST CANCER SCREENING: ICD-10-CM

## 2019-08-28 PROCEDURE — 99215 OFFICE O/P EST HI 40 MIN: CPT | Performed by: NURSE PRACTITIONER

## 2019-08-28 PROCEDURE — 1111F DSCHRG MED/CURRENT MED MERGE: CPT | Performed by: NURSE PRACTITIONER

## 2019-08-28 PROCEDURE — 77063 BREAST TOMOSYNTHESIS BI: CPT

## 2019-08-28 RX ORDER — SERTRALINE HYDROCHLORIDE 100 MG/1
100 TABLET, FILM COATED ORAL DAILY
Qty: 30 TABLET | Refills: 3 | Status: SHIPPED | OUTPATIENT
Start: 2019-08-28 | End: 2020-02-13 | Stop reason: SDUPTHER

## 2019-08-28 RX ORDER — ATORVASTATIN CALCIUM 40 MG/1
40 TABLET, FILM COATED ORAL NIGHTLY
Qty: 30 TABLET | Refills: 3 | Status: SHIPPED | OUTPATIENT
Start: 2019-08-28 | End: 2021-05-17

## 2019-08-28 RX ORDER — PREGABALIN 75 MG/1
75 CAPSULE ORAL 2 TIMES DAILY
Qty: 60 CAPSULE | Refills: 3 | Status: SHIPPED | OUTPATIENT
Start: 2019-08-28 | End: 2020-03-05 | Stop reason: SDUPTHER

## 2019-08-28 RX ORDER — METOPROLOL SUCCINATE 25 MG/1
12.5 TABLET, EXTENDED RELEASE ORAL DAILY
Qty: 30 TABLET | Refills: 3 | Status: SHIPPED | OUTPATIENT
Start: 2019-08-28 | End: 2020-04-27 | Stop reason: SDUPTHER

## 2019-08-28 NOTE — PROGRESS NOTES
Post-Discharge Transitional Care Management Services or Hospital Follow Up      Bisi Carina Whiting   YOB: 1960    Date of Office Visit:  8/28/2019  Date of Hospital Admission: 8/24/19  Date of Hospital Discharge: 8/24/19  Risk of hospital readmission (high >=14%. Medium >=10%) :Readmission Risk Score: 0    Patient reports having rash on mid back for about 1 week that itches. Care management risk score Rising risk (score 2-5) and Complex Care (Scores >=6): 9     Non face to face  following discharge, date last encounter closed (first attempt may have been earlier): 8/26/2019  2:49 PM    Call initiated 2 business days of discharge: Yes    Patient Active Problem List   Diagnosis    COPD, [facility]    Former smoker    Osteoporosis    Chronic back pain    Positive test for human papillomavirus (HPV)    Anxiety    Hyperlipidemia    Emotional instability ([facility]    Osteoarthritis of hip    Body mass index between 25-29, adult    Chest pain    NSTEMI (non-ST elevated myocardial infarction) ([facility]    Essential hypertension    Tachycardia    Vitamin D deficiency    Gastroesophageal reflux disease without esophagitis       Allergies   Allergen Reactions    Iodine Hives    Other Other (See Comments)     Flu vaccine  Felt like bones were on the outside of her body    Eggs Or Egg-Derived Products [Eggs Or Egg-Derived Products] Hives and Nausea And Vomiting    Eggs [Egg White] Nausea And Vomiting    Flexeril [Cyclobenzaprine Hcl] Rash    Levofloxacin Rash    Omeprazole Nausea And Vomiting and Other (See Comments)     Burns my stomach up          Medications listed as ordered at the time of discharge from hospital   Greta Whiting   Home Medication Instructions CORINNE:    Printed on:08/28/19 2280   Medication Information                      acetaminophen (TYLENOL) 500 MG tablet  Take 2 tablets by mouth every 6 hours as needed for Pain Do not take with other medications containing acetaminophen. tablet 3    pregabalin (LYRICA) 75 MG capsule Take 1 capsule by mouth 2 times daily for 125 days. 60 capsule 3    pantoprazole (PROTONIX) 40 MG tablet Take 1 tablet by mouth daily 30 tablet 3    budesonide-formoterol (SYMBICORT) 160-4.5 MCG/ACT AERO Inhale 2 puffs into the lungs 2 times daily 1 Inhaler 2    albuterol sulfate HFA (PROVENTIL HFA) 108 (90 Base) MCG/ACT inhaler Inhale 4-6 puffs into the lungs every 4 hours as needed for Wheezing or Shortness of Breath 1 Inhaler 2    ergocalciferol (ERGOCALCIFEROL) 41840 units capsule Take 1 capsule by mouth once a week 4 capsule 3    aspirin 81 MG chewable tablet CHEW ONE TABLET BY MOUTH DAILY 90 tablet 1    diphenhydrAMINE-APAP, sleep, (TYLENOL PM EXTRA STRENGTH)  MG tablet Take 1 tablet by mouth      hydrocortisone 2.5 % cream Apply topically 2 times daily. 28 g 1    mineral oil-hydrophilic petrolatum (AQUAPHOR) ointment Apply topically as needed. 396 g 0    lidocaine (LIDODERM) 5 % Place 1 patch onto the skin daily 12 hours on, 12 hours off. 10 patch 0    Spacer/Aero-Holding Chambers (E-Z SPACER) AMITA 1 Device by Does not apply route daily as needed (sob) 1 Device 0    acetaminophen (TYLENOL) 500 MG tablet Take 2 tablets by mouth every 6 hours as needed for Pain Do not take with other medications containing acetaminophen. 30 tablet 0    Handicap Placard MISC by Does not apply route Handicap Placard for 5 years 1 each 0        Medications patient taking as of now reconciled against medications ordered at time of hospital discharge: Yes    Chief Complaint   Patient presents with   [address] from Hospital       History of Present illness - Follow up of Hospital diagnosis(es): chest pain    Inpatient course: Discharge summary reviewed- see chart. Interval history/Current status: stable    A comprehensive review of systems was negative except for what was noted in the HPI.     Vitals:    08/28/19 1237   BP: 107/65   Site: Left Upper Arm   Position: Sitting   Cuff Size: Small Adult   Pulse: 79   Resp: 18   SpO2: 96%   Weight: 150 lb (68 kg)     Body mass index is 28.34 kg/m². Wt Readings from Last 3 Encounters:   08/28/19 150 lb (68 kg)   08/24/19 151 lb 14.4 oz (68.9 kg)   06/25/19 154 lb 1.6 oz (69.9 kg)     BP Readings from Last 3 Encounters:   08/28/19 107/65   08/24/19 125/64   06/25/19 112/80        Physical Exam:  General Appearance: alert and oriented to person, place and time, well developed and well- nourished, in no acute distress  Skin: warm and dry, no rash or erythema  Head: normocephalic and atraumatic  Eyes: pupils equal, round, and reactive to light, extraocular eye movements intact, conjunctivae normal  ENT: tympanic membrane, external ear and ear canal normal bilaterally, nose without deformity, nasal mucosa and turbinates normal without polyps  Neck: supple and non-tender without mass, no thyromegaly or thyroid nodules, no cervical lymphadenopathy  Pulmonary/Chest: clear to auscultation bilaterally- no wheezes, rales or rhonchi, normal air movement, no respiratory distress  Cardiovascular: normal rate, regular rhythm, normal S1 and S2, no murmurs, rubs, clicks, or gallops, distal pulses intact, no carotid bruits  Abdomen: soft, non-tender, non-distended, normal bowel sounds, no masses or organomegaly  Extremities: no cyanosis, clubbing or edema  Musculoskeletal: normal range of motion, no joint swelling, deformity or tenderness  Neurologic: reflexes normal and symmetric, no cranial nerve deficit, gait, coordination and speech normal    Assessment/Plan:    1. Hospital discharge follow-up      2. Essential hypertension    - metoprolol succinate (TOPROL XL) 25 MG extended release tablet; Take 0.5 tablets by mouth daily  Dispense: 30 tablet; Refill: 3    3. Pure hypercholesterolemia    - atorvastatin (LIPITOR) 40 MG tablet; Take 1 tablet by mouth nightly  Dispense: 30 tablet; Refill: 3    4. Anxiety    - sertraline (ZOLOFT) 100 MG tablet;  Take 1 tablet by mouth daily  Dispense: 30 tablet; Refill: 3    5. Fibromyalgia    - pregabalin (LYRICA) 75 MG capsule; Take 1 capsule by mouth 2 times daily for 125 days. Dispense: 60 capsule;  Refill: 3        Medical Decision Making: moderate complexity

## 2019-09-11 ENCOUNTER — TELEPHONE (OUTPATIENT)
Dept: INTERNAL MEDICINE CLINIC | Age: 59
End: 2019-09-11

## 2019-09-11 DIAGNOSIS — L24.9 IRRITANT CONTACT DERMATITIS, UNSPECIFIED TRIGGER: Primary | ICD-10-CM

## 2019-09-11 NOTE — TELEPHONE ENCOUNTER
Patient called and stated that she still has rash. Very itchy, it is starting to scab up. Still using the hydro cortisone. She was wondering if there was anything else you can do?

## 2019-09-12 RX ORDER — PREDNISONE 20 MG/1
40 TABLET ORAL DAILY
Qty: 10 TABLET | Refills: 0 | Status: SHIPPED | OUTPATIENT
Start: 2019-09-12 | End: 2019-09-17

## 2019-10-09 ENCOUNTER — OFFICE VISIT (OUTPATIENT)
Dept: INTERNAL MEDICINE CLINIC | Age: 59
End: 2019-10-09
Payer: COMMERCIAL

## 2019-10-09 VITALS
DIASTOLIC BLOOD PRESSURE: 80 MMHG | SYSTOLIC BLOOD PRESSURE: 136 MMHG | HEART RATE: 67 BPM | OXYGEN SATURATION: 97 % | RESPIRATION RATE: 18 BRPM | WEIGHT: 152 LBS | BODY MASS INDEX: 28.72 KG/M2

## 2019-10-09 DIAGNOSIS — L24.9 IRRITANT CONTACT DERMATITIS, UNSPECIFIED TRIGGER: Primary | ICD-10-CM

## 2019-10-09 PROCEDURE — 1036F TOBACCO NON-USER: CPT | Performed by: NURSE PRACTITIONER

## 2019-10-09 PROCEDURE — G8427 DOCREV CUR MEDS BY ELIG CLIN: HCPCS | Performed by: NURSE PRACTITIONER

## 2019-10-09 PROCEDURE — 3017F COLORECTAL CA SCREEN DOC REV: CPT | Performed by: NURSE PRACTITIONER

## 2019-10-09 PROCEDURE — G8419 CALC BMI OUT NRM PARAM NOF/U: HCPCS | Performed by: NURSE PRACTITIONER

## 2019-10-09 PROCEDURE — G8484 FLU IMMUNIZE NO ADMIN: HCPCS | Performed by: NURSE PRACTITIONER

## 2019-10-09 PROCEDURE — G8598 ASA/ANTIPLAT THER USED: HCPCS | Performed by: NURSE PRACTITIONER

## 2019-10-09 PROCEDURE — 99213 OFFICE O/P EST LOW 20 MIN: CPT | Performed by: NURSE PRACTITIONER

## 2019-10-09 RX ORDER — PREDNISONE 20 MG/1
40 TABLET ORAL DAILY
Qty: 10 TABLET | Refills: 0 | Status: SHIPPED | OUTPATIENT
Start: 2019-10-09 | End: 2019-10-14

## 2019-10-09 RX ORDER — TRIAMCINOLONE ACETONIDE 1 MG/G
CREAM TOPICAL
Qty: 45 G | Refills: 0 | Status: SHIPPED | OUTPATIENT
Start: 2019-10-09 | End: 2021-12-08

## 2019-10-16 ASSESSMENT — ENCOUNTER SYMPTOMS: COUGH: 0

## 2019-10-24 ENCOUNTER — TELEPHONE (OUTPATIENT)
Dept: INTERNAL MEDICINE CLINIC | Age: 59
End: 2019-10-24

## 2019-10-25 DIAGNOSIS — E78.00 PURE HYPERCHOLESTEROLEMIA: ICD-10-CM

## 2019-10-25 DIAGNOSIS — I10 ESSENTIAL HYPERTENSION: ICD-10-CM

## 2019-10-25 DIAGNOSIS — Z13.1 SCREENING FOR DIABETES MELLITUS: ICD-10-CM

## 2019-10-25 LAB
A/G RATIO: 1.9 (ref 1.1–2.2)
ALBUMIN SERPL-MCNC: 4.7 G/DL (ref 3.4–5)
ALP BLD-CCNC: 134 U/L (ref 40–129)
ALT SERPL-CCNC: 12 U/L (ref 10–40)
ANION GAP SERPL CALCULATED.3IONS-SCNC: 14 MMOL/L (ref 3–16)
AST SERPL-CCNC: 12 U/L (ref 15–37)
BILIRUB SERPL-MCNC: 0.5 MG/DL (ref 0–1)
BUN BLDV-MCNC: 10 MG/DL (ref 7–20)
CALCIUM SERPL-MCNC: 9.5 MG/DL (ref 8.3–10.6)
CHLORIDE BLD-SCNC: 98 MMOL/L (ref 99–110)
CHOLESTEROL, FASTING: 178 MG/DL (ref 0–199)
CO2: 28 MMOL/L (ref 21–32)
CREAT SERPL-MCNC: 0.6 MG/DL (ref 0.6–1.2)
ESTIMATED AVERAGE GLUCOSE: 139.9 MG/DL
GFR AFRICAN AMERICAN: >60
GFR NON-AFRICAN AMERICAN: >60
GLOBULIN: 2.5 G/DL
GLUCOSE FASTING: 138 MG/DL (ref 70–99)
HBA1C MFR BLD: 6.5 %
HDLC SERPL-MCNC: 43 MG/DL (ref 40–60)
LDL CHOLESTEROL CALCULATED: 99 MG/DL
POTASSIUM SERPL-SCNC: 4.6 MMOL/L (ref 3.5–5.1)
SODIUM BLD-SCNC: 140 MMOL/L (ref 136–145)
TOTAL PROTEIN: 7.2 G/DL (ref 6.4–8.2)
TRIGLYCERIDE, FASTING: 179 MG/DL (ref 0–150)
VLDLC SERPL CALC-MCNC: 36 MG/DL

## 2019-11-02 ENCOUNTER — HOSPITAL ENCOUNTER (EMERGENCY)
Age: 59
Discharge: HOME | End: 2019-11-02
Attending: EMERGENCY MEDICINE
Payer: COMMERCIAL

## 2019-11-02 VITALS
WEIGHT: 152.56 LBS | OXYGEN SATURATION: 95 % | TEMPERATURE: 97.8 F | SYSTOLIC BLOOD PRESSURE: 136 MMHG | HEART RATE: 66 BPM | RESPIRATION RATE: 16 BRPM | DIASTOLIC BLOOD PRESSURE: 93 MMHG | BODY MASS INDEX: 28.8 KG/M2 | HEIGHT: 61 IN

## 2019-11-02 DIAGNOSIS — S05.00XA CORNEAL ABRASION, UNSPECIFIED LATERALITY, INITIAL ENCOUNTER: Primary | ICD-10-CM

## 2019-11-02 PROCEDURE — 99283 EMERGENCY DEPT VISIT LOW MDM: CPT

## 2019-11-02 PROCEDURE — 6370000000 HC RX 637 (ALT 250 FOR IP): Performed by: EMERGENCY MEDICINE

## 2019-11-02 RX ORDER — POLYMYXIN B SULFATE AND TRIMETHOPRIM 1; 10000 MG/ML; [USP'U]/ML
1 SOLUTION OPHTHALMIC EVERY 4 HOURS
Qty: 1 BOTTLE | Refills: 0 | Status: SHIPPED | OUTPATIENT
Start: 2019-11-02 | End: 2019-11-12

## 2019-11-02 RX ORDER — POLYMYXIN B SULFATE AND TRIMETHOPRIM 1; 10000 MG/ML; [USP'U]/ML
1 SOLUTION OPHTHALMIC ONCE
Status: COMPLETED | OUTPATIENT
Start: 2019-11-02 | End: 2019-11-02

## 2019-11-02 RX ADMIN — Medication 3 ML: at 03:55

## 2019-11-02 RX ADMIN — POLYMYXIN B SULFATE AND TRIMETHOPRIM 1 DROP: 1; 10000 SOLUTION OPHTHALMIC at 04:41

## 2019-11-02 RX ADMIN — FLUORESCEIN SODIUM 1 MG: 1 STRIP OPHTHALMIC at 03:55

## 2019-11-02 ASSESSMENT — PAIN DESCRIPTION - PROGRESSION
CLINICAL_PROGRESSION: GRADUALLY IMPROVING
CLINICAL_PROGRESSION: GRADUALLY WORSENING
CLINICAL_PROGRESSION: GRADUALLY IMPROVING

## 2019-11-02 ASSESSMENT — PAIN DESCRIPTION - FREQUENCY
FREQUENCY: CONTINUOUS

## 2019-11-02 ASSESSMENT — ENCOUNTER SYMPTOMS
CHEST TIGHTNESS: 0
CHOKING: 0
EYE ITCHING: 0
STRIDOR: 0
SHORTNESS OF BREATH: 0
EYE DISCHARGE: 0
EYE REDNESS: 0
WHEEZING: 0
PHOTOPHOBIA: 0
EYE PAIN: 1
APNEA: 0
COUGH: 0
ABDOMINAL DISTENTION: 0

## 2019-11-02 ASSESSMENT — PAIN DESCRIPTION - ONSET
ONSET: ON-GOING

## 2019-11-02 ASSESSMENT — PAIN DESCRIPTION - ORIENTATION
ORIENTATION: LEFT

## 2019-11-02 ASSESSMENT — PAIN - FUNCTIONAL ASSESSMENT
PAIN_FUNCTIONAL_ASSESSMENT: PREVENTS OR INTERFERES SOME ACTIVE ACTIVITIES AND ADLS
PAIN_FUNCTIONAL_ASSESSMENT: 0-10
PAIN_FUNCTIONAL_ASSESSMENT: PREVENTS OR INTERFERES SOME ACTIVE ACTIVITIES AND ADLS
PAIN_FUNCTIONAL_ASSESSMENT: PREVENTS OR INTERFERES SOME ACTIVE ACTIVITIES AND ADLS

## 2019-11-02 ASSESSMENT — PAIN SCALES - GENERAL
PAINLEVEL_OUTOF10: 6
PAINLEVEL_OUTOF10: 7
PAINLEVEL_OUTOF10: 3

## 2019-11-02 ASSESSMENT — PAIN DESCRIPTION - PAIN TYPE
TYPE: ACUTE PAIN

## 2019-11-02 ASSESSMENT — PAIN DESCRIPTION - LOCATION
LOCATION: EYE

## 2019-11-02 ASSESSMENT — PAIN DESCRIPTION - DESCRIPTORS
DESCRIPTORS: DISCOMFORT
DESCRIPTORS: BURNING
DESCRIPTORS: DISCOMFORT

## 2019-11-06 ENCOUNTER — OFFICE VISIT (OUTPATIENT)
Dept: INTERNAL MEDICINE CLINIC | Age: 59
End: 2019-11-06
Payer: COMMERCIAL

## 2019-11-06 VITALS
HEART RATE: 74 BPM | SYSTOLIC BLOOD PRESSURE: 120 MMHG | OXYGEN SATURATION: 93 % | BODY MASS INDEX: 28.91 KG/M2 | RESPIRATION RATE: 18 BRPM | WEIGHT: 153 LBS | DIASTOLIC BLOOD PRESSURE: 70 MMHG

## 2019-11-06 DIAGNOSIS — E11.9 TYPE 2 DIABETES MELLITUS WITHOUT COMPLICATION, WITHOUT LONG-TERM CURRENT USE OF INSULIN (HCC): Primary | ICD-10-CM

## 2019-11-06 DIAGNOSIS — K21.00 GASTROESOPHAGEAL REFLUX DISEASE WITH ESOPHAGITIS: ICD-10-CM

## 2019-11-06 PROCEDURE — G8419 CALC BMI OUT NRM PARAM NOF/U: HCPCS | Performed by: NURSE PRACTITIONER

## 2019-11-06 PROCEDURE — G8427 DOCREV CUR MEDS BY ELIG CLIN: HCPCS | Performed by: NURSE PRACTITIONER

## 2019-11-06 PROCEDURE — 3044F HG A1C LEVEL LT 7.0%: CPT | Performed by: NURSE PRACTITIONER

## 2019-11-06 PROCEDURE — G8598 ASA/ANTIPLAT THER USED: HCPCS | Performed by: NURSE PRACTITIONER

## 2019-11-06 PROCEDURE — 3017F COLORECTAL CA SCREEN DOC REV: CPT | Performed by: NURSE PRACTITIONER

## 2019-11-06 PROCEDURE — 1036F TOBACCO NON-USER: CPT | Performed by: NURSE PRACTITIONER

## 2019-11-06 PROCEDURE — G8484 FLU IMMUNIZE NO ADMIN: HCPCS | Performed by: NURSE PRACTITIONER

## 2019-11-06 PROCEDURE — 99214 OFFICE O/P EST MOD 30 MIN: CPT | Performed by: NURSE PRACTITIONER

## 2019-11-06 PROCEDURE — 2022F DILAT RTA XM EVC RTNOPTHY: CPT | Performed by: NURSE PRACTITIONER

## 2019-11-06 RX ORDER — PANTOPRAZOLE SODIUM 40 MG/1
40 TABLET, DELAYED RELEASE ORAL DAILY
Qty: 30 TABLET | Refills: 3 | Status: SHIPPED | OUTPATIENT
Start: 2019-11-06 | End: 2020-06-19

## 2019-11-07 ENCOUNTER — SCHEDULED TELEPHONE ENCOUNTER (OUTPATIENT)
Dept: PHARMACY | Age: 59
End: 2019-11-07

## 2019-11-08 DIAGNOSIS — E11.9 TYPE 2 DIABETES MELLITUS WITHOUT COMPLICATION, WITHOUT LONG-TERM CURRENT USE OF INSULIN (HCC): ICD-10-CM

## 2019-11-11 ASSESSMENT — ENCOUNTER SYMPTOMS
NAUSEA: 0
DIARRHEA: 0
COUGH: 0
RHINORRHEA: 0
VOMITING: 0
BACK PAIN: 0
SORE THROAT: 0

## 2019-11-19 ENCOUNTER — OFFICE VISIT (OUTPATIENT)
Dept: PHARMACY | Age: 59
End: 2019-11-19
Payer: COMMERCIAL

## 2019-11-19 VITALS
WEIGHT: 153 LBS | OXYGEN SATURATION: 96 % | BODY MASS INDEX: 28.91 KG/M2 | HEART RATE: 65 BPM | SYSTOLIC BLOOD PRESSURE: 121 MMHG | DIASTOLIC BLOOD PRESSURE: 83 MMHG

## 2019-11-19 DIAGNOSIS — E11.9 TYPE 2 DIABETES MELLITUS WITHOUT COMPLICATION, WITHOUT LONG-TERM CURRENT USE OF INSULIN (HCC): Primary | ICD-10-CM

## 2019-11-19 PROCEDURE — 99214 OFFICE O/P EST MOD 30 MIN: CPT

## 2019-12-04 ENCOUNTER — OFFICE VISIT (OUTPATIENT)
Dept: INTERNAL MEDICINE CLINIC | Age: 59
End: 2019-12-04
Payer: COMMERCIAL

## 2019-12-04 VITALS
HEART RATE: 67 BPM | OXYGEN SATURATION: 95 % | DIASTOLIC BLOOD PRESSURE: 63 MMHG | SYSTOLIC BLOOD PRESSURE: 116 MMHG | HEIGHT: 62 IN | RESPIRATION RATE: 18 BRPM | BODY MASS INDEX: 27.97 KG/M2 | WEIGHT: 152 LBS

## 2019-12-04 DIAGNOSIS — E11.9 TYPE 2 DIABETES MELLITUS WITHOUT COMPLICATION, WITHOUT LONG-TERM CURRENT USE OF INSULIN (HCC): ICD-10-CM

## 2019-12-04 DIAGNOSIS — Z01.818 PRE-OP EXAMINATION: Primary | ICD-10-CM

## 2019-12-04 DIAGNOSIS — Z87.891 PERSONAL HISTORY OF TOBACCO USE: ICD-10-CM

## 2019-12-04 PROCEDURE — G8427 DOCREV CUR MEDS BY ELIG CLIN: HCPCS | Performed by: NURSE PRACTITIONER

## 2019-12-04 PROCEDURE — 2022F DILAT RTA XM EVC RTNOPTHY: CPT | Performed by: NURSE PRACTITIONER

## 2019-12-04 PROCEDURE — G0296 VISIT TO DETERM LDCT ELIG: HCPCS | Performed by: NURSE PRACTITIONER

## 2019-12-04 PROCEDURE — G8484 FLU IMMUNIZE NO ADMIN: HCPCS | Performed by: NURSE PRACTITIONER

## 2019-12-04 PROCEDURE — 99243 OFF/OP CNSLTJ NEW/EST LOW 30: CPT | Performed by: NURSE PRACTITIONER

## 2019-12-04 PROCEDURE — G8419 CALC BMI OUT NRM PARAM NOF/U: HCPCS | Performed by: NURSE PRACTITIONER

## 2019-12-05 DIAGNOSIS — E11.9 TYPE 2 DIABETES MELLITUS WITHOUT COMPLICATION, WITHOUT LONG-TERM CURRENT USE OF INSULIN (HCC): ICD-10-CM

## 2019-12-05 LAB
CREATININE URINE: 150.3 MG/DL (ref 28–259)
MICROALBUMIN UR-MCNC: <1.2 MG/DL
MICROALBUMIN/CREAT UR-RTO: NORMAL MG/G (ref 0–30)

## 2020-01-08 ENCOUNTER — TELEPHONE (OUTPATIENT)
Dept: PHARMACY | Age: 60
End: 2020-01-08

## 2020-01-08 ENCOUNTER — OFFICE VISIT (OUTPATIENT)
Dept: PHARMACY | Age: 60
End: 2020-01-08
Payer: COMMERCIAL

## 2020-01-08 VITALS
SYSTOLIC BLOOD PRESSURE: 131 MMHG | HEART RATE: 67 BPM | DIASTOLIC BLOOD PRESSURE: 75 MMHG | BODY MASS INDEX: 28.57 KG/M2 | WEIGHT: 153.7 LBS | OXYGEN SATURATION: 96 %

## 2020-01-08 PROCEDURE — 99213 OFFICE O/P EST LOW 20 MIN: CPT

## 2020-01-08 NOTE — PATIENT INSTRUCTIONS
1. Work to get set up with the [employer] do start regular activity. I think that is a great idea. You can always walk your sister's dog for exercise as well. 2. I work to decrease your amount of pop to one glass per day and eventually cut this out or decrease to maybe 1-2 times per week. Continue to drink teac and crystal light. These are great choices.    3. I would continue to skip your metformin if your blood glucose is less than 135.   4. Don't over do it with the potatoes

## 2020-01-08 NOTE — PROGRESS NOTES
[facility]  Pharmacy  Diabetes Service    [address]  Phone: [phone]  Fax: [phone]    NAME: Rogelio Jones RECORD NUMBER:  [id number]  AGE: 60 y.o. GENDER: female  : 1960  EPISODE DATE:  2020       Ms. Whiting was referred to the [facility] by  Keri Gómez CNP / Dr. Vale Noland. Special instructions per referral include: patient education    Goals per referral:   Fasting blood glucose: <   Peak postprandial glucose: <   A1C: < 6.5    If goals are not included on the referral, ADA guidelines will be used. Subjective   Ms. Whiting is a 60 y.o. female here for the Diabetes Service for self-management education, medication review including over the counter medications and herbal products, overall wellbeing assessment, transition of care and any needed adjustments with updates and recommendations communicated to the referring physician. Patient's name and  verified. Patient findings such as missed doses, medication changes, diet changes, activity changes, recent hospitalization or emergency room visit:  No, She does hold her metformin for BG < 135. If her BG is below this and she takes it she will get dizzy and feel like she is going to pass out. Symptoms of hypoglycemia such as shakiness, lightheadedness, dizziness, confusion, or sweating: dizziness if she takes metformin if her BG is less than 135        Symptoms of hyperglycemia such as frequent urination, increased thirst, or increased hunger: No     Medication adverse reactions such as GI symptoms, hypertension, increased edema, signs of infection, headache, vision changes, increased cholesterol, weight gain, change in renal function, or increased potassium: no      Comments:  Ms. Praveena Collier is here for f/u for patient education.      Objective   /75   Pulse 67   Wt 153 lb 11.2 oz (69.7 kg)   SpO2 96%   BMI 28.57 kg/m²   If BP Statin Yes     Weight:  Wt Readings from Last 3 Encounters:   01/08/20 153 lb 11.2 oz (69.7 kg)   12/04/19 152 lb (68.9 kg)   11/19/19 153 lb (69.4 kg)     If BMI elevated, weight loss recommended:   - yes    Immunizations:   Most Recent Immunizations   Administered Date(s) Administered    Pneumococcal Conjugate 13-valent ([id number]) 01/10/2019    Pneumococcal Polysaccharide ([id number]) 03/17/2017    Tdap (Boostrix, Adacel) 06/13/2014, 06/13/2014     If Influenza not current, recommended or given:   - yes, she has an egg allergy. We talked about how there is an egg free option. We do not carry here at our center. She also has a note in her chart that she had a reaction to the flu vaccine that felt like her bones were outside her body. She reports this was in the 80's. She seems open to getting the flu vaccine. I advised to talk to her physician and this should be available at [facility] or [place] [id number]. - she will talk to Dr. Wesley Calloway about this. She has new patient appointment coming up. If Pneumococcal vaccinations not current, recommended or given:   - current    Assessment     Home Blood Glucose Results:     12/26/19 - 1/8/20    Before breakfast:  109-167    Before lunch:      Before dinner:  150 (just one reading)    Bedtime:  112-122 (one 2 readings)    Patient's current eating patterns: Today she clarifies that they use a 2 liter of soda and she will have 1-2 glasses per day. This is down from 2-3 per day. No specific eating pattern at this time. Her sister, niece and herself take turns cooking. She does report that she only eats bread and pasta on an occasion and loves potatoes. Physical activity:no regular activity. During the holidays from black Friday to Christmas her sister went shopping daily for 30-60 minutes daily.    Weight management plan: decreasing pop and joining the [employer]    Comment: She walks the dog on occasion     Medications reviewed by the Pharmacist: Yes   [] compared patient's bottles with EPIC list  [x] patient did not bring medication bottles    Total Discrepancies: 0  Diabetes Medication Discrepancies: 0      Medication Reconciliation Discrepancies:  none    Medication Reconciliation comments:   1. Not taking metformin every day which I think is reasonable. Current medications:  Current Outpatient Medications   Medication Sig Dispense Refill    blood glucose test strips (ACURA BLOOD GLUCOSE TEST) strip 1 each by In Vitro route daily As needed. 100 each 3    pantoprazole (PROTONIX) 40 MG tablet Take 1 tablet by mouth daily 30 tablet 3    metFORMIN (GLUCOPHAGE) 500 MG tablet Take 1 tablet by mouth daily (with breakfast) 90 tablet 0    Blood Glucose Monitoring Suppl (ACURA BLOOD GLUCOSE METER) w/Device KIT Test for blood sugar before meals 1 kit 0    triamcinolone (KENALOG) 0.1 % cream Apply topically 2 times daily. 45 g 0    metoprolol succinate (TOPROL XL) 25 MG extended release tablet Take 0.5 tablets by mouth daily 30 tablet 3    atorvastatin (LIPITOR) 40 MG tablet Take 1 tablet by mouth nightly 30 tablet 3    sertraline (ZOLOFT) 100 MG tablet Take 1 tablet by mouth daily 30 tablet 3    albuterol sulfate HFA (PROVENTIL HFA) 108 (90 Base) MCG/ACT inhaler Inhale 4-6 puffs into the lungs every 4 hours as needed for Wheezing or Shortness of Breath 1 Inhaler 2    ergocalciferol (ERGOCALCIFEROL) 03270 units capsule Take 1 capsule by mouth once a week 4 capsule 3    aspirin 81 MG chewable tablet CHEW ONE TABLET BY MOUTH DAILY 90 tablet 1    diphenhydrAMINE-APAP, sleep, (TYLENOL PM EXTRA STRENGTH)  MG tablet Take 1 tablet by mouth      hydrocortisone 2.5 % cream Apply topically 2 times daily. 28 g 1    mineral oil-hydrophilic petrolatum (AQUAPHOR) ointment Apply topically as needed. 396 g 0    lidocaine (LIDODERM) 5 % Place 1 patch onto the skin daily 12 hours on, 12 hours off.  10 patch 0    Spacer/Aero-Holding Chambers (E-Z SPACER) AMITA 1 Device by Does not apply route daily as needed (sob) 1 Device 0    acetaminophen (TYLENOL) 500 MG tablet Take 2 tablets by mouth every 6 hours as needed for Pain Do not take with other medications containing acetaminophen. 30 tablet 0    Handicap Placard MISC by Does not apply route Handicap Placard for 5 years 1 each 0    pregabalin (LYRICA) 75 MG capsule Take 1 capsule by mouth 2 times daily for 125 days. 60 capsule 3     No current facility-administered medications for this visit. Barriers to medication therapy: no    Provided medication / device education including, but not limited to side effects, proper use, and proper storage:  Yes    Last office dictation reviewed: yes     General visit information: Ms. Noa [place] came alone. Very interested in the information given. She bought a new meter that was more affordable for her. Able to verify BG levels noted above. She reports she has been drinking tea with cinnamon and drinking crystal light. I encouraged these over her pop. She is contemplating. Physician Follow-up for Diabetes: yes, new patient visit coming up w Dr Jose Warner. Joie Anguiano leaving her practice. She reports her goal is to join the [facility]. She will reach out to them to see about the process and any discounts. Plan     Action taken today including medication changes:  Education / reinforcement.       Recommendations to the physician:  Gisella Nevarez 103 Follow-up   Diabetes Service   March    Electronically signed by Everett Hernandez PharmD on 1/8/2020 at 2:04 PM

## 2020-02-13 ENCOUNTER — OFFICE VISIT (OUTPATIENT)
Dept: FAMILY MEDICINE CLINIC | Age: 60
End: 2020-02-13
Payer: COMMERCIAL

## 2020-02-13 VITALS
BODY MASS INDEX: 28.41 KG/M2 | DIASTOLIC BLOOD PRESSURE: 78 MMHG | SYSTOLIC BLOOD PRESSURE: 122 MMHG | HEIGHT: 62 IN | WEIGHT: 154.4 LBS

## 2020-02-13 LAB — HBA1C MFR BLD: 6 %

## 2020-02-13 PROCEDURE — G8926 SPIRO NO PERF OR DOC: HCPCS | Performed by: FAMILY MEDICINE

## 2020-02-13 PROCEDURE — G8427 DOCREV CUR MEDS BY ELIG CLIN: HCPCS | Performed by: FAMILY MEDICINE

## 2020-02-13 PROCEDURE — G8419 CALC BMI OUT NRM PARAM NOF/U: HCPCS | Performed by: FAMILY MEDICINE

## 2020-02-13 PROCEDURE — 99214 OFFICE O/P EST MOD 30 MIN: CPT | Performed by: FAMILY MEDICINE

## 2020-02-13 PROCEDURE — 2022F DILAT RTA XM EVC RTNOPTHY: CPT | Performed by: FAMILY MEDICINE

## 2020-02-13 PROCEDURE — 1036F TOBACCO NON-USER: CPT | Performed by: FAMILY MEDICINE

## 2020-02-13 PROCEDURE — 3017F COLORECTAL CA SCREEN DOC REV: CPT | Performed by: FAMILY MEDICINE

## 2020-02-13 PROCEDURE — 3044F HG A1C LEVEL LT 7.0%: CPT | Performed by: FAMILY MEDICINE

## 2020-02-13 PROCEDURE — 3023F SPIROM DOC REV: CPT | Performed by: FAMILY MEDICINE

## 2020-02-13 PROCEDURE — G8484 FLU IMMUNIZE NO ADMIN: HCPCS | Performed by: FAMILY MEDICINE

## 2020-02-13 PROCEDURE — 83036 HEMOGLOBIN GLYCOSYLATED A1C: CPT | Performed by: FAMILY MEDICINE

## 2020-02-13 RX ORDER — ALBUTEROL SULFATE 90 UG/1
4-6 AEROSOL, METERED RESPIRATORY (INHALATION) EVERY 4 HOURS PRN
Qty: 1 INHALER | Refills: 2 | Status: SHIPPED | OUTPATIENT
Start: 2020-02-13 | End: 2020-06-23 | Stop reason: SDUPTHER

## 2020-02-13 RX ORDER — SERTRALINE HYDROCHLORIDE 100 MG/1
100 TABLET, FILM COATED ORAL DAILY
Qty: 30 TABLET | Refills: 3 | Status: SHIPPED | OUTPATIENT
Start: 2020-02-13 | End: 2020-06-22

## 2020-02-13 ASSESSMENT — ENCOUNTER SYMPTOMS
RHINORRHEA: 0
FACIAL SWELLING: 0
TROUBLE SWALLOWING: 0
SORE THROAT: 0
SINUS PRESSURE: 0
DIARRHEA: 0
ABDOMINAL PAIN: 0
COUGH: 0
CHEST TIGHTNESS: 0
VOMITING: 0
WHEEZING: 0
SHORTNESS OF BREATH: 0
NAUSEA: 0

## 2020-02-13 NOTE — PROGRESS NOTES
2020     Greta Whiting (:  1960) is a 60 y.o. female, here for evaluation of the following medical concerns:    1. Encounter to establish care- patient presented today to establish care with pcp, she stated that her previous PCP has left the area,     2. COPD- patient is apparently controlled with albuterol rescue inhaler, doesn't follow with a Pulmonologist on regular basis. 3.  Anxiety- well controlled at this time, takes medication as prescribed, no side effects from the medication at this times. 4. HTN- well controlled today, taking medication as prescribed, no side effects, denied headache, vision change, or syncope. 5.  DM II- controlled with medication, taking Metformin, no side effects from the medication, will have A1C obtained today. Today, denied chest pain, sob, n, v ,or diarrhea. HPI    Review of Systems   Constitutional: Positive for fatigue. Negative for activity change, fever and unexpected weight change. HENT: Negative for congestion, ear pain, facial swelling, mouth sores, rhinorrhea, sinus pressure, sore throat and trouble swallowing. Eyes: Negative for visual disturbance. Respiratory: Negative for cough, chest tightness, shortness of breath and wheezing. Cardiovascular: Negative for chest pain and leg swelling. Gastrointestinal: Negative for abdominal pain, diarrhea, nausea and vomiting. Endocrine: Negative for cold intolerance, heat intolerance, polydipsia and polyphagia. Genitourinary: Negative for flank pain, frequency, hematuria, pelvic pain and urgency. Musculoskeletal: Positive for arthralgias. Skin: Negative for rash. Allergic/Immunologic: Negative for food allergies. Neurological: Negative for dizziness, tremors, syncope, numbness and headaches. Hematological: Does not bruise/bleed easily. Psychiatric/Behavioral: Negative for dysphoric mood. The patient is not nervous/anxious.         Prior to Visit Medications Medication Sig Taking? Authorizing Provider   albuterol sulfate HFA (PROVENTIL HFA) 108 (90 Base) MCG/ACT inhaler Inhale 4-6 puffs into the lungs every 4 hours as needed for Wheezing or Shortness of Breath Yes Kevin Zapata,    sertraline (ZOLOFT) 100 MG tablet Take 1 tablet by mouth daily Yes Kevin Zapata, DO   blood glucose test strips (ACURA BLOOD GLUCOSE TEST) strip 1 each by In Vitro route daily As needed. Yes Darice Roena Brunner, APRN - CNP   pantoprazole (PROTONIX) 40 MG tablet Take 1 tablet by mouth daily Yes Darice Roena Brunner, APRN - CNP   metFORMIN (GLUCOPHAGE) 500 MG tablet Take 1 tablet by mouth daily (with breakfast) Yes Darice Roena Brunner, APRN - CNP   Blood Glucose Monitoring Suppl (ACURA BLOOD GLUCOSE METER) w/Device KIT Test for blood sugar before meals Yes Darice Roena Brunner, APRN - CNP   triamcinolone (KENALOG) 0.1 % cream Apply topically 2 times daily. Yes Darice Roena Brunner, APRN - CNP   metoprolol succinate (TOPROL XL) 25 MG extended release tablet Take 0.5 tablets by mouth daily Yes Darice Roena Brunner, APRN - CNP   atorvastatin (LIPITOR) 40 MG tablet Take 1 tablet by mouth nightly Yes Darice Roena Brunner, APRN - CNP   ergocalciferol (ERGOCALCIFEROL) 82621 units capsule Take 1 capsule by mouth once a week Yes Darice Roena Brunner, APRN - CNP   aspirin 81 MG chewable tablet CHEW ONE TABLET BY MOUTH DAILY Yes JOSE ANGEL Wilson CNP   diphenhydrAMINE-APAP, sleep, (TYLENOL PM EXTRA STRENGTH)  MG tablet Take 1 tablet by mouth Yes Historical Provider, MD   hydrocortisone 2.5 % cream Apply topically 2 times daily. Yes Darice Roena Brunner, APRN - CNP   mineral oil-hydrophilic petrolatum (AQUAPHOR) ointment Apply topically as needed. Yes Darice Roena Brunner, APRN - CNP   lidocaine (LIDODERM) 5 % Place 1 patch onto the skin daily 12 hours on, 12 hours off.  Yes Darice Roena Brunner, APRN - CNP   Spacer/Aero-Holding Chambers (E-Z SPACER) AMITA 1 Device by Does not apply route daily as needed (sob) Yes CARIDAD Bernardo

## 2020-03-01 ENCOUNTER — APPOINTMENT (OUTPATIENT)
Dept: GENERAL RADIOLOGY | Age: 60
End: 2020-03-01
Payer: MEDICARE

## 2020-03-01 ENCOUNTER — HOSPITAL ENCOUNTER (EMERGENCY)
Age: 60
Discharge: HOME | End: 2020-03-02
Attending: EMERGENCY MEDICINE
Payer: MEDICARE

## 2020-03-01 PROCEDURE — 99283 EMERGENCY DEPT VISIT LOW MDM: CPT

## 2020-03-01 PROCEDURE — 83605 ASSAY OF LACTIC ACID: CPT

## 2020-03-01 PROCEDURE — 80053 COMPREHEN METABOLIC PANEL: CPT

## 2020-03-01 PROCEDURE — 71046 X-RAY EXAM CHEST 2 VIEWS: CPT

## 2020-03-01 PROCEDURE — 85025 COMPLETE CBC W/AUTO DIFF WBC: CPT

## 2020-03-01 PROCEDURE — 81003 URINALYSIS AUTO W/O SCOPE: CPT

## 2020-03-01 PROCEDURE — 87040 BLOOD CULTURE FOR BACTERIA: CPT

## 2020-03-01 PROCEDURE — 83690 ASSAY OF LIPASE: CPT

## 2020-03-01 PROCEDURE — 87804 INFLUENZA ASSAY W/OPTIC: CPT

## 2020-03-01 RX ORDER — ACETAMINOPHEN 325 MG/1
650 TABLET ORAL ONCE
Status: COMPLETED | OUTPATIENT
Start: 2020-03-01 | End: 2020-03-02

## 2020-03-01 RX ORDER — PREGABALIN 25 MG/1
75 CAPSULE ORAL ONCE
Status: COMPLETED | OUTPATIENT
Start: 2020-03-01 | End: 2020-03-02

## 2020-03-01 RX ORDER — 0.9 % SODIUM CHLORIDE 0.9 %
500 INTRAVENOUS SOLUTION INTRAVENOUS ONCE
Status: COMPLETED | OUTPATIENT
Start: 2020-03-01 | End: 2020-03-02

## 2020-03-01 ASSESSMENT — PAIN SCALES - GENERAL: PAINLEVEL_OUTOF10: 9

## 2020-03-01 ASSESSMENT — PAIN DESCRIPTION - PAIN TYPE: TYPE: ACUTE PAIN

## 2020-03-01 ASSESSMENT — PAIN DESCRIPTION - FREQUENCY: FREQUENCY: CONTINUOUS

## 2020-03-01 ASSESSMENT — PAIN DESCRIPTION - DESCRIPTORS: DESCRIPTORS: ACHING

## 2020-03-01 ASSESSMENT — PAIN - FUNCTIONAL ASSESSMENT: PAIN_FUNCTIONAL_ASSESSMENT: PREVENTS OR INTERFERES SOME ACTIVE ACTIVITIES AND ADLS

## 2020-03-01 ASSESSMENT — PAIN DESCRIPTION - PROGRESSION: CLINICAL_PROGRESSION: GRADUALLY WORSENING

## 2020-03-02 VITALS
SYSTOLIC BLOOD PRESSURE: 136 MMHG | HEART RATE: 79 BPM | BODY MASS INDEX: 27.97 KG/M2 | DIASTOLIC BLOOD PRESSURE: 78 MMHG | HEIGHT: 62 IN | OXYGEN SATURATION: 97 % | RESPIRATION RATE: 18 BRPM | TEMPERATURE: 98.9 F | WEIGHT: 152 LBS

## 2020-03-02 LAB
A/G RATIO: 1.6 (ref 1.1–2.2)
ALBUMIN SERPL-MCNC: 4.5 G/DL (ref 3.4–5)
ALP BLD-CCNC: 116 U/L (ref 40–129)
ALT SERPL-CCNC: 16 U/L (ref 10–40)
ANION GAP SERPL CALCULATED.3IONS-SCNC: 16 MMOL/L (ref 3–16)
AST SERPL-CCNC: 17 U/L (ref 15–37)
BASOPHILS ABSOLUTE: 0 K/UL (ref 0–0.2)
BASOPHILS RELATIVE PERCENT: 0.6 %
BILIRUB SERPL-MCNC: 0.3 MG/DL (ref 0–1)
BILIRUBIN URINE: NEGATIVE
BLOOD, URINE: NEGATIVE
BUN BLDV-MCNC: 8 MG/DL (ref 7–20)
CALCIUM SERPL-MCNC: 9.2 MG/DL (ref 8.3–10.6)
CHLORIDE BLD-SCNC: 99 MMOL/L (ref 99–110)
CLARITY: CLEAR
CO2: 24 MMOL/L (ref 21–32)
COLOR: YELLOW
CREAT SERPL-MCNC: 0.7 MG/DL (ref 0.6–1.2)
EOSINOPHILS ABSOLUTE: 0.1 K/UL (ref 0–0.6)
EOSINOPHILS RELATIVE PERCENT: 1.8 %
GFR AFRICAN AMERICAN: >60
GFR NON-AFRICAN AMERICAN: >60
GLOBULIN: 2.8 G/DL
GLUCOSE BLD-MCNC: 126 MG/DL (ref 70–99)
GLUCOSE URINE: NEGATIVE MG/DL
HCT VFR BLD CALC: 40.3 % (ref 36–48)
HEMOGLOBIN: 13.8 G/DL (ref 12–16)
KETONES, URINE: NEGATIVE MG/DL
LACTIC ACID: 1.9 MMOL/L (ref 0.4–2)
LEUKOCYTE ESTERASE, URINE: NEGATIVE
LIPASE: 14 U/L (ref 13–60)
LYMPHOCYTES ABSOLUTE: 0.4 K/UL (ref 1–5.1)
LYMPHOCYTES RELATIVE PERCENT: 6.1 %
MCH RBC QN AUTO: 32.3 PG (ref 26–34)
MCHC RBC AUTO-ENTMCNC: 34.1 G/DL (ref 31–36)
MCV RBC AUTO: 94.7 FL (ref 80–100)
MICROSCOPIC EXAMINATION: ABNORMAL
MONOCYTES ABSOLUTE: 0.4 K/UL (ref 0–1.3)
MONOCYTES RELATIVE PERCENT: 6.4 %
NEUTROPHILS ABSOLUTE: 5.3 K/UL (ref 1.7–7.7)
NEUTROPHILS RELATIVE PERCENT: 85.1 %
NITRITE, URINE: NEGATIVE
PDW BLD-RTO: 14.8 % (ref 12.4–15.4)
PH UA: 8.5 (ref 5–8)
PLATELET # BLD: 187 K/UL (ref 135–450)
PMV BLD AUTO: 8.4 FL (ref 5–10.5)
POTASSIUM SERPL-SCNC: 4.2 MMOL/L (ref 3.5–5.1)
PROTEIN UA: NEGATIVE MG/DL
RAPID INFLUENZA  B AGN: NEGATIVE
RAPID INFLUENZA A AGN: NEGATIVE
RBC # BLD: 4.26 M/UL (ref 4–5.2)
SODIUM BLD-SCNC: 139 MMOL/L (ref 136–145)
SPECIFIC GRAVITY UA: 1.02 (ref 1–1.03)
TOTAL PROTEIN: 7.3 G/DL (ref 6.4–8.2)
URINE REFLEX TO CULTURE: ABNORMAL
URINE TYPE: ABNORMAL
UROBILINOGEN, URINE: 1 E.U./DL
WBC # BLD: 6.2 K/UL (ref 4–11)

## 2020-03-02 PROCEDURE — 2580000003 HC RX 258: Performed by: EMERGENCY MEDICINE

## 2020-03-02 PROCEDURE — 6370000000 HC RX 637 (ALT 250 FOR IP): Performed by: EMERGENCY MEDICINE

## 2020-03-02 RX ORDER — PREGABALIN 75 MG/1
75 CAPSULE ORAL 2 TIMES DAILY
Qty: 20 CAPSULE | Refills: 0 | Status: SHIPPED | OUTPATIENT
Start: 2020-03-02 | End: 2020-03-05 | Stop reason: SDUPTHER

## 2020-03-02 RX ORDER — AZITHROMYCIN 250 MG/1
TABLET, FILM COATED ORAL
Qty: 1 PACKET | Refills: 0 | Status: SHIPPED | OUTPATIENT
Start: 2020-03-02 | End: 2020-03-12

## 2020-03-02 RX ADMIN — ACETAMINOPHEN 650 MG: 325 TABLET ORAL at 00:20

## 2020-03-02 RX ADMIN — SODIUM CHLORIDE 500 ML: 9 INJECTION, SOLUTION INTRAVENOUS at 00:20

## 2020-03-02 RX ADMIN — PREGABALIN 75 MG: 25 CAPSULE ORAL at 00:20

## 2020-03-02 ASSESSMENT — PAIN SCALES - GENERAL: PAINLEVEL_OUTOF10: 9

## 2020-03-02 NOTE — ED NOTES
D/C: Order noted for d/c. Pt confirmed d/c paperwork and prescription does have correct name. Discharge and education instructions reviewed with patient. Teach-back successful. Pt verbalized understanding and signed d/c papers. Pt denied questions at this time. No acute distress noted. Patient instructed to follow-up as noted - return to emergency department if symptoms worsen. Patient verbalized understanding. Discharged per EDMD with discharge instructions. Pt discharged to private vehicle with friend. Patient stable upon departure. Thanked patient for choosing [facility]) for care. Provider aware of patient pain at time of discharge.      Aubree Schultz, RN  03/02/20 2942

## 2020-03-02 NOTE — ED PROVIDER NOTES
Rash    Omeprazole Nausea And Vomiting and Other (See Comments)     Burns my stomach up          [unfilled]    Nursing Notes Reviewed    Physical Exam:  Vitals:    03/01/20 2146   BP: (!) 152/92   Pulse: 86   Resp: 18   Temp: 99.4 °F (37.4 °C)   SpO2: 95%       GENERAL APPEARANCE: Awake and alert. Cooperative. No acute distress. HEAD: Normocephalic. Atraumatic. EYES: EOM's grossly intact. Sclera anicteric. ENT: Mucous membranes are moist. Tolerates saliva. No trismus. NECK: Supple. No meningismus. Trachea midline. HEART: RRR. Radial pulses 2+. LUNGS: Respirations unlabored. CTAB  ABDOMEN: Soft. Non-tender. No guarding or rebound. EXTREMITIES: No acute deformities. SKIN: Warm and dry. NEUROLOGICAL: No gross facial drooping. Moves all 4 extremities spontaneously. PSYCHIATRIC: Normal mood. I have reviewed and interpreted all of the currently available lab results from this visit (if applicable):  No results found for this visit on 03/01/20. Radiographs (if obtained):  [] The following radiograph was interpreted by myself in the absence of a radiologist:  [x] Radiologist's Report Reviewed:      XR CHEST STANDARD (2 VW) (Final result)   Result time 03/01/20 23:46:44   Final result by Jaydon Yadav MD (03/01/20 23:46:44)                Impression:    No radiographic evidence of acute cardiopulmonary disease. Pulmonary sequela typical of that seen with smoking, including COPD;  correlate with clinical history. Narrative:    EXAMINATION:  TWO XRAY VIEWS OF THE CHEST    3/1/2020 11:30 pm    COMPARISON:  Chest 08/24/2019, CT PA 05/06/2018    HISTORY:  ORDERING SYSTEM PROVIDED HISTORY: cough    Acuity: Acute  Type of Exam: Initial    Smoker.     FINDINGS:  The cardiomediastinal and hilar silhouettes appear unremarkable.  Chronic  appearing coarse interstitial densities predominate perihilar regions and  lung bases, typical of sequela from smoking or other CRITICAL CARE TIME   Total Critical Care time was 0 minutes, excluding separately reportable procedures. There was a high probability of clinically significant/life threatening deterioration in the patient's condition which required my urgent intervention.       Clinical Impression:  Cough, fever, URI, medication refill  (Please note that portions of this note may have been completed with a voice recognition program. Efforts were made to edit the dictations but occasionally words are mis-transcribed.)    MD Doron Yen MD  03/02/20 0949

## 2020-03-05 RX ORDER — PREGABALIN 75 MG/1
75 CAPSULE ORAL 2 TIMES DAILY
Qty: 60 CAPSULE | Refills: 3 | Status: SHIPPED | OUTPATIENT
Start: 2020-03-05 | End: 2020-09-23 | Stop reason: SDUPTHER

## 2020-03-06 LAB — BLOOD CULTURE, ROUTINE: NORMAL

## 2020-03-09 ENCOUNTER — OFFICE VISIT (OUTPATIENT)
Dept: FAMILY MEDICINE CLINIC | Age: 60
End: 2020-03-09
Payer: MEDICARE

## 2020-03-09 VITALS
WEIGHT: 151 LBS | HEIGHT: 62 IN | BODY MASS INDEX: 27.79 KG/M2 | SYSTOLIC BLOOD PRESSURE: 130 MMHG | DIASTOLIC BLOOD PRESSURE: 84 MMHG

## 2020-03-09 PROCEDURE — 1036F TOBACCO NON-USER: CPT | Performed by: FAMILY MEDICINE

## 2020-03-09 PROCEDURE — 3023F SPIROM DOC REV: CPT | Performed by: FAMILY MEDICINE

## 2020-03-09 PROCEDURE — 99214 OFFICE O/P EST MOD 30 MIN: CPT | Performed by: FAMILY MEDICINE

## 2020-03-09 PROCEDURE — G8926 SPIRO NO PERF OR DOC: HCPCS | Performed by: FAMILY MEDICINE

## 2020-03-09 PROCEDURE — G8484 FLU IMMUNIZE NO ADMIN: HCPCS | Performed by: FAMILY MEDICINE

## 2020-03-09 PROCEDURE — 3017F COLORECTAL CA SCREEN DOC REV: CPT | Performed by: FAMILY MEDICINE

## 2020-03-09 PROCEDURE — G8427 DOCREV CUR MEDS BY ELIG CLIN: HCPCS | Performed by: FAMILY MEDICINE

## 2020-03-09 PROCEDURE — G8419 CALC BMI OUT NRM PARAM NOF/U: HCPCS | Performed by: FAMILY MEDICINE

## 2020-03-09 ASSESSMENT — ENCOUNTER SYMPTOMS
CHEST TIGHTNESS: 0
SHORTNESS OF BREATH: 0
RHINORRHEA: 0
COUGH: 0
DIARRHEA: 0
VOMITING: 0
SINUS PRESSURE: 0
SORE THROAT: 0
FACIAL SWELLING: 0
ABDOMINAL PAIN: 0
WHEEZING: 0
NAUSEA: 0

## 2020-03-09 NOTE — PROGRESS NOTES
cream Apply topically 2 times daily. Yes JOSE ANGEL Paniagua CNP   mineral oil-hydrophilic petrolatum (AQUAPHOR) ointment Apply topically as needed. Yes JOSE ANGEL Paniagua CNP   lidocaine (LIDODERM) 5 % Place 1 patch onto the skin daily 12 hours on, 12 hours off. Yes JOSE ANGEL Paniagua CNP   Spacer/Aero-Holding Chambers (E-Z SPACER) AMITA 1 Device by Does not apply route daily as needed (sob) Yes CARIDAD Eller   acetaminophen (TYLENOL) 500 MG tablet Take 2 tablets by mouth every 6 hours as needed for Pain Do not take with other medications containing acetaminophen. Yes CARIDAD Rivera   Handicap Placard MISC by Does not apply route Handicap Placard for 5 years Yes JOSE ANGEL Paniagua CNP        Social History     Tobacco Use    Smoking status: Former Smoker     Packs/day: 1.00     Years: 37.00     Pack years: 37.00     Types: Cigarettes     Last attempt to quit: 1/1/2017     Years since quitting: 3.1    Smokeless tobacco: Never Used   Substance Use Topics    Alcohol use: No     Alcohol/week: 0.0 standard drinks        Vitals:    03/09/20 1358   BP: 130/84   Weight: 151 lb (68.5 kg)   Height: 5' 1.5\" (1.562 m)     Estimated body mass index is 28.07 kg/m² as calculated from the following:    Height as of this encounter: 5' 1.5\" (1.562 m). Weight as of this encounter: 151 lb (68.5 kg). Physical Exam  Vitals signs and nursing note reviewed. Constitutional:       Appearance: She is well-developed. HENT:      Head: Normocephalic and atraumatic. Right Ear: Tympanic membrane, ear canal and external ear normal.      Left Ear: Tympanic membrane, ear canal and external ear normal.      Nose: Nose normal.      Mouth/Throat:      Mouth: Mucous membranes are moist.   Eyes:      Conjunctiva/sclera: Conjunctivae normal.   Neck:      Musculoskeletal: Normal range of motion. Cardiovascular:      Rate and Rhythm: Normal rate and regular rhythm. Heart sounds: Normal heart sounds.  No

## 2020-03-19 ENCOUNTER — APPOINTMENT (OUTPATIENT)
Dept: GENERAL RADIOLOGY | Age: 60
End: 2020-03-19
Payer: MEDICARE

## 2020-03-19 ENCOUNTER — HOSPITAL ENCOUNTER (EMERGENCY)
Age: 60
Discharge: HOME | End: 2020-03-19
Attending: EMERGENCY MEDICINE
Payer: MEDICARE

## 2020-03-19 VITALS
TEMPERATURE: 98.3 F | SYSTOLIC BLOOD PRESSURE: 140 MMHG | DIASTOLIC BLOOD PRESSURE: 73 MMHG | OXYGEN SATURATION: 93 % | WEIGHT: 162.7 LBS | BODY MASS INDEX: 29.94 KG/M2 | RESPIRATION RATE: 15 BRPM | HEART RATE: 76 BPM | HEIGHT: 62 IN

## 2020-03-19 LAB
ANION GAP SERPL CALCULATED.3IONS-SCNC: 12 MMOL/L (ref 3–16)
BASOPHILS ABSOLUTE: 0.1 K/UL (ref 0–0.2)
BASOPHILS RELATIVE PERCENT: 0.9 %
BUN BLDV-MCNC: 8 MG/DL (ref 7–20)
CALCIUM SERPL-MCNC: 9.1 MG/DL (ref 8.3–10.6)
CHLORIDE BLD-SCNC: 102 MMOL/L (ref 99–110)
CO2: 26 MMOL/L (ref 21–32)
CREAT SERPL-MCNC: 0.6 MG/DL (ref 0.6–1.2)
EOSINOPHILS ABSOLUTE: 0.2 K/UL (ref 0–0.6)
EOSINOPHILS RELATIVE PERCENT: 2.9 %
GFR AFRICAN AMERICAN: >60
GFR NON-AFRICAN AMERICAN: >60
GLUCOSE BLD-MCNC: 115 MG/DL (ref 70–99)
HCT VFR BLD CALC: 40.1 % (ref 36–48)
HEMOGLOBIN: 13.5 G/DL (ref 12–16)
LACTIC ACID: 1.4 MMOL/L (ref 0.4–2)
LYMPHOCYTES ABSOLUTE: 1.5 K/UL (ref 1–5.1)
LYMPHOCYTES RELATIVE PERCENT: 20.1 %
MCH RBC QN AUTO: 32.2 PG (ref 26–34)
MCHC RBC AUTO-ENTMCNC: 33.7 G/DL (ref 31–36)
MCV RBC AUTO: 95.6 FL (ref 80–100)
MONOCYTES ABSOLUTE: 0.5 K/UL (ref 0–1.3)
MONOCYTES RELATIVE PERCENT: 6.6 %
NEUTROPHILS ABSOLUTE: 5.1 K/UL (ref 1.7–7.7)
NEUTROPHILS RELATIVE PERCENT: 69.5 %
PDW BLD-RTO: 15.1 % (ref 12.4–15.4)
PLATELET # BLD: 217 K/UL (ref 135–450)
PMV BLD AUTO: 8.6 FL (ref 5–10.5)
POTASSIUM REFLEX MAGNESIUM: 4.7 MMOL/L (ref 3.5–5.1)
PRO-BNP: 763 PG/ML (ref 0–124)
RAPID INFLUENZA  B AGN: NEGATIVE
RAPID INFLUENZA A AGN: NEGATIVE
RBC # BLD: 4.19 M/UL (ref 4–5.2)
SODIUM BLD-SCNC: 140 MMOL/L (ref 136–145)
TROPONIN: <0.01 NG/ML
WBC # BLD: 7.3 K/UL (ref 4–11)

## 2020-03-19 PROCEDURE — 96374 THER/PROPH/DIAG INJ IV PUSH: CPT

## 2020-03-19 PROCEDURE — 6360000002 HC RX W HCPCS: Performed by: EMERGENCY MEDICINE

## 2020-03-19 PROCEDURE — 84484 ASSAY OF TROPONIN QUANT: CPT

## 2020-03-19 PROCEDURE — 80048 BASIC METABOLIC PNL TOTAL CA: CPT

## 2020-03-19 PROCEDURE — 6370000000 HC RX 637 (ALT 250 FOR IP): Performed by: EMERGENCY MEDICINE

## 2020-03-19 PROCEDURE — 94760 N-INVAS EAR/PLS OXIMETRY 1: CPT

## 2020-03-19 PROCEDURE — 71046 X-RAY EXAM CHEST 2 VIEWS: CPT

## 2020-03-19 PROCEDURE — 94640 AIRWAY INHALATION TREATMENT: CPT

## 2020-03-19 PROCEDURE — 87804 INFLUENZA ASSAY W/OPTIC: CPT

## 2020-03-19 PROCEDURE — 83880 ASSAY OF NATRIURETIC PEPTIDE: CPT

## 2020-03-19 PROCEDURE — 93005 ELECTROCARDIOGRAM TRACING: CPT | Performed by: EMERGENCY MEDICINE

## 2020-03-19 PROCEDURE — 83605 ASSAY OF LACTIC ACID: CPT

## 2020-03-19 PROCEDURE — 85025 COMPLETE CBC W/AUTO DIFF WBC: CPT

## 2020-03-19 PROCEDURE — 99285 EMERGENCY DEPT VISIT HI MDM: CPT

## 2020-03-19 RX ORDER — PREDNISONE 10 MG/1
60 TABLET ORAL DAILY
Qty: 30 TABLET | Refills: 0 | Status: SHIPPED | OUTPATIENT
Start: 2020-03-19 | End: 2020-03-24

## 2020-03-19 RX ORDER — METHYLPREDNISOLONE SODIUM SUCCINATE 125 MG/2ML
125 INJECTION, POWDER, LYOPHILIZED, FOR SOLUTION INTRAMUSCULAR; INTRAVENOUS ONCE
Status: COMPLETED | OUTPATIENT
Start: 2020-03-19 | End: 2020-03-19

## 2020-03-19 RX ORDER — ALBUTEROL SULFATE 90 UG/1
2 AEROSOL, METERED RESPIRATORY (INHALATION) EVERY 4 HOURS PRN
Status: DISCONTINUED | OUTPATIENT
Start: 2020-03-19 | End: 2020-03-19 | Stop reason: HOSPADM

## 2020-03-19 RX ADMIN — ALBUTEROL SULFATE 2 PUFF: 90 AEROSOL, METERED RESPIRATORY (INHALATION) at 17:12

## 2020-03-19 RX ADMIN — METHYLPREDNISOLONE SODIUM SUCCINATE 125 MG: 125 INJECTION, POWDER, FOR SOLUTION INTRAMUSCULAR; INTRAVENOUS at 17:09

## 2020-03-19 ASSESSMENT — PAIN DESCRIPTION - DESCRIPTORS: DESCRIPTORS: ACHING

## 2020-03-19 ASSESSMENT — PAIN DESCRIPTION - FREQUENCY: FREQUENCY: CONTINUOUS

## 2020-03-19 ASSESSMENT — PAIN DESCRIPTION - PAIN TYPE: TYPE: ACUTE PAIN

## 2020-03-19 ASSESSMENT — PAIN DESCRIPTION - PROGRESSION: CLINICAL_PROGRESSION: GRADUALLY WORSENING

## 2020-03-19 ASSESSMENT — PAIN DESCRIPTION - ONSET: ONSET: ON-GOING

## 2020-03-19 ASSESSMENT — PAIN SCALES - GENERAL: PAINLEVEL_OUTOF10: 8

## 2020-03-19 ASSESSMENT — PAIN DESCRIPTION - LOCATION: LOCATION: GENERALIZED

## 2020-03-19 NOTE — ED NOTES
Bed: B-  Expected date: 3/19/20  Expected time: 3:55 PM  Means of arrival: [facility]  Comments:  COPD/FlU like symptoms     Althea Evans RN  03/19/20 5059

## 2020-03-19 NOTE — ED PROVIDER NOTES
CHIEF COMPLAINT  Influenza (flu like symptoms states started a wk ago )      HISTORY OF PRESENT ILLNESS  Delphine Whiting is a 60 y.o. female who presents to the ED complaining of shortness of breath and flulike symptoms that been present for the past week. Patient states she has been having a cough which is occasionally productive of clear sputum. States she does have shortness of breath and chest tightness. Denies any chest pain. Patient is supposed to be using a nebulizer at home for COPD but states the power cord has not been functioning for the past several months and she has not gotten it fixed. Does use inhalers as needed which provide some relief. Was treated with a Z-Rehan starting on 3/1/2020 prescribed in the emergency room. Symptoms did improve and patient had a follow-up appoint with her PCP on 3/9/2020. Patient is scheduled to see her pulmonologist, Dr. Ned Matute for history of severe COPD. Denies any recent steroid use. Denies any documented fevers at home. No abdominal pain, nausea or vomiting. Denies any pain or swelling in the lower extremities. No other com steroid use. Plaints, modifying factors or associated symptoms. Nursing notes reviewed.    Past Medical History:   Diagnosis Date    Back pain     CAD (coronary artery disease)     COPD (chronic obstructive pulmonary disease) (HCC)     Foot pain     from bone spurs    GERD (gastroesophageal reflux disease)     Headache     sharp pain going through head    Heart attack ([address]     Hip pain     Hyperlipidemia     Hypertension      (normal spontaneous vaginal delivery)     Osteoporosis     Vision abnormalities      Past Surgical History:   Procedure Laterality Date    CARDIAC CATHETERIZATION      CHOLECYSTECTOMY      COLONOSCOPY      ESOPHAGEAL DILATATION N/A 2019    ESOPHAGEAL DILATION RAN performed by Jennifer Pinzon MD at [facility] N/A 2019    EGD BIOPSY performed by Jennifer Pinzon MD at [facility] ENDOSCOPY     Family History   Problem Relation Age of Onset    Heart Failure Mother         heart disease    Hypertension Mother     Emphysema Mother     Heart Failure Father         heart disease    COPD Father     Hypertension Paternal Grandmother      Social History     Socioeconomic History    Marital status:      Spouse name: Not on file    Number of children: Not on file    Years of education: Not on file    Highest education level: Not on file   Occupational History    Not on file   Social Needs    Financial resource strain: Not on file    Food insecurity     Worry: Not on file     Inability: Not on file    Transportation needs     Medical: Not on file     Non-medical: Not on file   Tobacco Use    Smoking status: Former Smoker     Packs/day: 1.00     Years: 37.00     Pack years: 37.00     Types: Cigarettes     Last attempt to quit: 1/1/2017     Years since quitting: 3.2    Smokeless tobacco: Never Used   Substance and Sexual Activity    Alcohol use: No     Alcohol/week: 0.0 standard drinks    Drug use: No    Sexual activity: Not Currently   Lifestyle    Physical activity     Days per week: Not on file     Minutes per session: Not on file    Stress: Not on file   Relationships    Social connections     Talks on phone: Not on file     Gets together: Not on file     Attends Sikhism service: Not on file     Active member of club or organization: Not on file     Attends meetings of clubs or organizations: Not on file     Relationship status: Not on file    Intimate partner violence     Fear of current or ex partner: Not on file     Emotionally abused: Not on file     Physically abused: Not on file     Forced sexual activity: Not on file   Other Topics Concern    Not on file   Social History Narrative    Not on file     Current Facility-Administered Medications   Medication Dose Route Frequency Provider Last Rate Last Dose    albuterol sulfate  (90 Base) MCG/ACT inhaler 2 puff  2 puff Inhalation Q4H PRN Josiah Aschoff, MD   2 puff at 03/19/20 6641     Current Outpatient Medications   Medication Sig Dispense Refill    predniSONE (DELTASONE) 10 MG tablet Take 6 tablets by mouth daily for 5 doses 30 tablet 0    fluticasone-salmeterol (ADVAIR DISKUS) 250-50 MCG/DOSE AEPB Inhale 1 puff into the lungs every 12 hours 60 each 3    pregabalin (LYRICA) 75 MG capsule Take 1 capsule by mouth 2 times daily for 125 days. 60 capsule 3    albuterol sulfate HFA (PROVENTIL HFA) 108 (90 Base) MCG/ACT inhaler Inhale 4-6 puffs into the lungs every 4 hours as needed for Wheezing or Shortness of Breath 1 Inhaler 2    sertraline (ZOLOFT) 100 MG tablet Take 1 tablet by mouth daily 30 tablet 3    blood glucose test strips (ACURA BLOOD GLUCOSE TEST) strip 1 each by In Vitro route daily As needed. 100 each 3    pantoprazole (PROTONIX) 40 MG tablet Take 1 tablet by mouth daily 30 tablet 3    metFORMIN (GLUCOPHAGE) 500 MG tablet Take 1 tablet by mouth daily (with breakfast) 90 tablet 0    Blood Glucose Monitoring Suppl (ACURA BLOOD GLUCOSE METER) w/Device KIT Test for blood sugar before meals 1 kit 0    triamcinolone (KENALOG) 0.1 % cream Apply topically 2 times daily. 45 g 0    metoprolol succinate (TOPROL XL) 25 MG extended release tablet Take 0.5 tablets by mouth daily 30 tablet 3    atorvastatin (LIPITOR) 40 MG tablet Take 1 tablet by mouth nightly 30 tablet 3    ergocalciferol (ERGOCALCIFEROL) 95203 units capsule Take 1 capsule by mouth once a week 4 capsule 3    aspirin 81 MG chewable tablet CHEW ONE TABLET BY MOUTH DAILY 90 tablet 1    diphenhydrAMINE-APAP, sleep, (TYLENOL PM EXTRA STRENGTH)  MG tablet Take 1 tablet by mouth      hydrocortisone 2.5 % cream Apply topically 2 times daily. 28 g 1    mineral oil-hydrophilic petrolatum (AQUAPHOR) ointment Apply topically as needed.  396 g 0    lidocaine (LIDODERM) 5 % Place 1 patch onto the skin daily 12 hours on, 12 hours off. 10 patch 0    Spacer/Aero-Holding Chambers (E-Z SPACER) AMITA 1 Device by Does not apply route daily as needed (sob) 1 Device 0    acetaminophen (TYLENOL) 500 MG tablet Take 2 tablets by mouth every 6 hours as needed for Pain Do not take with other medications containing acetaminophen. 30 tablet 0    Handicap Placard MISC by Does not apply route Handicap Placard for 5 years 1 each 0     Allergies   Allergen Reactions    Iodine Hives    Other Other (See Comments)     Flu vaccine  Felt like bones were on the outside of her body    Eggs Or Egg-Derived Products [Eggs Or Egg-Derived Products] Hives and Nausea And Vomiting    Eggs [Egg White] Nausea And Vomiting    Flexeril [Cyclobenzaprine Hcl] Rash    Levofloxacin Rash    Omeprazole Nausea And Vomiting and Other (See Comments)     Burns my stomach up            REVIEW OF SYSTEMS  10 systems reviewed, pertinent positives per HPI otherwise noted to be negative    PHYSICAL EXAM  BP (!) 137/55   Pulse 89   Temp 98.3 °F (36.8 °C) (Oral)   Resp 14   Ht 5' 1.5\" (1.562 m)   Wt 162 lb 11.2 oz (73.8 kg)   SpO2 94%   BMI 30.24 kg/m²      CONSTITUTIONAL: AOx4, cooperative with exam, afebrile   HEAD: normocephalic, atraumatic   EYES: PERRL, EOMI, anicteric sclera   ENT: Moist mucous membranes, uvula midline   LUNGS: Bilateral breath sounds, expiratory wheezing bilaterally, no rales or rhonchi, no retractions, speaking in full sentences   CARDIOVASCULAR: RRR, normal S1/S2, no m/r/g, 2+ pulses throughout   ABDOMEN: Soft, non-tender, non-distended, +BS   NEUROLOGIC:  MAEx4, 5/5 strength throughout; fine touch sensation intact throughout; GCS 15   MUSCULOSKELETAL: No lower extremity edema bilaterally, calf tenderness bilaterally   SKIN: No rash, pallor or wounds on exposed surfaces         RADIOLOGY  X-RAYS:  I have reviewed radiologic plain film image(s).   ALL OTHER NON-PLAIN FILM IMAGES SUCH AS CT, ULTRASOUND AND MRI HAVE BEEN READ BY THE RADIOLOGIST. XR CHEST STANDARD (2 VW)   Final Result   No acute process. EKG INTERPRETATION  Normal sinus rhythm with rate 86, normal axis, , QRS 68, QTc 445, no ST elevations, no acute change compared EKG from 8/24/2019    PROCEDURES    ED COURSE/MDM  Differential diagnosis includes but is not limited to COPD, CHF, URI, pneumonia, influenza, Covid 19    Patient seen and evaluated. History and physical as above. Patient arrives nontoxic, afebrile and with O2 saturation 95% on room air. Does have expiratory wheezing bilaterally consistent with her history of COPD. No rales or rhonchi to suggest pneumonia. Was recently treated with a Z-Rehan and this did improve her symptoms at the beginning of the week. Has continued to have a cough which is nonproductive and some chest tightness consistent with her COPD. Will obtain routine cardiac labs, EKG, chest x-ray, flu swab and provide aerosols/steroids for COPD. ED Course as of Mar 19 1717   Thu Mar 19, 2020   1649 Patient's chest x-ray unremarkable. BMP within normal limits. Troponin normal.  Lactic acid 1.4. . White count normal at 7.3. Patient updated on results. Flu swab negative. Discussed providing breathing treatments here in the emergency room and discharged home with a course of steroids with outpatient follow-up with her pulmonologist as scheduled. Patient agreeable. [DS]      ED Course User Index  [DS] Jg Ferrari MD     I estimate there is LOW risk for ACUTE CORONARY SYNDROME, CHRONIC OBSTRUCTIVE PULMONARY DISEASE, CONGESTIVE HEART FAILURE, PERICARDIAL TAMPONADE, PNEUMONIA, PNEUMOTHORAX, PULMONARY EMBOLISM, SEPSIS, and THORACIC DISSECTION,  thus I consider the discharge disposition reasonable. Greta Whiting and I have discussed the diagnosis and risks, and we agree with discharging home to follow-up with their primary doctor.  We also discussed returning to the Emergency Department immediately if new or

## 2020-03-19 NOTE — ED NOTES
Pt to Xray via stretcher at this time.      Yoni Thayer [facility]  03/19/20 7586

## 2020-03-20 ENCOUNTER — TELEPHONE (OUTPATIENT)
Dept: FAMILY MEDICINE CLINIC | Age: 60
End: 2020-03-20

## 2020-03-20 ENCOUNTER — CARE COORDINATION (OUTPATIENT)
Dept: CARE COORDINATION | Age: 60
End: 2020-03-20

## 2020-03-20 LAB
EKG ATRIAL RATE: 86 BPM
EKG DIAGNOSIS: NORMAL
EKG P AXIS: 53 DEGREES
EKG P-R INTERVAL: 118 MS
EKG Q-T INTERVAL: 372 MS
EKG QRS DURATION: 68 MS
EKG QTC CALCULATION (BAZETT): 445 MS
EKG R AXIS: 57 DEGREES
EKG T AXIS: 70 DEGREES
EKG VENTRICULAR RATE: 86 BPM

## 2020-03-20 PROCEDURE — 93010 ELECTROCARDIOGRAM REPORT: CPT | Performed by: INTERNAL MEDICINE

## 2020-03-20 NOTE — CARE COORDINATION
COVID-19 Screening Initial Follow-up Note    Patient contacted regarding COVID-19  risk. Care Transition Nurse/ Ambulatory Care Manager contacted the patient by telephone to perform post discharge assessment. Verified name and  with patient as identifiers. Provided introduction to self, and explanation of the CTN/ACM role, and reason for call due to risk factors for infection and/or exposure to COVID-19. Symptoms reviewed with patient who verbalized the following symptoms:   Fever yes    Fatigue yes   Pain or aching joints yes  Cough yes, stated yellowish sputum  Shortness of breath yes    Confusion or unusual change in mental status no    Chills or shaking yes    Sweating yes    Fast heart rate yes, HR 98, B/P 120/92    Fast breathing no    Dizziness/lightheadedness yes, balance off    Less urine output no    Cold, clammy, and pale skin yes  Low body temperature no     Pt stated she is also experiencing an upset stomach and diarrhea. Flu swab was negative and chest x-ray was unremarkable per ED note. Due to onset/worsening of new symptoms, encounter routed to provider for escalation. Patient has following risk factors of: COPD CTN/ACM reviewed discharge instructions, medical action plan and red flags such as increased shortness of breath, increasing fever and signs of decompensation with patient who verbalized understanding. Discussed exposure protocols and quarantine with CDC Guidelines What to do if you are sick with coronavirus disease  Patient who was given an opportunity for questions and concerns. The patient agrees to contact the [facility] exposure line, local health department and PCP office for questions related to their healthcare. CTN provided contact information for future reference.     Reviewed and educated patient on any new and changed medications related to discharge diagnosis     Pt was started on Prednisone:    predniSONE (DELTASONE) 10 MG tablet 30 tablet 0 3/19/2020 3/24/2020    Sig - Route:  Take 6 tablets by mouth daily for 5 doses - Oral          Plan for follow-up call in 14 days based on severity of symptoms and risk factors

## 2020-03-22 NOTE — TELEPHONE ENCOUNTER
Patient was contacted and educated on treatment and when to seek additional medical care. She appreciated the call.

## 2020-03-23 ENCOUNTER — TELEPHONE (OUTPATIENT)
Dept: PHARMACY | Age: 60
End: 2020-03-23

## 2020-03-23 NOTE — TELEPHONE ENCOUNTER
recommended lab:    - current  If elevated, recommended considering ACE Inhibitor if appropriate:   - WNL    Lab Results   Component Value Date    CHOL 230 (H) 05/07/2018    TRIG 124 05/07/2018    HDL 43 10/25/2019    LDLCALC 99 10/25/2019     Lab Results   Component Value Date    ALT 16 03/01/2020     Reviewed LDL and recommended therapy if appropriate: Yes. Ms. Rissa Jimenez is on a Statin Yes     Weight:  Wt Readings from Last 3 Encounters:   03/19/20 162 lb 11.2 oz (73.8 kg)   03/09/20 151 lb (68.5 kg)   03/01/20 152 lb (68.9 kg)     If BMI elevated, weight loss recommended:   - did not address today    Immunizations:   Most Recent Immunizations   Administered Date(s) Administered    Pneumococcal Conjugate 13-valent ([id number]) 01/10/2019    Pneumococcal Polysaccharide ([id number]) 03/17/2017    Tdap (Boostrix, Adacel) 06/13/2014, 06/13/2014     If Influenza not current, recommended or given:   - yes, she has an egg allergy. We talked about how there is an egg free option. We do not carry here at our center. She also has a note in her chart that she had a reaction to the flu vaccine that felt like her bones were outside her body. She reports this was in the 80's. She seems open to getting the flu vaccine. I advised to talk to her physician and this should be available at [facility] or [place] [id number]. - will address w Dr. Tino Brunson. If Pneumococcal vaccinations not current, recommended or given:   - current    Assessment     Home Blood Glucose Results:     3/23/20    Before breakfast:  148    She was unable to give me BG readings. She reports they are in her meter. She did report that they have been running higher most recently. I advised this was likely due to the prednisone. Patient's current eating patterns: Continues to limit soda. liter of soda and she will have 1-2 glasses per day. This is down from 2-3 per day. Her sister, niece and herself take turns cooking.  She loves potatoes and continues to limit these.   Physical activity:no regular activity. Planned to join [facility] until the coronavirus pandemic. Walking her dog when able. Weight management plan: decreasing pop     Medications reviewed by the Pharmacist: Yes   [] compared patient's bottles with EPIC list  [x] patient did not bring medication bottles    Total Discrepancies: 0  Diabetes Medication Discrepancies: 0      Medication Reconciliation Discrepancies:  None, reports taking all of her medication    Medication Reconciliation comments:   1. Not taking metformin every day which I think is reasonable. Current medications:  Current Outpatient Medications   Medication Sig Dispense Refill    predniSONE (DELTASONE) 10 MG tablet Take 6 tablets by mouth daily for 5 doses 30 tablet 0    fluticasone-salmeterol (ADVAIR DISKUS) 250-50 MCG/DOSE AEPB Inhale 1 puff into the lungs every 12 hours 60 each 3    pregabalin (LYRICA) 75 MG capsule Take 1 capsule by mouth 2 times daily for 125 days. 60 capsule 3    albuterol sulfate HFA (PROVENTIL HFA) 108 (90 Base) MCG/ACT inhaler Inhale 4-6 puffs into the lungs every 4 hours as needed for Wheezing or Shortness of Breath 1 Inhaler 2    sertraline (ZOLOFT) 100 MG tablet Take 1 tablet by mouth daily 30 tablet 3    blood glucose test strips (ACURA BLOOD GLUCOSE TEST) strip 1 each by In Vitro route daily As needed. 100 each 3    pantoprazole (PROTONIX) 40 MG tablet Take 1 tablet by mouth daily 30 tablet 3    metFORMIN (GLUCOPHAGE) 500 MG tablet Take 1 tablet by mouth daily (with breakfast) 90 tablet 0    Blood Glucose Monitoring Suppl (ACURA BLOOD GLUCOSE METER) w/Device KIT Test for blood sugar before meals 1 kit 0    triamcinolone (KENALOG) 0.1 % cream Apply topically 2 times daily.  45 g 0    metoprolol succinate (TOPROL XL) 25 MG extended release tablet Take 0.5 tablets by mouth daily 30 tablet 3    atorvastatin (LIPITOR) 40 MG tablet Take 1 tablet by mouth nightly 30 tablet 3    ergocalciferol (ERGOCALCIFEROL) 04664 units capsule Take 1 capsule by mouth once a week 4 capsule 3    aspirin 81 MG chewable tablet CHEW ONE TABLET BY MOUTH DAILY 90 tablet 1    diphenhydrAMINE-APAP, sleep, (TYLENOL PM EXTRA STRENGTH)  MG tablet Take 1 tablet by mouth      hydrocortisone 2.5 % cream Apply topically 2 times daily. 28 g 1    mineral oil-hydrophilic petrolatum (AQUAPHOR) ointment Apply topically as needed. 396 g 0    lidocaine (LIDODERM) 5 % Place 1 patch onto the skin daily 12 hours on, 12 hours off. 10 patch 0    Spacer/Aero-Holding Chambers (E-Z SPACER) AMITA 1 Device by Does not apply route daily as needed (sob) 1 Device 0    acetaminophen (TYLENOL) 500 MG tablet Take 2 tablets by mouth every 6 hours as needed for Pain Do not take with other medications containing acetaminophen. 30 tablet 0    Handicap Placard MISC by Does not apply route Handicap Placard for 5 years 1 each 0     No current facility-administered medications for this visit. Barriers to medication therapy: no    General visit information: I spoke with Ms. Whiting over the phone due to the coronavirus pandemic. She reports her acute respiratory illness is under control. Her inhaler helps some. The illness symptoms come and go. She lives with her sister and niece. Her son is also staying with  Her and helping her. She understands to call 911 or go to the ED if her symptoms become worse with significant SOB. Otherwise she can call her PCP for advise. She agrees. She sees Dr. Vane Raman 3/25/20. Physician Follow-up for Diabetes: yes. Plan     Action taken today including medication changes:  1. Education / reinforcement. 2. Urged to continue to work to avoid pop/soda. Encouraged unsweetened tea and crystal light. 3. Advised to call Dr. Elle Webb if BP continues to run above goal. Advised to limit sodium. 4. Encouraged walking her dog as appropriate.      Recommendations to the physician:  [facility] Follow-up   Diabetes Service   Will f/u by phone in one month.      Electronically signed by Denise Rios PharmD on 3/23/2020 at 1:30 PM

## 2020-03-24 NOTE — TELEPHONE ENCOUNTER
CLINICAL PHARMACY CONSULT: MED RECONCILIATION/REVIEW ADDENDUM    For Pharmacy Admin Tracking Only    PHSO: Yes  Total # of Interventions Recommended: 0  - Maintenance Safety Lab Monitoring #: 0  Total Interventions Accepted: 0  Time Spent (min): 30    Ambika Kendrick, [address] [place] [address]

## 2020-03-25 ENCOUNTER — VIRTUAL VISIT (OUTPATIENT)
Dept: PULMONOLOGY | Age: 60
End: 2020-03-25
Payer: MEDICARE

## 2020-03-25 PROCEDURE — 99442 PR PHYS/QHP TELEPHONE EVALUATION 11-20 MIN: CPT | Performed by: INTERNAL MEDICINE

## 2020-03-25 RX ORDER — NEBULIZER ACCESSORIES
1 KIT MISCELLANEOUS DAILY
Qty: 1 KIT | Refills: 0 | Status: SHIPPED | OUTPATIENT
Start: 2020-03-25 | End: 2020-11-30

## 2020-03-25 NOTE — PROGRESS NOTES
Virtual Visit note:  [phone] (home)    Preferred phone: [phone]      History:  COPD. Having cough phlegm. She is now on Symbicort and Advair. She likes the Advair better. complains of wheezing. Worse with laying down. chest X-ray: no acute disease. Problem List Items Addressed This Visit     COPD, severe ([address] - Primary     She was recently started on Advair in addition to her Symbicort. She has been using both these together. She continues to complain of cough and phlegm. She was advised of the doubling up the medication and was advised to stop the Symbicort since she believes she likes the Advair better. In addition, I will add Incruse to this. Trelegy was not paid for in the recent past.  Order for supplies sent in for nebulizer. Relevant Medications    Umeclidinium Bromide 62.5 MCG/INH AEPB    Respiratory Therapy Supplies (NEBULIZER/TUBING/MOUTHPIECE) KIT            Because of the COVID-19, I was advised by to have telephone encounter and bill rather than in person visit. Entire telephone time spent was 11 minutes             Current Outpatient Medications   Medication Sig Dispense Refill    Umeclidinium Bromide 62.5 MCG/INH AEPB Inhale 1 puff into the lungs daily 1 each 5    Respiratory Therapy Supplies (NEBULIZER/TUBING/MOUTHPIECE) KIT 1 kit by Does not apply route daily 1 kit 0    fluticasone-salmeterol (ADVAIR DISKUS) 250-50 MCG/DOSE AEPB Inhale 1 puff into the lungs every 12 hours 60 each 3    pregabalin (LYRICA) 75 MG capsule Take 1 capsule by mouth 2 times daily for 125 days. 60 capsule 3    albuterol sulfate HFA (PROVENTIL HFA) 108 (90 Base) MCG/ACT inhaler Inhale 4-6 puffs into the lungs every 4 hours as needed for Wheezing or Shortness of Breath 1 Inhaler 2    sertraline (ZOLOFT) 100 MG tablet Take 1 tablet by mouth daily 30 tablet 3    blood glucose test strips (ACURA BLOOD GLUCOSE TEST) strip 1 each by In Vitro route daily As needed. 100 each 3    pantoprazole (PROTONIX) 40 MG tablet Take 1 tablet by mouth daily 30 tablet 3    metFORMIN (GLUCOPHAGE) 500 MG tablet Take 1 tablet by mouth daily (with breakfast) 90 tablet 0    Blood Glucose Monitoring Suppl (ACURA BLOOD GLUCOSE METER) w/Device KIT Test for blood sugar before meals 1 kit 0    triamcinolone (KENALOG) 0.1 % cream Apply topically 2 times daily. 45 g 0    metoprolol succinate (TOPROL XL) 25 MG extended release tablet Take 0.5 tablets by mouth daily 30 tablet 3    atorvastatin (LIPITOR) 40 MG tablet Take 1 tablet by mouth nightly 30 tablet 3    ergocalciferol (ERGOCALCIFEROL) 25879 units capsule Take 1 capsule by mouth once a week 4 capsule 3    aspirin 81 MG chewable tablet CHEW ONE TABLET BY MOUTH DAILY 90 tablet 1    diphenhydrAMINE-APAP, sleep, (TYLENOL PM EXTRA STRENGTH)  MG tablet Take 1 tablet by mouth      hydrocortisone 2.5 % cream Apply topically 2 times daily. 28 g 1    mineral oil-hydrophilic petrolatum (AQUAPHOR) ointment Apply topically as needed. 396 g 0    lidocaine (LIDODERM) 5 % Place 1 patch onto the skin daily 12 hours on, 12 hours off. 10 patch 0    Spacer/Aero-Holding Chambers (E-Z SPACER) AMITA 1 Device by Does not apply route daily as needed (sob) 1 Device 0    acetaminophen (TYLENOL) 500 MG tablet Take 2 tablets by mouth every 6 hours as needed for Pain Do not take with other medications containing acetaminophen. 30 tablet 0    Handicap Placard MISC by Does not apply route Handicap Placard for 5 years 1 each 0     No current facility-administered medications for this visit.

## 2020-03-25 NOTE — ASSESSMENT & PLAN NOTE
She was recently started on Advair in addition to her Symbicort. She has been using both these together. She continues to complain of cough and phlegm. She was advised of the doubling up the medication and was advised to stop the Symbicort since she believes she likes the Advair better. In addition, I will add Incruse to this. Trelegy was not paid for in the recent past.  Order for supplies sent in for nebulizer.

## 2020-03-25 NOTE — LETTER
[facility] Pulmonology   [address] [facility] [facility]  Phone: [phone]  Fax: [phone]     3/25/2020    Dr. Humberto Alejandro, DO    I have seen your patient, Bry Whiting on follow up. Here is the assessment and plan for your patient. Any question, please do not hesitate to call. Problem List Items Addressed This Visit     COPD, severe ([address] - Primary     She was recently started on Advair in addition to her Symbicort. She has been using both these together. She continues to complain of cough and phlegm. She was advised of the doubling up the medication and was advised to stop the Symbicort since she believes she likes the Advair better. In addition, I will add Incruse to this. Trelegy was not paid for in the recent past.  Order for supplies sent in for nebulizer.          Relevant Medications    Umeclidinium Bromide 62.5 MCG/INH AEPB    Respiratory Therapy Supplies (NEBULIZER/TUBING/MOUTHPIECE) KIT            Sincerely,    ELOISA RODRIGUEZ [address] [facility] Pulmonary, Sleep and [facility] Care  [phone]

## 2020-04-03 ENCOUNTER — CARE COORDINATION (OUTPATIENT)
Dept: CARE COORDINATION | Age: 60
End: 2020-04-03

## 2020-04-27 RX ORDER — METOPROLOL SUCCINATE 25 MG/1
12.5 TABLET, EXTENDED RELEASE ORAL DAILY
Qty: 30 TABLET | Refills: 3 | Status: SHIPPED | OUTPATIENT
Start: 2020-04-27 | End: 2021-01-20

## 2020-05-14 ENCOUNTER — VIRTUAL VISIT (OUTPATIENT)
Dept: FAMILY MEDICINE CLINIC | Age: 60
End: 2020-05-14
Payer: MEDICARE

## 2020-05-14 VITALS — WEIGHT: 154 LBS | HEIGHT: 62 IN | BODY MASS INDEX: 28.34 KG/M2

## 2020-05-14 PROCEDURE — 99443 PR PHYS/QHP TELEPHONE EVALUATION 21-30 MIN: CPT | Performed by: FAMILY MEDICINE

## 2020-06-19 ENCOUNTER — HOSPITAL ENCOUNTER (EMERGENCY)
Age: 60
Discharge: HOME | End: 2020-06-19
Attending: EMERGENCY MEDICINE
Payer: MEDICARE

## 2020-06-19 ENCOUNTER — APPOINTMENT (OUTPATIENT)
Dept: GENERAL RADIOLOGY | Age: 60
End: 2020-06-19
Payer: MEDICARE

## 2020-06-19 VITALS
TEMPERATURE: 98.5 F | OXYGEN SATURATION: 92 % | DIASTOLIC BLOOD PRESSURE: 55 MMHG | HEART RATE: 70 BPM | BODY MASS INDEX: 28.69 KG/M2 | SYSTOLIC BLOOD PRESSURE: 115 MMHG | WEIGHT: 154.32 LBS | RESPIRATION RATE: 16 BRPM

## 2020-06-19 LAB
ANION GAP SERPL CALCULATED.3IONS-SCNC: 12 MMOL/L (ref 3–16)
BASOPHILS ABSOLUTE: 0.1 K/UL (ref 0–0.2)
BASOPHILS RELATIVE PERCENT: 0.8 %
BUN BLDV-MCNC: 9 MG/DL (ref 7–20)
CALCIUM SERPL-MCNC: 9.2 MG/DL (ref 8.3–10.6)
CHLORIDE BLD-SCNC: 99 MMOL/L (ref 99–110)
CO2: 26 MMOL/L (ref 21–32)
CREAT SERPL-MCNC: <0.5 MG/DL (ref 0.6–1.2)
EKG ATRIAL RATE: 66 BPM
EKG DIAGNOSIS: NORMAL
EKG P-R INTERVAL: 146 MS
EKG Q-T INTERVAL: 424 MS
EKG QRS DURATION: 76 MS
EKG QTC CALCULATION (BAZETT): 444 MS
EKG R AXIS: 59 DEGREES
EKG T AXIS: 74 DEGREES
EKG VENTRICULAR RATE: 66 BPM
EOSINOPHILS ABSOLUTE: 0.2 K/UL (ref 0–0.6)
EOSINOPHILS RELATIVE PERCENT: 3 %
GFR AFRICAN AMERICAN: >60
GFR NON-AFRICAN AMERICAN: >60
GLUCOSE BLD-MCNC: 160 MG/DL (ref 70–99)
HCT VFR BLD CALC: 43.5 % (ref 36–48)
HEMOGLOBIN: 14.5 G/DL (ref 12–16)
LYMPHOCYTES ABSOLUTE: 1.5 K/UL (ref 1–5.1)
LYMPHOCYTES RELATIVE PERCENT: 19.7 %
MCH RBC QN AUTO: 32.1 PG (ref 26–34)
MCHC RBC AUTO-ENTMCNC: 33.4 G/DL (ref 31–36)
MCV RBC AUTO: 96.2 FL (ref 80–100)
MONOCYTES ABSOLUTE: 0.6 K/UL (ref 0–1.3)
MONOCYTES RELATIVE PERCENT: 7.1 %
NEUTROPHILS ABSOLUTE: 5.4 K/UL (ref 1.7–7.7)
NEUTROPHILS RELATIVE PERCENT: 69.4 %
PDW BLD-RTO: 15.6 % (ref 12.4–15.4)
PLATELET # BLD: 225 K/UL (ref 135–450)
PMV BLD AUTO: 8.1 FL (ref 5–10.5)
POTASSIUM REFLEX MAGNESIUM: 4 MMOL/L (ref 3.5–5.1)
PRO-BNP: 83 PG/ML (ref 0–124)
RBC # BLD: 4.52 M/UL (ref 4–5.2)
SODIUM BLD-SCNC: 137 MMOL/L (ref 136–145)
TROPONIN: <0.01 NG/ML
WBC # BLD: 7.8 K/UL (ref 4–11)

## 2020-06-19 PROCEDURE — 83880 ASSAY OF NATRIURETIC PEPTIDE: CPT

## 2020-06-19 PROCEDURE — 99285 EMERGENCY DEPT VISIT HI MDM: CPT

## 2020-06-19 PROCEDURE — 84484 ASSAY OF TROPONIN QUANT: CPT

## 2020-06-19 PROCEDURE — 96374 THER/PROPH/DIAG INJ IV PUSH: CPT

## 2020-06-19 PROCEDURE — 93010 ELECTROCARDIOGRAM REPORT: CPT | Performed by: INTERNAL MEDICINE

## 2020-06-19 PROCEDURE — 71045 X-RAY EXAM CHEST 1 VIEW: CPT

## 2020-06-19 PROCEDURE — 85025 COMPLETE CBC W/AUTO DIFF WBC: CPT

## 2020-06-19 PROCEDURE — 80048 BASIC METABOLIC PNL TOTAL CA: CPT

## 2020-06-19 PROCEDURE — 93005 ELECTROCARDIOGRAM TRACING: CPT | Performed by: EMERGENCY MEDICINE

## 2020-06-19 PROCEDURE — 2500000003 HC RX 250 WO HCPCS: Performed by: EMERGENCY MEDICINE

## 2020-06-19 PROCEDURE — 6370000000 HC RX 637 (ALT 250 FOR IP): Performed by: EMERGENCY MEDICINE

## 2020-06-19 RX ORDER — PANTOPRAZOLE SODIUM 20 MG/1
40 TABLET, DELAYED RELEASE ORAL DAILY
Qty: 30 TABLET | Refills: 0 | Status: SHIPPED | OUTPATIENT
Start: 2020-06-19 | End: 2020-07-15 | Stop reason: SDUPTHER

## 2020-06-19 RX ORDER — PANTOPRAZOLE SODIUM 40 MG/10ML
40 INJECTION, POWDER, LYOPHILIZED, FOR SOLUTION INTRAVENOUS ONCE
Status: DISCONTINUED | OUTPATIENT
Start: 2020-06-19 | End: 2020-06-19

## 2020-06-19 RX ORDER — PANTOPRAZOLE SODIUM 40 MG/1
40 TABLET, DELAYED RELEASE ORAL ONCE
Status: COMPLETED | OUTPATIENT
Start: 2020-06-19 | End: 2020-06-19

## 2020-06-19 RX ADMIN — PANTOPRAZOLE SODIUM 40 MG: 40 TABLET, DELAYED RELEASE ORAL at 09:13

## 2020-06-19 RX ADMIN — FAMOTIDINE 20 MG: 10 INJECTION, SOLUTION INTRAVENOUS at 09:13

## 2020-06-19 RX ADMIN — LIDOCAINE HYDROCHLORIDE: 20 SOLUTION ORAL; TOPICAL at 09:13

## 2020-06-19 ASSESSMENT — ENCOUNTER SYMPTOMS
APNEA: 0
SHORTNESS OF BREATH: 0
EYE REDNESS: 0
EYE DISCHARGE: 0
STRIDOR: 0
EYE ITCHING: 0
CHEST TIGHTNESS: 0
CHOKING: 0
ABDOMINAL DISTENTION: 0
WHEEZING: 0
EYE PAIN: 0
PHOTOPHOBIA: 0
COUGH: 0

## 2020-06-19 ASSESSMENT — PAIN DESCRIPTION - DESCRIPTORS: DESCRIPTORS: BURNING

## 2020-06-19 ASSESSMENT — PAIN SCALES - GENERAL
PAINLEVEL_OUTOF10: 7
PAINLEVEL_OUTOF10: 3

## 2020-06-19 ASSESSMENT — PAIN DESCRIPTION - LOCATION: LOCATION: CHEST

## 2020-06-19 NOTE — ED PROVIDER NOTES
needs     Medical: None     Non-medical: None   Tobacco Use    Smoking status: Former Smoker     Packs/day: 1.00     Years: 37.00     Pack years: 37.00     Types: Cigarettes     Last attempt to quit: 1/1/2017     Years since quitting: 3.4    Smokeless tobacco: Never Used   Substance and Sexual Activity    Alcohol use: No     Alcohol/week: 0.0 standard drinks    Drug use: No    Sexual activity: Not Currently   Lifestyle    Physical activity     Days per week: None     Minutes per session: None    Stress: None   Relationships    Social connections     Talks on phone: None     Gets together: None     Attends Synagogue service: None     Active member of club or organization: None     Attends meetings of clubs or organizations: None     Relationship status: None    Intimate partner violence     Fear of current or ex partner: None     Emotionally abused: None     Physically abused: None     Forced sexual activity: None   Other Topics Concern    None   Social History Narrative    None       PHYSICAL EXAM       ED Triage Vitals [06/19/20 0836]   BP Temp Temp Source Pulse Resp SpO2 Height Weight   (!) 163/113 98.3 °F (36.8 °C) Oral 73 15 93 % -- 154 lb 5.2 oz (70 kg)       Physical Exam  Vitals signs and nursing note reviewed. Constitutional:       General: She is not in acute distress. Appearance: She is well-developed. She is not ill-appearing, toxic-appearing or diaphoretic. HENT:      Head: Normocephalic and atraumatic. Right Ear: External ear normal.      Left Ear: External ear normal.   Eyes:      General:         Right eye: No discharge. Left eye: No discharge. Conjunctiva/sclera: Conjunctivae normal.      Pupils: Pupils are equal, round, and reactive to light. Neck:      Musculoskeletal: Normal range of motion and neck supple. Cardiovascular:      Rate and Rhythm: Normal rate and regular rhythm. Heart sounds: No murmur.    Pulmonary:      Effort: Pulmonary effort is normal. No respiratory distress. Breath sounds: Normal breath sounds. No wheezing or rales. Abdominal:      General: Bowel sounds are normal. There is no distension. Palpations: Abdomen is soft. There is no mass. Tenderness: There is no abdominal tenderness. There is no guarding or rebound. Genitourinary:     Comments: Deferred  Musculoskeletal: Normal range of motion. General: No deformity. Skin:     General: Skin is warm. Findings: No erythema or rash. Neurological:      Mental Status: She is alert and oriented to person, place, and time. She is not disoriented. Cranial Nerves: No cranial nerve deficit. Motor: No atrophy or abnormal muscle tone. Coordination: Coordination normal.   Psychiatric:         Behavior: Behavior normal.         Thought Content:  Thought content normal.         DIAGNOSTIC RESULTS     EKG: All EKG's are interpreted by the Emergency Department Physician who either signs or Co-signs this chart in the absence of acardiologist.    EKG NSR no ectopy no acute st changes rate 66    RADIOLOGY:   Non-plain film images such as CT, Ultrasoundand MRI are read by the radiologist. Plain radiographic images are visualized and preliminarily interpreted by the emergency physician with the below findings:    CXR reassuring     ED BEDSIDE ULTRASOUND:   Performed by ED Physician - none    LABS:  Labs Reviewed   CBC WITH AUTO DIFFERENTIAL - Abnormal; Notable for the following components:       Result Value    RDW 15.6 (*)     All other components within normal limits    Narrative:     Performed at:  [address] [address]   Phone [phone]   BASIC METABOLIC PANEL W/ REFLEX TO MG FOR LOW K - Abnormal; Notable for the following components:    Glucose 160 (*)     CREATININE <0.5 (*)     All other components within normal limits    Narrative:     Performed at:  [facility]

## 2020-06-19 NOTE — ED NOTES
Patient presents to the ED with complaints of chest pain and SOB. In registration/lobby patient in tripod position. Once getting patient to the room she states she has heart burn and is out of her medications. She states she does have some SOB that started yesterday and is slightly worse and some chest pain that she thinks is from the heart burn. Patient much more relaxed in the room. States she is out of her medications and does not have refills. She states she had a phone visit with PCP and she was instructed to call PCP for refills.       Maritza Franks RN  06/19/20 [address], RN  06/19/20 8632

## 2020-06-21 ENCOUNTER — CARE COORDINATION (OUTPATIENT)
Dept: CARE COORDINATION | Age: 60
End: 2020-06-21

## 2020-06-22 ENCOUNTER — NURSE TRIAGE (OUTPATIENT)
Dept: OTHER | Facility: CLINIC | Age: 60
End: 2020-06-22

## 2020-06-22 ENCOUNTER — VIRTUAL VISIT (OUTPATIENT)
Dept: FAMILY MEDICINE CLINIC | Age: 60
End: 2020-06-22
Payer: MEDICARE

## 2020-06-22 PROCEDURE — 99443 PR PHYS/QHP TELEPHONE EVALUATION 21-30 MIN: CPT | Performed by: FAMILY MEDICINE

## 2020-06-22 RX ORDER — LISINOPRIL 5 MG/1
5 TABLET ORAL DAILY
Qty: 30 TABLET | Refills: 5 | Status: ON HOLD
Start: 2020-06-22 | End: 2020-08-26 | Stop reason: HOSPADM

## 2020-06-22 RX ORDER — SERTRALINE HYDROCHLORIDE 100 MG/1
TABLET, FILM COATED ORAL
Qty: 30 TABLET | Refills: 2 | Status: SHIPPED | OUTPATIENT
Start: 2020-06-22 | End: 2020-10-05

## 2020-06-22 NOTE — TELEPHONE ENCOUNTER
Reason for Disposition   COVID-19 Testing, questions about    Answer Assessment - Initial Assessment Questions  1. CLOSE CONTACT: \"Who is the person with the confirmed or suspected COVID-19 infection that you were exposed to? \"      Doesn't know  2. PLACE of CONTACT: \"Where were you when you were exposed to COVID-19? \" (e.g., home, school, medical waiting room; which city?)     Don't know  3. TYPE of CONTACT: \"How much contact was there? \" (e.g., sitting next to, live in same house, work in same office, same building)   unknown  4. DURATION of CONTACT: \"How long were you in contact with the COVID-19 patient? \" (e.g., a few seconds, passed by person, a few minutes, live with the patient)     unknown  5. DATE of CONTACT: \"When did you have contact with a COVID-19 patient? \" (e.g., how many days ago)     unknown  6. TRAVEL: \"Have you traveled out of the country recently? \" If so, \"When and where? \"      * Also ask about out-of-state travel, since the [facility] has identified some high-risk cities for community spread in the [address]. * Note: Travel becomes less relevant if there is widespread community transmission where the patient lives. no  7. COMMUNITY SPREAD: \"Are there lots of cases of COVID-19 (community spread) where you live? \" (See public health department website, if unsure)        unknown  8. SYMPTOMS: \"Do you have any symptoms? \" (e.g., fever, cough, breathing difficulty)    Heartburn, headache behind eyes,sob, cold chills, diarrhea, BP was 148/90, leg pain  9. PREGNANCY OR POSTPARTUM: \"Is there any chance you are pregnant? \" \"When was your last menstrual period? \" \"Did you deliver in the last 2 weeks? \"      na  10. HIGH RISK: \"Do you have any heart or lung problems? Do you have a weak immune system? \" (e.g., CHF, COPD, asthma, HIV positive, chemotherapy, renal failure, diabetes mellitus, sickle cell anemia)        COPD    Protocols used: CORONAVIRUS (COVID-19) EXPOSURE-ADULT-[place]    Had some questions about the

## 2020-06-22 NOTE — CARE COORDINATION
Patient contacted regarding [address] Discussed COVID-19 related testing which was not done at this time. Test results were not done. Patient informed of results, if available? n/a    Care Transition Nurse/ Ambulatory Care Manager contacted the patient by telephone to perform post discharge assessment. Verified name and  with patient as identifiers. Provided introduction to self, and explanation of the CTN/ACM role, and reason for call due to risk factors for infection and/or exposure to COVID-19. Symptoms reviewed with patient who verbalized the following symptoms: pain or aching joints, shortness of breath, chest pain and headache. Due to no new or worsening symptoms encounter was not routed to provider for escalation. Discussed follow-up appointments. If no appointment was previously scheduled, appointment scheduling offered: [facility] follow up appointment(s):   Future Appointments   Date Time Provider Lashell Prabhakar   2020  9:00 AM Kaylynn Figueroa MD [facility]     Non-[facility] follow up appointment(s): n/a     Patient has following risk factors of: COPD, diabetes and CVD. CTN/ACM reviewed discharge instructions, medical action plan and red flags such as increased shortness of breath, increasing fever and signs of decompensation with patient who verbalized understanding. Discussed exposure protocols and quarantine with CDC Guidelines What to do if you are sick with coronavirus disease .  Patient was given an opportunity for questions and concerns. The patient agrees to contact the [facility] exposure line [phone], local [facility] department [place] [facility] of [facility]: ([phone]) and PCP office for questions related to their healthcare. CTN/ACM provided contact information for future needs.     Reviewed and educated patient on any new and changed medications related to discharge diagnosis     Patient/family/caregiver given information for [facility] and agrees to enroll

## 2020-06-22 NOTE — PROGRESS NOTES
Blanca Murry is a 60 y.o. female evaluated via telephone on 6/22/2020. Consent:  She and/or health care decision maker is aware that that she may receive a bill for this telephone service, depending on her insurance coverage, and has provided verbal consent to proceed: Yes      Documentation:  I communicated with the patient and/or health care decision maker about    1. ER follow up- GERD- patient stated that she was at the ER last week for abdominal pain and chest pain, not a new problem, was given GI cocktail and placed on Protonix 40 mg, her evaluation there was wnl and is feeling much better today. She denied chest pain, sob, n, v, or diarrhea. 2.  HTN- patient has had elevated BP for several months, today she had two readings that were elevated, it was elevated in the ER, she read back recent BP numbers she has been taking at home and most were above 140/90, she is taking her medications as prescribed, she is not on an ACE or ARB and has hx of DM II. She was only on Toprol 25 mg. Details of this discussion including any medical advice provided:     1. GERD  Take medication as prescribed. Raise head of bed. Avoid trigger foods. Discussed side effects of the medication. Please RTC if not improved. 2.  HTN  Difficult and poorly controlled. Discussed signs and symptoms for immediate evaluation in the ER. Reduce sodium. Monitor diet, exercise and lose weight. Keep a BP log and bring it to your next appointment. Needs to rtc in one month for BP check    I affirm this is a Patient Initiated Episode with a Patient who has not had a related appointment within my department in the past 7 days or scheduled within the next 24 hours.     Patient identification was verified at the start of the visit: Yes    Total Time: minutes: 21-30 minutes    Note: not billable if this call serves to triage the patient into an appointment for the relevant concern      Rudolph Sabillon

## 2020-06-23 ENCOUNTER — OFFICE VISIT (OUTPATIENT)
Dept: PULMONOLOGY | Age: 60
End: 2020-06-23
Payer: MEDICARE

## 2020-06-23 VITALS
HEART RATE: 76 BPM | SYSTOLIC BLOOD PRESSURE: 120 MMHG | DIASTOLIC BLOOD PRESSURE: 78 MMHG | TEMPERATURE: 98.3 F | HEIGHT: 62 IN | RESPIRATION RATE: 14 BRPM | WEIGHT: 154 LBS | OXYGEN SATURATION: 94 % | BODY MASS INDEX: 28.34 KG/M2

## 2020-06-23 PROCEDURE — 3023F SPIROM DOC REV: CPT | Performed by: INTERNAL MEDICINE

## 2020-06-23 PROCEDURE — G8419 CALC BMI OUT NRM PARAM NOF/U: HCPCS | Performed by: INTERNAL MEDICINE

## 2020-06-23 PROCEDURE — 1036F TOBACCO NON-USER: CPT | Performed by: INTERNAL MEDICINE

## 2020-06-23 PROCEDURE — G8427 DOCREV CUR MEDS BY ELIG CLIN: HCPCS | Performed by: INTERNAL MEDICINE

## 2020-06-23 PROCEDURE — 3017F COLORECTAL CA SCREEN DOC REV: CPT | Performed by: INTERNAL MEDICINE

## 2020-06-23 PROCEDURE — G8926 SPIRO NO PERF OR DOC: HCPCS | Performed by: INTERNAL MEDICINE

## 2020-06-23 PROCEDURE — 99213 OFFICE O/P EST LOW 20 MIN: CPT | Performed by: INTERNAL MEDICINE

## 2020-06-23 RX ORDER — AZITHROMYCIN 500 MG/1
500 TABLET, FILM COATED ORAL DAILY
Qty: 10 TABLET | Refills: 0 | Status: SHIPPED | OUTPATIENT
Start: 2020-06-23 | End: 2020-07-03

## 2020-06-23 RX ORDER — ALBUTEROL SULFATE 90 UG/1
4-6 AEROSOL, METERED RESPIRATORY (INHALATION) EVERY 4 HOURS PRN
Qty: 1 INHALER | Refills: 2 | Status: SHIPPED | OUTPATIENT
Start: 2020-06-23 | End: 2021-05-09 | Stop reason: ALTCHOICE

## 2020-06-23 NOTE — PROGRESS NOTES
REASON FOR CONSULTATION/CC:    Chief Complaint   Patient presents with    COPD     f/u COPD        Consult at request of   Ebony Brown DO for shortness of breath     PCP: Ebony Brown DO    HISTORY OF PRESENT ILLNESS: [address] is a 60y.o. year old female with a history of tobacoco abuse who presents :         COPD  She is currently only using Incruse. Had not have the albuterol HFA. Does have neb. She was supposed to be on Advair and stop Symbicort but she stopped both. She is happy with only Incruse     She is complains of nose pain with covering nose. No fevers . tenderness to palpation . + sinus drainage, clear. Social History     Tobacco Use   Smoking Status Former Smoker    Packs/day: 1.00    Years: 37.00    Pack years: 37.00    Types: Cigarettes    Last attempt to quit: 2017    Years since quitting: 3.4   Smokeless Tobacco Never Used          PAST MEDICAL HISTORY:  Past Medical History:   Diagnosis Date    Back pain     CAD (coronary artery disease)     COPD (chronic obstructive pulmonary disease) (HCC)     Foot pain     from bone spurs    GERD (gastroesophageal reflux disease)     Headache     sharp pain going through head    Heart attack ([address]     Hip pain     Hyperlipidemia     Hypertension      (normal spontaneous vaginal delivery)     Osteoporosis     Vision abnormalities        PAST SURGICAL HISTORY:  Past Surgical History:   Procedure Laterality Date    CARDIAC CATHETERIZATION      CHOLECYSTECTOMY      COLONOSCOPY      ESOPHAGEAL DILATATION N/A 2019    ESOPHAGEAL DILATION Elisa Jett performed by Maribel Charles MD at [facility] N/A 2019    EGD BIOPSY performed by Maribel Charles MD at [address]:  family history includes COPD in her father; Emphysema in her mother; Heart Failure in her father and mother; Hypertension in her mother and paternal grandmother. SOCIAL HISTORY:   reports that she quit smoking about 3 years ago. Her smoking use included cigarettes. She has a 37.00 pack-year smoking history. She has never used smokeless tobacco.      ALLERGIES:  Patient is allergic to iodine; other; eggs or egg-derived products [eggs or egg-derived products]; eggs [egg white]; flexeril [cyclobenzaprine hcl]; levofloxacin; and omeprazole. REVIEW OF SYSTEMS:  Constitutional: Negative for fever   HENT: Negative for sore throat + sinus pain   Respiratory: Negative for dyspnea, cough  Cardiovascular: Negative for chest pain  Gastrointestinal: Negative for vomiting, diarrhea   Skin: Negative for rash  Psychiatric/Behavorial: Negative for anxiety    Objective:   PHYSICAL EXAM:  Blood pressure 120/78, pulse 76, temperature 98.3 °F (36.8 °C), temperature source Oral, resp. rate 14, height 5' 1.5\" (1.562 m), weight 154 lb (69.9 kg), SpO2 94 %, not currently breastfeeding.'  Gen: No distress. ENT: tenderness to palpation of frontal sinus  Resp: No accessory muscle use. No crackles. No wheezes. No rhonchi. CV: Regular rate. Regular rhythm. No murmur or rub. No edema. Skin: Warm, dry, normal texture and turgor. No nodule on exposed extremities. M/S: No cyanosis. No clubbing. No joint deformity. Psych: Oriented x 3. No anxiety. Awake. Alert. Intact judgement and insight. Good Mood / Affect.   Memory appears in tact         Current Outpatient Medications   Medication Sig Dispense Refill    albuterol sulfate HFA (PROVENTIL HFA) 108 (90 Base) MCG/ACT inhaler Inhale 4-6 puffs into the lungs every 4 hours as needed for Wheezing or Shortness of Breath 1 Inhaler 2    azithromycin (ZITHROMAX TRI-HOLLIS) 500 MG tablet Take 1 tablet by mouth daily for 10 days 10 tablet 0    sertraline (ZOLOFT) 100 MG tablet TAKE ONE TABLET BY MOUTH DAILY 30 tablet 2    lisinopril (PRINIVIL;ZESTRIL) 5 MG tablet Take 1 tablet by mouth daily 30 tablet 5    pantoprazole (PROTONIX) 20 MG tablet Take 2 tablets by mouth daily 30 tablet 0    metoprolol succinate (TOPROL XL) 25 MG extended release tablet Take 0.5 tablets by mouth daily 30 tablet 3    Umeclidinium Bromide 62.5 MCG/INH AEPB Inhale 1 puff into the lungs daily 1 each 5    Respiratory Therapy Supplies (NEBULIZER/TUBING/MOUTHPIECE) KIT 1 kit by Does not apply route daily 1 kit 0    pregabalin (LYRICA) 75 MG capsule Take 1 capsule by mouth 2 times daily for 125 days. 60 capsule 3    blood glucose test strips (ACURA BLOOD GLUCOSE TEST) strip 1 each by In Vitro route daily As needed. 100 each 3    metFORMIN (GLUCOPHAGE) 500 MG tablet Take 1 tablet by mouth daily (with breakfast) 90 tablet 0    Blood Glucose Monitoring Suppl (ACURA BLOOD GLUCOSE METER) w/Device KIT Test for blood sugar before meals 1 kit 0    triamcinolone (KENALOG) 0.1 % cream Apply topically 2 times daily. 45 g 0    atorvastatin (LIPITOR) 40 MG tablet Take 1 tablet by mouth nightly 30 tablet 3    ergocalciferol (ERGOCALCIFEROL) 12075 units capsule Take 1 capsule by mouth once a week 4 capsule 3    aspirin 81 MG chewable tablet CHEW ONE TABLET BY MOUTH DAILY 90 tablet 1    diphenhydrAMINE-APAP, sleep, (TYLENOL PM EXTRA STRENGTH)  MG tablet Take 1 tablet by mouth      hydrocortisone 2.5 % cream Apply topically 2 times daily. 28 g 1    mineral oil-hydrophilic petrolatum (AQUAPHOR) ointment Apply topically as needed. 396 g 0    lidocaine (LIDODERM) 5 % Place 1 patch onto the skin daily 12 hours on, 12 hours off. 10 patch 0    acetaminophen (TYLENOL) 500 MG tablet Take 2 tablets by mouth every 6 hours as needed for Pain Do not take with other medications containing acetaminophen.  30 tablet 0    Handicap Placard MISC by Does not apply route Handicap Placard for 5 years 1 each 0    fluticasone-salmeterol (ADVAIR DISKUS) 250-50 MCG/DOSE AEPB Inhale 1 puff into the lungs every 12 hours (Patient not taking: Reported on 6/23/2020) 60 each 3    Spacer/Aero-Holding Chambers (E-Z

## 2020-06-23 NOTE — ASSESSMENT & PLAN NOTE
She is now using Incruse and albuterol as needed. She was supposed to be on Advair but stopped this as well as Symbicort. Since she is controlled, no change. She is complains of sinus congestion and drainage with tenderness to palpation. Will treat with EULALIA sullivan for acute sinusitis.

## 2020-07-06 ENCOUNTER — OFFICE VISIT (OUTPATIENT)
Dept: FAMILY MEDICINE CLINIC | Age: 60
End: 2020-07-06
Payer: MEDICARE

## 2020-07-06 VITALS
SYSTOLIC BLOOD PRESSURE: 134 MMHG | DIASTOLIC BLOOD PRESSURE: 80 MMHG | BODY MASS INDEX: 28.26 KG/M2 | WEIGHT: 153.6 LBS | TEMPERATURE: 96.9 F | HEIGHT: 62 IN

## 2020-07-06 LAB
AMPHETAMINE SCREEN, URINE: NORMAL
BACTERIA: ABNORMAL /HPF
BARBITURATE SCREEN URINE: NORMAL
BENZODIAZEPINE SCREEN, URINE: NORMAL
BILIRUBIN URINE: ABNORMAL
BLOOD, URINE: NEGATIVE
CANNABINOID SCREEN URINE: NORMAL
CLARITY: ABNORMAL
COCAINE METABOLITE SCREEN URINE: NORMAL
COLOR: YELLOW
CRYSTALS, UA: ABNORMAL /HPF
EPITHELIAL CELLS, UA: 8 /HPF (ref 0–5)
GLUCOSE URINE: NEGATIVE MG/DL
HBA1C MFR BLD: 6.6 %
KETONES, URINE: NEGATIVE MG/DL
LEUKOCYTE ESTERASE, URINE: ABNORMAL
Lab: NORMAL
METHADONE SCREEN, URINE: NORMAL
MICROSCOPIC EXAMINATION: YES
NITRITE, URINE: NEGATIVE
OPIATE SCREEN URINE: NORMAL
OXYCODONE URINE: NORMAL
PH UA: 5
PH UA: 5.5 (ref 5–8)
PHENCYCLIDINE SCREEN URINE: NORMAL
PROPOXYPHENE SCREEN: NORMAL
PROTEIN UA: NEGATIVE MG/DL
RBC UA: 5 /HPF (ref 0–4)
SPECIFIC GRAVITY UA: 1.02 (ref 1–1.03)
URINE REFLEX TO CULTURE: ABNORMAL
URINE TYPE: ABNORMAL
UROBILINOGEN, URINE: 1 E.U./DL
WBC UA: 4 /HPF (ref 0–5)

## 2020-07-06 PROCEDURE — 83036 HEMOGLOBIN GLYCOSYLATED A1C: CPT | Performed by: FAMILY MEDICINE

## 2020-07-06 PROCEDURE — 99214 OFFICE O/P EST MOD 30 MIN: CPT | Performed by: FAMILY MEDICINE

## 2020-07-06 PROCEDURE — 3017F COLORECTAL CA SCREEN DOC REV: CPT | Performed by: FAMILY MEDICINE

## 2020-07-06 PROCEDURE — 1036F TOBACCO NON-USER: CPT | Performed by: FAMILY MEDICINE

## 2020-07-06 PROCEDURE — G8419 CALC BMI OUT NRM PARAM NOF/U: HCPCS | Performed by: FAMILY MEDICINE

## 2020-07-06 PROCEDURE — G8427 DOCREV CUR MEDS BY ELIG CLIN: HCPCS | Performed by: FAMILY MEDICINE

## 2020-07-06 PROCEDURE — 81003 URINALYSIS AUTO W/O SCOPE: CPT | Performed by: FAMILY MEDICINE

## 2020-07-06 NOTE — PROGRESS NOTES
Respiratory: Positive for cough. Negative for shortness of breath and wheezing. Cardiovascular: Negative for chest pain, palpitations and leg swelling. Gastrointestinal: Negative for abdominal pain, diarrhea, nausea and vomiting. Musculoskeletal: Positive for arthralgias, back pain and gait problem. Skin: Negative for color change and wound. Neurological: Positive for facial asymmetry and light-headedness. Negative for seizures, syncope, speech difficulty, numbness and headaches. Psychiatric/Behavioral: Negative for dysphoric mood. The patient is nervous/anxious. Prior to Visit Medications    Medication Sig Taking? Authorizing Provider   albuterol sulfate HFA (PROVENTIL HFA) 108 (90 Base) MCG/ACT inhaler Inhale 4-6 puffs into the lungs every 4 hours as needed for Wheezing or Shortness of Breath Yes Cara Escamilla MD   sertraline (ZOLOFT) 100 MG tablet TAKE ONE TABLET BY MOUTH DAILY Yes Kevin Zapata DO   lisinopril (PRINIVIL;ZESTRIL) 5 MG tablet Take 1 tablet by mouth daily Yes Kevin Zapata DO   pantoprazole (PROTONIX) 20 MG tablet Take 2 tablets by mouth daily Yes Sue Martin MD   metoprolol succinate (TOPROL XL) 25 MG extended release tablet Take 0.5 tablets by mouth daily Yes Martell Rivera,    Umeclidinium Bromide 62.5 MCG/INH AEPB Inhale 1 puff into the lungs daily Yes Cara Escamilla MD   Respiratory Therapy Supplies (NEBULIZER/TUBING/MOUTHPIECE) KIT 1 kit by Does not apply route daily Yes Cara Escamilla MD   fluticasone-salmeterol (ADVAIR DISKUS) 250-50 MCG/DOSE AEPB Inhale 1 puff into the lungs every 12 hours Yes Kevin Zapata DO   pregabalin (LYRICA) 75 MG capsule Take 1 capsule by mouth 2 times daily for 125 days. Yes Martell Rivera DO   blood glucose test strips (ACURA BLOOD GLUCOSE TEST) strip 1 each by In Vitro route daily As needed.  Yes JOSE ANGEL Massey - CNP   metFORMIN (GLUCOPHAGE) 500 MG tablet Take 1 tablet by mouth daily (with breakfast) Yes index is 28.55 kg/m² as calculated from the following:    Height as of this encounter: 5' 1.5\" (1.562 m). Weight as of this encounter: 153 lb 9.6 oz (69.7 kg). Physical Exam  Vitals signs and nursing note reviewed. Constitutional:       Appearance: She is obese. HENT:      Head: Normocephalic and atraumatic. Right Ear: Tympanic membrane, ear canal and external ear normal.      Left Ear: Tympanic membrane, ear canal and external ear normal.      Nose: Nose normal.      Mouth/Throat:      Mouth: Mucous membranes are moist.   Eyes:      Conjunctiva/sclera: Conjunctivae normal.   Cardiovascular:      Rate and Rhythm: Regular rhythm. Heart sounds: Normal heart sounds. No murmur. Pulmonary:      Breath sounds: Normal breath sounds. No wheezing. Abdominal:      General: Abdomen is flat. Palpations: Abdomen is soft. Tenderness: There is no abdominal tenderness. Musculoskeletal:         General: Tenderness present. Comments: Lower back pain, present for years  Right hand  is greater than left (pateint is left handed)   Skin:     General: Skin is warm. Neurological:      Mental Status: She is alert and oriented to person, place, and time. Cranial Nerves: No cranial nerve deficit. Coordination: Coordination abnormal.      Gait: Gait abnormal.   Psychiatric:         Mood and Affect: Mood normal.         ASSESSMENT/PLAN:  1. Essential hypertension  Well controlled. Discussed signs and symptoms for immediate evaluation in the ER. Reduce sodium. Monitor diet, exercise and lose weight. Keep a BP log and bring it to your next appointment. Needs to rtc in one month for BP check  - [id number] - CA Duplex Scan Extracranial, Randall  - DRUG SCREEN MULTI URINE  - URINE RT REFLEX TO CULTURE  - POCT glycosylated hemoglobin (Hb A1C)    2. Memory deficit  MRI ordered? Patient stated she is allergic to contrast? Iodine?  Very difficult to distinguish, will discuss with radiologist on the day of MRI to decide if contrast is needed. Again apparently not new, sister stated it has been going on for several years. - MRI BRAIN WO CONTRAST  - [id number] - SD Duplex Scan Extracranial, Randall  - DRUG SCREEN MULTI URINE  - URINE RT REFLEX TO CULTURE  - POCT glycosylated hemoglobin (Hb A1C)    3. Lightheadedness  See HPI  Needs TSH and B 12 ordered. - MRI BRAIN WO CONTRAST  - [id number] - SD Duplex Scan Extracranial, Randall  - DRUG SCREEN MULTI URINE  - URINE RT REFLEX TO CULTURE  - POCT glycosylated hemoglobin (Hb A1C)    4. Ataxia  Referred for procedure  Educated on the importance of having this done. Answered questions  - MRI BRAIN WO CONTRAST  - [id number] - SD Duplex Scan Extracranial, Randall  - DRUG SCREEN MULTI URINE  - URINE RT REFLEX TO CULTURE  - POCT glycosylated hemoglobin (Hb A1C)    5. Facial droop  Referred for procedure  Educated on the importance of having this done. Answered questions  Educated on signs and symptoms for immediate evaluation in the ER.   - MRI BRAIN WO CONTRAST  - [id number] - SD Duplex Scan Extracranial, Randall  - DRUG SCREEN MULTI URINE  - URINE RT REFLEX TO CULTURE  - POCT glycosylated hemoglobin (Hb A1C)    6. Weakness  Stable  Continue with medication  Keep appointments with specialist.   Chai Sauceda questions      Return in about 3 months (around 10/6/2020).

## 2020-07-07 ASSESSMENT — ENCOUNTER SYMPTOMS
ABDOMINAL PAIN: 0
BACK PAIN: 1
COUGH: 1
COLOR CHANGE: 0
SINUS PAIN: 1
WHEEZING: 0
NAUSEA: 0
SHORTNESS OF BREATH: 0
DIARRHEA: 0
RHINORRHEA: 0
VOMITING: 0
SORE THROAT: 1

## 2020-07-10 ENCOUNTER — HOSPITAL ENCOUNTER (OUTPATIENT)
Dept: MRI IMAGING | Age: 60
Discharge: HOME | End: 2020-07-10
Payer: MEDICARE

## 2020-07-10 PROCEDURE — 70551 MRI BRAIN STEM W/O DYE: CPT

## 2020-07-14 ENCOUNTER — TELEPHONE (OUTPATIENT)
Dept: FAMILY MEDICINE CLINIC | Age: 60
End: 2020-07-14

## 2020-07-14 NOTE — TELEPHONE ENCOUNTER
Pt calling to get MRI results, MRI was done Friday 7/10, was told results would be available Monday or Tuesday

## 2020-07-15 ENCOUNTER — HOSPITAL ENCOUNTER (EMERGENCY)
Age: 60
Discharge: HOME | End: 2020-07-16
Payer: MEDICARE

## 2020-07-15 ENCOUNTER — APPOINTMENT (OUTPATIENT)
Dept: GENERAL RADIOLOGY | Age: 60
End: 2020-07-15
Payer: MEDICARE

## 2020-07-15 LAB
ANION GAP SERPL CALCULATED.3IONS-SCNC: 10 MMOL/L (ref 3–16)
BASOPHILS ABSOLUTE: 0.1 K/UL (ref 0–0.2)
BASOPHILS RELATIVE PERCENT: 0.7 %
BUN BLDV-MCNC: 11 MG/DL (ref 7–20)
CALCIUM SERPL-MCNC: 9.2 MG/DL (ref 8.3–10.6)
CHLORIDE BLD-SCNC: 102 MMOL/L (ref 99–110)
CO2: 25 MMOL/L (ref 21–32)
CREAT SERPL-MCNC: 0.5 MG/DL (ref 0.6–1.2)
EOSINOPHILS ABSOLUTE: 0.2 K/UL (ref 0–0.6)
EOSINOPHILS RELATIVE PERCENT: 1.9 %
GFR AFRICAN AMERICAN: >60
GFR NON-AFRICAN AMERICAN: >60
GLUCOSE BLD-MCNC: 107 MG/DL (ref 70–99)
HCT VFR BLD CALC: 39.9 % (ref 36–48)
HEMOGLOBIN: 13.2 G/DL (ref 12–16)
LYMPHOCYTES ABSOLUTE: 1.9 K/UL (ref 1–5.1)
LYMPHOCYTES RELATIVE PERCENT: 22.6 %
MCH RBC QN AUTO: 31.9 PG (ref 26–34)
MCHC RBC AUTO-ENTMCNC: 33.1 G/DL (ref 31–36)
MCV RBC AUTO: 96.3 FL (ref 80–100)
MONOCYTES ABSOLUTE: 0.5 K/UL (ref 0–1.3)
MONOCYTES RELATIVE PERCENT: 6.2 %
NEUTROPHILS ABSOLUTE: 5.8 K/UL (ref 1.7–7.7)
NEUTROPHILS RELATIVE PERCENT: 68.6 %
PDW BLD-RTO: 16.3 % (ref 12.4–15.4)
PLATELET # BLD: 236 K/UL (ref 135–450)
PMV BLD AUTO: 8.1 FL (ref 5–10.5)
POTASSIUM REFLEX MAGNESIUM: 4.2 MMOL/L (ref 3.5–5.1)
PRO-BNP: 102 PG/ML (ref 0–124)
RBC # BLD: 4.14 M/UL (ref 4–5.2)
SODIUM BLD-SCNC: 137 MMOL/L (ref 136–145)
TROPONIN: <0.01 NG/ML
WBC # BLD: 8.4 K/UL (ref 4–11)

## 2020-07-15 PROCEDURE — 99285 EMERGENCY DEPT VISIT HI MDM: CPT

## 2020-07-15 PROCEDURE — 84484 ASSAY OF TROPONIN QUANT: CPT

## 2020-07-15 PROCEDURE — 93005 ELECTROCARDIOGRAM TRACING: CPT

## 2020-07-15 PROCEDURE — 71046 X-RAY EXAM CHEST 2 VIEWS: CPT

## 2020-07-15 PROCEDURE — 85025 COMPLETE CBC W/AUTO DIFF WBC: CPT

## 2020-07-15 PROCEDURE — 80048 BASIC METABOLIC PNL TOTAL CA: CPT

## 2020-07-15 PROCEDURE — 6370000000 HC RX 637 (ALT 250 FOR IP): Performed by: NURSE PRACTITIONER

## 2020-07-15 PROCEDURE — 36415 COLL VENOUS BLD VENIPUNCTURE: CPT

## 2020-07-15 PROCEDURE — 83880 ASSAY OF NATRIURETIC PEPTIDE: CPT

## 2020-07-15 RX ORDER — ACETAMINOPHEN 325 MG/1
650 TABLET ORAL ONCE
Status: COMPLETED | OUTPATIENT
Start: 2020-07-15 | End: 2020-07-15

## 2020-07-15 RX ORDER — PANTOPRAZOLE SODIUM 20 MG/1
40 TABLET, DELAYED RELEASE ORAL DAILY
Qty: 30 TABLET | Refills: 0 | Status: SHIPPED | OUTPATIENT
Start: 2020-07-15 | End: 2020-08-06

## 2020-07-15 RX ORDER — METHOCARBAMOL 500 MG/1
500 TABLET, FILM COATED ORAL ONCE
Status: COMPLETED | OUTPATIENT
Start: 2020-07-15 | End: 2020-07-15

## 2020-07-15 RX ADMIN — ACETAMINOPHEN 650 MG: 325 TABLET ORAL at 22:39

## 2020-07-15 RX ADMIN — METHOCARBAMOL TABLETS 500 MG: 500 TABLET, COATED ORAL at 22:40

## 2020-07-15 ASSESSMENT — PAIN SCALES - GENERAL
PAINLEVEL_OUTOF10: 3
PAINLEVEL_OUTOF10: 9
PAINLEVEL_OUTOF10: 6

## 2020-07-15 ASSESSMENT — PAIN - FUNCTIONAL ASSESSMENT: PAIN_FUNCTIONAL_ASSESSMENT: PREVENTS OR INTERFERES SOME ACTIVE ACTIVITIES AND ADLS

## 2020-07-15 ASSESSMENT — PAIN DESCRIPTION - PROGRESSION: CLINICAL_PROGRESSION: GRADUALLY WORSENING

## 2020-07-15 ASSESSMENT — ENCOUNTER SYMPTOMS
GASTROINTESTINAL NEGATIVE: 1
SHORTNESS OF BREATH: 1

## 2020-07-15 ASSESSMENT — HEART SCORE: ECG: 0

## 2020-07-15 ASSESSMENT — PAIN DESCRIPTION - DESCRIPTORS
DESCRIPTORS: ACHING
DESCRIPTORS: CONSTANT;SHOOTING
DESCRIPTORS: ACHING

## 2020-07-15 ASSESSMENT — PAIN DESCRIPTION - ONSET: ONSET: GRADUAL

## 2020-07-15 ASSESSMENT — PAIN DESCRIPTION - FREQUENCY: FREQUENCY: CONTINUOUS

## 2020-07-15 ASSESSMENT — PAIN DESCRIPTION - ORIENTATION: ORIENTATION: RIGHT

## 2020-07-15 ASSESSMENT — PAIN DESCRIPTION - PAIN TYPE
TYPE: ACUTE PAIN

## 2020-07-15 ASSESSMENT — PAIN DESCRIPTION - LOCATION
LOCATION: CHEST

## 2020-07-15 ASSESSMENT — PAIN SCALES - WONG BAKER: WONGBAKER_NUMERICALRESPONSE: 8

## 2020-07-16 ENCOUNTER — CARE COORDINATION (OUTPATIENT)
Dept: CARE COORDINATION | Age: 60
End: 2020-07-16

## 2020-07-16 VITALS
SYSTOLIC BLOOD PRESSURE: 118 MMHG | DIASTOLIC BLOOD PRESSURE: 69 MMHG | RESPIRATION RATE: 23 BRPM | OXYGEN SATURATION: 93 % | TEMPERATURE: 99 F | HEART RATE: 79 BPM

## 2020-07-16 LAB
EKG ATRIAL RATE: 71 BPM
EKG DIAGNOSIS: NORMAL
EKG P AXIS: 24 DEGREES
EKG P-R INTERVAL: 114 MS
EKG Q-T INTERVAL: 390 MS
EKG QRS DURATION: 74 MS
EKG QTC CALCULATION (BAZETT): 423 MS
EKG R AXIS: 60 DEGREES
EKG T AXIS: 61 DEGREES
EKG VENTRICULAR RATE: 71 BPM

## 2020-07-16 PROCEDURE — 93010 ELECTROCARDIOGRAM REPORT: CPT | Performed by: INTERNAL MEDICINE

## 2020-07-16 ASSESSMENT — PAIN SCALES - GENERAL: PAINLEVEL_OUTOF10: 2

## 2020-07-16 ASSESSMENT — PAIN DESCRIPTION - DESCRIPTORS: DESCRIPTORS: ACHING

## 2020-07-16 ASSESSMENT — PAIN DESCRIPTION - LOCATION: LOCATION: CHEST

## 2020-07-16 ASSESSMENT — PAIN DESCRIPTION - PAIN TYPE: TYPE: ACUTE PAIN

## 2020-07-16 NOTE — ED PROVIDER NOTES
Neurological: Negative. Hematological: Negative. Except as noted above the remainder of the review of systems was reviewed and negative. PAST MEDICAL HISTORY         Diagnosis Date    Back pain     CAD (coronary artery disease)     COPD (chronic obstructive pulmonary disease) (HCC)     Foot pain     from bone spurs    GERD (gastroesophageal reflux disease)     Headache     sharp pain going through head    Heart attack ([facility] [address]     Hip pain     Hyperlipidemia     Hypertension      (normal spontaneous vaginal delivery)     Osteoporosis     Vision abnormalities        SURGICAL HISTORY           Procedure Laterality Date    CARDIAC CATHETERIZATION      CHOLECYSTECTOMY      COLONOSCOPY      ESOPHAGEAL DILATATION N/A 2019    ESOPHAGEAL DILATION [place] performed by Amirah Rivers MD at [address] [employer] N/A 2019    EGD BIOPSY performed by Amirah Rivers MD at [facility]     [unfilled]    ALLERGIES     Iodine; Other; Eggs or egg-derived products [eggs or egg-derived products]; Eggs [egg white]; Flexeril [cyclobenzaprine hcl]; Levofloxacin; and Omeprazole    FAMILY HISTORY           Problem Relation Age of Onset    Heart Failure Mother         heart disease    Hypertension Mother     Emphysema Mother     Heart Failure Father         heart disease    COPD Father     Hypertension Paternal Grandmother      Family Status   Relation Name Status    Mother           at 43years of age   Chery Father      MGM      MGF      [address] [place] [address]          SOCIAL HISTORY      reports that she quit smoking about 3 years ago. Her smoking use included cigarettes. She has a 37.00 pack-year smoking history. She has never used smokeless tobacco. She reports that she does not drink alcohol or use drugs.     PHYSICAL EXAM    (up to 7 for level 4, 8 or more for level 5)     ED Triage Vitals [07/15/20 2155]   Enc Vitals Group      BP (!) 111/58      Pulse 81      Resp 18      Temp 99 °F (37.2 °C)      Temp Source Oral      SpO2 92 %      Weight       Height       Head Circumference       Peak Flow       Pain Score       Pain Loc       Pain Edu? Excl. in [address]? Physical Exam  Vitals signs and nursing note reviewed. Constitutional:       General: She is not in acute distress. Appearance: She is not ill-appearing, toxic-appearing or diaphoretic. HENT:      Mouth/Throat:      Mouth: Mucous membranes are moist.   Eyes:      Extraocular Movements: Extraocular movements intact. Pupils: Pupils are equal, round, and reactive to light. Cardiovascular:      Rate and Rhythm: Normal rate and regular rhythm. Pulses: Normal pulses. Heart sounds: Normal heart sounds. Comments: Negative for lower extremity edema. Calves are nontender bilaterally. Pulmonary:      Effort: Pulmonary effort is normal.      Breath sounds: Normal breath sounds. Comments: Patient speaks in full sentences. No evidence of tachycardia, or hypoxia. No retractions. Lung fields are clear apex to base anterior posterior. Chest:      Chest wall: No tenderness. Abdominal:      Palpations: Abdomen is soft. Skin:     General: Skin is warm and dry. Capillary Refill: Capillary refill takes less than 2 seconds. Neurological:      General: No focal deficit present. Mental Status: She is alert and oriented to person, place, and time. DIAGNOSTIC RESULTS     EKG: All EKG's are interpreted by the Emergency Department Physician who either signs or Co-signs this chart in the absence of a cardiologist.  EKG interpreted per Dr. Elaine Jaimes reviewed per myself sinus rhythm. Narrow complex. Negative for ST elevation or depression. No axis deviation. Ventricular rate 71, CO interval 114, QRS 7 4, . Compared to June 19, 2020 unchanged.   RADIOLOGY:   Non-plain film images such as CT, Ultrasound and MRI are read by the radiologist. Plain radiographic images are visualized and preliminarilyinterpreted by the emergency physician with the below findings:    Interpretation per the Radiologist below,if available at the time of this note:    XR CHEST STANDARD (2 VW)   Preliminary Result   1. Increased atelectasis in the left lung base. LABS:  Labs Reviewed   CBC WITH AUTO DIFFERENTIAL - Abnormal; Notable for the following components:       Result Value    RDW 16.3 (*)     All other components within normal limits    Narrative:     Performed at:  [address] [address]   Phone [phone]   BASIC METABOLIC PANEL W/ REFLEX TO MG FOR LOW K - Abnormal; Notable for the following components:    Glucose 107 (*)     CREATININE 0.5 (*)     All other components within normal limits    Narrative:     Performed at:  [address] [place] [address]   Phone [phone]   BRAIN NATRIURETIC PEPTIDE    Narrative:     Performed at:  [address] [place] [address]   Phone [phone]   TROPONIN    Narrative:     Performed at:  [facility]  [address] [place], [place] [address] [address]   Phone [phone]       All other labs were within normal range or not returned as of this dictation. EMERGENCY DEPARTMENT COURSE and DIFFERENTIAL DIAGNOSIS/MDM:   Vitals:    Vitals:    07/15/20 2155   BP: (!) 111/58   Pulse: 81   Resp: 18   Temp: 99 °F (37.2 °C)   TempSrc: Oral   SpO2: 92%     Medications   acetaminophen (TYLENOL) tablet 650 mg (650 mg Oral Given 7/15/20 2239)   methocarbamol (ROBAXIN) tablet 500 mg (500 mg Oral Given 7/15/20 2240)       MDM   Was seen and evaluated per myself. Dr. Shannan Avila present available for consultation as needed.     Differential diagnosis musculoskeletal pain, MI, acute coronary syndrome, COPD exacerbation with bronchospasm, heart failure, pneumonia    Chest x-ray is negative for any evidence of vascular congestion. There is some increased atelectasis in the left lung base. No evidence of pneumonia. CBC, metabolic panel and B NP are unremarkable without evidence of abnormality. EKG is negative for evidence of ischemia or acute changes. On clinical exam the patient was resting comfortably. No evidence of distress. No hypoxia. No tachycardia. Blood pressure very stable. No evidence of peripheral edema or JVD. Lung fields were clear. Currently there is no evidence of acute coronary syndrome, or MI.  Nursing protocol did not include a troponin enzyme. I have added this on to the laboratory studies. I currently do not see any clinical indicators that would warrant a CTA of the chest for evaluation of vascular phenomenon are PE. Heart score:2  WELL's PE criteria: 0    2310: Bedside reevaluation. Patient without evidence of distress. And she is playing a game on her cell phone. Resting comfortably    No indication that warrants delta troponin and EKG at this time. I feel that this patient can be safely discharged home. She is encouraged to use Tylenol. She is encouraged to follow-up with her primary care provider. She is also encouraged to return to the emergency department if her symptoms do not improve or worsen. This dictation was performed with a verbal recognition program (DRAGON) and it was checked for errors. It is possible that there are still dictated errors within this office note. If so, please bring any errors to my attention for an addendum. All efforts were made to ensure that this office note is accurate. CONSULTS:  None    PROCEDURES:  Procedures    FINAL IMPRESSION      1.  Right-sided chest pain          DISPOSITION/PLAN   [unfilled]    PATIENT REFERRED TO:  Pamela Galeana DO  [address] [address]  [phone]    Schedule an appointment as soon as possible for a visit       [facility] Emergency Department  [address] [place] [address]  [phone]    As needed, If symptoms worsen      DISCHARGE MEDICATIONS:  Current Discharge Medication List          (Please note that portions of this note were completed with a voice recognition program.  Efforts were made to edit the dictations but occasionally words are mis-transcribed.)    JOSE ANGEL Saavedra - JOSE ANGEL Perez CNP  07/15/20 7709

## 2020-07-20 ENCOUNTER — NURSE TRIAGE (OUTPATIENT)
Dept: OTHER | Facility: CLINIC | Age: 60
End: 2020-07-20

## 2020-07-20 NOTE — CARE COORDINATION
Outreach call made and no answer. Left message with ACM contact information. ACM will outreach at another time.

## 2020-07-20 NOTE — TELEPHONE ENCOUNTER
Reason for Disposition   [1] COVID-19 infection suspected by caller or triager AND [2] mild symptoms (cough, fever, or others) AND [3] no complications or SOB    Protocols used: CORONAVIRUS (COVID-19) [facility]    Caller reports on-going breathing issues, cough and headache. Provided care advice to help with symptoms.

## 2020-07-27 ENCOUNTER — TELEPHONE (OUTPATIENT)
Dept: FAMILY MEDICINE CLINIC | Age: 60
End: 2020-07-27

## 2020-07-27 NOTE — TELEPHONE ENCOUNTER
Pt called to ask Dr Sujata Mcguire if she can get a muscle relaxer or something to help with the pain in her back ,hips and down her legs. Please give pt a call [phone].  [place] [phone]

## 2020-07-31 ENCOUNTER — OFFICE VISIT (OUTPATIENT)
Dept: FAMILY MEDICINE CLINIC | Age: 60
End: 2020-07-31
Payer: MEDICARE

## 2020-07-31 VITALS
HEIGHT: 62 IN | TEMPERATURE: 92 F | DIASTOLIC BLOOD PRESSURE: 80 MMHG | SYSTOLIC BLOOD PRESSURE: 132 MMHG | BODY MASS INDEX: 27.71 KG/M2 | WEIGHT: 150.6 LBS

## 2020-07-31 DIAGNOSIS — R53.1 WEAKNESS: ICD-10-CM

## 2020-07-31 DIAGNOSIS — R41.3 MEMORY DEFICIT: ICD-10-CM

## 2020-07-31 DIAGNOSIS — J44.9 COPD, SEVERE (HCC): ICD-10-CM

## 2020-07-31 DIAGNOSIS — R42 LIGHTHEADEDNESS: ICD-10-CM

## 2020-07-31 DIAGNOSIS — I10 ESSENTIAL HYPERTENSION: ICD-10-CM

## 2020-07-31 DIAGNOSIS — E11.9 TYPE 2 DIABETES MELLITUS WITHOUT COMPLICATION, WITHOUT LONG-TERM CURRENT USE OF INSULIN (HCC): ICD-10-CM

## 2020-07-31 DIAGNOSIS — R27.0 ATAXIA: ICD-10-CM

## 2020-07-31 DIAGNOSIS — R29.810 FACIAL DROOP: ICD-10-CM

## 2020-07-31 LAB
ANION GAP SERPL CALCULATED.3IONS-SCNC: 13 MMOL/L (ref 3–16)
BUN BLDV-MCNC: 8 MG/DL (ref 7–20)
CALCIUM SERPL-MCNC: 8.9 MG/DL (ref 8.3–10.6)
CHLORIDE BLD-SCNC: 104 MMOL/L (ref 99–110)
CO2: 27 MMOL/L (ref 21–32)
CREAT SERPL-MCNC: <0.5 MG/DL (ref 0.6–1.2)
FOLATE: 3.89 NG/ML (ref 4.78–24.2)
GFR AFRICAN AMERICAN: >60
GFR NON-AFRICAN AMERICAN: >60
GLUCOSE BLD-MCNC: 113 MG/DL (ref 70–99)
HCT VFR BLD CALC: 37.5 % (ref 36–48)
HEMOGLOBIN: 12.9 G/DL (ref 12–16)
MCH RBC QN AUTO: 33 PG (ref 26–34)
MCHC RBC AUTO-ENTMCNC: 34.3 G/DL (ref 31–36)
MCV RBC AUTO: 96.2 FL (ref 80–100)
PDW BLD-RTO: 15.8 % (ref 12.4–15.4)
PLATELET # BLD: 229 K/UL (ref 135–450)
PMV BLD AUTO: 8.4 FL (ref 5–10.5)
POTASSIUM SERPL-SCNC: 4.8 MMOL/L (ref 3.5–5.1)
RBC # BLD: 3.9 M/UL (ref 4–5.2)
SODIUM BLD-SCNC: 144 MMOL/L (ref 136–145)
TSH REFLEX FT4: 1.08 UIU/ML (ref 0.27–4.2)
VITAMIN B-12: 299 PG/ML (ref 211–911)
WBC # BLD: 7.2 K/UL (ref 4–11)

## 2020-07-31 PROCEDURE — 3044F HG A1C LEVEL LT 7.0%: CPT | Performed by: FAMILY MEDICINE

## 2020-07-31 PROCEDURE — G8427 DOCREV CUR MEDS BY ELIG CLIN: HCPCS | Performed by: FAMILY MEDICINE

## 2020-07-31 PROCEDURE — 1036F TOBACCO NON-USER: CPT | Performed by: FAMILY MEDICINE

## 2020-07-31 PROCEDURE — 99214 OFFICE O/P EST MOD 30 MIN: CPT | Performed by: FAMILY MEDICINE

## 2020-07-31 PROCEDURE — G8419 CALC BMI OUT NRM PARAM NOF/U: HCPCS | Performed by: FAMILY MEDICINE

## 2020-07-31 PROCEDURE — 2022F DILAT RTA XM EVC RTNOPTHY: CPT | Performed by: FAMILY MEDICINE

## 2020-07-31 PROCEDURE — 3017F COLORECTAL CA SCREEN DOC REV: CPT | Performed by: FAMILY MEDICINE

## 2020-07-31 NOTE — PROGRESS NOTES
2020     Greta Whiting (:  1960) is a 60 y.o. female, here for evaluation of the following medical concerns: Patient presented today to discuss back pain. Patient has complex medical history and is a poor historian at times. HPI     1. Lower back pain- across lumbar area for years, waxes and wanes, has slowly became worse recently,  goes into both legs at times, does admit to shooting pain down both legs,but stated the majority of her pain pain is located in the front of her thighs, denied loss of baddle or bowel control, denied saddle anesthesia, denied falling at this time, does admit to weakness and problems ambulating, remaining in one position is difficult, she does report symptoms of claudication, continues to point to her back at the problem, tires otc medication but has limited relief, no ice or heat at this time. She doesn't believe she can do PT at this time due to access. MRI-    Moderate-severe right L5 neural foraminal narrowing due to facet hypertrophy    and foraminal disc protrusion.         No significant spinal canal stenosis.               3.  Folate def. - will replace, low according to labs. 3.  Abdominal pain- very tender to the touch, nause, no vomiting for years, has become worse recently, no weight loss, no fever or night sweat, had cholecystectomy in the past, has history of bloating, diarrhea daily, pain daily, had colonoscopy in 14, due in , no history of blood in stool, no constipation today, takes Protonix BID, not sure if this helps, patient complains of generalized abdominal pain. 4.  DM II- A1C is 6.1 today, controlled with medication, taking Metformin, no side effects from the medication, will have A1C obtained today. Today, denied chest pain, sob, n, v ,or diarrhea. Review of Systems   Constitutional: Positive for fatigue. Negative for activity change, fever and unexpected weight change.    HENT: Negative for congestion, ear pain, sinus pain and sore throat. Eyes: Negative for visual disturbance. Respiratory: Negative for shortness of breath. Cardiovascular: Negative for chest pain, palpitations and leg swelling. Gastrointestinal: Positive for abdominal distention, abdominal pain, diarrhea and nausea. Negative for anal bleeding, blood in stool, rectal pain and vomiting. Genitourinary: Negative for dysuria and urgency. Musculoskeletal: Positive for arthralgias, back pain, gait problem and myalgias. Skin: Negative for color change and wound. Neurological: Positive for light-headedness. Negative for syncope, weakness and headaches. Prior to Visit Medications    Medication Sig Taking? Authorizing Provider   folic acid (FOLVITE) 1 MG tablet Take 1 tablet by mouth daily Yes Kevin Zapata DO   pantoprazole (PROTONIX) 20 MG tablet Take 2 tablets by mouth daily Yes Kevin Zapata DO   albuterol sulfate HFA (PROVENTIL HFA) 108 (90 Base) MCG/ACT inhaler Inhale 4-6 puffs into the lungs every 4 hours as needed for Wheezing or Shortness of Breath Yes Javi Calero MD   sertraline (ZOLOFT) 100 MG tablet TAKE ONE TABLET BY MOUTH DAILY Yes Kevin Zapata DO   lisinopril (PRINIVIL;ZESTRIL) 5 MG tablet Take 1 tablet by mouth daily Yes Kevin Zapata DO   metoprolol succinate (TOPROL XL) 25 MG extended release tablet Take 0.5 tablets by mouth daily Yes Nagi Godoy, DO   Umeclidinium Bromide 62.5 MCG/INH AEPB Inhale 1 puff into the lungs daily Yes Javi Calero MD   Respiratory Therapy Supplies (NEBULIZER/TUBING/MOUTHPIECE) KIT 1 kit by Does not apply route daily Yes Javi Calero MD   fluticasone-salmeterol (ADVAIR DISKUS) 250-50 MCG/DOSE AEPB Inhale 1 puff into the lungs every 12 hours Yes Kevin Zapata DO   blood glucose test strips (ACURA BLOOD GLUCOSE TEST) strip 1 each by In Vitro route daily As needed.  Yes JOSE ANGEL Boudreaux - CNP   metFORMIN (GLUCOPHAGE) 500 MG tablet Take 1 tablet by mouth daily (with breakfast) Yes JOSE ANGEL Muniz CNP   Blood Glucose Monitoring Suppl (ACURA BLOOD GLUCOSE METER) w/Device KIT Test for blood sugar before meals Yes JOSE ANGEL Muniz CNP   triamcinolone (KENALOG) 0.1 % cream Apply topically 2 times daily. Yes JOSE ANGEL Muniz CNP   atorvastatin (LIPITOR) 40 MG tablet Take 1 tablet by mouth nightly Yes JOSE ANGEL Muniz CNP   ergocalciferol (ERGOCALCIFEROL) 57869 units capsule Take 1 capsule by mouth once a week Yes JOSE ANGEL Muniz CNP   aspirin 81 MG chewable tablet CHEW ONE TABLET BY MOUTH DAILY Yes JOSE ANGEL Wilson CNP   diphenhydrAMINE-APAP, sleep, (TYLENOL PM EXTRA STRENGTH)  MG tablet Take 1 tablet by mouth Yes Historical Provider, MD   mineral oil-hydrophilic petrolatum (AQUAPHOR) ointment Apply topically as needed. Yes JOSE ANGEL Muniz CNP   lidocaine (LIDODERM) 5 % Place 1 patch onto the skin daily 12 hours on, 12 hours off. Yes JOSE ANGEL Muniz CNP   Spacer/Aero-Holding Chambers (E-Z SPACER) AMITA 1 Device by Does not apply route daily as needed (sob) Yes CARIDAD Cook   acetaminophen (TYLENOL) 500 MG tablet Take 2 tablets by mouth every 6 hours as needed for Pain Do not take with other medications containing acetaminophen. Yes CARIDAD Forte   Handicap Placard MISC by Does not apply route Handicap Placard for 5 years Yes JOSE ANGEL Muniz CNP   pregabalin (LYRICA) 75 MG capsule Take 1 capsule by mouth 2 times daily for 125 days.   Sheldon Necessary, DO        Social History     Tobacco Use    Smoking status: Former Smoker     Packs/day: 1.00     Years: 37.00     Pack years: 37.00     Types: Cigarettes     Last attempt to quit: 1/1/2017     Years since quitting: 3.5    Smokeless tobacco: Never Used   Substance Use Topics    Alcohol use: No     Alcohol/week: 0.0 standard drinks        Vitals:    07/31/20 1024   BP: 132/80   Temp: 92 °F (33.3 °C)   Weight: 150 lb 9.6 oz (68.3 kg)   Height: 5' 1.5\" (1.562 m)     Estimated body mass index is 27.99 kg/m² as calculated from the following:    Height as of this encounter: 5' 1.5\" (1.562 m). Weight as of this encounter: 150 lb 9.6 oz (68.3 kg). Physical Exam  Vitals signs and nursing note reviewed. Constitutional:       Appearance: She is obese. HENT:      Head: Normocephalic and atraumatic. Right Ear: Tympanic membrane, ear canal and external ear normal.      Left Ear: Tympanic membrane, ear canal and external ear normal.      Nose: Nose normal.      Mouth/Throat:      Mouth: Mucous membranes are moist.   Eyes:      Conjunctiva/sclera: Conjunctivae normal.   Cardiovascular:      Rate and Rhythm: Regular rhythm. Heart sounds: Normal heart sounds. No murmur. Pulmonary:      Breath sounds: Normal breath sounds. No wheezing. Abdominal:      General: Bowel sounds are normal. There is distension. Tenderness: There is abdominal tenderness. There is guarding. There is no right CVA tenderness, left CVA tenderness or rebound. Comments: Pain out of proportion to exam  Soft at times  Most of the pain seems to be in epigastric region  Very tender to palpation,  Lower abdominal pain with palpation  Again not new according to the patient. No rebound noted  Does have guarding   No discoloration   Musculoskeletal:         General: Tenderness present. No swelling or signs of injury. Right lower leg: No edema. Left lower leg: No edema. Comments: Pain with palpation across lumbar back  Reduced ROM with rotation of torso and bending at waist  Walks very slowly holding back  Unable to conduct straight leg due to pain out of proportion to exam  Does have hypertonic musculature across lumbar back   Skin:     General: Skin is warm. Neurological:      Mental Status: She is alert. Mental status is at baseline. Psychiatric:         Mood and Affect: Mood normal.         Behavior: Behavior normal.         ASSESSMENT/PLAN:  1.  Abdominal pain, unspecified abdominal location  Labs ordered  Ultrasound ordered  Questions answered    2. Bilateral low back pain without sciatica, unspecified chronicity  Patient will use otc medication sparingly  Referred to ortho for additional treatment   She was educated on the medication and its use. She will  Use ice, heat, and stretching. She was shown stretching exercises. She will push fluids and rest.   RTC if not improved. - Tara Pino MD, Orthopedic Surgery, [facility]    3. Type 2 diabetes mellitus without complication, without long-term current use of insulin ([facility] [address]  Patient will need to keep a log of her glucose readings and bring them to the office. He needs to monitor his diet and exercise. Attempt to lose weight. Discussed proper eating habits. Continue to take medication as prescribed. Will obtain A1C at this visit. Educated on signs and symptoms for immediate evaluation in the ER. 4. Gastroesophageal reflux disease without esophagitis  Take medication as prescribed. Raise head of bed. Avoid trigger foods. Discussed side effects of the medication. Please RTC if not improved. 5. Folate deficiency  Take medication as prescribed. Discussed conservative treatment  RTC if no improved. Return in about 3 months (around 10/31/2020).

## 2020-08-01 LAB
ESTIMATED AVERAGE GLUCOSE: 128.4 MG/DL
HBA1C MFR BLD: 6.1 %

## 2020-08-05 RX ORDER — FOLIC ACID 1 MG/1
1 TABLET ORAL DAILY
Qty: 30 TABLET | Refills: 1 | Status: SHIPPED | OUTPATIENT
Start: 2020-08-05 | End: 2021-12-08

## 2020-08-05 ASSESSMENT — ENCOUNTER SYMPTOMS
COLOR CHANGE: 0
BLOOD IN STOOL: 0
RECTAL PAIN: 0
SHORTNESS OF BREATH: 0
DIARRHEA: 1
SORE THROAT: 0
SINUS PAIN: 0
NAUSEA: 1
VOMITING: 0
BACK PAIN: 1
ANAL BLEEDING: 0
ABDOMINAL DISTENTION: 1
ABDOMINAL PAIN: 1

## 2020-08-06 RX ORDER — PANTOPRAZOLE SODIUM 20 MG/1
TABLET, DELAYED RELEASE ORAL
Qty: 30 TABLET | Refills: 0 | Status: SHIPPED | OUTPATIENT
Start: 2020-08-06 | End: 2020-08-17

## 2020-08-07 ENCOUNTER — TELEPHONE (OUTPATIENT)
Dept: FAMILY MEDICINE CLINIC | Age: 60
End: 2020-08-07

## 2020-08-07 NOTE — TELEPHONE ENCOUNTER
Pt need a return call about her being tested for Covid before she can get her US. She states that she answered yes to some of the questions they ask before scheduling her. She states that her symptoms are related to copd.  Please give pt a call [phone]

## 2020-08-11 ENCOUNTER — OFFICE VISIT (OUTPATIENT)
Dept: PRIMARY CARE CLINIC | Age: 60
End: 2020-08-11
Payer: MEDICARE

## 2020-08-11 PROCEDURE — G8428 CUR MEDS NOT DOCUMENT: HCPCS | Performed by: NURSE PRACTITIONER

## 2020-08-11 PROCEDURE — G8419 CALC BMI OUT NRM PARAM NOF/U: HCPCS | Performed by: NURSE PRACTITIONER

## 2020-08-11 PROCEDURE — 99211 OFF/OP EST MAY X REQ PHY/QHP: CPT | Performed by: NURSE PRACTITIONER

## 2020-08-11 NOTE — PROGRESS NOTES
Greta BARR Henok received a viral test for COVID-19. They were educated on isolation and quarantine as appropriate. For any symptoms, they were directed to seek care from their PCP, given contact information to establish with a doctor, directed to an urgent care or the emergency room.

## 2020-08-12 LAB
SARS-COV-2: NOT DETECTED
SOURCE: NORMAL

## 2020-08-17 RX ORDER — PANTOPRAZOLE SODIUM 20 MG/1
TABLET, DELAYED RELEASE ORAL
Qty: 30 TABLET | Refills: 0 | Status: SHIPPED | OUTPATIENT
Start: 2020-08-17 | End: 2021-07-18

## 2020-08-24 ENCOUNTER — APPOINTMENT (OUTPATIENT)
Dept: CT IMAGING | Age: 60
DRG: 392 | End: 2020-08-24
Payer: MEDICARE

## 2020-08-24 ENCOUNTER — APPOINTMENT (OUTPATIENT)
Dept: GENERAL RADIOLOGY | Age: 60
DRG: 392 | End: 2020-08-24
Payer: MEDICARE

## 2020-08-24 ENCOUNTER — NURSE TRIAGE (OUTPATIENT)
Dept: OTHER | Facility: CLINIC | Age: 60
End: 2020-08-24

## 2020-08-24 ENCOUNTER — HOSPITAL ENCOUNTER (INPATIENT)
Age: 60
LOS: 2 days | Discharge: HOME | DRG: 392 | End: 2020-08-26
Attending: EMERGENCY MEDICINE | Admitting: STUDENT IN AN ORGANIZED HEALTH CARE EDUCATION/TRAINING PROGRAM
Payer: MEDICARE

## 2020-08-24 PROBLEM — A41.9 SEPSIS (HCC): Status: ACTIVE | Noted: 2020-08-24

## 2020-08-24 PROBLEM — K55.1 ATHEROSCLEROSIS OF SUPERIOR MESENTERIC ARTERY (HCC): Status: ACTIVE | Noted: 2020-08-24

## 2020-08-24 PROBLEM — R19.7 DIARRHEA: Status: ACTIVE | Noted: 2020-08-24

## 2020-08-24 PROBLEM — I73.9 PERIPHERAL VASCULAR DISEASE (HCC): Status: ACTIVE | Noted: 2020-08-24

## 2020-08-24 PROBLEM — I95.9 HYPOTENSION: Status: ACTIVE | Noted: 2020-08-24

## 2020-08-24 LAB
A/G RATIO: 1.5 (ref 1.1–2.2)
ABO/RH: NORMAL
ALBUMIN SERPL-MCNC: 3.9 G/DL (ref 3.4–5)
ALP BLD-CCNC: 110 U/L (ref 40–129)
ALT SERPL-CCNC: 17 U/L (ref 10–40)
ANION GAP SERPL CALCULATED.3IONS-SCNC: 12 MMOL/L (ref 3–16)
ANTIBODY SCREEN: NORMAL
APTT: 29.1 SEC (ref 24.2–36.2)
AST SERPL-CCNC: 20 U/L (ref 15–37)
BASE EXCESS VENOUS: 3.1 MMOL/L
BASOPHILS ABSOLUTE: 0.1 K/UL (ref 0–0.2)
BASOPHILS RELATIVE PERCENT: 0.7 %
BILIRUB SERPL-MCNC: 0.3 MG/DL (ref 0–1)
BILIRUBIN URINE: NEGATIVE
BLOOD, URINE: NEGATIVE
BUN BLDV-MCNC: 14 MG/DL (ref 7–20)
CALCIUM SERPL-MCNC: 8.9 MG/DL (ref 8.3–10.6)
CARBOXYHEMOGLOBIN: 1 %
CHLORIDE BLD-SCNC: 102 MMOL/L (ref 99–110)
CLARITY: CLEAR
CO2: 22 MMOL/L (ref 21–32)
COLOR: YELLOW
CREAT SERPL-MCNC: 0.8 MG/DL (ref 0.6–1.2)
EOSINOPHILS ABSOLUTE: 0.3 K/UL (ref 0–0.6)
EOSINOPHILS RELATIVE PERCENT: 3.2 %
GFR AFRICAN AMERICAN: >60
GFR NON-AFRICAN AMERICAN: >60
GLOBULIN: 2.6 G/DL
GLUCOSE BLD-MCNC: 126 MG/DL (ref 70–99)
GLUCOSE URINE: NEGATIVE MG/DL
HCO3 VENOUS: 31 MMOL/L (ref 23–29)
HCT VFR BLD CALC: 38.1 % (ref 36–48)
HEMOGLOBIN: 12.9 G/DL (ref 12–16)
INR BLD: 0.98 (ref 0.86–1.14)
KETONES, URINE: NEGATIVE MG/DL
LACTIC ACID, SEPSIS: 1.2 MMOL/L (ref 0.4–1.9)
LEUKOCYTE ESTERASE, URINE: NEGATIVE
LIPASE: 37 U/L (ref 13–60)
LYMPHOCYTES ABSOLUTE: 1.6 K/UL (ref 1–5.1)
LYMPHOCYTES RELATIVE PERCENT: 17.3 %
MCH RBC QN AUTO: 32.6 PG (ref 26–34)
MCHC RBC AUTO-ENTMCNC: 33.8 G/DL (ref 31–36)
MCV RBC AUTO: 96.6 FL (ref 80–100)
METHEMOGLOBIN VENOUS: 0.4 %
MICROSCOPIC EXAMINATION: NORMAL
MONOCYTES ABSOLUTE: 0.8 K/UL (ref 0–1.3)
MONOCYTES RELATIVE PERCENT: 8.5 %
NEUTROPHILS ABSOLUTE: 6.4 K/UL (ref 1.7–7.7)
NEUTROPHILS RELATIVE PERCENT: 70.3 %
NITRITE, URINE: NEGATIVE
O2 CONTENT, VEN: 10 ML/DL
O2 SAT, VEN: 54 %
O2 THERAPY: ABNORMAL
OCCULT BLOOD DIAGNOSTIC: NORMAL
PCO2, VEN: 60.3 MMHG (ref 40–50)
PDW BLD-RTO: 15.1 % (ref 12.4–15.4)
PH UA: 6 (ref 5–8)
PH VENOUS: 7.32 (ref 7.35–7.45)
PLATELET # BLD: 217 K/UL (ref 135–450)
PMV BLD AUTO: 8.6 FL (ref 5–10.5)
PO2, VEN: 30 MMHG
POTASSIUM REFLEX MAGNESIUM: 4.3 MMOL/L (ref 3.5–5.1)
PROTEIN UA: NEGATIVE MG/DL
PROTHROMBIN TIME: 11.4 SEC (ref 10–13.2)
RBC # BLD: 3.94 M/UL (ref 4–5.2)
SODIUM BLD-SCNC: 136 MMOL/L (ref 136–145)
SPECIFIC GRAVITY UA: 1.03 (ref 1–1.03)
TCO2 CALC VENOUS: 33 MMOL/L
TOTAL PROTEIN: 6.5 G/DL (ref 6.4–8.2)
TROPONIN: <0.01 NG/ML
URINE REFLEX TO CULTURE: NORMAL
URINE TYPE: NORMAL
UROBILINOGEN, URINE: 1 E.U./DL
WBC # BLD: 9.2 K/UL (ref 4–11)

## 2020-08-24 PROCEDURE — 36415 COLL VENOUS BLD VENIPUNCTURE: CPT

## 2020-08-24 PROCEDURE — 83605 ASSAY OF LACTIC ACID: CPT

## 2020-08-24 PROCEDURE — 85610 PROTHROMBIN TIME: CPT

## 2020-08-24 PROCEDURE — 86900 BLOOD TYPING SEROLOGIC ABO: CPT

## 2020-08-24 PROCEDURE — G0328 FECAL BLOOD SCRN IMMUNOASSAY: HCPCS

## 2020-08-24 PROCEDURE — 82803 BLOOD GASES ANY COMBINATION: CPT

## 2020-08-24 PROCEDURE — 80053 COMPREHEN METABOLIC PANEL: CPT

## 2020-08-24 PROCEDURE — 93005 ELECTROCARDIOGRAM TRACING: CPT | Performed by: EMERGENCY MEDICINE

## 2020-08-24 PROCEDURE — 83690 ASSAY OF LIPASE: CPT

## 2020-08-24 PROCEDURE — 74174 CTA ABD&PLVS W/CONTRAST: CPT

## 2020-08-24 PROCEDURE — 71045 X-RAY EXAM CHEST 1 VIEW: CPT

## 2020-08-24 PROCEDURE — 96375 TX/PRO/DX INJ NEW DRUG ADDON: CPT

## 2020-08-24 PROCEDURE — 96367 TX/PROPH/DG ADDL SEQ IV INF: CPT

## 2020-08-24 PROCEDURE — 6360000002 HC RX W HCPCS: Performed by: EMERGENCY MEDICINE

## 2020-08-24 PROCEDURE — 85730 THROMBOPLASTIN TIME PARTIAL: CPT

## 2020-08-24 PROCEDURE — 1200000000 HC SEMI PRIVATE

## 2020-08-24 PROCEDURE — 84145 PROCALCITONIN (PCT): CPT

## 2020-08-24 PROCEDURE — G0378 HOSPITAL OBSERVATION PER HR: HCPCS

## 2020-08-24 PROCEDURE — 99291 CRITICAL CARE FIRST HOUR: CPT

## 2020-08-24 PROCEDURE — 86901 BLOOD TYPING SEROLOGIC RH(D): CPT

## 2020-08-24 PROCEDURE — 87040 BLOOD CULTURE FOR BACTERIA: CPT

## 2020-08-24 PROCEDURE — 71250 CT THORAX DX C-: CPT

## 2020-08-24 PROCEDURE — 86850 RBC ANTIBODY SCREEN: CPT

## 2020-08-24 PROCEDURE — 96365 THER/PROPH/DIAG IV INF INIT: CPT

## 2020-08-24 PROCEDURE — 6360000004 HC RX CONTRAST MEDICATION: Performed by: EMERGENCY MEDICINE

## 2020-08-24 PROCEDURE — 85025 COMPLETE CBC W/AUTO DIFF WBC: CPT

## 2020-08-24 PROCEDURE — 84484 ASSAY OF TROPONIN QUANT: CPT

## 2020-08-24 PROCEDURE — 2500000003 HC RX 250 WO HCPCS: Performed by: EMERGENCY MEDICINE

## 2020-08-24 PROCEDURE — 81003 URINALYSIS AUTO W/O SCOPE: CPT

## 2020-08-24 PROCEDURE — 70450 CT HEAD/BRAIN W/O DYE: CPT

## 2020-08-24 PROCEDURE — 2580000003 HC RX 258: Performed by: EMERGENCY MEDICINE

## 2020-08-24 RX ORDER — 0.9 % SODIUM CHLORIDE 0.9 %
30 INTRAVENOUS SOLUTION INTRAVENOUS ONCE
Status: COMPLETED | OUTPATIENT
Start: 2020-08-24 | End: 2020-08-24

## 2020-08-24 RX ORDER — DIPHENHYDRAMINE HYDROCHLORIDE 50 MG/ML
25 INJECTION INTRAMUSCULAR; INTRAVENOUS ONCE
Status: COMPLETED | OUTPATIENT
Start: 2020-08-24 | End: 2020-08-24

## 2020-08-24 RX ORDER — METHYLPREDNISOLONE SODIUM SUCCINATE 125 MG/2ML
125 INJECTION, POWDER, LYOPHILIZED, FOR SOLUTION INTRAMUSCULAR; INTRAVENOUS ONCE
Status: COMPLETED | OUTPATIENT
Start: 2020-08-24 | End: 2020-08-24

## 2020-08-24 RX ADMIN — IOPAMIDOL 75 ML: 755 INJECTION, SOLUTION INTRAVENOUS at 20:43

## 2020-08-24 RX ADMIN — METHYLPREDNISOLONE SODIUM SUCCINATE 125 MG: 125 INJECTION, POWDER, FOR SOLUTION INTRAMUSCULAR; INTRAVENOUS at 20:22

## 2020-08-24 RX ADMIN — DIPHENHYDRAMINE HYDROCHLORIDE 25 MG: 50 INJECTION, SOLUTION INTRAMUSCULAR; INTRAVENOUS at 20:22

## 2020-08-24 RX ADMIN — METRONIDAZOLE 500 MG: 500 INJECTION, SOLUTION INTRAVENOUS at 23:39

## 2020-08-24 RX ADMIN — CEFEPIME HYDROCHLORIDE 2 G: 2 INJECTION, POWDER, FOR SOLUTION INTRAVENOUS at 21:17

## 2020-08-24 RX ADMIN — SODIUM CHLORIDE 2067 ML: 9 INJECTION, SOLUTION INTRAVENOUS at 20:22

## 2020-08-24 ASSESSMENT — PAIN DESCRIPTION - ORIENTATION: ORIENTATION: LOWER

## 2020-08-24 ASSESSMENT — PAIN - FUNCTIONAL ASSESSMENT: PAIN_FUNCTIONAL_ASSESSMENT: ACTIVITIES ARE NOT PREVENTED

## 2020-08-24 ASSESSMENT — PAIN DESCRIPTION - DESCRIPTORS: DESCRIPTORS: ACHING

## 2020-08-24 ASSESSMENT — PAIN SCALES - GENERAL: PAINLEVEL_OUTOF10: 8

## 2020-08-24 ASSESSMENT — PAIN DESCRIPTION - PROGRESSION: CLINICAL_PROGRESSION: NOT CHANGED

## 2020-08-24 ASSESSMENT — PAIN DESCRIPTION - LOCATION: LOCATION: BACK

## 2020-08-24 ASSESSMENT — PAIN DESCRIPTION - FREQUENCY: FREQUENCY: CONTINUOUS

## 2020-08-24 ASSESSMENT — PAIN DESCRIPTION - PAIN TYPE: TYPE: ACUTE PAIN

## 2020-08-24 ASSESSMENT — PAIN DESCRIPTION - ONSET: ONSET: ON-GOING

## 2020-08-24 NOTE — ED NOTES
Upon RN entry to room, patient resting with eyes closed, easily awakened. Patient is alert and oriented x4 but drowsy, patient reports taking morning dose of lyrica and then taking a muscle relaxer this afternoon for back pain, denies injury.  Patient states she then began to feel dizzy around 1800, also reports not eating      20 G IV placed to R wrist on first attempt, patient tolerated well, labs obtained  MD at bedside  Verbal order for 1L NS, running wide open at this time     Melvi Person RN  08/24/20 1161

## 2020-08-24 NOTE — ED NOTES
Patient in trendelenburg BP improving     Juan Diego Byrnes, [address] [facility]  08/24/20 2297

## 2020-08-24 NOTE — TELEPHONE ENCOUNTER
Reason for Disposition   SEVERE back pain of sudden onset and age > 61    Answer Assessment - Initial Assessment Questions  1. ONSET: \"When did the pain begin? \"       Degenerative disc disease, ongoing for 2-3 years  2. LOCATION: \"Where does it hurt? \" (upper, mid or lower back)     Lower back and neck  3. SEVERITY: \"How bad is the pain? \"  (e.g., Scale 1-10; mild, moderate, or severe)    - MILD (1-3): doesn't interfere with normal activities     - MODERATE (4-7): interferes with normal activities or awakens from sleep     - SEVERE (8-10): excruciating pain, unable to do any normal activities      severe  4. PATTERN: \"Is the pain constant? \" (e.g., yes, no; constant, intermittent)       constant  5. RADIATION: \"Does the pain shoot into your legs or elsewhere? \"  To right leg  6. CAUSE:  \"What do you think is causing the back pain? \"   Degenerative disc disease  7. BACK OVERUSE:  Cindia Robbie recent lifting of heavy objects, strenuous work or exercise? \"    no  8. MEDICATIONS: \"What have you taken so far for the pain? \" (e.g., nothing, acetaminophen, NSAIDS)    Muscle relaxers  9. NEUROLOGIC SYMPTOMS: \"Do you have any weakness, numbness, or problems with bowel/bladder control? \"   no  10. OTHER SYMPTOMS: \"Do you have any other symptoms? \" (e.g., fever, abdominal pain, burning with urination, blood in urine)  no  11. PREGNANCY: \"Is there any chance you are pregnant? \" (e.g., yes, no; LMP)  no    Protocols used: BACK PAIN-ADULT-[place]    /States took a muscle relaxer and bp is 75/45. Sister is there with her.

## 2020-08-25 PROBLEM — I95.89 OTHER HYPOTENSION: Status: ACTIVE | Noted: 2020-08-24

## 2020-08-25 LAB
ANION GAP SERPL CALCULATED.3IONS-SCNC: 12 MMOL/L (ref 3–16)
BASOPHILS ABSOLUTE: 0 K/UL (ref 0–0.2)
BASOPHILS RELATIVE PERCENT: 0.2 %
BUN BLDV-MCNC: 15 MG/DL (ref 7–20)
C DIFF TOXIN/ANTIGEN: NORMAL
CALCIUM SERPL-MCNC: 8.6 MG/DL (ref 8.3–10.6)
CHLORIDE BLD-SCNC: 105 MMOL/L (ref 99–110)
CO2: 24 MMOL/L (ref 21–32)
CREAT SERPL-MCNC: 0.8 MG/DL (ref 0.6–1.2)
EKG ATRIAL RATE: 64 BPM
EKG DIAGNOSIS: NORMAL
EKG P AXIS: 21 DEGREES
EKG P-R INTERVAL: 122 MS
EKG Q-T INTERVAL: 424 MS
EKG QRS DURATION: 70 MS
EKG QTC CALCULATION (BAZETT): 437 MS
EKG R AXIS: 66 DEGREES
EKG T AXIS: 66 DEGREES
EKG VENTRICULAR RATE: 64 BPM
EOSINOPHILS ABSOLUTE: 0 K/UL (ref 0–0.6)
EOSINOPHILS RELATIVE PERCENT: 0.3 %
GFR AFRICAN AMERICAN: >60
GFR NON-AFRICAN AMERICAN: >60
GLUCOSE BLD-MCNC: 167 MG/DL (ref 70–99)
GLUCOSE BLD-MCNC: 190 MG/DL (ref 70–99)
GLUCOSE BLD-MCNC: 193 MG/DL (ref 70–99)
GLUCOSE BLD-MCNC: 195 MG/DL (ref 70–99)
GLUCOSE BLD-MCNC: 198 MG/DL (ref 70–99)
GLUCOSE BLD-MCNC: 222 MG/DL (ref 70–99)
HCT VFR BLD CALC: 40.8 % (ref 36–48)
HEMOGLOBIN: 13.5 G/DL (ref 12–16)
LACTIC ACID, SEPSIS: 1.8 MMOL/L (ref 0.4–1.9)
LYMPHOCYTES ABSOLUTE: 0.6 K/UL (ref 1–5.1)
LYMPHOCYTES RELATIVE PERCENT: 6.7 %
MCH RBC QN AUTO: 32.2 PG (ref 26–34)
MCHC RBC AUTO-ENTMCNC: 33.2 G/DL (ref 31–36)
MCV RBC AUTO: 97.1 FL (ref 80–100)
MONOCYTES ABSOLUTE: 0.1 K/UL (ref 0–1.3)
MONOCYTES RELATIVE PERCENT: 1.1 %
NEUTROPHILS ABSOLUTE: 8.7 K/UL (ref 1.7–7.7)
NEUTROPHILS RELATIVE PERCENT: 91.7 %
PDW BLD-RTO: 15.1 % (ref 12.4–15.4)
PERFORMED ON: ABNORMAL
PLATELET # BLD: 203 K/UL (ref 135–450)
PMV BLD AUTO: 9 FL (ref 5–10.5)
POTASSIUM REFLEX MAGNESIUM: 4.5 MMOL/L (ref 3.5–5.1)
PROCALCITONIN: 0.07 NG/ML (ref 0–0.15)
RBC # BLD: 4.2 M/UL (ref 4–5.2)
SODIUM BLD-SCNC: 141 MMOL/L (ref 136–145)
WBC # BLD: 9.5 K/UL (ref 4–11)

## 2020-08-25 PROCEDURE — 85025 COMPLETE CBC W/AUTO DIFF WBC: CPT

## 2020-08-25 PROCEDURE — 2580000003 HC RX 258: Performed by: INTERNAL MEDICINE

## 2020-08-25 PROCEDURE — 2500000003 HC RX 250 WO HCPCS: Performed by: STUDENT IN AN ORGANIZED HEALTH CARE EDUCATION/TRAINING PROGRAM

## 2020-08-25 PROCEDURE — 96366 THER/PROPH/DIAG IV INF ADDON: CPT

## 2020-08-25 PROCEDURE — 2580000003 HC RX 258: Performed by: STUDENT IN AN ORGANIZED HEALTH CARE EDUCATION/TRAINING PROGRAM

## 2020-08-25 PROCEDURE — APPNB60 APP NON BILLABLE TIME 46-60 MINS: Performed by: NURSE PRACTITIONER

## 2020-08-25 PROCEDURE — 6370000000 HC RX 637 (ALT 250 FOR IP): Performed by: STUDENT IN AN ORGANIZED HEALTH CARE EDUCATION/TRAINING PROGRAM

## 2020-08-25 PROCEDURE — 87324 CLOSTRIDIUM AG IA: CPT

## 2020-08-25 PROCEDURE — 80048 BASIC METABOLIC PNL TOTAL CA: CPT

## 2020-08-25 PROCEDURE — 87449 NOS EACH ORGANISM AG IA: CPT

## 2020-08-25 PROCEDURE — 83605 ASSAY OF LACTIC ACID: CPT

## 2020-08-25 PROCEDURE — 94640 AIRWAY INHALATION TREATMENT: CPT

## 2020-08-25 PROCEDURE — 6360000002 HC RX W HCPCS: Performed by: STUDENT IN AN ORGANIZED HEALTH CARE EDUCATION/TRAINING PROGRAM

## 2020-08-25 PROCEDURE — G0378 HOSPITAL OBSERVATION PER HR: HCPCS

## 2020-08-25 PROCEDURE — 36415 COLL VENOUS BLD VENIPUNCTURE: CPT

## 2020-08-25 PROCEDURE — 2060000000 HC ICU INTERMEDIATE R&B

## 2020-08-25 PROCEDURE — 93010 ELECTROCARDIOGRAM REPORT: CPT | Performed by: INTERNAL MEDICINE

## 2020-08-25 PROCEDURE — 96372 THER/PROPH/DIAG INJ SC/IM: CPT

## 2020-08-25 PROCEDURE — 99221 1ST HOSP IP/OBS SF/LOW 40: CPT | Performed by: SURGERY

## 2020-08-25 PROCEDURE — 94760 N-INVAS EAR/PLS OXIMETRY 1: CPT

## 2020-08-25 PROCEDURE — 87505 NFCT AGENT DETECTION GI: CPT

## 2020-08-25 PROCEDURE — APPSS60 APP SPLIT SHARED TIME 46-60 MINUTES: Performed by: NURSE PRACTITIONER

## 2020-08-25 PROCEDURE — 87040 BLOOD CULTURE FOR BACTERIA: CPT

## 2020-08-25 RX ORDER — NICOTINE POLACRILEX 4 MG
15 LOZENGE BUCCAL PRN
Status: DISCONTINUED | OUTPATIENT
Start: 2020-08-25 | End: 2020-08-26 | Stop reason: HOSPADM

## 2020-08-25 RX ORDER — ASPIRIN 81 MG/1
81 TABLET, CHEWABLE ORAL DAILY
Status: DISCONTINUED | OUTPATIENT
Start: 2020-08-25 | End: 2020-08-26 | Stop reason: HOSPADM

## 2020-08-25 RX ORDER — METOPROLOL SUCCINATE 25 MG/1
12.5 TABLET, EXTENDED RELEASE ORAL DAILY
Status: DISCONTINUED | OUTPATIENT
Start: 2020-08-25 | End: 2020-08-26 | Stop reason: HOSPADM

## 2020-08-25 RX ORDER — BUDESONIDE AND FORMOTEROL FUMARATE DIHYDRATE 80; 4.5 UG/1; UG/1
2 AEROSOL RESPIRATORY (INHALATION) 2 TIMES DAILY
Status: DISCONTINUED | OUTPATIENT
Start: 2020-08-25 | End: 2020-08-26 | Stop reason: HOSPADM

## 2020-08-25 RX ORDER — ALBUTEROL SULFATE 2.5 MG/3ML
2.5 SOLUTION RESPIRATORY (INHALATION) EVERY 4 HOURS PRN
Status: DISCONTINUED | OUTPATIENT
Start: 2020-08-25 | End: 2020-08-26 | Stop reason: HOSPADM

## 2020-08-25 RX ORDER — ATORVASTATIN CALCIUM 40 MG/1
40 TABLET, FILM COATED ORAL NIGHTLY
Status: DISCONTINUED | OUTPATIENT
Start: 2020-08-25 | End: 2020-08-26 | Stop reason: HOSPADM

## 2020-08-25 RX ORDER — SODIUM CHLORIDE 9 MG/ML
INJECTION, SOLUTION INTRAVENOUS CONTINUOUS
Status: DISCONTINUED | OUTPATIENT
Start: 2020-08-25 | End: 2020-08-26

## 2020-08-25 RX ORDER — ACETAMINOPHEN 325 MG/1
650 TABLET ORAL EVERY 6 HOURS PRN
Status: DISCONTINUED | OUTPATIENT
Start: 2020-08-25 | End: 2020-08-26 | Stop reason: HOSPADM

## 2020-08-25 RX ORDER — PREGABALIN 75 MG/1
75 CAPSULE ORAL 2 TIMES DAILY
Status: DISCONTINUED | OUTPATIENT
Start: 2020-08-25 | End: 2020-08-26 | Stop reason: HOSPADM

## 2020-08-25 RX ORDER — PANTOPRAZOLE SODIUM 40 MG/1
40 TABLET, DELAYED RELEASE ORAL
Status: DISCONTINUED | OUTPATIENT
Start: 2020-08-25 | End: 2020-08-26 | Stop reason: HOSPADM

## 2020-08-25 RX ORDER — DEXTROSE MONOHYDRATE 50 MG/ML
100 INJECTION, SOLUTION INTRAVENOUS PRN
Status: DISCONTINUED | OUTPATIENT
Start: 2020-08-25 | End: 2020-08-26 | Stop reason: HOSPADM

## 2020-08-25 RX ORDER — SODIUM CHLORIDE 0.9 % (FLUSH) 0.9 %
10 SYRINGE (ML) INJECTION EVERY 12 HOURS SCHEDULED
Status: DISCONTINUED | OUTPATIENT
Start: 2020-08-25 | End: 2020-08-26 | Stop reason: HOSPADM

## 2020-08-25 RX ORDER — DEXTROSE MONOHYDRATE 25 G/50ML
12.5 INJECTION, SOLUTION INTRAVENOUS PRN
Status: DISCONTINUED | OUTPATIENT
Start: 2020-08-25 | End: 2020-08-26 | Stop reason: HOSPADM

## 2020-08-25 RX ORDER — SERTRALINE HYDROCHLORIDE 100 MG/1
100 TABLET, FILM COATED ORAL DAILY
Status: DISCONTINUED | OUTPATIENT
Start: 2020-08-25 | End: 2020-08-26 | Stop reason: HOSPADM

## 2020-08-25 RX ORDER — ACETAMINOPHEN 650 MG/1
650 SUPPOSITORY RECTAL EVERY 6 HOURS PRN
Status: DISCONTINUED | OUTPATIENT
Start: 2020-08-25 | End: 2020-08-26 | Stop reason: HOSPADM

## 2020-08-25 RX ORDER — SODIUM CHLORIDE 0.9 % (FLUSH) 0.9 %
10 SYRINGE (ML) INJECTION PRN
Status: DISCONTINUED | OUTPATIENT
Start: 2020-08-25 | End: 2020-08-26 | Stop reason: HOSPADM

## 2020-08-25 RX ADMIN — PREGABALIN 75 MG: 75 CAPSULE ORAL at 08:31

## 2020-08-25 RX ADMIN — Medication 10 ML: at 08:38

## 2020-08-25 RX ADMIN — METRONIDAZOLE 500 MG: 500 INJECTION, SOLUTION INTRAVENOUS at 08:32

## 2020-08-25 RX ADMIN — TIOTROPIUM BROMIDE INHALATION SPRAY 2 PUFF: 3.12 SPRAY, METERED RESPIRATORY (INHALATION) at 08:41

## 2020-08-25 RX ADMIN — INSULIN LISPRO 1 UNITS: 100 INJECTION, SOLUTION INTRAVENOUS; SUBCUTANEOUS at 21:08

## 2020-08-25 RX ADMIN — PANTOPRAZOLE SODIUM 40 MG: 40 TABLET, DELAYED RELEASE ORAL at 16:43

## 2020-08-25 RX ADMIN — BUDESONIDE AND FORMOTEROL FUMARATE DIHYDRATE 2 PUFF: 80; 4.5 AEROSOL RESPIRATORY (INHALATION) at 08:41

## 2020-08-25 RX ADMIN — ENOXAPARIN SODIUM 40 MG: 40 INJECTION SUBCUTANEOUS at 08:32

## 2020-08-25 RX ADMIN — ATORVASTATIN CALCIUM 40 MG: 40 TABLET, FILM COATED ORAL at 21:09

## 2020-08-25 RX ADMIN — METRONIDAZOLE 500 MG: 500 INJECTION, SOLUTION INTRAVENOUS at 23:00

## 2020-08-25 RX ADMIN — SODIUM CHLORIDE: 9 INJECTION, SOLUTION INTRAVENOUS at 12:10

## 2020-08-25 RX ADMIN — INSULIN LISPRO 1 UNITS: 100 INJECTION, SOLUTION INTRAVENOUS; SUBCUTANEOUS at 18:29

## 2020-08-25 RX ADMIN — SERTRALINE HYDROCHLORIDE 100 MG: 100 TABLET ORAL at 08:31

## 2020-08-25 RX ADMIN — PREGABALIN 75 MG: 75 CAPSULE ORAL at 21:09

## 2020-08-25 RX ADMIN — METRONIDAZOLE 500 MG: 500 INJECTION, SOLUTION INTRAVENOUS at 16:43

## 2020-08-25 RX ADMIN — PREGABALIN 75 MG: 75 CAPSULE ORAL at 01:20

## 2020-08-25 RX ADMIN — METOPROLOL SUCCINATE 12.5 MG: 25 TABLET, EXTENDED RELEASE ORAL at 08:31

## 2020-08-25 RX ADMIN — INSULIN LISPRO 2 UNITS: 100 INJECTION, SOLUTION INTRAVENOUS; SUBCUTANEOUS at 08:32

## 2020-08-25 RX ADMIN — BUDESONIDE AND FORMOTEROL FUMARATE DIHYDRATE 2 PUFF: 80; 4.5 AEROSOL RESPIRATORY (INHALATION) at 19:45

## 2020-08-25 RX ADMIN — ACETAMINOPHEN 650 MG: 325 TABLET ORAL at 22:55

## 2020-08-25 RX ADMIN — PANTOPRAZOLE SODIUM 40 MG: 40 TABLET, DELAYED RELEASE ORAL at 05:15

## 2020-08-25 RX ADMIN — INSULIN LISPRO 1 UNITS: 100 INJECTION, SOLUTION INTRAVENOUS; SUBCUTANEOUS at 01:20

## 2020-08-25 RX ADMIN — INSULIN LISPRO 1 UNITS: 100 INJECTION, SOLUTION INTRAVENOUS; SUBCUTANEOUS at 12:12

## 2020-08-25 RX ADMIN — ATORVASTATIN CALCIUM 40 MG: 40 TABLET, FILM COATED ORAL at 01:20

## 2020-08-25 RX ADMIN — ASPIRIN 81 MG: 81 TABLET, CHEWABLE ORAL at 08:31

## 2020-08-25 ASSESSMENT — PAIN DESCRIPTION - FREQUENCY: FREQUENCY: CONTINUOUS

## 2020-08-25 ASSESSMENT — PAIN SCALES - GENERAL
PAINLEVEL_OUTOF10: 0
PAINLEVEL_OUTOF10: 0
PAINLEVEL_OUTOF10: 4
PAINLEVEL_OUTOF10: 0
PAINLEVEL_OUTOF10: 6
PAINLEVEL_OUTOF10: 0

## 2020-08-25 ASSESSMENT — PAIN DESCRIPTION - ONSET: ONSET: ON-GOING

## 2020-08-25 ASSESSMENT — PAIN DESCRIPTION - PROGRESSION: CLINICAL_PROGRESSION: NOT CHANGED

## 2020-08-25 ASSESSMENT — PAIN DESCRIPTION - DESCRIPTORS: DESCRIPTORS: HEADACHE

## 2020-08-25 ASSESSMENT — PAIN DESCRIPTION - PAIN TYPE: TYPE: ACUTE PAIN

## 2020-08-25 ASSESSMENT — PAIN - FUNCTIONAL ASSESSMENT: PAIN_FUNCTIONAL_ASSESSMENT: ACTIVITIES ARE NOT PREVENTED

## 2020-08-25 ASSESSMENT — PAIN DESCRIPTION - LOCATION: LOCATION: HEAD

## 2020-08-25 NOTE — PROGRESS NOTES
4 Eyes Skin Assessment     The patient is being assess for  Admission    I agree that 2 RN's have performed a thorough Head to Toe Skin Assessment on the patient. ALL assessment sites listed below have been assessed. Areas assessed by both nurses:   [x]   Head, Face, and Ears   [x]   Shoulders, Back, and Chest  []   Arms, Elbows, and Hands   [x]   Coccyx, Sacrum, and IschIum  [x]   Legs, Feet, and Heels        Does the Patient have Skin Breakdown?   No         [place] Prevention initiated:  NA   Wound Care Orders initiated:  NA      [facility] nurse consulted for Pressure Injury (Stage 3,4, Unstageable, DTI, NWPT, and Complex wounds), New and Established Ostomies:  No      Nurse 1 eSignature: Electronically signed by Alley Lynch RN on 8/25/20 at 1:04 AM EDT    **SHARE this note so that the co-signing nurse is able to place an eSignature**    Nurse 2 eSignature: {Esignature:[id number]}

## 2020-08-25 NOTE — ED NOTES
MD at bedside to perform rectal exam, this RN at bedside as chaperone, blood occult sample obtained and sent to lab     Malvin Boateng RN  08/24/20 6459

## 2020-08-25 NOTE — PLAN OF CARE
Problem: Falls - Risk of:  Goal: Will remain free from falls  Description: Will remain free from falls  8/25/2020 1510 by Chacho Bell RN  Outcome: Ongoing     Problem: Falls - Risk of:  Goal: Absence of physical injury  Description: Absence of physical injury  8/25/2020 1510 by Chacho Bell RN  Outcome: Ongoing     Problem: Pain:  Goal: Pain level will decrease  Description: Pain level will decrease  8/25/2020 1510 by Chacho Bell RN  Outcome: Ongoing     Problem: Pain:  Goal: Control of acute pain  Description: Control of acute pain  8/25/2020 1510 by Chacho Bell RN  Outcome: Ongoing     Problem: Pain:  Goal: Control of chronic pain  Description: Control of chronic pain  8/25/2020 1510 by Chacho Bell RN  Outcome: Ongoing     Problem: Safety:  Goal: Free from accidental physical injury  Description: Free from accidental physical injury  8/25/2020 1510 by Chacho Bell RN  Outcome: Ongoing     Problem: Safety:  Goal: Free from intentional harm  Description: Free from intentional harm  8/25/2020 1510 by Chacho Bell RN  Outcome: Ongoing     Problem: Skin Integrity:  Goal: Skin integrity will stabilize  Description: Skin integrity will stabilize  8/25/2020 1510 by Chacho Bell RN  Outcome: Ongoing     Problem: Discharge Planning:  Goal: Patients continuum of care needs are met  Description: Patients continuum of care needs are met  8/25/2020 1510 by Chacho Bell RN  Outcome: Ongoing   Electronically signed by Chacho Bell RN on 8/25/2020 at 3:11 PM

## 2020-08-25 NOTE — CONSULTS
[facility]   GI Consult Note      Reason for Consult:  Abdominal pain  Requesting Physician:  Lynn Man    CHIEF COMPLAINT:  Diarrhea which has resolved. History Obtained From:  patient    HISTORY OF PRESENT ILLNESS:                The patient is a 63 y.o. female with significant past medical history of GERD and dysphagia. She was admitted for lightheadedness and low BP. She complained of diarrhea which has resolved. Her CT shows some narrowing of the SMA. She has been seen by vascular surgery. She denies abdominal pain or diarrhea. Her only complaint is dysphagia.      Past Medical History:        Diagnosis Date    Back pain     CAD (coronary artery disease)     COPD (chronic obstructive pulmonary disease) (HCC)     Foot pain     from bone spurs    GERD (gastroesophageal reflux disease)     Headache     sharp pain going through head    Heart attack ([address]     Hip pain     Hyperlipidemia     Hypertension      (normal spontaneous vaginal delivery)     Osteoporosis     Vision abnormalities      Past Surgical History:        Procedure Laterality Date    CARDIAC CATHETERIZATION      CHOLECYSTECTOMY      COLONOSCOPY      ESOPHAGEAL DILATATION N/A 2019    ESOPHAGEAL DILATION TONG performed by César Castillo MD at [address] [place] [address] N/A 2019    EGD BIOPSY performed by César Castillo MD at [facility] ENDOSCOPY     Current Medications:    Current Facility-Administered Medications: sodium chloride flush 0.9 % injection 10 mL, 10 mL, Intravenous, 2 times per day  sodium chloride flush 0.9 % injection 10 mL, 10 mL, Intravenous, PRN  acetaminophen (TYLENOL) tablet 650 mg, 650 mg, Oral, Q6H PRN **OR** acetaminophen (TYLENOL) suppository 650 mg, 650 mg, Rectal, Q6H PRN  enoxaparin (LOVENOX) injection 40 mg, 40 mg, Subcutaneous, Daily  glucose (GLUTOSE) 40 % oral gel 15 g, 15 g, Oral, PRN  dextrose 50 % IV solution, 12.5 g, Intravenous, PRN  glucagon (rDNA) injection 1 mg, 1 mg, Intramuscular, PRN  dextrose 5 % solution, 100 mL/hr, Intravenous, PRN  insulin lispro (HUMALOG) injection vial 0-6 Units, 0-6 Units, Subcutaneous, TID WC  insulin lispro (HUMALOG) injection vial 0-3 Units, 0-3 Units, Subcutaneous, Nightly  metronidazole (FLAGYL) 500 mg in NaCl 100 mL IVPB premix, 500 mg, Intravenous, Q8H  aspirin chewable tablet 81 mg, 81 mg, Oral, Daily  atorvastatin (LIPITOR) tablet 40 mg, 40 mg, Oral, Nightly  budesonide-formoterol (SYMBICORT) 80-4.5 MCG/ACT inhaler 2 puff, 2 puff, Inhalation, BID  albuterol (PROVENTIL) nebulizer solution 2.5 mg, 2.5 mg, Nebulization, Q4H PRN  metoprolol succinate (TOPROL XL) extended release tablet 12.5 mg, 12.5 mg, Oral, Daily  pantoprazole (PROTONIX) tablet 40 mg, 40 mg, Oral, BID AC  pregabalin (LYRICA) capsule 75 mg, 75 mg, Oral, BID  sertraline (ZOLOFT) tablet 100 mg, 100 mg, Oral, Daily  tiotropium (SPIRIVA RESPIMAT) 2.5 MCG/ACT inhaler 2 puff, 2 puff, Inhalation, Daily  0.9 % sodium chloride infusion, , Intravenous, Continuous  Allergies:  Iodine; Other; Eggs or egg-derived products [eggs or egg-derived products]; Eggs [egg white]; Flexeril [cyclobenzaprine hcl];  Levofloxacin; and Omeprazole    Social History:    Social History     Socioeconomic History    Marital status: Legally      Spouse name: Not on file    Number of children: Not on file    Years of education: Not on file    Highest education level: Not on file   Occupational History    Not on file   Social Needs    Financial resource strain: Not on file    Food insecurity     Worry: Not on file     Inability: Not on file   [employer] needs     Medical: Not on file     Non-medical: Not on file   Tobacco Use    Smoking status: Former Smoker     Packs/day: 1.00     Years: 37.00     Pack years: 37.00     Types: Cigarettes     Last attempt to quit: 1/1/2017     Years since quitting: 3.6    Smokeless tobacco: Never Used   Substance and Sexual Activity    Alcohol use: No     Alcohol/week: 0.0 standard drinks    Drug use: No    Sexual activity: Not Currently   Lifestyle    Physical activity     Days per week: Not on file     Minutes per session: Not on file    Stress: Not on file   Relationships    Social connections     Talks on phone: Not on file     Gets together: Not on file     Attends Catholic service: Not on file     Active member of club or organization: Not on file     Attends meetings of clubs or organizations: Not on file     Relationship status: Not on file    Intimate partner violence     Fear of current or ex partner: Not on file     Emotionally abused: Not on file     Physically abused: Not on file     Forced sexual activity: Not on file   Other Topics Concern    Not on file   Social History Narrative    Not on file       Family History:       Problem Relation Age of Onset    Heart Failure Mother         heart disease    Hypertension Mother     Emphysema Mother     Heart Failure Father         heart disease    COPD Father     Hypertension Paternal Grandmother      REVIEW OF SYSTEMS:    CONSTITUTIONAL:  negative  EYES:  negative  HEENT:  negative  RESPIRATORY:  negative  CARDIOVASCULAR:  negative  GASTROINTESTINAL:  negative  INTEGUMENT/BREAST:  negative  HEMATOLOGIC/LYMPHATIC:  negative  ENDOCRINE:  negative  MUSCULOSKELETAL:  negative  NEUROLOGICAL:  negative  PHYSICAL EXAM:    General Appearance: alert and oriented to person, place and time, well developed and well- nourished, in no acute distress  Skin: warm and dry, no rash or erythema  Head: normocephalic and atraumatic  Eyes: pupils equal, round, and reactive to light, extraocular eye movements intact, conjunctivae normal  ENT: tympanic membrane, external ear and ear canal normal bilaterally, nose without deformity, nasal mucosa and turbinates normal without polyps  Neck: supple and non-tender without mass, no thyromegaly or thyroid nodules, no cervical lymphadenopathy  Pulmonary/Chest: clear to auscultation bilaterally- no wheezes, rales or rhonchi, normal air movement, no respiratory distress  Cardiovascular: normal rate, regular rhythm, normal S1 and S2, no murmurs, rubs, clicks, or gallops, distal pulses intact, no carotid bruits  Abdomen: soft, non-tender, non-distended, normal bowel sounds, no masses or organomegaly  Extremities: no cyanosis, clubbing or edema  Musculoskeletal: normal range of motion, no joint swelling, deformity or tenderness  Neurologic: reflexes normal and symmetric, no cranial nerve deficit, gait, coordination and speech normal  Vitals:    /69   Pulse 70   Temp 98 °F (36.7 °C) (Oral)   Resp 16   Ht 5' 5\" (1.651 m)   Wt 153 lb 10.6 oz (69.7 kg)   SpO2 94%   BMI 25.57 kg/m²     DATA:    CBC with Differential:    Lab Results   Component Value Date    WBC 9.5 08/25/2020    RBC 4.20 08/25/2020    HGB 13.5 08/25/2020    HCT 40.8 08/25/2020     08/25/2020    MCV 97.1 08/25/2020    MCH 32.2 08/25/2020    MCHC 33.2 08/25/2020    RDW 15.1 08/25/2020    LYMPHOPCT 6.7 08/25/2020    MONOPCT 1.1 08/25/2020    BASOPCT 0.2 08/25/2020    MONOSABS 0.1 08/25/2020    LYMPHSABS 0.6 08/25/2020    EOSABS 0.0 08/25/2020    BASOSABS 0.0 08/25/2020     CMP:    Lab Results   Component Value Date     08/25/2020    K 4.5 08/25/2020     08/25/2020    CO2 24 08/25/2020    BUN 15 08/25/2020    CREATININE 0.8 08/25/2020    GFRAA >60 08/25/2020    AGRATIO 1.5 08/24/2020    LABGLOM >60 08/25/2020    GLUCOSE 190 08/25/2020    PROT 6.5 08/24/2020    LABALBU 3.9 08/24/2020    CALCIUM 8.6 08/25/2020    BILITOT 0.3 08/24/2020    ALKPHOS 110 08/24/2020    AST 20 08/24/2020    ALT 17 08/24/2020       IMPRESSION/RECOMMENDATIONS:      1. Dysphagia with a history of GERD. Diarrhea has resolved. CT with moderate narrowing of SMA thought not to be significant by vascular surgery. Can dilate esophageal stricture as outpatient as needed.     Electronically signed by Ilia Martinez MD on 8/25/2020 at 2:05 PM

## 2020-08-25 NOTE — ED PROVIDER NOTES
[address]      Pt Name: Lencho Arroyo  MRN: [id number]  [place] 1960  Date of evaluation: 8/24/2020  Provider: Moira Merrill DO    CHIEF COMPLAINT       Chief Complaint   Patient presents with    Hypotension     pt reports hypotension at home. pt staets systolic of 75 at home today.  Headache     chronic HA, chronic back ache. HISTORY OF PRESENT ILLNESS   (Location/Symptom, Timing/Onset, Context/Setting, Quality, Duration, Modifying Factors, Severity)  Note limiting factors. Lencho Arroyo is a 60 y.o. female who presents to the emergency department with a complaint of hypotension. She states that she has been dizzy and lightheaded today and her blood pressure was 75 at home. She also reports some abdominal pain. It should be noted the patient is very vague in her description of symptoms and she was unable to determine exactly what time the abdominal pain started and was unable to describe the character of the abdominal pain. She reports that she has been having some diarrhea for several days. She states that it is watery but she is unsure of the color. She denies any melena hematochezia or hematemesis. She denies any falls, trauma, or injury. She denies any associated chest pain heaviness pressure or tightness and denies any shortness of breath. She denies any cough or sputum production. She denies any vomiting but does report some nausea. She states that she does have chronic headaches that happen all the time. She has a mild headache at this time but it is not any different than what she typically has. She denies any neck pain or stiffness. She denies any focal weakness or numbness. She denies any vision change or speech change. She states that she did take a muscle relaxer this evening but did not take any other medications. She did not take any overdoses.   She denies ingestion of any alcohol or other substances. She does have chronic low back pain and states that it is unchanged from baseline. She denies any radicular pain focal weakness or numbness. She does not take any anticoagulants. She denies any saddle anesthesia. No incontinence bowel or bladder difficulties. She does report some urinary frequency and some urgency. HPI    Nursing Notes were reviewed. REVIEW OF SYSTEMS    (2-9 systems for level 4, 10 or more for level 5)       Constitutional: Negative for fever or chills. HENT: Negative for rhinorrhea and sore throat. Eyes: Negative for redness or drainage. Respiratory: Negative for shortness of breath or dyspnea on exertion. Cardiovascular: Negative for chest pain. Genitourinary: Negative for flank pain. Negative for dysuria. Negative for hematuria. All systems are reviewed and are negative except for those listed above in the history of present illness and ROS.         PAST MEDICAL HISTORY     Past Medical History:   Diagnosis Date    Back pain     CAD (coronary artery disease)     COPD (chronic obstructive pulmonary disease) (HCC)     Foot pain     from bone spurs    GERD (gastroesophageal reflux disease)     Headache     sharp pain going through head    Heart attack ([address]     Hip pain     Hyperlipidemia     Hypertension      (normal spontaneous vaginal delivery)     Osteoporosis     Vision abnormalities          SURGICAL HISTORY       Past Surgical History:   Procedure Laterality Date    CARDIAC CATHETERIZATION      CHOLECYSTECTOMY      COLONOSCOPY      ESOPHAGEAL DILATATION N/A 2019    ESOPHAGEAL DILATION TONG performed by Kellie Grubbs MD at [facility] N/A 2019    EGD BIOPSY performed by Kellie Grubbs MD at [facility]       Previous Medications    ACETAMINOPHEN (TYLENOL) 500 MG TABLET    Take 2 tablets by mouth every 6 hours as needed for Pain Do not take with other medications containing acetaminophen. ALBUTEROL SULFATE HFA (PROVENTIL HFA) 108 (90 BASE) MCG/ACT INHALER    Inhale 4-6 puffs into the lungs every 4 hours as needed for Wheezing or Shortness of Breath    ASPIRIN 81 MG CHEWABLE TABLET    CHEW ONE TABLET BY MOUTH DAILY    ATORVASTATIN (LIPITOR) 40 MG TABLET    Take 1 tablet by mouth nightly    BLOOD GLUCOSE MONITORING SUPPL (ACURA BLOOD GLUCOSE METER) W/DEVICE KIT    Test for blood sugar before meals    BLOOD GLUCOSE TEST STRIPS (ACURA BLOOD GLUCOSE TEST) STRIP    1 each by In Vitro route daily As needed. DIPHENHYDRAMINE-APAP, SLEEP, (TYLENOL PM EXTRA STRENGTH)  MG TABLET    Take 1 tablet by mouth    ERGOCALCIFEROL (ERGOCALCIFEROL) 93193 UNITS CAPSULE    Take 1 capsule by mouth once a week    FLUTICASONE-SALMETEROL (ADVAIR DISKUS) 250-50 MCG/DOSE AEPB    Inhale 1 puff into the lungs every 12 hours    FOLIC ACID (FOLVITE) 1 MG TABLET    Take 1 tablet by mouth daily    HANDICAP PLACARD MISC    by Does not apply route Handicap Placard for 5 years    LIDOCAINE (LIDODERM) 5 %    Place 1 patch onto the skin daily 12 hours on, 12 hours off. LISINOPRIL (PRINIVIL;ZESTRIL) 5 MG TABLET    Take 1 tablet by mouth daily    METFORMIN (GLUCOPHAGE) 500 MG TABLET    Take 1 tablet by mouth daily (with breakfast)    METOPROLOL SUCCINATE (TOPROL XL) 25 MG EXTENDED RELEASE TABLET    Take 0.5 tablets by mouth daily    MINERAL OIL-HYDROPHILIC PETROLATUM (AQUAPHOR) OINTMENT    Apply topically as needed. PANTOPRAZOLE (PROTONIX) 20 MG TABLET    TAKE TWO TABLETS BY MOUTH DAILY    PREGABALIN (LYRICA) 75 MG CAPSULE    Take 1 capsule by mouth 2 times daily for 125 days.     RESPIRATORY THERAPY SUPPLIES (NEBULIZER/TUBING/MOUTHPIECE) KIT    1 kit by Does not apply route daily    SERTRALINE (ZOLOFT) 100 MG TABLET    TAKE ONE TABLET BY MOUTH DAILY    SPACER/AERO-HOLDING CHAMBERS (E-Z SPACER) AMITA    1 Device by Does not apply route daily as needed (sob) TRIAMCINOLONE (KENALOG) 0.1 % CREAM    Apply topically 2 times daily. UMECLIDINIUM BROMIDE 62.5 MCG/INH AEPB    Inhale 1 puff into the lungs daily       ALLERGIES     Iodine; Other; Eggs or egg-derived products [eggs or egg-derived products]; Eggs [egg white]; Flexeril [cyclobenzaprine hcl];  Levofloxacin; and Omeprazole    FAMILY HISTORY       Family History   Problem Relation Age of Onset    Heart Failure Mother         heart disease    Hypertension Mother     Emphysema Mother     Heart Failure Father         heart disease    COPD Father     Hypertension Paternal Grandmother           SOCIAL HISTORY       Social History     Socioeconomic History    Marital status: Legally      Spouse name: Not on file    Number of children: Not on file    Years of education: Not on file    Highest education level: Not on file   Occupational History    Not on file   Social Needs    Financial resource strain: Not on file    Food insecurity     Worry: Not on file     Inability: Not on file    Transportation needs     Medical: Not on file     Non-medical: Not on file   Tobacco Use    Smoking status: Former Smoker     Packs/day: 1.00     Years: 37.00     Pack years: 37.00     Types: Cigarettes     Last attempt to quit: 1/1/2017     Years since quitting: 3.6    Smokeless tobacco: Never Used   Substance and Sexual Activity    Alcohol use: No     Alcohol/week: 0.0 standard drinks    Drug use: No    Sexual activity: Not Currently   Lifestyle    Physical activity     Days per week: Not on file     Minutes per session: Not on file    Stress: Not on file   Relationships    Social connections     Talks on phone: Not on file     Gets together: Not on file     Attends Sabianism service: Not on file     Active member of club or organization: Not on file     Attends meetings of clubs or organizations: Not on file     Relationship status: Not on file    Intimate partner violence     Fear of current or ex partner: normal.         DIAGNOSTIC RESULTS     EKG: All EKG's are interpreted by the Emergency Department Physician who either signs or Co-signs this chart in the absence of a cardiologist.    Normal sinus rhythm. Rate 64. ID interval 122 ms. QRS duration 70 ms. QTc 437 ms. R axis 66 degrees. There was no ST elevation. Normal EKG. EKG was unchanged from prior tracing from July 15, 2020. RADIOLOGY:   Non-plain film images such as CT, Ultrasound and MRI are read by the radiologist. Plain radiographic images are visualized and preliminarily interpreted by the emergency physician with the below findings:        Interpretation per the Radiologist below, if available at the time of this note:    CTA CHEST ABDOMEN PELVIS W CONTRAST   Final Result   No acute intrathoracic nor abdominopelvic abnormality. No aortic dissection. Moderate narrowing of the proximal superior mesenteric artery due to   noncalcified plaque. Focal ulceration or dissection of the left common iliac artery. CT Head WO Contrast   Final Result   No acute intracranial abnormality. CT CHEST WO CONTRAST   Final Result   Addendum 1 of 1   ADDENDUM:   3 D images were reviewed. Final      XR CHEST PORTABLE   Final Result   No acute cardiopulmonary disease.                ED BEDSIDE ULTRASOUND:   Performed by ED Physician - none    LABS:  Labs Reviewed   CBC WITH AUTO DIFFERENTIAL - Abnormal; Notable for the following components:       Result Value    RBC 3.94 (*)     All other components within normal limits    Narrative:     Performed at:  [facility]  [address] [place], [place] [address]   Phone [phone]   COMPREHENSIVE METABOLIC PANEL W/ REFLEX TO MG FOR LOW K - Abnormal; Notable for the following components:    Glucose 126 (*)     All other components within normal limits    Narrative:     Performed at:  [facility]  [address] [facility] [place] [address]   Phone [phone]   BLOOD GAS, VENOUS - Abnormal; Notable for the following components:    pH, Wes 7.316 (*)     pCO2, Wes 60.3 (*)     HCO3, Venous 31 (*)     All other components within normal limits    Narrative:     Performed at:  [facility]  [address] [facility] [place] [address]   Phone [phone]   CULTURE, BLOOD 1   CULTURE, BLOOD 2   GASTROINTESTINAL PANEL, MOLECULAR   C DIFF TOXIN/ANTIGEN   LIPASE    Narrative:     Performed at:  [facility]  [address], [place] [employer] [facility] [place] [address]   Phone [phone]   TROPONIN    Narrative:     Performed at:  [facility]  [address] [facility] [place] [address]   Phone [phone]   PROTIME-INR    Narrative:     Performed at:  [facility]  [address] [facility] [place] [address]   Phone [phone]   APTT    Narrative:     Performed at:  [facility]  [address], [place] [employer] [employer] [place] [address]   Phone [phone]   URINE RT REFLEX TO CULTURE    Narrative:     Performed at:  [facility]  [address], [place] [address]   Phone [phone]   LACTATE, SEPSIS    Narrative:     Performed at:  [facility]  [address], [place] [employer] [facility] [employer] [address]   Phone [phone]   LACTATE, SEPSIS   BLOOD OCCULT STOOL DIAGNOSTIC   TYPE AND SCREEN    Narrative:     Performed at:  [facility]  [address] [place] [facility] [place] [address]   Phone [phone]       All other labs were within normal range or not returned as of this dictation.     EMERGENCY DEPARTMENT COURSE and DIFFERENTIAL DIAGNOSIS/MDM:   Vitals:    Vitals:    08/24/20 2102 08/24/20 2117 08/24/20 2131 08/24/20 2146   BP: (!) 134/55 (!) 132/52 119/67 136/63   Pulse: 69 68 65 72   Resp: 14 13 11 14 Temp:       TempSrc:       SpO2: 95% 97% 97%    Weight:       Height:           Patient presented with hypotension, low back pain, and abdominal pain with some recent diarrhea and some recent urinary frequency and urgency as noted above. She is afebrile. Initial blood pressure was 63/37 at the time of triage. 2 peripheral IVs were established and she was given normal saline 30 mL/kg bolus. Knowing that she had some abdominal pain and some back pain she was sent for CTA chest abdomen and pelvis to evaluate for aortic aneurysm. Differential diagnosis would include GI bleed or sepsis. Patient is neurologically intact, no associated deficits. She does have some recent chronic headaches and for this reason CT head without contrast was also obtained. She was given an empiric dose of cefepime 2 g IV at the time of initial examination. The record indicated a prior history of iodine allergy. As result of this the patient was premedicated with Solu-Medrol 125 mg IV and Benadryl 25 mg IV. Patient has had prior CTA of the chest in 2018 and was given contrast at that time and tolerated it well. MDM      REASSESSMENT          9:55 PM: CTA chest abdomen and pelvis was reviewed. No acute abnormalities identified. There is some focal ulceration or dissection of the left common iliac artery. However, the patient denies any inguinal or groin pain. No leg pain. No evidence of vascular compromise to the left leg. The patient was reexamined. Blood pressure is 107/52. She feels significantly improved. Rectal exam was completed with the nurse present. There is liquid medium brown stool in the rectal vault. No bright red blood. No melena. Nontender. 10:01 PM: Patient was reexamined. Her abdomen is nontender. Blood pressure is 138/75. Heart rate is 68. She is feeling markedly improved. She denies any current physical complaints. Etiology of her hypotension is unclear.   Is certainly possible that she may have just been dehydrated from recent diarrhea. She reports at least 4 or 5 brown watery stools since this morning. No recent travel. No exposure to illness. Stool for C. difficile cytotoxin and culture has been ordered. Urinalysis was unremarkable. Laboratory studies are unremarkable. Lactate is 1.2. BUN and creatinine are normal.  pH was 7.31. The patient will be admitted for further treatment and evaluation. A call was placed to the hospitalist on-call for admission. CRITICAL CARE TIME   Total Critical Care time was 35 minutes, excluding separately reportable procedures. There was a high probability of clinically significant/life threatening deterioration in the patient's condition which required my urgent intervention. CONSULTS:  None    PROCEDURES:  Unless otherwise noted below, none     Procedures        FINAL IMPRESSION      1. Other hypotension    2. Diarrhea, unspecified type    3. Nonintractable episodic headache, unspecified headache type    4. Acute abdominal pain    5. Chronic bilateral low back pain without sciatica          DISPOSITION/PLAN   DISPOSITION        PATIENT REFERRED TO:  No follow-up provider specified. DISCHARGE MEDICATIONS:  New Prescriptions    No medications on file     Controlled Substances Monitoring:     No flowsheet data found. (Please note that portions of this note were completed with a voice recognition program.  Efforts were made to edit the dictations but occasionally words are mis-transcribed. )    [id number] Merrill, DO (electronically signed)  Attending Emergency Physician          Benito Sears, DO  08/24/20 [address] [place] [address],   08/24/20 [id number]

## 2020-08-25 NOTE — PROGRESS NOTES
Hospitalist Progress Note    CC: Other hypotension    Hospital course:  62yo WF with PMHx sig for CAD, GERD, hx of MI, HTN presents to the hospital with dizziness, lightheadedness and diarrhea. Pt came in very hypotensive with severe abd pain. The hypotension did respond to IVF. It was thought that she may have had a mesenteric occlusion, however that was found to not be the case. The pt likely has significant gastroenteritis complicated by dehydration and hypotension, now improved. I do not see evidence of sepsis. Admit date: 8/24/2020  Days in hospital:  1    24 Hour Events: feels better    Subjective: c/o some dysphagia, no further diarrhea, but has some bloating    ROS:   A comprehensive review of systems was negative. except for bloating and occ abd cramping - diarrhea has improved. Objective:    /69   Pulse 76   Temp 98 °F (36.7 °C) (Oral)   Resp 16   Ht 5' 5\" (1.651 m)   Wt 153 lb 10.6 oz (69.7 kg)   SpO2 95%   BMI 25.57 kg/m²     Gen: NAD  HEENT: NC/AT, moist mucous membranes, no oropharyngeal erythema or exudate  Neck: supple, trachea midline, no anterior cervical or SC LAD  Heart:  Normal s1/s2, RRR, no murmurs, gallops, or rubs. no leg edema  Lungs:  diminished bilaterally, no wheeze, no rales, no rhonchi, no crackles, no use of accessory muscles  Abd: bowel sounds present, soft, nontender, distended, no masses  Extrem:  No clubbing, cyanosis,  no edema  Skin: no rashes or lesions  Psych: A & O x3  Neuro: grossly intact, moves all four extremities.       Assessment:    Principal Problem:    Other hypotension  Active Problems:    Essential hypertension    COPD, severe (HCC)    Type 2 diabetes mellitus without complication, without long-term current use of insulin (HCC)    Diarrhea    Sepsis ([address]    Peripheral vascular disease (HCC)    Atherosclerosis of superior mesenteric artery ([address]  Resolved Problems:    * No resolved hospital problems. *      Plan:  1. Hypotension - improved with IVF  2. Gastroenteritis - stool for C diff, but I think this is just basic gastroenteritis  3. DMII - continue with SSI and accuchecks  4. COPD - continue with inhalers    Prognosis:  Good    Code status:  Full code    DVT prophylaxis: Lovenox  GI prophylaxis: Proton Pump Inhibitor  Antibiotic prophylaxis indicated:   no  Diet:  DIET CARB CONTROL; Carb Control: 4 carb choices (60 gms)/meal    Disposition:  Home    Medications:  Scheduled Meds:   sodium chloride flush  10 mL Intravenous 2 times per day    enoxaparin  40 mg Subcutaneous Daily    insulin lispro  0-6 Units Subcutaneous TID WC    insulin lispro  0-3 Units Subcutaneous Nightly    metroNIDAZOLE  500 mg Intravenous Q8H    aspirin  81 mg Oral Daily    atorvastatin  40 mg Oral Nightly    budesonide-formoterol  2 puff Inhalation BID    metoprolol succinate  12.5 mg Oral Daily    pantoprazole  40 mg Oral BID AC    pregabalin  75 mg Oral BID    sertraline  100 mg Oral Daily    tiotropium  2 puff Inhalation Daily       PRN Meds:  sodium chloride flush, acetaminophen **OR** acetaminophen, glucose, dextrose, glucagon (rDNA), dextrose, albuterol    IV:   dextrose      sodium chloride 75 mL/hr at 08/25/20 1210         Intake/Output Summary (Last 24 hours) at 8/25/2020 2018  Last data filed at 8/25/2020 1458  Gross per 24 hour   Intake 1214 ml   Output 1450 ml   Net -236 ml       Results:  CBC:   Recent Labs     08/24/20 2016 08/25/20  0121   WBC 9.2 9.5   HGB 12.9 13.5   HCT 38.1 40.8   MCV 96.6 97.1    203     BMP:   Recent Labs     08/24/20 2016 08/25/20  0121    141   K 4.3 4.5    105   CO2 22 24   BUN 14 15   CREATININE 0.8 0.8     Mag: No results for input(s): MAG in the last 72 hours.   Phos: No results found for: PHOS  No components found for: GLU    LIVER PROFILE:   Recent Labs     08/24/20 2016   AST 20   ALT 17   LIPASE 37.0   BILITOT 0.3   ALKPHOS 110 PT/INR:   Recent Labs     08/24/20 2016   PROTIME 11.4   INR 0.98     APTT:   Recent Labs     08/24/20 2016   APTT 29.1     UA:  Recent Labs     08/24/20 2123   COLORU YELLOW   PHUR 6.0   CLARITYU Clear   SPECGRAV 1.029   LEUKOCYTESUR Negative   UROBILINOGEN 1.0   BILIRUBINUR Negative   BLOODU Negative   GLUCOSEU Negative       Invalid input(s): ABG  Lab Results   Component Value Date    CALCIUM 8.6 08/25/2020                 Electronically signed by Kim Woodruff MD on 8/25/2020 at 8:18 PM

## 2020-08-25 NOTE — H&P
Hospital Medicine History & Physical      PCP: Harris England DO    Date of Admission: 8/24/2020    Date of Service: Pt seen/examined on 8/24/2020 and Admitted to Inpatient    Chief Complaint: Dizziness, lightheadedness, hypotension, generalized weakness and fatigue, frequency and consistency diarrhea, abdominal pain      History Of Present Illness: The patient is a 63 y.o. female with past medical history of COPD not on home osepsisxygen, CAD, GERD, previous history of headaches, previous MI, hypertension, hyperlipidemia, and osteoarthritis/osteoporosis who presents to [facility] with above symptoms within 24 hours prior to arrival.  Patient mostly came in due to complaints of dizziness and lightheadedness at home, notes she never had any syncopal episode, but was otherwise feeling more weak and fatigued with the dizziness, she notes she may have also had a headache although has had headaches prior. She was brought by family member. Patient admits to having her blood pressure tested at home and was noted to be 75 systolic. Patient admits she has not had a very good appetite in the last week and has been having significant diarrhea of loose to watery consistency at around 4-5 times daily for the past week, and she notes that this diarrhea seems to be off and on and has been occurring for the past few months but seemingly self terminates. Patient also notes she has had some mild to moderate abdominal pain with this diarrhea, notably starts diffuse but does seem to focalize to the epigastric region and sometimes the flanks bilaterally. She denies any recent fever, chills, nausea/vomiting, blood loss in the sputum/urine/stool, recent cough, chest pain, shortness of breath, seizure disorder, syncopal episodes, rashes, sick contacts.     Of note, patient was still significantly hypotensive with blood pressure in the 62W systolic on presentation, responded well to fluid boluses in the ED and blood pressure is now stabilized up to the 026Q to 343L systolic. Was initially slightly hypothermic at 96.1 °F but otherwise vital signs unremarkable overall other than those 2. Chest x-ray, CT head noncontrast, noncontrast CT chest all were noted negative. CTA of the chest abdomen pelvis did note some moderate narrowing of the proximal superior mesenteric artery due to noncalcified plaque and focal ulceration or dissection of the left common iliac artery. Past Medical History:        Diagnosis Date    Back pain     CAD (coronary artery disease)     COPD (chronic obstructive pulmonary disease) (HCC)     Foot pain     from bone spurs    GERD (gastroesophageal reflux disease)     Headache     sharp pain going through head    Heart attack ([address]     Hip pain     Hyperlipidemia     Hypertension      (normal spontaneous vaginal delivery)     Osteoporosis     Vision abnormalities        Past Surgical History:        Procedure Laterality Date    CARDIAC CATHETERIZATION      CHOLECYSTECTOMY      COLONOSCOPY      ESOPHAGEAL DILATATION N/A 2019    ESOPHAGEAL DILATION TONG performed by Nolvia Burks MD at [address] [place] [address] N/A 2019    EGD BIOPSY performed by Nolvia Burks MD at [facility] ENDOSCOPY       Medications Prior to Admission:    Prior to Admission medications    Medication Sig Start Date End Date Taking?  Authorizing Provider   pantoprazole (PROTONIX) 20 MG tablet TAKE TWO TABLETS BY MOUTH DAILY 20  Yes Kevin Zapata,    albuterol sulfate HFA (PROVENTIL HFA) 108 (90 Base) MCG/ACT inhaler Inhale 4-6 puffs into the lungs every 4 hours as needed for Wheezing or Shortness of Breath 20  Yes Ness Beltran MD   sertraline (ZOLOFT) 100 MG tablet TAKE ONE TABLET BY MOUTH DAILY 20  Yes Keyon Pickering DO Benny   Umeclidinium Bromide 62.5 MCG/INH AEPB Inhale 1 puff into the lungs daily 3/25/20  Yes Delilah Pina MD   fluticasone-salmeterol (ADVAIR DISKUS) 250-50 MCG/DOSE AEPB Inhale 1 puff into the lungs every 12 hours 3/13/20  Yes Kevin Zapata DO   pregabalin (LYRICA) 75 MG capsule Take 1 capsule by mouth 2 times daily for 125 days. 3/5/20 8/25/20 Yes Deng Dick DO   metFORMIN (GLUCOPHAGE) 500 MG tablet Take 1 tablet by mouth daily (with breakfast) 11/6/19  Yes Maryanne Marion FractionJOSE ANGEL CNP   atorvastatin (LIPITOR) 40 MG tablet Take 1 tablet by mouth nightly 8/28/19  Yes Maryanne Marion FractionJOSE ANGEL CNP   ergocalciferol (ERGOCALCIFEROL) 20438 units capsule Take 1 capsule by mouth once a week 6/7/19  Yes Maryanne Marion Fraction, APRN - CNP   diphenhydrAMINE-APAP, sleep, (TYLENOL PM EXTRA STRENGTH)  MG tablet Take 1 tablet by mouth   Yes Historical Provider, MD   acetaminophen (TYLENOL) 500 MG tablet Take 2 tablets by mouth every 6 hours as needed for Pain Do not take with other medications containing acetaminophen. 9/2/18  Yes CARIDAD Barrett   folic acid (FOLVITE) 1 MG tablet Take 1 tablet by mouth daily 8/5/20   Temple Decree, DO   lisinopril (PRINIVIL;ZESTRIL) 5 MG tablet Take 1 tablet by mouth daily 6/22/20   Deng Decree, DO   metoprolol succinate (TOPROL XL) 25 MG extended release tablet Take 0.5 tablets by mouth daily 4/27/20   Temple Decree, DO   Respiratory Therapy Supplies (NEBULIZER/TUBING/MOUTHPIECE) KIT 1 kit by Does not apply route daily 3/25/20   Delilah Pina MD   blood glucose test strips [facility] BLOOD GLUCOSE TEST) strip 1 each by In Vitro route daily As needed. 11/8/19   [facility] APRN - CNP   Blood Glucose Monitoring Suppl [facility] BLOOD GLUCOSE METER) w/Device KIT Test for blood sugar before meals 11/6/19   Maryanne SamJOSE ANGEL CNP   triamcinolone (KENALOG) 0.1 % cream Apply topically 2 times daily.  10/9/19   Maryanne Sam, JOSE ANGEL - CNP   aspirin 81 MG chewable lymphadenopathy  Lungs: Clear to auscultation, bilaterally  Heart: Regular rate and rhythm with Normal S1/S2 without murmurs, rubs or gallops, point of maximum impulse non-displaced  Abdomen: Mild tenderness to epigastric region on palpation, overall soft, nonrigid, active bowel sounds  Extremities: No edema noted bilaterally  Skin: Dry, intact, no rashes  Neurologic: Alert and oriented X 3, neurovascularly intact with sensory/motor intact upper extremities/lower extremities, bilaterally. Cranial nerves: II-XII intact, grossly non-focal.  Mental status: Alert, oriented, thought content appropriate. Capillary Refill: Acceptable  < 3 seconds  Peripheral Pulses: +3 Easily felt, not easily obliterated with pressure      CXR: No acute process  CT head noncontrast: No acute process  CT chest noncontrast: No acute abnormality  CTA chest and abdomen pelvis:  No acute intrathoracic nor abdominopelvic abnormality.  No aortic dissection.         Moderate narrowing of the proximal superior mesenteric artery due to    noncalcified plaque.         Focal ulceration or dissection of the left common iliac artery.           CBC   Recent Labs     08/24/20 2016   WBC 9.2   HGB 12.9   HCT 38.1         RENAL  Recent Labs     08/24/20 2016      K 4.3      CO2 22   BUN 14   CREATININE 0.8     LFT'S  Recent Labs     08/24/20 2016   AST 20   ALT 17   BILITOT 0.3   ALKPHOS 110     COAG  Recent Labs     08/24/20 2016   INR 0.98     CARDIAC ENZYMES  Recent Labs     08/24/20 2016   TROPONINI <0.01       U/A:    Lab Results   Component Value Date    COLORU YELLOW 08/24/2020    WBCUA 4 07/06/2020    RBCUA 5 07/06/2020    MUCUS Rare 10/31/2018    BACTERIA RARE 07/06/2020    CLARITYU Clear 08/24/2020    SPECGRAV 1.029 08/24/2020    LEUKOCYTESUR Negative 08/24/2020    BLOODU Negative 08/24/2020    GLUCOSEU Negative 08/24/2020       ABG  No results found for: RVJ4BRU, BEART, G4ULFXNV, PHART, THGBART, CMM9ZOY, PO2ART, Jennie Castanon [address] Problems    Diagnosis Date Noted    Diarrhea [R19.7] 08/24/2020     Priority: High    Sepsis ([facility] [A41.9] 08/24/2020     Priority: High    Hypotension [I95.9] 08/24/2020     Priority: High    Atherosclerosis of superior mesenteric artery (HCC) [K55.1] 08/24/2020     Priority: High    Peripheral vascular disease ([facility] [I73.9] 08/24/2020    Type 2 diabetes mellitus without complication, without long-term current use of insulin ([facility] [E11.9] 11/06/2019    Essential hypertension [I10]     COPD, severe ([facility] [J44.9] 02/19/2015         PHYSICIANS CERTIFICATION:    I certify that Mitchell Hermosillo is expected to be hospitalized for greater than 2 midnights based on the following assessment and plan:      ASSESSMENT/PLAN:  · Meet sepsis criteria with noted abnormal vital signs and suspected infectious source being GI, will continue on Flagyl and patient did receive 1 dose of cefepime in the ED  · Low suspicion for C. difficile but will follow up on results, C. difficile test ordered and pending and stool full panel pending as well, H. pylori antigens ordered for the morning  · GI consulted for noted findings of negative imaging but consistent pattern of enteritis, patient may need colonoscopy since diarrhea has been persistent  · Vascular surgery consulted for findings on CTA for narrowing of the SMA and for the findings of focal ulceration versus dissection of the left iliac artery, some concern for a chronic mesenteric ischemia causing the above symptoms  · Hypotension seems to have resolved at this time with IV fluids  · Restarted most home medications, low dose insulin sliding scale and Accu-Cheks for glucose control  · Repeat labs in the morning  · Diet ordered    DVT Prophylaxis: Lovenox  Diet: DIET CARB CONTROL; Carb Control: 4 carb choices (60 gms)/meal  Code Status: Full Code  PT/OT Eval Status: Ambulatory    Dispo -started on antibiotics for possible enteritis as a source for sepsis and hypotension in addition to the diarrhea and abdominal pain, GI consulted and vascular surgery consulted as noted above for reasons as listed, pending further infectious work-up for diarrhea including C. difficile and full stool panel, H. pylori antigen was also ordered       DO Sourav    Thank you Pari Dill DO for the opportunity to be involved in this patient's care. If you have any questions or concerns please feel free to contact me at [phone].

## 2020-08-25 NOTE — ED NOTES
Patient has tender, distended abdomen, MD concerned for AAA  18 G IV Placed to L AC  EKG complete and shown to MD, patient remains NSR on tele monitor         Lorenza Osuna RN  08/24/20 2010

## 2020-08-25 NOTE — PLAN OF CARE
Problem: Falls - Risk of:  Goal: Will remain free from falls  Description: Will remain free from falls  Outcome: Ongoing  Goal: Absence of physical injury  Description: Absence of physical injury  Outcome: Ongoing     Problem: Pain:  Goal: Pain level will decrease  Description: Pain level will decrease  Outcome: Ongoing  Goal: Control of acute pain  Description: Control of acute pain  Outcome: Ongoing  Goal: Control of chronic pain  Description: Control of chronic pain  Outcome: Ongoing     Problem: Safety:  Goal: Free from accidental physical injury  Description: Free from accidental physical injury  Outcome: Ongoing  Goal: Free from intentional harm  Description: Free from intentional harm  Outcome: Ongoing     Problem: Daily Care:  Goal: Daily care needs are met  Description: Daily care needs are met  Outcome: Ongoing     Problem: Skin Integrity:  Goal: Skin integrity will stabilize  Description: Skin integrity will stabilize  Outcome: Ongoing     Problem: Discharge Planning:  Goal: Patients continuum of care needs are met  Description: Patients continuum of care needs are met  Outcome: Ongoing

## 2020-08-25 NOTE — ED NOTES
Lactic acid, and blood cultures obtained from [facility]  Patient medicated per [facility]  08/24/20 2024

## 2020-08-26 VITALS
BODY MASS INDEX: 25.75 KG/M2 | RESPIRATION RATE: 17 BRPM | SYSTOLIC BLOOD PRESSURE: 137 MMHG | TEMPERATURE: 98 F | OXYGEN SATURATION: 94 % | HEART RATE: 67 BPM | DIASTOLIC BLOOD PRESSURE: 77 MMHG | WEIGHT: 154.54 LBS | HEIGHT: 65 IN

## 2020-08-26 LAB
ANION GAP SERPL CALCULATED.3IONS-SCNC: 9 MMOL/L (ref 3–16)
BASOPHILS ABSOLUTE: 0 K/UL (ref 0–0.2)
BASOPHILS RELATIVE PERCENT: 0.5 %
BUN BLDV-MCNC: 11 MG/DL (ref 7–20)
CALCIUM SERPL-MCNC: 8.4 MG/DL (ref 8.3–10.6)
CHLORIDE BLD-SCNC: 107 MMOL/L (ref 99–110)
CO2: 26 MMOL/L (ref 21–32)
CREAT SERPL-MCNC: <0.5 MG/DL (ref 0.6–1.2)
EKG ATRIAL RATE: 72 BPM
EKG DIAGNOSIS: NORMAL
EKG P AXIS: 52 DEGREES
EKG P-R INTERVAL: 122 MS
EKG Q-T INTERVAL: 398 MS
EKG QRS DURATION: 78 MS
EKG QTC CALCULATION (BAZETT): 435 MS
EKG R AXIS: 63 DEGREES
EKG T AXIS: 56 DEGREES
EKG VENTRICULAR RATE: 72 BPM
EOSINOPHILS ABSOLUTE: 0.1 K/UL (ref 0–0.6)
EOSINOPHILS RELATIVE PERCENT: 0.9 %
GFR AFRICAN AMERICAN: >60
GFR NON-AFRICAN AMERICAN: >60
GI BACTERIAL PATHOGENS BY PCR: NORMAL
GLUCOSE BLD-MCNC: 127 MG/DL (ref 70–99)
GLUCOSE BLD-MCNC: 128 MG/DL (ref 70–99)
GLUCOSE BLD-MCNC: 190 MG/DL (ref 70–99)
HCT VFR BLD CALC: 33.8 % (ref 36–48)
HEMOGLOBIN: 11.5 G/DL (ref 12–16)
LYMPHOCYTES ABSOLUTE: 1.5 K/UL (ref 1–5.1)
LYMPHOCYTES RELATIVE PERCENT: 17.8 %
MCH RBC QN AUTO: 32.6 PG (ref 26–34)
MCHC RBC AUTO-ENTMCNC: 33.9 G/DL (ref 31–36)
MCV RBC AUTO: 96.4 FL (ref 80–100)
MONOCYTES ABSOLUTE: 0.4 K/UL (ref 0–1.3)
MONOCYTES RELATIVE PERCENT: 4.6 %
NEUTROPHILS ABSOLUTE: 6.4 K/UL (ref 1.7–7.7)
NEUTROPHILS RELATIVE PERCENT: 76.2 %
PDW BLD-RTO: 15.3 % (ref 12.4–15.4)
PERFORMED ON: ABNORMAL
PERFORMED ON: ABNORMAL
PLATELET # BLD: 154 K/UL (ref 135–450)
PMV BLD AUTO: 8.5 FL (ref 5–10.5)
POTASSIUM REFLEX MAGNESIUM: 3.9 MMOL/L (ref 3.5–5.1)
RBC # BLD: 3.51 M/UL (ref 4–5.2)
SODIUM BLD-SCNC: 142 MMOL/L (ref 136–145)
WBC # BLD: 8.5 K/UL (ref 4–11)

## 2020-08-26 PROCEDURE — 99232 SBSQ HOSP IP/OBS MODERATE 35: CPT | Performed by: SURGERY

## 2020-08-26 PROCEDURE — G0378 HOSPITAL OBSERVATION PER HR: HCPCS

## 2020-08-26 PROCEDURE — 96372 THER/PROPH/DIAG INJ SC/IM: CPT

## 2020-08-26 PROCEDURE — APPNB30 APP NON BILLABLE TIME 0-30 MINS: Performed by: NURSE PRACTITIONER

## 2020-08-26 PROCEDURE — 6370000000 HC RX 637 (ALT 250 FOR IP): Performed by: STUDENT IN AN ORGANIZED HEALTH CARE EDUCATION/TRAINING PROGRAM

## 2020-08-26 PROCEDURE — 93005 ELECTROCARDIOGRAM TRACING: CPT | Performed by: INTERNAL MEDICINE

## 2020-08-26 PROCEDURE — 6360000002 HC RX W HCPCS: Performed by: STUDENT IN AN ORGANIZED HEALTH CARE EDUCATION/TRAINING PROGRAM

## 2020-08-26 PROCEDURE — 2580000003 HC RX 258: Performed by: INTERNAL MEDICINE

## 2020-08-26 PROCEDURE — 94760 N-INVAS EAR/PLS OXIMETRY 1: CPT

## 2020-08-26 PROCEDURE — 6370000000 HC RX 637 (ALT 250 FOR IP): Performed by: INTERNAL MEDICINE

## 2020-08-26 PROCEDURE — 2580000003 HC RX 258: Performed by: STUDENT IN AN ORGANIZED HEALTH CARE EDUCATION/TRAINING PROGRAM

## 2020-08-26 PROCEDURE — 80048 BASIC METABOLIC PNL TOTAL CA: CPT

## 2020-08-26 PROCEDURE — 2500000003 HC RX 250 WO HCPCS: Performed by: STUDENT IN AN ORGANIZED HEALTH CARE EDUCATION/TRAINING PROGRAM

## 2020-08-26 PROCEDURE — 85025 COMPLETE CBC W/AUTO DIFF WBC: CPT

## 2020-08-26 PROCEDURE — 93010 ELECTROCARDIOGRAM REPORT: CPT | Performed by: INTERNAL MEDICINE

## 2020-08-26 PROCEDURE — 94640 AIRWAY INHALATION TREATMENT: CPT

## 2020-08-26 PROCEDURE — 96366 THER/PROPH/DIAG IV INF ADDON: CPT

## 2020-08-26 RX ADMIN — SERTRALINE HYDROCHLORIDE 100 MG: 100 TABLET ORAL at 08:33

## 2020-08-26 RX ADMIN — INSULIN LISPRO 1 UNITS: 100 INJECTION, SOLUTION INTRAVENOUS; SUBCUTANEOUS at 12:27

## 2020-08-26 RX ADMIN — TIOTROPIUM BROMIDE INHALATION SPRAY 2 PUFF: 3.12 SPRAY, METERED RESPIRATORY (INHALATION) at 08:07

## 2020-08-26 RX ADMIN — ENOXAPARIN SODIUM 40 MG: 40 INJECTION SUBCUTANEOUS at 08:33

## 2020-08-26 RX ADMIN — ASPIRIN 81 MG: 81 TABLET, CHEWABLE ORAL at 08:33

## 2020-08-26 RX ADMIN — METRONIDAZOLE 500 MG: 500 INJECTION, SOLUTION INTRAVENOUS at 08:33

## 2020-08-26 RX ADMIN — SODIUM CHLORIDE: 9 INJECTION, SOLUTION INTRAVENOUS at 01:47

## 2020-08-26 RX ADMIN — ALBUTEROL SULFATE 2.5 MG: 2.5 SOLUTION RESPIRATORY (INHALATION) at 05:30

## 2020-08-26 RX ADMIN — METOPROLOL SUCCINATE 12.5 MG: 25 TABLET, EXTENDED RELEASE ORAL at 08:33

## 2020-08-26 RX ADMIN — PANTOPRAZOLE SODIUM 40 MG: 40 TABLET, DELAYED RELEASE ORAL at 05:11

## 2020-08-26 RX ADMIN — PREGABALIN 75 MG: 75 CAPSULE ORAL at 08:33

## 2020-08-26 RX ADMIN — BUDESONIDE AND FORMOTEROL FUMARATE DIHYDRATE 2 PUFF: 80; 4.5 AEROSOL RESPIRATORY (INHALATION) at 08:07

## 2020-08-26 RX ADMIN — Medication 10 ML: at 08:34

## 2020-08-26 ASSESSMENT — PAIN SCALES - GENERAL
PAINLEVEL_OUTOF10: 0

## 2020-08-26 NOTE — PLAN OF CARE
Problem: Falls - Risk of:  Goal: Will remain free from falls  Description: Will remain free from falls  8/25/2020 2321 by Jeffery Barlow RN  Outcome: Ongoing     Problem: Falls - Risk of:  Goal: Absence of physical injury  Description: Absence of physical injury  8/25/2020 2321 by Jeffery Barlow RN  Outcome: Ongoing     Problem: Pain:  Goal: Pain level will decrease  Description: Pain level will decrease  8/25/2020 2321 by Jeffery Barlow RN  Outcome: Ongoing     Problem: Pain:  Goal: Control of acute pain  Description: Control of acute pain  8/25/2020 2321 by Jeffery Barlow RN  Outcome: Ongoing     Problem: Pain:  Goal: Control of chronic pain  Description: Control of chronic pain  8/25/2020 2321 by Jeffery Barlow RN  Outcome: Ongoing     Problem: Safety:  Goal: Free from accidental physical injury  Description: Free from accidental physical injury  8/25/2020 2321 by Jeffery Barlow RN  Outcome: Ongoing     Problem: Safety:  Goal: Free from intentional harm  Description: Free from intentional harm  8/25/2020 2321 by Jeffery Barlow RN  Outcome: Ongoing     Problem: Daily Care:  Goal: Daily care needs are met  Description: Daily care needs are met  8/25/2020 2321 by Jeffery Barlow RN  Outcome: Ongoing     Problem: Skin Integrity:  Goal: Skin integrity will stabilize  Description: Skin integrity will stabilize  8/25/2020 2321 by Jeffery aBrlow RN  Outcome: Ongoing     Problem: Discharge Planning:  Goal: Patients continuum of care needs are met  Description: Patients continuum of care needs are met  8/25/2020 2321 by Jeffery Barlow RN  Outcome: Ongoing

## 2020-08-26 NOTE — PROGRESS NOTES
While administering morning medications, patient stated she was having right sided chest pain in the center rating it as 7/10. She also stated her pain was in her back as well and couldn't describe the feeling. Patient also reported being short of breath. Patient head of bed was raised, 2L of o2 administered and respiratory called for breathing treatment. Respiratory has administered a breathing treatment. She now states her pain is 4/10 in chest. IV fluids were also paused. Dr. Veronica Ashton on floor to check on patient. Patient states she \"feels better\". Will continue to monitor patient for any changes.

## 2020-08-26 NOTE — DISCHARGE SUMMARY
Discharge Summary    Date:8/26/2020        Patient Name:Greta Whiting     YOB: 1960     Age:60 y.o. Admit Date:8/24/2020   Admission Condition:fair   Discharged Condition:stable  Discharge Date: 08/26/20     Discharge Diagnoses   Principal Problem:    Other hypotension  Active Problems:    Essential hypertension    COPD, severe (HCC)    Type 2 diabetes mellitus without complication, without long-term current use of insulin (HCC)    Diarrhea    Sepsis ([facility] [address]    Peripheral vascular disease (HCC)    Atherosclerosis of superior mesenteric artery ([facility] [address]  Resolved Problems:    * No resolved hospital problems. [facility] Stay   Narrative of Hospital Course:   63 y.o. female with past medical history of COPD not on home osepsisxygen, CAD, GERD, previous history of headaches, previous MI, hypertension, hyperlipidemia, and osteoarthritis/osteoporosis who presents with dizziness, weakness, fatigue and diarrhea. She never had a syncope episode. She was brought by family member. Blood pressure checked at home showed systolic and 42T. In ER:Of note, patient was hypotensive with systolic blood pressure in 60s, responded well to fluid boluses in the ED. BP improved to the 336Z to 746Z systolic after IV hydration. She was initially  hypothermic at 96.1 °F.  Chest x-ray, CT head noncontrast, noncontrast CT chest all were noted negative. CTA of the chest abdomen pelvis did note some moderate narrowing of the proximal superior mesenteric artery due to noncalcified plaque and focal ulceration or dissection of the left common iliac artery. She was admitted for further care. Hospital course as follows:    #Hypotension  This was likely due to fluid loss. She received IV hydration. She became normotensive     #Diarrhea  This was thought to be due to gastroenteritis. She was initially started on Flagyl. Enteropathic pathogens and C. difficile negative. Flagyl was discontinued.   Supportive care was given.       #SMA stenosis  Patient has incidental finding of SMA stenosis. She was seen by Vascular surgery who reviewed images and did not think this was a source of her abdominal pain. She will follow-up outpatient on discharge.     #Dysphasia  History of GERD  -Patient was seen by GI. Plan to dilate esophageal stricture outpatient.     #DMII  -Patient is placed on insulin and fingerstick checks     #COPD without exacerbation  -Continued with inhalers    Consultants:   IP CONSULT TO GI  IP CONSULT TO VASCULAR SURGERY    Time Spent on Discharge:  35 minutes were spent in patient examination, evaluation, counseling as well as medication reconciliation, prescriptions for required medications, discharge plan and follow up.       Significant Diagnostic Studies:   Recent Labs:  CBC:   Lab Results   Component Value Date    WBC 8.5 08/26/2020    RBC 3.51 08/26/2020    HGB 11.5 08/26/2020    HCT 33.8 08/26/2020    MCV 96.4 08/26/2020    MCH 32.6 08/26/2020    MCHC 33.9 08/26/2020    RDW 15.3 08/26/2020     08/26/2020     BMP:    Lab Results   Component Value Date    GLUCOSE 127 08/26/2020     08/26/2020    K 3.9 08/26/2020     08/26/2020    CO2 26 08/26/2020    ANIONGAP 9 08/26/2020    BUN 11 08/26/2020    CREATININE <0.5 08/26/2020    CALCIUM 8.4 08/26/2020    LABGLOM >60 08/26/2020    GFRAA >60 08/26/2020     HFP:    Lab Results   Component Value Date    PROT 6.5 08/24/2020     CMP:    Lab Results   Component Value Date    GLUCOSE 127 08/26/2020     08/26/2020    K 3.9 08/26/2020     08/26/2020    CO2 26 08/26/2020    BUN 11 08/26/2020    CREATININE <0.5 08/26/2020    ANIONGAP 9 08/26/2020    ALKPHOS 110 08/24/2020    ALT 17 08/24/2020    AST 20 08/24/2020    BILITOT 0.3 08/24/2020    LABALBU 3.9 08/24/2020    LABGLOM >60 08/26/2020    GFRAA >60 08/26/2020    PROT 6.5 08/24/2020    CALCIUM 8.4 08/26/2020     PT/INR:    Lab Results   Component Value Date    PROTIME 11.4 08/24/2020    INR 0.98 08/24/2020     PTT:   Lab Results   Component Value Date    APTT 29.1 08/24/2020     FLP:    Lab Results   Component Value Date    CHOL 230 05/07/2018    TRIG 124 05/07/2018    HDL 43 10/25/2019     U/A:    Lab Results   Component Value Date    COLORU YELLOW 08/24/2020    SPECGRAV 1.029 08/24/2020    PHUR 6.0 08/24/2020    PROTEINU Negative 08/24/2020    GLUCOSEU Negative 08/24/2020    KETUA Negative 08/24/2020    BILIRUBINUR Negative 08/24/2020    UROBILINOGEN 1.0 08/24/2020    NITRU Negative 08/24/2020    LEUKOCYTESUR Negative 08/24/2020     TSH:    Lab Results   Component Value Date    TSH 1.65 05/09/2018       Radiology Last 7 Days:  Ct Head Wo Contrast    Result Date: 8/24/2020  No acute intracranial abnormality. Ct Chest Wo Contrast    Addendum Date: 8/24/2020    ADDENDUM: 3 D images were reviewed. Result Date: 8/24/2020  No acute intrathoracic nor abdominopelvic abnormality. No aortic dissection. Moderate narrowing of the proximal superior mesenteric artery due to noncalcified plaque. Focal ulceration or dissection of the left common iliac artery. Xr Chest Portable    Result Date: 8/24/2020  No acute cardiopulmonary disease. Cta Chest Abdomen Pelvis W Contrast    Addendum Date: 8/24/2020    ADDENDUM: 3 D images were reviewed. Result Date: 8/24/2020  No acute intrathoracic nor abdominopelvic abnormality. No aortic dissection. Moderate narrowing of the proximal superior mesenteric artery due to noncalcified plaque. Focal ulceration or dissection of the left common iliac artery. Discharge Plan   Disposition: Home    Provider Follow-Up:   Kenzie Perdue, [address] [facility],[address]    Schedule an appointment as soon as possible for a visit in 1 week  after hospital care    Lucía Cooper MD  [address]  [facility] [id number] [phone]    Schedule an appointment as soon as possible for a visit  blockage of mesenteric artery    MD JOSE Lipscomb January Feldman [address] [place] [id number]  [phone]    Schedule an appointment as soon as possible for a visit in 2 weeks  for dilation of esophagus       Hospital/Incidental Findings Requiring Follow-Up:  As per hospital course    Patient Instructions   Diet: diabetic diet    Activity: activity as tolerated        Discharge Medications         Medication List      CONTINUE taking these medications    acetaminophen 500 MG tablet  Commonly known as:  TYLENOL  Take 2 tablets by mouth every 6 hours as needed for Pain Do not take with other medications containing acetaminophen. [employer] Blood Glucose Meter w/Device Kit  Test for blood sugar before meals     albuterol sulfate  (90 Base) MCG/ACT inhaler  Commonly known as:  Proventil HFA  Inhale 4-6 puffs into the lungs every 4 hours as needed for Wheezing or Shortness of Breath     aspirin 81 MG chewable tablet  CHEW ONE TABLET BY MOUTH DAILY     atorvastatin 40 MG tablet  Commonly known as:  LIPITOR  Take 1 tablet by mouth nightly     blood glucose test strips strip  Commonly known as:  [employer] Blood Glucose Test  1 each by In Vitro route daily As needed. diphenhydrAMINE-APAP (sleep)  MG tablet  Commonly known as:  TYLENOL PM EXTRA STRENGTH     E-Z Spacer Nyla  1 Device by Does not apply route daily as needed (sob)     folic acid 1 MG tablet  Commonly known as:  FOLVITE  Take 1 tablet by mouth daily     Handicap Placard Misc  by Does not apply route Handicap Placard for 5 years     metFORMIN 500 MG tablet  Commonly known as:  GLUCOPHAGE  Take 1 tablet by mouth daily (with breakfast)     metoprolol succinate 25 MG extended release tablet  Commonly known as:  TOPROL XL  Take 0.5 tablets by mouth daily     mineral oil-hydrophilic petrolatum ointment  Apply topically as needed.      Nebulizer/Tubing/Mouthpiece Kit  1 kit by Does not apply route daily     pantoprazole 20 MG tablet  Commonly known as:  PROTONIX  TAKE TWO TABLETS BY MOUTH DAILY     pregabalin 75 MG capsule  Commonly known as:  Lyrica  Take 1 capsule by mouth 2 times daily for 125 days. sertraline 100 MG tablet  Commonly known as:  ZOLOFT  TAKE ONE TABLET BY MOUTH DAILY     triamcinolone 0.1 % cream  Commonly known as:  KENALOG  Apply topically 2 times daily.      Umeclidinium Bromide 62.5 MCG/INH Aepb  Inhale 1 puff into the lungs daily        STOP taking these medications    lisinopril 5 MG tablet  Commonly known as:  PRINIVIL;ZESTRIL            Electronically signed by Laurence Edwards MD on 8/26/20 at 3:27 PM EDT

## 2020-08-26 NOTE — PROGRESS NOTES
CHOLECYSTECTOMY  1999    COLONOSCOPY      ESOPHAGEAL DILATATION N/A 5/1/2019    ESOPHAGEAL DILATION TONG performed by Irma Jin MD at [address] [place] [address] N/A 5/1/2019    EGD BIOPSY performed by Irma Jin MD at [facility] ENDOSCOPY       Current Medications:   Current Facility-Administered Medications: sodium chloride flush 0.9 % injection 10 mL, 10 mL, Intravenous, 2 times per day  sodium chloride flush 0.9 % injection 10 mL, 10 mL, Intravenous, PRN  acetaminophen (TYLENOL) tablet 650 mg, 650 mg, Oral, Q6H PRN **OR** acetaminophen (TYLENOL) suppository 650 mg, 650 mg, Rectal, Q6H PRN  enoxaparin (LOVENOX) injection 40 mg, 40 mg, Subcutaneous, Daily  glucose (GLUTOSE) 40 % oral gel 15 g, 15 g, Oral, PRN  dextrose 50 % IV solution, 12.5 g, Intravenous, PRN  glucagon (rDNA) injection 1 mg, 1 mg, Intramuscular, PRN  dextrose 5 % solution, 100 mL/hr, Intravenous, PRN  insulin lispro (HUMALOG) injection vial 0-6 Units, 0-6 Units, Subcutaneous, TID WC  insulin lispro (HUMALOG) injection vial 0-3 Units, 0-3 Units, Subcutaneous, Nightly  metronidazole (FLAGYL) 500 mg in NaCl 100 mL IVPB premix, 500 mg, Intravenous, Q8H  aspirin chewable tablet 81 mg, 81 mg, Oral, Daily  atorvastatin (LIPITOR) tablet 40 mg, 40 mg, Oral, Nightly  budesonide-formoterol (SYMBICORT) 80-4.5 MCG/ACT inhaler 2 puff, 2 puff, Inhalation, BID  albuterol (PROVENTIL) nebulizer solution 2.5 mg, 2.5 mg, Nebulization, Q4H PRN  metoprolol succinate (TOPROL XL) extended release tablet 12.5 mg, 12.5 mg, Oral, Daily  pantoprazole (PROTONIX) tablet 40 mg, 40 mg, Oral, BID AC  pregabalin (LYRICA) capsule 75 mg, 75 mg, Oral, BID  sertraline (ZOLOFT) tablet 100 mg, 100 mg, Oral, Daily  tiotropium (SPIRIVA RESPIMAT) 2.5 MCG/ACT inhaler 2 puff, 2 puff, Inhalation, Daily  0.9 % sodium chloride infusion, , Intravenous, Continuous  Prior to Admission medications    Medication Sig Start Date End Date Taking?  Authorizing Provider   pantoprazole (PROTONIX) 20 MG tablet TAKE TWO TABLETS BY MOUTH DAILY 8/17/20  Yes Kevin Zapata,    albuterol sulfate HFA (PROVENTIL HFA) 108 (90 Base) MCG/ACT inhaler Inhale 4-6 puffs into the lungs every 4 hours as needed for Wheezing or Shortness of Breath 6/23/20  Yes Fabiola Bach MD   sertraline (ZOLOFT) 100 MG tablet TAKE ONE TABLET BY MOUTH DAILY 6/22/20  Yes Pamela Galeana DO   lisinopril (PRINIVIL;ZESTRIL) 5 MG tablet Take 1 tablet by mouth daily 6/22/20  Yes Pamela Galeana DO   Umeclidinium Bromide 62.5 MCG/INH AEPB Inhale 1 puff into the lungs daily 3/25/20  Yes Fabiola Bach MD   pregabalin (LYRICA) 75 MG capsule Take 1 capsule by mouth 2 times daily for 125 days. 3/5/20 8/25/20 Yes Pamela Galeana DO   metFORMIN (GLUCOPHAGE) 500 MG tablet Take 1 tablet by mouth daily (with breakfast) 11/6/19  Yes JOSE ANGEL Guzman CNP   atorvastatin (LIPITOR) 40 MG tablet Take 1 tablet by mouth nightly 8/28/19  Yes JOSE ANGEL Guzman CNP   diphenhydrAMINE-APAP, sleep, (TYLENOL PM EXTRA STRENGTH)  MG tablet Take 1 tablet by mouth nightly as needed    Yes Historical Provider, MD   acetaminophen (TYLENOL) 500 MG tablet Take 2 tablets by mouth every 6 hours as needed for Pain Do not take with other medications containing acetaminophen. 9/2/18  Yes CARIDAD Larose   folic acid (FOLVITE) 1 MG tablet Take 1 tablet by mouth daily 8/5/20   Pamela Galeana DO   metoprolol succinate (TOPROL XL) 25 MG extended release tablet Take 0.5 tablets by mouth daily 4/27/20   Pamela Galeana DO   Respiratory Therapy Supplies (NEBULIZER/TUBING/MOUTHPIECE) KIT 1 kit by Does not apply route daily 3/25/20   Fabiola Bach MD   blood glucose test strips [facility] BLOOD GLUCOSE TEST) strip 1 each by In Vitro route daily As needed.  11/8/19   Maryanne Cyntha Goldy, APRN - CNP   Blood Glucose Monitoring Suppl [facility] BLOOD GLUCOSE METER) w/Device KIT Test for blood sugar before meals 11/6/19   Maryanne WEST JOSE ANGEL Lang CNP   triamcinolone (KENALOG) 0.1 % cream Apply topically 2 times daily. 10/9/19   JOSE ANGEL Desir CNP   aspirin 81 MG chewable tablet CHEW ONE TABLET BY MOUTH DAILY 6/7/19   JOSE ANGEL Desir CNP   mineral oil-hydrophilic petrolatum (AQUAPHOR) ointment Apply topically as needed. 2/6/19   JOSE ANGEL Desir CNP   Spacer/Aero-Holding Chambers (E-Z SPACER) AMITA 1 Device by Does not apply route daily as needed (sob) 10/7/18   CARIDAD Whitney   Handicap Placard MISC by Does not apply route Handicap Placard for 5 years 8/3/18   JOSE ANGEL Treadwell CNP        Allergies:  Iodine; Other; Eggs or egg-derived products [eggs or egg-derived products]; Eggs [egg white]; Flexeril [cyclobenzaprine hcl]; Levofloxacin; and Omeprazole    Social History:    reports that she quit smoking about 3 years ago. Her smoking use included cigarettes. She has a 37.00 pack-year smoking history. She has never used smokeless tobacco. She reports that she does not drink alcohol or use drugs.     Family History:        Problem Relation Age of Onset    Heart Failure Mother         heart disease    Hypertension Mother     Emphysema Mother     Heart Failure Father         heart disease    COPD Father     Hypertension Paternal Grandmother            REVIEW OF SYSTEMS:  CONSTITUTIONAL:  negative except for  fatigue  HEENT:  Negative except for dizziness  RESPIRATORY:  negative  CARDIOVASCULAR:  negative  GASTROINTESTINAL:  Negative  GENITOURINARY:  negative  HEMATOLOGIC/LYMPHATIC:  negative  NEUROLOGICAL:  negative  SKIN: negative      OBJECTIVE:  VITALS:    BP (!) 131/55   Pulse 57   Temp 97.8 °F (36.6 °C) (Oral)   Resp 16   Ht 5' 5\" (1.651 m)   Wt 154 lb 8.7 oz (70.1 kg)   SpO2 98%   BMI 25.72 kg/m²     CONSTITUTIONAL:  alert, no apparent distress   NEUROLOGIC:  Mental Status Exam:  Level of Alertness:   awake  Orientation:  Oriented to person, place, time  EYES:  sclera clear  ENT:  normocepalic, without obvious abnormality  NECK:  supple, symmetrical, trachea midline  LUNGS:  clear to auscultation  CARDIAC: regular rate and rhythm   ABDOMEN: abdomen is soft without significant tenderness, masses, organomegaly or guarding  VASCULAR:  both carotids normal upstroke without bruits, radial pulses normal, pedal pulses normal both DP's and PT's, color, temperature, sensation in feet normal, no lesions  SKIN:  no bruising or bleeding and normal skin color, texture, turgor  WOUND:  none  Extremities:  CHAU  Pulses:   R bruit   L bruit   +2 no   carotid +2  no       brachial      +2    radial +2     +2  no  femoral +2  no       popliteal      +2     posterior tibial +2      +2    dorsalis pedis +2     n/a    bypass graft n/a            IMAGING:  EXAMINATION:    CT OF THE CHEST WITHOUT CONTRAST; CTA OF THE CHEST, ABDOMEN AND PELVIS WITH    CONTRAST 8/24/2020 8:26 pm prior studies including chest x-ray today, CT    chest May 2018         TECHNIQUE:    CT of the chest was performed without the administration of intravenous    contrast. Multiplanar reformatted images are provided for review. Dose    modulation, iterative reconstruction, and/or weight based adjustment of the    mA/kV was utilized to reduce the radiation dose to as low as reasonably    achievable.; CTA of the chest, abdomen and pelvis was performed after the    administration of intravenous contrast.  Multiplanar reformatted images are    provided for review.  MIP images are provided for review.  Dose modulation,    iterative reconstruction, and/or weight based adjustment of the mA/kV was    utilized to reduce the radiation dose to as low as reasonably achievable.         COMPARISON:    None.         HISTORY:    ORDERING SYSTEM PROVIDED HISTORY: per protocol    TECHNOLOGIST PROVIDED HISTORY:    Reason for exam:->per protocol    Reason for Exam: per protocol    Acuity: Acute    Type of Exam: Initial    Relevant Medical/Surgical History: hx hypertension, copd, diabetes         History of obstructive lung disease, gastroesophageal reflux, coronary artery    disease, myocardial infarction, osteoporosis.  Hypotension, dizziness,    lightheadedness today.  Some abdominal pain as well. [facility] for several    days.  Chronic back pain headache         FINDINGS:    Chest:         CTA: The thoracic aorta is normal in caliber and course without acute    abnormality.  No aortic dissection is identified.  Mild-to-moderate    atherosclerotic changes the thoracic aorta and its branches are evident.  The    branches of the aortic arch show no significant stenosis.         The central pulmonary arteries are normal in caliber and course without acute    abnormality.         Mediastinum: The heart size is within normal limits.  Trace pericardial    fluid.  No mediastinal adenopathy.         Lungs/pleura: The central airways are patent.  Mild upper lung predominant    centrilobular emphysema is present.  Lingular and right middle lobe scarring    are unchanged.  The lungs are otherwise clear.         Soft Tissues/Bones: No acute bony abnormality.         Abdomen and pelvis:         CTA: The moderate atherosclerotic changes of the abdominal aorta and its    branches are evident.  The celiac, renal, and inferior mesenteric arteries    show no significant stenosis.  There is moderate approximately 70% luminal    narrowing of the origin of the superior mesenteric artery due to noncalcified    plaque.         Mild narrowing of the common in external iliac arteries is evident due to    calcified and noncalcified plaque.  There is a focal dissection or ulceration    of the left common iliac artery.         Organs: The liver, spleen, pancreas, adrenal glands, and kidneys show no    acute or focal abnormality.  The gallbladder is surgically absent.         GI/bowel:  The bowel is within normal limits in caliber and course.  Normal    appendix. Carola Carota is a moderate amount of stool throughout much of allowing us to participate in the care of Greta Whiting. Messi Skinner MD  Pt seen and examined. Agree with Colie Baumgarten, APRN note. Labs reviewed. CTA of abdomen reviewed and multiple views  Patient has pretty good peripheral arteries in her legs only has 1 vessel stenosis of 3 going to the intestine has no significant weight loss. Has inconsistent pain in the abdomen no real food fear just intermittent pain.   I think the SMA stenosis was an incidental finding and has nothing to do with her symptoms thank you for allowing us to participate in her care   Dr Iglesia Rodriguez appreciated  abd soft non tender this AM.   RNs report chest pain last night   no BM recorded

## 2020-08-26 NOTE — PROGRESS NOTES
Patient asked to wait in room while this nurse printed discharge paperwork , upon return, patient left facility, this nurse called sister and states in car and will bring patient back for discharge instructions [facility]

## 2020-08-26 NOTE — PROGRESS NOTES
Hospitalist Progress Note      PCP: Lula Mcardle, DO    Date of Admission: 8/24/2020    Hospital Course: 59-year-old female history of CAD, GERD, hypertension who presents with dizziness, lightheadedness and diarrhea. He was noted to be hypotensive. Initial concern for mesenteric occlusion but excluded by vascular surgery. He responded well to IVF and supportive care. Subjective:   Patient had right-sided chest pain and back pain yesterday. Was placed on 2 L. She had nebulizers administered. She had 2.3-second pause this morning which is asymptomatic. Medications:  Reviewed    Infusion Medications    dextrose      sodium chloride Stopped (08/26/20 0542)     Scheduled Medications    sodium chloride flush  10 mL Intravenous 2 times per day    enoxaparin  40 mg Subcutaneous Daily    insulin lispro  0-6 Units Subcutaneous TID WC    insulin lispro  0-3 Units Subcutaneous Nightly    metroNIDAZOLE  500 mg Intravenous Q8H    aspirin  81 mg Oral Daily    atorvastatin  40 mg Oral Nightly    budesonide-formoterol  2 puff Inhalation BID    metoprolol succinate  12.5 mg Oral Daily    pantoprazole  40 mg Oral BID AC    pregabalin  75 mg Oral BID    sertraline  100 mg Oral Daily    tiotropium  2 puff Inhalation Daily     PRN Meds: sodium chloride flush, acetaminophen **OR** acetaminophen, glucose, dextrose, glucagon (rDNA), dextrose, albuterol      Intake/Output Summary (Last 24 hours) at 8/26/2020 1020  Last data filed at 8/26/2020 0900  Gross per 24 hour   Intake 2716.75 ml   Output 2150 ml   Net 566.75 ml       Physical Exam Performed:    /73   Pulse 74   Temp 98 °F (36.7 °C) (Oral)   Resp 16   Ht 5' 5\" (1.651 m)   Wt 154 lb 8.7 oz (70.1 kg)   SpO2 96%   BMI 25.72 kg/m²     General appearance: No apparent distress, appears stated age and cooperative. HEENT: Pupils equal, round, and reactive to light. Conjunctivae/corneas clear. Neck: Supple, with full range of motion.  No jugular venous distention. Trachea midline. Respiratory:  Normal respiratory effort. Clear to auscultation, bilaterally without Rales/Wheezes/Rhonchi. Cardiovascular: Regular rate and rhythm with normal S1/S2 without murmurs, rubs or gallops. Abdomen: Soft, non-tender, non-distended with normal bowel sounds. Musculoskeletal: No clubbing, cyanosis or edema bilaterally. Full range of motion without deformity. Skin: Skin color, texture, turgor normal.  No rashes or lesions. Neurologic:  Neurovascularly intact without any focal sensory/motor deficits. Cranial nerves: II-XII intact, grossly non-focal.  Psychiatric: Alert and oriented, thought content appropriate, normal insight      Labs:   Recent Labs     08/24/20 2016 08/25/20  0121 08/26/20  0617   WBC 9.2 9.5 8.5   HGB 12.9 13.5 11.5*   HCT 38.1 40.8 33.8*    203 154     Recent Labs     08/24/20 2016 08/25/20  0121 08/26/20  0617    141 142   K 4.3 4.5 3.9    105 107   CO2 22 24 26   BUN 14 15 11   CREATININE 0.8 0.8 <0.5*   CALCIUM 8.9 8.6 8.4     Recent Labs     08/24/20 2016   AST 20   ALT 17   BILITOT 0.3   ALKPHOS 110     Recent Labs     08/24/20 2016   INR 0.98     Recent Labs     08/24/20 2016   TROPONINI <0.01       Urinalysis:      Lab Results   Component Value Date    NITRU Negative 08/24/2020    WBCUA 4 07/06/2020    BACTERIA RARE 07/06/2020    RBCUA 5 07/06/2020    BLOODU Negative 08/24/2020    SPECGRAV 1.029 08/24/2020    GLUCOSEU Negative 08/24/2020       Radiology:  CTA CHEST ABDOMEN PELVIS W CONTRAST   Final Result   Addendum 1 of 1   ADDENDUM:   3 D images were reviewed. Final      CT Head WO Contrast   Final Result   No acute intracranial abnormality. CT CHEST WO CONTRAST   Final Result   Addendum 1 of 1   ADDENDUM:   3 D images were reviewed. Final      XR CHEST PORTABLE   Final Result   No acute cardiopulmonary disease.                  Assessment/Plan:    Active Hospital Problems    Diagnosis  Essential hypertension [I10]     Priority: High    Diarrhea [R19.7]    Sepsis ([facility] [A41.9]    Other hypotension [I95.89]    Peripheral vascular disease ([facility] [I73.9]    Atherosclerosis of superior mesenteric artery (HCC) [K55.1]    Type 2 diabetes mellitus without complication, without long-term current use of insulin (HCC) [E11.9]    COPD, severe ([facility] [J44.9]     #Hypotension-likely due to fluid loss. Resolved. -Continue oral hydration  -DC IVF    #Gastroenteritis   Enteropathic pathogens and C. difficile negative  -Continue supportive care    #SMA stenosis-not etiology of abdominal pain  -No surgical intervention needed per Vascular surgery    #Dysphasia  History of GERD  -GI recommendations noted. Plan to dilate esophageal stricture outpatient.     #DMII  -Continue with SSI and accuchecks    #COPD  -Continue with inhalers    DVT Prophylaxis: Lovenox  Diet: DIET CARB CONTROL; Carb Control: 4 carb choices (60 gms)/meal  Code Status: Full Code    PT/OT Eval Status: As needed    Dispo -DC home today    Karl Plummer MD

## 2020-08-28 LAB
BLOOD CULTURE, ROUTINE: NORMAL
CULTURE, BLOOD 2: NORMAL

## 2020-08-29 LAB — BLOOD CULTURE, ROUTINE: NORMAL

## 2020-09-01 ENCOUNTER — HOSPITAL ENCOUNTER (OUTPATIENT)
Dept: ULTRASOUND IMAGING | Age: 60
Discharge: HOME | End: 2020-09-01
Payer: MEDICARE

## 2020-09-01 PROCEDURE — 76700 US EXAM ABDOM COMPLETE: CPT

## 2020-09-11 ENCOUNTER — OFFICE VISIT (OUTPATIENT)
Dept: SURGERY | Age: 60
End: 2020-09-11
Payer: MEDICARE

## 2020-09-11 VITALS
DIASTOLIC BLOOD PRESSURE: 63 MMHG | HEIGHT: 65 IN | BODY MASS INDEX: 25.16 KG/M2 | SYSTOLIC BLOOD PRESSURE: 108 MMHG | WEIGHT: 151 LBS

## 2020-09-11 PROCEDURE — 3017F COLORECTAL CA SCREEN DOC REV: CPT | Performed by: SURGERY

## 2020-09-11 PROCEDURE — 1111F DSCHRG MED/CURRENT MED MERGE: CPT | Performed by: SURGERY

## 2020-09-11 PROCEDURE — G8419 CALC BMI OUT NRM PARAM NOF/U: HCPCS | Performed by: SURGERY

## 2020-09-11 PROCEDURE — G8427 DOCREV CUR MEDS BY ELIG CLIN: HCPCS | Performed by: SURGERY

## 2020-09-11 PROCEDURE — 1036F TOBACCO NON-USER: CPT | Performed by: SURGERY

## 2020-09-11 PROCEDURE — 99214 OFFICE O/P EST MOD 30 MIN: CPT | Performed by: SURGERY

## 2020-09-11 ASSESSMENT — ENCOUNTER SYMPTOMS
EYE REDNESS: 0
CONSTIPATION: 0
VOMITING: 0
RECTAL PAIN: 0
ANAL BLEEDING: 0
ABDOMINAL PAIN: 1
NAUSEA: 0
ALLERGIC/IMMUNOLOGIC NEGATIVE: 1
BLOOD IN STOOL: 0
EYE ITCHING: 0
RESPIRATORY NEGATIVE: 1
DIARRHEA: 0
ABDOMINAL DISTENTION: 0
EYE PAIN: 0
PHOTOPHOBIA: 0
EYE DISCHARGE: 0
BACK PAIN: 1

## 2020-09-11 NOTE — PROGRESS NOTES
Daily Progress Note   Mariah Davis MD      2020    Chief Complaint   Patient presents with    Surgical Consult     blockage shown on CTA. 70% stenosis. pt states pain in abodmen and groin. HISTORY OF PRESENT ILLNESS:                The patient is a 63 y.o. female who presents with an office visit for ER follow up for abdominal pain and SMA blockage. She also complains about leg pain. Ms. Gagandeep Lee says her legs are very painful, as are her knees. She also says she has sciatica. She is wondering if the pain she has that is abdominal migrates, or \"jumps\", and she also wonders if it's sometimes from what I eat. She also has back pain, and is seeing a back specialist on 2020. The scans Ms. Whiting had in the hospital show nothing to worry about with her mesenteric artery.      Past Medical History:   Diagnosis Date    Back pain     CAD (coronary artery disease)     COPD (chronic obstructive pulmonary disease) (HCC)     Foot pain     from bone spurs    GERD (gastroesophageal reflux disease)     Headache     sharp pain going through head    Heart attack ([address]     Hip pain     Hyperlipidemia     Hypertension      (normal spontaneous vaginal delivery)     Osteoporosis     Vision abnormalities        Past Surgical History:   Procedure Laterality Date    CARDIAC CATHETERIZATION      CHOLECYSTECTOMY      COLONOSCOPY      ESOPHAGEAL DILATATION N/A 2019    ESOPHAGEAL DILATION TONG performed by Karen Nielsen MD at [address] [place] [address] N/A 2019    EGD BIOPSY performed by Karen Nielsen MD at [place] [address] Marital status: Legally      Spouse name: Not on file    Number of children: Not on file    Years of education: Not on file    Highest education level: Not on file   Occupational History    Not on file   Social Needs    Financial resource strain: Not on file   Miri Food insecurity     Worry: Not on file     Inability: Not on file    Transportation needs     Medical: Not on file     Non-medical: Not on file   Tobacco Use    Smoking status: Former Smoker     Packs/day: 1.00     Years: 37.00     Pack years: 37.00     Types: Cigarettes     Last attempt to quit: 1/1/2017     Years since quitting: 3.6    Smokeless tobacco: Never Used   Substance and Sexual Activity    Alcohol use: No     Alcohol/week: 0.0 standard drinks    Drug use: No    Sexual activity: Not Currently   Lifestyle    Physical activity     Days per week: Not on file     Minutes per session: Not on file    Stress: Not on file   Relationships    Social connections     Talks on phone: Not on file     Gets together: Not on file     Attends [employer] service: Not on file     Active member of club or organization: Not on file     Attends meetings of clubs or organizations: Not on file     Relationship status: Not on file    Intimate partner violence     Fear of current or ex partner: Not on file     Emotionally abused: Not on file     Physically abused: Not on file     Forced sexual activity: Not on file   Other Topics Concern    Not on file   Social History Narrative    Not on file       Family History   Problem Relation Age of Onset    Heart Failure Mother         heart disease    Hypertension Mother     Emphysema Mother     Heart Failure Father         heart disease    COPD Father     Hypertension Paternal Grandmother          Current Outpatient Medications:     pantoprazole (PROTONIX) 20 MG tablet, TAKE TWO TABLETS BY MOUTH DAILY, Disp: 30 tablet, Rfl: 0    folic acid (FOLVITE) 1 MG tablet, Take 1 tablet by mouth daily, Disp: 30 tablet, Rfl: 1    albuterol sulfate HFA (PROVENTIL HFA) 108 (90 Base) MCG/ACT inhaler, Inhale 4-6 puffs into the lungs every 4 hours as needed for Wheezing or Shortness of Breath, Disp: 1 Inhaler, Rfl: 2    sertraline (ZOLOFT) 100 MG tablet, TAKE ONE TABLET BY MOUTH side and 1+ on the left side. Femoral pulses are 2+ on the right side and 2+ on the left side. Popliteal pulses are 2+ on the right side and 2+ on the left side. Dorsalis pedis pulses are 1+ on the right side and 2+ on the left side. Posterior tibial pulses are 2+ on the right side and 1+ on the left side. Heart sounds: Normal heart sounds and S1 normal. No murmur. Comments: Doppler 9/11/2020:  Rt DP: biphasic   Rt PT: biphasic  Rt AT:     Lt DP: biphasic  Lt PT: monophasic  Lt AT:    Pulmonary:      Effort: Pulmonary effort is normal. No tachypnea, accessory muscle usage or respiratory distress. Breath sounds: Normal breath sounds. No stridor. No wheezing or rales. Abdominal:      General: Bowel sounds are normal. There is no distension. Palpations: Abdomen is soft. There is no mass. Tenderness: There is no abdominal tenderness. There is no guarding or rebound. Hernia: No hernia is present. There is no hernia in the ventral area or left inguinal area. Genitourinary:     Comments: Rectal exam/stool guaiac not indicated. Musculoskeletal: Normal range of motion. General: No tenderness. Right shoulder: She exhibits normal range of motion, no deformity and no pain. Lymphadenopathy:      Head:      Right side of head: No submandibular, preauricular, posterior auricular or occipital adenopathy. Left side of head: No submandibular, preauricular, posterior auricular or occipital adenopathy. Cervical: No cervical adenopathy. Right cervical: No superficial, deep or posterior cervical adenopathy. Left cervical: No superficial, deep or posterior cervical adenopathy. Upper Body:      Right upper body: No supraclavicular or pectoral adenopathy. Left upper body: No supraclavicular or pectoral adenopathy. Skin:     General: Skin is warm and dry. Coloration: Skin is not pale.       Findings: No bruising, erythema, and it is both accurate and complete.         Electronically signed by Alvaro Ceballos MD on 9/11/2020 at 4:07 PM

## 2020-09-15 ENCOUNTER — OFFICE VISIT (OUTPATIENT)
Dept: FAMILY MEDICINE CLINIC | Age: 60
End: 2020-09-15
Payer: MEDICARE

## 2020-09-15 VITALS
HEIGHT: 65 IN | TEMPERATURE: 98.2 F | BODY MASS INDEX: 25.09 KG/M2 | DIASTOLIC BLOOD PRESSURE: 78 MMHG | WEIGHT: 150.6 LBS | SYSTOLIC BLOOD PRESSURE: 118 MMHG

## 2020-09-15 PROCEDURE — 1111F DSCHRG MED/CURRENT MED MERGE: CPT | Performed by: FAMILY MEDICINE

## 2020-09-15 PROCEDURE — 99214 OFFICE O/P EST MOD 30 MIN: CPT | Performed by: FAMILY MEDICINE

## 2020-09-15 PROCEDURE — 2022F DILAT RTA XM EVC RTNOPTHY: CPT | Performed by: FAMILY MEDICINE

## 2020-09-15 PROCEDURE — G8427 DOCREV CUR MEDS BY ELIG CLIN: HCPCS | Performed by: FAMILY MEDICINE

## 2020-09-15 PROCEDURE — 3017F COLORECTAL CA SCREEN DOC REV: CPT | Performed by: FAMILY MEDICINE

## 2020-09-15 PROCEDURE — G8419 CALC BMI OUT NRM PARAM NOF/U: HCPCS | Performed by: FAMILY MEDICINE

## 2020-09-15 PROCEDURE — 1036F TOBACCO NON-USER: CPT | Performed by: FAMILY MEDICINE

## 2020-09-15 PROCEDURE — 3044F HG A1C LEVEL LT 7.0%: CPT | Performed by: FAMILY MEDICINE

## 2020-09-15 ASSESSMENT — PATIENT HEALTH QUESTIONNAIRE - PHQ9
SUM OF ALL RESPONSES TO PHQ QUESTIONS 1-9: 0
2. FEELING DOWN, DEPRESSED OR HOPELESS: 0
1. LITTLE INTEREST OR PLEASURE IN DOING THINGS: 0
SUM OF ALL RESPONSES TO PHQ9 QUESTIONS 1 & 2: 0
SUM OF ALL RESPONSES TO PHQ QUESTIONS 1-9: 0

## 2020-09-21 ASSESSMENT — ENCOUNTER SYMPTOMS
NAUSEA: 0
BACK PAIN: 1
SHORTNESS OF BREATH: 0
RHINORRHEA: 0
DIARRHEA: 0
SORE THROAT: 0
VOMITING: 0
ABDOMINAL PAIN: 1

## 2020-09-23 ENCOUNTER — TELEPHONE (OUTPATIENT)
Dept: FAMILY MEDICINE CLINIC | Age: 60
End: 2020-09-23

## 2020-09-23 RX ORDER — PREGABALIN 75 MG/1
75 CAPSULE ORAL 2 TIMES DAILY
Qty: 60 CAPSULE | Refills: 3 | Status: SHIPPED | OUTPATIENT
Start: 2020-09-23 | End: 2021-05-21

## 2020-09-23 NOTE — TELEPHONE ENCOUNTER
Patient is calling stating she needs a refill on her lyrica 75mg BID   Pharmacy [address]   If you need to reach her call [phone] thanks

## 2020-10-05 RX ORDER — SERTRALINE HYDROCHLORIDE 100 MG/1
TABLET, FILM COATED ORAL
Qty: 60 TABLET | Refills: 1 | Status: SHIPPED | OUTPATIENT
Start: 2020-10-05 | End: 2021-03-01

## 2020-10-06 ENCOUNTER — TELEPHONE (OUTPATIENT)
Dept: FAMILY MEDICINE CLINIC | Age: 60
End: 2020-10-06

## 2020-10-19 ENCOUNTER — OFFICE VISIT (OUTPATIENT)
Dept: ORTHOPEDIC SURGERY | Age: 60
End: 2020-10-19
Payer: MEDICARE

## 2020-10-19 VITALS — TEMPERATURE: 98.1 F | HEIGHT: 65 IN | WEIGHT: 150 LBS | BODY MASS INDEX: 24.99 KG/M2

## 2020-10-19 PROCEDURE — G8427 DOCREV CUR MEDS BY ELIG CLIN: HCPCS | Performed by: PHYSICIAN ASSISTANT

## 2020-10-19 PROCEDURE — G8420 CALC BMI NORM PARAMETERS: HCPCS | Performed by: PHYSICIAN ASSISTANT

## 2020-10-19 PROCEDURE — G8484 FLU IMMUNIZE NO ADMIN: HCPCS | Performed by: PHYSICIAN ASSISTANT

## 2020-10-19 PROCEDURE — 99213 OFFICE O/P EST LOW 20 MIN: CPT | Performed by: PHYSICIAN ASSISTANT

## 2020-10-19 NOTE — PROGRESS NOTES
History of present illness:   Ms. Koby Leon is a pleasant 63 y.o. female with a PMH significant for COPD, osteoporosis, HTN, and CAD previously seen by Gevena Bumpers PA-C and Kenneth Betancourt CNP for her low back and bilateral leg pain, R=L. Her pain has persisted since then. The pain originates in her low back and radiates primarily into the lateral aspect of her thighs bilaterally to her knees. Does note her pain can be widespread through her lower legs and back. She notes intermittent numbness and tingling in a similar distribution. She notes occasional urinary urgency, but denies bowel or bladder dysfunction or saddle numbness. She has tried multiple NSAIDs. She has tried oral steroids and muscle relaxers. She rates her back pain 10/10 today. She has tried physical therapy previously without relief. Has not been able to do aquatic therapy due to COVID. States she was evaluated by Dr. Ramandeep Maharaj and states he diagnosed her with fibromyalgia. States Lyrica has been helping. Has not tried ESIs. Past history:  Her past medical, surgical and social history has been reviewed. Her  medications and allergies were reviewed. Review of symptoms:  Pertinent items are noted in HPI. Review of systems reviewed from Patient History Form dated on 8/27/18 and available in the patient's chart under the Media tab. Physical examination:  Ms. Aruna Whiting's most recent vitals:  Vitals  Height: 5' 5\" (165.1 cm)  Weight: 150 lb (68 kg)  Body mass index is 24.96 kg/m². She is well-developed and well-nourished, is in no obvious pain and alert and oriented to person, place, and time. She demonstrates appropriate mood and affect. Her skin is warm and dry. Her gait is normal and she walks without assistive devices. She stands with slight lumbar flexion. Her lumbar flexion, extension and lateral bending are moderately reduced with pain. She has mild tenderness over her lumbar spine without obvious muscle spasm.  The skin over her lumbar spine is normal without a surgical scar. She has 5/5 motor strength of bilateral lower extremities. She has a negative straight leg raise, bilaterally. 1+ deep tendon reflexes at knees. Sensation is intact to light touch L3 to S1 bilaterally. She has no clonus. Hip range of motion painless. Imaging:  I reviewed MRI images of the lumbar spine from 2/18/19 in the office today. There is moderate right foraminal/extraforaminal stenosis L5-S1 secondary to broad-based disc bulge. Assessment:  Lumbar spondylosis with radiculopathy  Fibromyalgia    Plan:  We discussed treatment options including observation, additional physical therapy or aquatic therapy, surgical intervention and epidural injections. I do not feel she would be a good surgical candidate. She will follow up with Dr. Blake Carvalho for additional conservative treatment. She would also like additional outpatient physical therapy.      Korin Betancourt PA-C

## 2020-10-26 ENCOUNTER — TELEPHONE (OUTPATIENT)
Dept: FAMILY MEDICINE CLINIC | Age: 60
End: 2020-10-26

## 2020-10-26 NOTE — TELEPHONE ENCOUNTER
Patient is calling stating she was referred to a back surgeon and the surgeon is wanting her to have back injections as well as therapy and she doesn't think the the therapy is going to work she would like a call back

## 2020-10-28 ENCOUNTER — HOSPITAL ENCOUNTER (OUTPATIENT)
Dept: PHYSICAL THERAPY | Age: 60
Setting detail: THERAPIES SERIES
Discharge: HOME | End: 2020-10-28
Payer: MEDICARE

## 2020-10-28 NOTE — FLOWSHEET NOTE
Physical Therapy  Cancellation/No-show Note  Patient Name:  Rich Young  :  1960   Date:  10/28/2020  Cancelled visits to date: 1  No-shows to date: 0    For today's appointment patient:  [x]  Cancelled  []  Rescheduled appointment  []  No-show     Reason given by patient:  [x]  Patient ill  []  Conflicting appointment  []  No transportation    []  Conflict with work  []  No reason given  []  Other:     Comments:  Patient has shingles and so cancelled PT evaluation.     Electronically signed by:  Aura Ibrahim, PT

## 2020-10-29 ENCOUNTER — OFFICE VISIT (OUTPATIENT)
Dept: FAMILY MEDICINE CLINIC | Age: 60
End: 2020-10-29
Payer: MEDICARE

## 2020-10-29 VITALS
SYSTOLIC BLOOD PRESSURE: 124 MMHG | TEMPERATURE: 98.9 F | BODY MASS INDEX: 25.36 KG/M2 | HEIGHT: 65 IN | WEIGHT: 152.2 LBS | DIASTOLIC BLOOD PRESSURE: 78 MMHG

## 2020-10-29 PROCEDURE — G8484 FLU IMMUNIZE NO ADMIN: HCPCS | Performed by: FAMILY MEDICINE

## 2020-10-29 PROCEDURE — G8419 CALC BMI OUT NRM PARAM NOF/U: HCPCS | Performed by: FAMILY MEDICINE

## 2020-10-29 PROCEDURE — 1036F TOBACCO NON-USER: CPT | Performed by: FAMILY MEDICINE

## 2020-10-29 PROCEDURE — G8427 DOCREV CUR MEDS BY ELIG CLIN: HCPCS | Performed by: FAMILY MEDICINE

## 2020-10-29 PROCEDURE — 99213 OFFICE O/P EST LOW 20 MIN: CPT | Performed by: FAMILY MEDICINE

## 2020-10-29 PROCEDURE — 3017F COLORECTAL CA SCREEN DOC REV: CPT | Performed by: FAMILY MEDICINE

## 2020-10-29 PROCEDURE — G8510 SCR DEP NEG, NO PLAN REQD: HCPCS | Performed by: FAMILY MEDICINE

## 2020-10-29 RX ORDER — VALACYCLOVIR HYDROCHLORIDE 1 G/1
1000 TABLET, FILM COATED ORAL 3 TIMES DAILY
Qty: 21 TABLET | Refills: 0 | Status: SHIPPED | OUTPATIENT
Start: 2020-10-29 | End: 2020-11-05

## 2020-10-29 ASSESSMENT — ENCOUNTER SYMPTOMS
NAUSEA: 0
VOMITING: 0
COLOR CHANGE: 1
ABDOMINAL PAIN: 0
SHORTNESS OF BREATH: 0
DIARRHEA: 0

## 2020-10-29 ASSESSMENT — PATIENT HEALTH QUESTIONNAIRE - PHQ9
SUM OF ALL RESPONSES TO PHQ QUESTIONS 1-9: 0
2. FEELING DOWN, DEPRESSED OR HOPELESS: 0
SUM OF ALL RESPONSES TO PHQ9 QUESTIONS 1 & 2: 0
SUM OF ALL RESPONSES TO PHQ QUESTIONS 1-9: 0
1. LITTLE INTEREST OR PLEASURE IN DOING THINGS: 0
SUM OF ALL RESPONSES TO PHQ QUESTIONS 1-9: 0

## 2020-10-29 NOTE — PROGRESS NOTES
10/29/2020     Greta Whiting (:  1960) is a 63 y.o. female, here for evaluation of the following medical concerns:    HPI    Patient developed a \"rash\" lower back, radiates to the right, started two days ago, itching, painful, has had \"shingles\" in the past and stated the discomfort is the similar to last time, she admitted to being under stress at times, she denied a \"rash\" at other locations. Today, she denied chest  Pain, sob, n, v, or diarrhea. Review of Systems   Constitutional: Positive for fatigue. Negative for activity change, fever and unexpected weight change. Respiratory: Negative for shortness of breath. Cardiovascular: Negative for chest pain. Gastrointestinal: Negative for abdominal pain, diarrhea, nausea and vomiting. Musculoskeletal: Positive for arthralgias. Skin: Positive for color change and rash. Psychiatric/Behavioral: Negative for dysphoric mood. The patient is not nervous/anxious. Prior to Visit Medications    Medication Sig Taking? Authorizing Provider   valACYclovir (VALTREX) 1 g tablet Take 1 tablet by mouth 3 times daily for 7 days Yes Kevin Zapata DO   sertraline (ZOLOFT) 100 MG tablet TAKE ONE TABLET BY MOUTH DAILY Yes Kevin Zapata DO   pregabalin (LYRICA) 75 MG capsule Take 1 capsule by mouth 2 times daily for 125 days.  Yes Kevin Zapata DO   pantoprazole (PROTONIX) 20 MG tablet TAKE TWO TABLETS BY MOUTH DAILY Yes Kevin Zapata, DO   folic acid (FOLVITE) 1 MG tablet Take 1 tablet by mouth daily Yes Kevin Zapata DO   albuterol sulfate HFA (PROVENTIL HFA) 108 (90 Base) MCG/ACT inhaler Inhale 4-6 puffs into the lungs every 4 hours as needed for Wheezing or Shortness of Breath Yes Juvencio Gilmore MD   metoprolol succinate (TOPROL XL) 25 MG extended release tablet Take 0.5 tablets by mouth daily Yes Marcella Anderson, DO   Umeclidinium Bromide 62.5 MCG/INH AEPB Inhale 1 puff into the lungs daily Yes Juvencio Gilmore MD   Respiratory Therapy Supplies (NEBULIZER/TUBING/MOUTHPIECE) KIT 1 kit by Does not apply route daily Yes Bossman Gonsalez MD   blood glucose test strips [facility] BLOOD GLUCOSE TEST) strip 1 each by In Vitro route daily As needed. Yes JOSE ANGEL Hargrove CNP   metFORMIN (GLUCOPHAGE) 500 MG tablet Take 1 tablet by mouth daily (with breakfast) Yes JOSE ANGEL Hargrove CNP   Blood Glucose Monitoring Suppl (ACURA BLOOD GLUCOSE METER) w/Device KIT Test for blood sugar before meals Yes JOSE ANGEL Hargrove CNP   triamcinolone (KENALOG) 0.1 % cream Apply topically 2 times daily. Yes JOSE ANGEL Hargrove CNP   atorvastatin (LIPITOR) 40 MG tablet Take 1 tablet by mouth nightly Yes JOSE ANGEL Hargrove CNP   aspirin 81 MG chewable tablet CHEW ONE TABLET BY MOUTH DAILY Yes JOSE ANGEL Wilson CNP   diphenhydrAMINE-APAP, sleep, (TYLENOL PM EXTRA STRENGTH)  MG tablet Take 1 tablet by mouth nightly as needed  Yes Historical Provider, MD   mineral oil-hydrophilic petrolatum (AQUAPHOR) ointment Apply topically as needed. Yes JOSE ANGEL Hargrove CNP   Spacer/Aero-Holding Chambers (E-Z SPACER) AMITA 1 Device by Does not apply route daily as needed (sob) Yes CARIDAD Sandoval   acetaminophen (TYLENOL) 500 MG tablet Take 2 tablets by mouth every 6 hours as needed for Pain Do not take with other medications containing acetaminophen.  Yes CARIDAD Carballo   Handicap Placard MISC by Does not apply route Handicap Placard for 5 years Yes JOSE ANGEL Hargrove CNP        Social History     Tobacco Use    Smoking status: Former Smoker     Packs/day: 1.00     Years: 37.00     Pack years: 37.00     Types: Cigarettes     Last attempt to quit: 1/1/2017     Years since quitting: 3.8    Smokeless tobacco: Never Used   Substance Use Topics    Alcohol use: No     Alcohol/week: 0.0 standard drinks        Vitals:    10/29/20 0800   BP: 124/78   Temp: 98.9 °F (37.2 °C)   Weight: 152 lb 3.2 oz (69 kg)   Height: 5' 5\" (1.651 m) Estimated body mass index is 25.33 kg/m² as calculated from the following:    Height as of this encounter: 5' 5\" (1.651 m). Weight as of this encounter: 152 lb 3.2 oz (69 kg). Physical Exam  Vitals signs and nursing note reviewed. Constitutional:       Appearance: She is well-developed. HENT:      Head: Normocephalic and atraumatic. Right Ear: External ear normal.      Left Ear: External ear normal.   Cardiovascular:      Rate and Rhythm: Normal rate. Heart sounds: Normal heart sounds. No murmur. Pulmonary:      Effort: Pulmonary effort is normal.      Breath sounds: Normal breath sounds. Skin:     Findings: Erythema and rash present. Comments: Lumbar spine, radiates to the patient's left side, erythematous, very small pustules? Neurological:      Mental Status: She is alert and oriented to person, place, and time. Psychiatric:         Behavior: Behavior normal.         ASSESSMENT/PLAN:  1. Dermatitis  more than likely \"shingles\"  Continue antihistamine for possible contact dermatitis  Stop steroid cream  Valtrex prescribed  Patient already on Lyrica, pain well controlled  Tylenol/ibuprofen for pain  Patient will get otc anti-itch cream  Educated on spread of shingles and treatment  Answered questions  Reassured the patient  JADYN if not improved. Return for [facility] up if not improved.

## 2020-11-11 ENCOUNTER — OFFICE VISIT (OUTPATIENT)
Dept: PRIMARY CARE CLINIC | Age: 60
End: 2020-11-11
Payer: MEDICARE

## 2020-11-11 PROCEDURE — 99211 OFF/OP EST MAY X REQ PHY/QHP: CPT | Performed by: NURSE PRACTITIONER

## 2020-11-14 LAB — SARS-COV-2, NAA: DETECTED

## 2020-11-16 ENCOUNTER — TELEPHONE (OUTPATIENT)
Dept: FAMILY MEDICINE CLINIC | Age: 60
End: 2020-11-16

## 2020-11-16 NOTE — TELEPHONE ENCOUNTER
Patient states she tested positive for Covid-19 and states her eyes are painful, blood shot, and swollen and she wants to know if this is a Covid symptom. Please return call.

## 2020-11-20 ENCOUNTER — TELEPHONE (OUTPATIENT)
Dept: FAMILY MEDICINE CLINIC | Age: 60
End: 2020-11-20

## 2020-11-30 ENCOUNTER — HOSPITAL ENCOUNTER (INPATIENT)
Age: 60
LOS: 1 days | Discharge: HOME | DRG: 392 | End: 2020-12-01
Attending: STUDENT IN AN ORGANIZED HEALTH CARE EDUCATION/TRAINING PROGRAM | Admitting: FAMILY MEDICINE
Payer: MEDICARE

## 2020-11-30 ENCOUNTER — APPOINTMENT (OUTPATIENT)
Dept: CT IMAGING | Age: 60
DRG: 392 | End: 2020-11-30
Payer: MEDICARE

## 2020-11-30 LAB
ALBUMIN SERPL-MCNC: 4.5 G/DL (ref 3.4–5)
ALP BLD-CCNC: 120 U/L (ref 40–129)
ALT SERPL-CCNC: 14 U/L (ref 10–40)
ANION GAP SERPL CALCULATED.3IONS-SCNC: 8 MMOL/L (ref 3–16)
APTT: 29.2 SEC (ref 24.2–36.2)
APTT: 79.3 SEC (ref 24.2–36.2)
AST SERPL-CCNC: 17 U/L (ref 15–37)
BASOPHILS ABSOLUTE: 0 K/UL (ref 0–0.2)
BASOPHILS RELATIVE PERCENT: 0.5 %
BILIRUB SERPL-MCNC: 0.5 MG/DL (ref 0–1)
BILIRUBIN DIRECT: <0.2 MG/DL (ref 0–0.3)
BILIRUBIN, INDIRECT: NORMAL MG/DL (ref 0–1)
BUN BLDV-MCNC: 8 MG/DL (ref 7–20)
CALCIUM SERPL-MCNC: 9.1 MG/DL (ref 8.3–10.6)
CHLORIDE BLD-SCNC: 103 MMOL/L (ref 99–110)
CO2: 29 MMOL/L (ref 21–32)
CREAT SERPL-MCNC: <0.5 MG/DL (ref 0.6–1.2)
EKG ATRIAL RATE: 69 BPM
EKG DIAGNOSIS: NORMAL
EKG P AXIS: 60 DEGREES
EKG P-R INTERVAL: 144 MS
EKG Q-T INTERVAL: 390 MS
EKG QRS DURATION: 50 MS
EKG QTC CALCULATION (BAZETT): 464 MS
EKG R AXIS: 66 DEGREES
EKG T AXIS: 90 DEGREES
EKG VENTRICULAR RATE: 85 BPM
EOSINOPHILS ABSOLUTE: 0.1 K/UL (ref 0–0.6)
EOSINOPHILS RELATIVE PERCENT: 2 %
GFR AFRICAN AMERICAN: >60
GFR NON-AFRICAN AMERICAN: >60
GLUCOSE BLD-MCNC: 136 MG/DL (ref 70–99)
HCT VFR BLD CALC: 43.1 % (ref 36–48)
HEMOGLOBIN: 14.4 G/DL (ref 12–16)
LIPASE: 16 U/L (ref 13–60)
LYMPHOCYTES ABSOLUTE: 0.9 K/UL (ref 1–5.1)
LYMPHOCYTES RELATIVE PERCENT: 13.2 %
MCH RBC QN AUTO: 31.1 PG (ref 26–34)
MCHC RBC AUTO-ENTMCNC: 33.5 G/DL (ref 31–36)
MCV RBC AUTO: 92.9 FL (ref 80–100)
MONOCYTES ABSOLUTE: 0.4 K/UL (ref 0–1.3)
MONOCYTES RELATIVE PERCENT: 5.2 %
NEUTROPHILS ABSOLUTE: 5.6 K/UL (ref 1.7–7.7)
NEUTROPHILS RELATIVE PERCENT: 79.1 %
PDW BLD-RTO: 14.5 % (ref 12.4–15.4)
PLATELET # BLD: 208 K/UL (ref 135–450)
PMV BLD AUTO: 8.8 FL (ref 5–10.5)
POTASSIUM REFLEX MAGNESIUM: 4.5 MMOL/L (ref 3.5–5.1)
RBC # BLD: 4.63 M/UL (ref 4–5.2)
SARS-COV-2, NAAT: NOT DETECTED
SODIUM BLD-SCNC: 140 MMOL/L (ref 136–145)
TOTAL PROTEIN: 7.3 G/DL (ref 6.4–8.2)
TROPONIN: 0.02 NG/ML
WBC # BLD: 7.1 K/UL (ref 4–11)

## 2020-11-30 PROCEDURE — 84484 ASSAY OF TROPONIN QUANT: CPT

## 2020-11-30 PROCEDURE — 80048 BASIC METABOLIC PNL TOTAL CA: CPT

## 2020-11-30 PROCEDURE — 93010 ELECTROCARDIOGRAM REPORT: CPT | Performed by: INTERNAL MEDICINE

## 2020-11-30 PROCEDURE — 96374 THER/PROPH/DIAG INJ IV PUSH: CPT

## 2020-11-30 PROCEDURE — 1200000000 HC SEMI PRIVATE

## 2020-11-30 PROCEDURE — 83690 ASSAY OF LIPASE: CPT

## 2020-11-30 PROCEDURE — 36415 COLL VENOUS BLD VENIPUNCTURE: CPT

## 2020-11-30 PROCEDURE — 2500000003 HC RX 250 WO HCPCS: Performed by: STUDENT IN AN ORGANIZED HEALTH CARE EDUCATION/TRAINING PROGRAM

## 2020-11-30 PROCEDURE — 85025 COMPLETE CBC W/AUTO DIFF WBC: CPT

## 2020-11-30 PROCEDURE — 99285 EMERGENCY DEPT VISIT HI MDM: CPT

## 2020-11-30 PROCEDURE — 85730 THROMBOPLASTIN TIME PARTIAL: CPT

## 2020-11-30 PROCEDURE — 96365 THER/PROPH/DIAG IV INF INIT: CPT

## 2020-11-30 PROCEDURE — 6360000004 HC RX CONTRAST MEDICATION: Performed by: STUDENT IN AN ORGANIZED HEALTH CARE EDUCATION/TRAINING PROGRAM

## 2020-11-30 PROCEDURE — 99222 1ST HOSP IP/OBS MODERATE 55: CPT | Performed by: INTERNAL MEDICINE

## 2020-11-30 PROCEDURE — 2580000003 HC RX 258: Performed by: FAMILY MEDICINE

## 2020-11-30 PROCEDURE — 96366 THER/PROPH/DIAG IV INF ADDON: CPT

## 2020-11-30 PROCEDURE — 80076 HEPATIC FUNCTION PANEL: CPT

## 2020-11-30 PROCEDURE — G0378 HOSPITAL OBSERVATION PER HR: HCPCS

## 2020-11-30 PROCEDURE — U0002 COVID-19 LAB TEST NON-CDC: HCPCS

## 2020-11-30 PROCEDURE — 74177 CT ABD & PELVIS W/CONTRAST: CPT

## 2020-11-30 PROCEDURE — 6370000000 HC RX 637 (ALT 250 FOR IP): Performed by: FAMILY MEDICINE

## 2020-11-30 PROCEDURE — 6370000000 HC RX 637 (ALT 250 FOR IP): Performed by: STUDENT IN AN ORGANIZED HEALTH CARE EDUCATION/TRAINING PROGRAM

## 2020-11-30 PROCEDURE — 6360000002 HC RX W HCPCS: Performed by: STUDENT IN AN ORGANIZED HEALTH CARE EDUCATION/TRAINING PROGRAM

## 2020-11-30 PROCEDURE — 93005 ELECTROCARDIOGRAM TRACING: CPT | Performed by: STUDENT IN AN ORGANIZED HEALTH CARE EDUCATION/TRAINING PROGRAM

## 2020-11-30 PROCEDURE — 96375 TX/PRO/DX INJ NEW DRUG ADDON: CPT

## 2020-11-30 RX ORDER — HEPARIN SODIUM 1000 [USP'U]/ML
2000 INJECTION, SOLUTION INTRAVENOUS; SUBCUTANEOUS PRN
Status: DISCONTINUED | OUTPATIENT
Start: 2020-11-30 | End: 2020-11-30

## 2020-11-30 RX ORDER — ASPIRIN 81 MG/1
324 TABLET, CHEWABLE ORAL ONCE
Status: COMPLETED | OUTPATIENT
Start: 2020-11-30 | End: 2020-11-30

## 2020-11-30 RX ORDER — ASPIRIN 81 MG/1
81 TABLET, CHEWABLE ORAL DAILY
Status: DISCONTINUED | OUTPATIENT
Start: 2020-12-01 | End: 2020-12-01 | Stop reason: HOSPADM

## 2020-11-30 RX ORDER — FOLIC ACID 1 MG/1
1 TABLET ORAL DAILY
Status: DISCONTINUED | OUTPATIENT
Start: 2020-12-01 | End: 2020-12-01 | Stop reason: HOSPADM

## 2020-11-30 RX ORDER — HEPARIN SODIUM 1000 [USP'U]/ML
4000 INJECTION, SOLUTION INTRAVENOUS; SUBCUTANEOUS PRN
Status: DISCONTINUED | OUTPATIENT
Start: 2020-11-30 | End: 2020-11-30

## 2020-11-30 RX ORDER — ACETAMINOPHEN 650 MG/1
650 SUPPOSITORY RECTAL EVERY 6 HOURS PRN
Status: DISCONTINUED | OUTPATIENT
Start: 2020-11-30 | End: 2020-12-01 | Stop reason: HOSPADM

## 2020-11-30 RX ORDER — SODIUM CHLORIDE 0.9 % (FLUSH) 0.9 %
10 SYRINGE (ML) INJECTION EVERY 12 HOURS SCHEDULED
Status: DISCONTINUED | OUTPATIENT
Start: 2020-11-30 | End: 2020-12-01 | Stop reason: HOSPADM

## 2020-11-30 RX ORDER — HEPARIN SODIUM 1000 [USP'U]/ML
4000 INJECTION, SOLUTION INTRAVENOUS; SUBCUTANEOUS ONCE
Status: COMPLETED | OUTPATIENT
Start: 2020-11-30 | End: 2020-11-30

## 2020-11-30 RX ORDER — PREGABALIN 75 MG/1
75 CAPSULE ORAL 2 TIMES DAILY
Status: DISCONTINUED | OUTPATIENT
Start: 2020-11-30 | End: 2020-12-01 | Stop reason: HOSPADM

## 2020-11-30 RX ORDER — POLYETHYLENE GLYCOL 3350 17 G/17G
17 POWDER, FOR SOLUTION ORAL DAILY PRN
Status: DISCONTINUED | OUTPATIENT
Start: 2020-11-30 | End: 2020-12-01 | Stop reason: HOSPADM

## 2020-11-30 RX ORDER — ATORVASTATIN CALCIUM 40 MG/1
40 TABLET, FILM COATED ORAL NIGHTLY
Status: DISCONTINUED | OUTPATIENT
Start: 2020-11-30 | End: 2020-12-01 | Stop reason: HOSPADM

## 2020-11-30 RX ORDER — HEPARIN SODIUM 10000 [USP'U]/100ML
7 INJECTION, SOLUTION INTRAVENOUS CONTINUOUS
Status: DISCONTINUED | OUTPATIENT
Start: 2020-11-30 | End: 2020-12-01

## 2020-11-30 RX ORDER — SERTRALINE HYDROCHLORIDE 100 MG/1
100 TABLET, FILM COATED ORAL DAILY
Status: DISCONTINUED | OUTPATIENT
Start: 2020-12-01 | End: 2020-12-01 | Stop reason: HOSPADM

## 2020-11-30 RX ORDER — NITROGLYCERIN 0.4 MG/1
0.4 TABLET SUBLINGUAL EVERY 5 MIN PRN
Status: DISCONTINUED | OUTPATIENT
Start: 2020-11-30 | End: 2020-12-01 | Stop reason: HOSPADM

## 2020-11-30 RX ORDER — PROMETHAZINE HYDROCHLORIDE 25 MG/1
12.5 TABLET ORAL EVERY 6 HOURS PRN
Status: DISCONTINUED | OUTPATIENT
Start: 2020-11-30 | End: 2020-12-01 | Stop reason: HOSPADM

## 2020-11-30 RX ORDER — ONDANSETRON 2 MG/ML
4 INJECTION INTRAMUSCULAR; INTRAVENOUS EVERY 6 HOURS PRN
Status: DISCONTINUED | OUTPATIENT
Start: 2020-11-30 | End: 2020-12-01 | Stop reason: HOSPADM

## 2020-11-30 RX ORDER — SODIUM CHLORIDE 0.9 % (FLUSH) 0.9 %
10 SYRINGE (ML) INJECTION PRN
Status: DISCONTINUED | OUTPATIENT
Start: 2020-11-30 | End: 2020-12-01 | Stop reason: HOSPADM

## 2020-11-30 RX ORDER — DIPHENHYDRAMINE HCL 25 MG
50 TABLET ORAL ONCE
Status: COMPLETED | OUTPATIENT
Start: 2020-11-30 | End: 2020-11-30

## 2020-11-30 RX ORDER — ACETAMINOPHEN 325 MG/1
650 TABLET ORAL EVERY 6 HOURS PRN
Status: DISCONTINUED | OUTPATIENT
Start: 2020-11-30 | End: 2020-12-01 | Stop reason: HOSPADM

## 2020-11-30 RX ORDER — ONDANSETRON 2 MG/ML
4 INJECTION INTRAMUSCULAR; INTRAVENOUS ONCE
Status: COMPLETED | OUTPATIENT
Start: 2020-11-30 | End: 2020-11-30

## 2020-11-30 RX ORDER — METOPROLOL SUCCINATE 25 MG/1
12.5 TABLET, EXTENDED RELEASE ORAL DAILY
Status: DISCONTINUED | OUTPATIENT
Start: 2020-12-01 | End: 2020-12-01 | Stop reason: HOSPADM

## 2020-11-30 RX ADMIN — SODIUM CHLORIDE, PRESERVATIVE FREE 10 ML: 5 INJECTION INTRAVENOUS at 20:42

## 2020-11-30 RX ADMIN — HEPARIN SODIUM 4000 UNITS: 1000 INJECTION INTRAVENOUS; SUBCUTANEOUS at 13:29

## 2020-11-30 RX ADMIN — HEPARIN SODIUM 8.4 ML/HR: 10000 INJECTION, SOLUTION INTRAVENOUS at 13:52

## 2020-11-30 RX ADMIN — ATORVASTATIN CALCIUM 40 MG: 40 TABLET, FILM COATED ORAL at 20:36

## 2020-11-30 RX ADMIN — FAMOTIDINE 20 MG: 10 INJECTION, SOLUTION INTRAVENOUS at 11:21

## 2020-11-30 RX ADMIN — ASPIRIN 324 MG: 81 TABLET, CHEWABLE ORAL at 12:56

## 2020-11-30 RX ADMIN — NITROGLYCERIN 0.4 MG: 0.4 TABLET, ORALLY DISINTEGRATING SUBLINGUAL at 11:51

## 2020-11-30 RX ADMIN — IOPAMIDOL 75 ML: 755 INJECTION, SOLUTION INTRAVENOUS at 11:37

## 2020-11-30 RX ADMIN — DIPHENHYDRAMINE HCL 50 MG: 25 TABLET ORAL at 11:13

## 2020-11-30 RX ADMIN — ONDANSETRON 4 MG: 2 INJECTION INTRAMUSCULAR; INTRAVENOUS at 12:23

## 2020-11-30 RX ADMIN — PREGABALIN 75 MG: 75 CAPSULE ORAL at 20:36

## 2020-11-30 ASSESSMENT — PAIN DESCRIPTION - ORIENTATION
ORIENTATION: MID

## 2020-11-30 ASSESSMENT — PAIN DESCRIPTION - PAIN TYPE
TYPE: ACUTE PAIN

## 2020-11-30 ASSESSMENT — PAIN DESCRIPTION - DESCRIPTORS
DESCRIPTORS: BURNING

## 2020-11-30 ASSESSMENT — PAIN SCALES - GENERAL
PAINLEVEL_OUTOF10: 0
PAINLEVEL_OUTOF10: 4
PAINLEVEL_OUTOF10: 0
PAINLEVEL_OUTOF10: 0
PAINLEVEL_OUTOF10: 5
PAINLEVEL_OUTOF10: 2

## 2020-11-30 ASSESSMENT — PAIN DESCRIPTION - LOCATION
LOCATION: CHEST

## 2020-11-30 NOTE — PROGRESS NOTES
Clinical Pharmacy Note  Medication Counseling    Reviewed new medications started during hospital admission: heparin. Indications and side effects were emphasized during counseling. All medication-related questions addressed. Patient verbalized understanding of education. Should the patient express any additional questions or concerns regarding their medications, please do not hesitate to contact the pharmacy department. Patient/caregiver aware they may refuse medications during hospital stay.      Selvin Lal PharmD, BCPS  11/30/2020 1:29 PM

## 2020-11-30 NOTE — ED PROVIDER NOTES
[facility]      Pt Name: Eduard Freeman  MRN: [id number]  [place] 1960  Date of evaluation: 11/30/2020  Provider: Tommy Bloch, MD    CHIEF COMPLAINT       Chief Complaint   Patient presents with    Chest Pain     Pt c/o chest pain and N/V starting an hour ago. 324 mg aspirin given en route. HISTORY OF PRESENT ILLNESS   (Location/Symptom, Timing/Onset,Context/Setting, Quality, Duration, Modifying Factors, Severity)  Note limiting factors. Eduard Freeman is a 63 y.o. female who presents to the emergency department complaint of chest pain that started an hour ago. Onset sudden, described as pressure in the center of her left chest, nonradiating before this was feeling fine, associated with shortness of breath, nausea and vomiting. Has significant history of coronary artery disease, prior NSTEMI. Denies fevers, cough, myalgias. Symptoms not otherwise alleviated or exacerbated by other factors. NursingNotes were reviewed. REVIEW OF SYSTEMS    (2-9 systems for level 4, 10 or more for level 5)       Constitutional: No fever or chills. Eye: No visual disturbances. No eye pain. Ear/Nose/Mouth/Throat: No nasal congestion. No sore throat. Respiratory: No cough, + shortness of breath, No sputum production. Cardiovascular: + chest pain. No palpitations. Gastrointestinal: No abdominal pain. + nausea or vomiting  Genitourinary: No dysuria. No hematuria. Hematology/Lymphatics: No bleeding or bruising tendency. Immunologic: No malaise. No swollen glands. Musculoskeletal: No back pain. No joint pain. Integumentary: No rash. No abrasions. Neurologic: No headache. No focal numbness or weakness.       PAST MEDICAL HISTORY     Past Medical History:   Diagnosis Date    Back pain     CAD (coronary artery disease)     COPD (chronic obstructive pulmonary disease) (HCC)     Foot pain     from bone spurs    GERD (gastroesophageal reflux disease)     Headache sharp pain going through head    Heart attack ([address]     Hip pain     Hyperlipidemia     Hypertension      (normal spontaneous vaginal delivery)     Osteoporosis     Vision abnormalities          SURGICALHISTORY       Past Surgical History:   Procedure Laterality Date    CARDIAC CATHETERIZATION      CHOLECYSTECTOMY      COLONOSCOPY      ESOPHAGEAL DILATATION N/A 2019    ESOPHAGEAL DILATION RAN performed by Robles Valera MD at [facility] N/A 2019    EGD BIOPSY performed by Robles Valera MD at [facility]       Current Discharge Medication List      CONTINUE these medications which have NOT CHANGED    Details   sertraline (ZOLOFT) 100 MG tablet TAKE ONE TABLET BY MOUTH DAILY  Qty: 60 tablet, Refills: 1    Associated Diagnoses: Anxiety      pregabalin (LYRICA) 75 MG capsule Take 1 capsule by mouth 2 times daily for 125 days.   Qty: 60 capsule, Refills: 3    Associated Diagnoses: Fibromyalgia      pantoprazole (PROTONIX) 20 MG tablet TAKE TWO TABLETS BY MOUTH DAILY  Qty: 30 tablet, Refills: 0      folic acid (FOLVITE) 1 MG tablet Take 1 tablet by mouth daily  Qty: 30 tablet, Refills: 1      albuterol sulfate HFA (PROVENTIL HFA) 108 (90 Base) MCG/ACT inhaler Inhale 4-6 puffs into the lungs every 4 hours as needed for Wheezing or Shortness of Breath  Qty: 1 Inhaler, Refills: 2    Associated Diagnoses: COPD, severe (HCC)      metoprolol succinate (TOPROL XL) 25 MG extended release tablet Take 0.5 tablets by mouth daily  Qty: 30 tablet, Refills: 3    Associated Diagnoses: Essential hypertension      Umeclidinium Bromide 62.5 MCG/INH AEPB Inhale 1 puff into the lungs daily  Qty: 1 each, Refills: 5    Associated Diagnoses: COPD, severe (HCC)      metFORMIN (GLUCOPHAGE) 500 MG tablet Take 1 tablet by mouth daily (with breakfast)  Qty: 90 tablet, Refills: 0    Associated Diagnoses: Type 2 diabetes mellitus without Packs/day: 1.00     Years: 37.00     Pack years: 37.00     Types: Cigarettes     Last attempt to quit: 1/1/2017     Years since quitting: 3.9    Smokeless tobacco: Never Used   Substance and Sexual Activity    Alcohol use: No     Alcohol/week: 0.0 standard drinks    Drug use: No    Sexual activity: Not Currently   Lifestyle    Physical activity     Days per week: None     Minutes per session: None    Stress: None   Relationships    Social connections     Talks on phone: None     Gets together: None     Attends Jew service: None     Active member of club or organization: None     Attends meetings of clubs or organizations: None     Relationship status: None    Intimate partner violence     Fear of current or ex partner: None     Emotionally abused: None     Physically abused: None     Forced sexual activity: None   Other Topics Concern    None   Social History Narrative    None       SCREENINGS             PHYSICAL EXAM    (up to 7 for level 4, 8 or more for level 5)     ED Triage Vitals   BP Temp Temp Source Pulse Resp SpO2 Height Weight   11/30/20 1031 11/30/20 1041 11/30/20 1041 11/30/20 1031 11/30/20 1031 11/30/20 1031 -- 11/30/20 1031   (!) 148/77 98.3 °F (36.8 °C) Oral 69 16 96 %  155 lb 3.3 oz (70.4 kg)       General: Alert and oriented appropriately for age, No acute distress. Eye: Normal conjunctiva. Pupils equal and reactive. HENT: Oral mucosa is moist.  Respiratory: Respirations even and non-labored. Clear to auscultation bilaterally. Cardiovascular: Normal rate, Regular rhythm. Intact peripheral pulses, no JVD. Gastrointestinal: Soft, tenderness to palpation in the epigastrium. Non-distended. Musculoskeletal: No swelling. No edema. Integumentary: Warm, Dry. Neurologic: Alert and appropriate for age. No focal deficits. Psychiatric: Cooperative.     DIAGNOSTIC RESULTS     EKG: All EKG's are interpreted by the Emergency Department Physician who either signs or Co-signsthis chart in the absence of a cardiologist.    The Ekg interpreted by me shows  normal sinus rhythm with a rate of 85 bpm, occasional PVCs. Axis is   Normal  QTc is  normal  Intervals and Durations are unremarkable. ST Segments: New T wave inversion in lead aVL compared with prior EKG dated 8/26/2020. Otherwise consistent with priors, occasional PVCs. RADIOLOGY:   Non-plain filmimages such as CT, Ultrasound and MRI are read by the radiologist. Plain radiographic images are visualized and preliminarily interpreted by the emergency physician with the below findings:      Interpretation per the Radiologist below, if available at the time ofthis note:    CT ABDOMEN PELVIS W IV CONTRAST Additional Contrast? None   Final Result   1. Chronic multifocal colitis. 2. Moderate superior mesenteric arterial stenosis. 3. No acute abdominopelvic abnormality.                ED BEDSIDE ULTRASOUND:   Performed by ED Physician - none    LABS:  Labs Reviewed   CBC WITH AUTO DIFFERENTIAL - Abnormal; Notable for the following components:       Result Value    Lymphocytes Absolute 0.9 (*)     All other components within normal limits    Narrative:     Performed at:  [facility]  [address] [facility] [place] [address]   Phone [phone]   BASIC METABOLIC PANEL W/ REFLEX TO MG FOR LOW K - Abnormal; Notable for the following components:    Glucose 136 (*)     CREATININE <0.5 (*)     All other components within normal limits    Narrative:     Performed at:  [facility]  [address] [facility] [place] [address]   Phone [phone]   TROPONIN - Abnormal; Notable for the following components:    Troponin 0.02 (*)     All other components within normal limits    Narrative:     Performed at:  [facility]  [address] [facility] [place] [address]   Phone [phone]   HEPATIC FUNCTION PANEL    Narrative:     Performed at:  [address] [facility]  [address] [place] [address]   Phone [phone]   LIPASE    Narrative:     Performed at:  [facility]  [address] [place] [address]   Phone [phone]   APTT    Narrative:     Performed at:  [facility]  [address] [place] [address]   Phone [phone]   APTT   COVID-19   COVID-19   COVID-19   COVID-19       All other labs were within normal range or not returned as of this dictation. EMERGENCY DEPARTMENT COURSE and DIFFERENTIAL DIAGNOSIS/MDM:   Vitals:    Vitals:    11/30/20 1145 11/30/20 1301 11/30/20 1357 11/30/20 1542   BP: (!) 157/93 118/65 104/66 127/66   Pulse: 68 64 61 64   Resp:  16 18 16   Temp:  98 °F (36.7 °C) 98 °F (36.7 °C) 98.1 °F (36.7 °C)   TempSrc:  Oral Oral Oral   SpO2: 97% 94% 95% 94%   Weight:             Medical decision making:  Luis A Espinal in is a 62 yo F with PMHx of NSTEMI, CAD, COPD, HLD who p/w L sided substernal CP and SOB, nausea and vomiting, concerning for ACS, given nitroglycerin, aspirin. EKG, CBC, BMP, troponin. On reassessment, pain improved with NTG here. EKG with new TWI in lead aVL, troponin 0.02 up from baseline of undetectable, c/f UA/NSTEMI, given ASA. starting heparin.   Admitted to hospitalist.      Remains hemodynamically stable on reassessment, CT negative acute, demonstrates chronic colitis, was obtained given the patient's epigastric abdominal pain and tenderness on exam.    Medications   nitroGLYCERIN (NITROSTAT) SL tablet 0.4 mg (0.4 mg Sublingual Given 11/30/20 1151)   heparin 25,000 unit in sodium chloride 0.45% 250 mL infusion (8.4 mL/hr Intravenous New Bag 11/30/20 1352)   diphenhydrAMINE (BENADRYL) tablet 50 mg (50 mg Oral Given 11/30/20 1113)   famotidine (PEPCID) injection 20 mg (20 mg Intravenous Given 11/30/20 1121)   iopamidol (ISOVUE-370) 76 % injection 75 mL (75 mLs Intravenous Given 11/30/20 3207)   ondansetron (ZOFRAN) injection 4 mg (4 mg Intravenous Given 11/30/20 1223)   aspirin chewable tablet 324 mg (324 mg Oral Given 11/30/20 1256)   heparin (porcine) injection 4,000 Units (4,000 Units Intravenous Given 11/30/20 1329)         CONSULTS:  [address]:  Unless otherwise noted below, none         FINAL IMPRESSION      1. Elevated troponin level    2.  Acute chest pain          DISPOSITION/PLAN   DISPOSITION Admitted 11/30/2020 12:38:21 PM      (Please note that portions of this note were completed with a voice recognition program.Efforts were made to edit the dictations but occasionally words are mis-transcribed.)    Eunice Turner MD (electronically signed)  Attending Emergency Physician          Eunice Turner MD  11/30/20 4182

## 2020-11-30 NOTE — ED NOTES
Spoke with the pt's sister Fredy Barron per pt's request to update on pt condition and admission.       Susan Castillo RN  11/30/20 1654

## 2020-11-30 NOTE — ED NOTES
Pt remains alert and oriented with no acute distress noted. Report called to [facility], pain score reviewed and all questions answered.       Llaa Tran RN  11/30/20 2251

## 2020-11-30 NOTE — CONSULTS
[address]    Jaret Chen  1960 November 30, 2020    Reason for Consult: Chest Pain    CC: Chest Pain    HPI:  The patient is 63 y.o. female with a past medical history significant for COPD and fibromyalgia who presented to the [facility] ED with chets pain. She had been diagnosed COVID-19 positive on 11/20. She follows with Dr. Nga Herrera for her primary care. She states that she came to the ED because of chest pain that began at [address] was laying down at the time. She started feeling \"kind of odd\". She describes heartburn and then developed diffuse abdominal pain radiating up into her chest. She states she was short of breath. She was brought in by EMS. Movement around her house did make the chest pain worse for her. She was sweaty and nauseous. She did vomit once. It was \"off white mucus\". The chest pain lasted hours but she thinks it resolved with sublingual nitroglycerin. She denies any chest pain at present. She states that she did have issues with her heart in the past. She quit smoking in 2017. Review of Systems:  Constitutional: No fatigue, weakness, night sweats or fever. HEENT: No new vision difficulties or ringing in the ears. Respiratory: No new SOB, PND, orthopnea or cough. Cardiovascular: See HPI   GI: No n/v, diarrhea, constipation, abdominal pain or changes in bowel habits. No melena, no hematochezia  : No urinary frequency, urgency, incontinence, hematuria or dysuria. Skin: No cyanosis or skin lesions. Musculoskeletal: No new muscle or joint pain. Neurological: No syncope or TIA-like symptoms.   Psychiatric: No anxiety, insomnia or depression     Past Medical History:   Diagnosis Date    Back pain     CAD (coronary artery disease)     [facility]ch[facility] obstructive [facility] disease) ([facility]     Foot pain     from bone spurs    GERD (gastroesophageal reflux disease)     Headache     sharp pain going through head    Heart attack ([address]     Hip pain     Hyperlipidemia     Hypertension      (normal spontaneous vaginal delivery)     Osteoporosis     Vision abnormalities      Past Surgical History:   Procedure Laterality Date    CARDIAC CATHETERIZATION      CHOLECYSTECTOMY      COLONOSCOPY      ESOPHAGEAL DILATATION N/A 2019    ESOPHAGEAL DILATION Aye Jurist performed by Algie Meigs, MD at [address] [place] [address] ENDOSCOPY N/A 2019    EGD BIOPSY performed by Algie Meigs, MD at [facility]     Family History   Problem Relation Age of Onset    Heart Failure Mother         heart disease    Hypertension Mother     Emphysema Mother     Heart Failure Father         heart disease    COPD Father     Hypertension Paternal Grandmother      Social History     Tobacco Use    Smoking status: Former Smoker     Packs/day: 1.00     Years: 37.00     Pack years: 37.00     Types: Cigarettes     Last attempt to quit: 2017     Years since quitting: 3.9    Smokeless tobacco: Never Used   Substance Use Topics    Alcohol use: No     Alcohol/week: 0.0 standard drinks    Drug use: No       Allergies   Allergen Reactions    Iodine Hives    Other Other (See Comments)     Flu vaccine  Felt like bones were on the outside of her body    Eggs Or Egg-Derived Products [Eggs Or Egg-Derived Products] Hives and Nausea And Vomiting    Eggs [Egg White] Nausea And Vomiting     PT STATES CAN EAT EGG WHITE BUT NOT EGG YOLK    Flexeril [Cyclobenzaprine Hcl] Rash    Levofloxacin Rash    Omeprazole Nausea And Vomiting and Other (See Comments)     Burns my stomach up        Current Facility-Administered Medications   Medication Dose Route Frequency Provider Last Rate Last Dose    nitroGLYCERIN (NITROSTAT) SL tablet 0.4 mg  0.4 mg Sublingual Q5 Min PRN Meliza Wilde MD   0.4 mg at 20 1151    heparin 25,000 unit in sodium chloride 0.45% 250 mL infusion  8.4 mL/hr Intravenous Continuous Meliza Wilde MD 8.4 mL/hr at well-developed and well-nourished. In no acute distress. Head: Normocephalic and atraumatic. Neck: Neck supple. No JVD present. Carotid bruit is not present. No mass and no thyromegaly present. No lymphadenopathy present. Cardiovascular: Normal rate, regular rhythm, normal heart sounds and intact distal pulses. Exam reveals no gallop and no friction rub. No murmur heard. Pulmonary/Chest: Effort normal and breath sounds normal. No respiratory distress. She has no wheezes, rhonchi or rales. Abdominal: Soft, non-tender. Bowel sounds and aorta are normal. She exhibits no organomegaly, mass or bruit. Extremities: No edema, cyanosis, or clubbing. Pulses are 2+ radial/carotid/dorsalis pedis and posterior tibial bilaterally. Neurological: She is alert and oriented to person, place, and time. She has normal reflexes. No cranial nerve deficit. Coordination normal.   Skin: Skin is warm and dry. There is no rash or diaphoresis. Psychiatric: She has a normal mood and affect. Her speech is normal and behavior is normal.     EKG Interpretation: Sinus rhythm with premature atrial contractions      Lab Review:   Lab Results   Component Value Date    TRIG 124 05/07/2018    HDL 43 10/25/2019    LDLCALC 99 10/25/2019    LABVLDL 36 10/25/2019     Lab Results   Component Value Date     11/30/2020    K 4.5 11/30/2020    BUN 8 11/30/2020    CREATININE <0.5 11/30/2020     Recent Labs     11/30/20  1123   WBC 7.1   HGB 14.4   HCT 43.1        Echo 5/9/18:  Normal left ventricle size, wall thickness and systolic function with an   estimated ejection fraction of 55%. No regional wall motion abnormalities are seen. Diastolic filling parameters suggest normal diastolic filing pressure. Mild mitral regurgitation is present. Assessment:  1. Chest Pain  2. Recent COVID-19 infection  3. PAD  4. Abdominal Pain    Plan:  I am not sure that Greta's chest pain is cardiac in nature.  It did respond to nitrates per her report but she also had heartburn laying down with nausea. I think this could be GI in origin. The pain really radiated up from her abdomen. Regardless, I don't feel that she needs to continue on the heparin drip right now unless her troponin assays trend positive. GI has been consulted. If her troponin assays continue to be negative, she could have  Stress perfusion study tomorrow. Okay to eat right now.

## 2020-11-30 NOTE — ED TRIAGE NOTES
Pt arrived to dept via EMS. Pt c/o mid sternal chest pain that started about an hour ago and feels lke a burning sensation and causes N/V. Pt awake, alert and oriented x 4. Skin warm and dry/normal color for ethnicity. Resp easy and unlabored. Pt placed in gown and on cardiac monitor. Call light in reach. Will continue to monitor.

## 2020-11-30 NOTE — PROGRESS NOTES
Medication Reconciliation    List of medications patient is currently taking is complete. Source of information: 1. Conversation with patient at bedside                                      2. EPIC records      Allergies  Iodine; Other; Eggs or egg-derived products [eggs or egg-derived products]; Eggs [egg white]; Flexeril [cyclobenzaprine hcl]; Levofloxacin; and Omeprazole     Notes regarding home medications:   1. Patient received all of her morning home medications prior to arrival to the emergency department today.     Shaina Luis, PharmD, BCPS  11/30/2020 1:25 PM

## 2020-11-30 NOTE — H&P
Hospital Medicine History & Physical      PCP: Santo Blue DO    Date of Admission: 2020    Date of Service: 2020    Chief Complaint: Left-sided chest pain      History Of Present Illness: The patient is a 63 y.o. female with history of COPD, coronary artery disease who presents to [facility] with left-sided chest pain that presented just prior to presentation to the emergency department. She had some diaphoresis and vomiting prior to calling EMS. She describes the pain as a pressure sensation as well as with some acid reflux sensation. Pain radiated to the left neck. She is complained of some hot and cold sensations as well as some chronic cough for the past couple days. Otherwise no abdominal pain, dysuria or hematuria or other focal symptoms of infection. In the ED vitals were within normal limits. EKG showed nonspecific T wave abnormalities but otherwise unremarkable. Troponin was elevated at 0.02 with an otherwise normal CMP and CBC. She was started on a heparin drip given aspirin, and medicine was called for admission. She was started on a heparin drip and given aspirin and medicine was called for admission.     Past Medical History:        Diagnosis Date    Back pain     CAD (coronary artery disease)     COPD [facility]chronic obstructive pulmonary disease) ([facility])     Foot pain     from bone spurs    GERD (gastroesophageal reflux disease)     Headache     sharp pain going through head    Heart attack ([address]     Hip pain     Hyperlipidemia     Hypertension      (normal spontaneous vaginal delivery)     Osteoporosis     Vision abnormalities        Past Surgical History:        Procedure Laterality Date    CARDIAC CATHETERIZATION      CHOLECYSTECTOMY      COLONOSCOPY      ESOPHAGEAL DILATATION N/A 2019    ESOPHAGEAL DILATION TONG performed by Hema Joseph MD at [address] [facility] N/A 5/1/2019    EGD BIOPSY performed by Hema Joseph MD at [place] [address]       Medications Prior to Admission:    Prior to Admission medications    Medication Sig Start Date End Date Taking? Authorizing Provider   sertraline (ZOLOFT) 100 MG tablet TAKE ONE TABLET BY MOUTH DAILY 10/5/20  Yes Kevin Zapata DO   pregabalin (LYRICA) 75 MG capsule Take 1 capsule by mouth 2 times daily for 125 days. 9/23/20 1/26/21 Yes Kevin Zapata DO   pantoprazole (PROTONIX) 20 MG tablet TAKE TWO TABLETS BY MOUTH DAILY 8/17/20  Yes Yoni Lynn,    folic acid (FOLVITE) 1 MG tablet Take 1 tablet by mouth daily 8/5/20  Yes Kevin Zapata DO   albuterol sulfate HFA (PROVENTIL HFA) 108 (90 Base) MCG/ACT inhaler Inhale 4-6 puffs into the lungs every 4 hours as needed for Wheezing or Shortness of Breath 6/23/20  Yes Byron Hays MD   metoprolol succinate (TOPROL XL) 25 MG extended release tablet Take 0.5 tablets by mouth daily 4/27/20  Yes Yoni Lynn DO   Umeclidinium Bromide 62.5 MCG/INH AEPB Inhale 1 puff into the lungs daily 3/25/20  Yes Byron Hays MD   metFORMIN (GLUCOPHAGE) 500 MG tablet Take 1 tablet by mouth daily (with breakfast) 11/6/19  Yes JOSE ANGEL Mulligan CNP   triamcinolone (KENALOG) 0.1 % cream Apply topically 2 times daily. 10/9/19  Yes JOSE ANGEL Mulligan CNP   atorvastatin (LIPITOR) 40 MG tablet Take 1 tablet by mouth nightly 8/28/19  Yes JOSE ANGEL Mulligan CNP   aspirin 81 MG chewable tablet CHEW ONE TABLET BY MOUTH DAILY 6/7/19  Yes JOSE ANGEL Mulligan CNP   diphenhydrAMINE-APAP, sleep, (TYLENOL PM EXTRA STRENGTH)  MG tablet Take 1 tablet by mouth nightly as needed    Yes Historical Provider, MD   mineral oil-hydrophilic petrolatum (AQUAPHOR) ointment Apply topically as needed.  2/6/19  Yes Maryanne Rodriguez, APRN - CNP   acetaminophen (TYLENOL) 500 MG tablet quadrants. Extremities: No clubbing, cyanosis, or edema bilaterally. Full range of motion without deformity and normal gait intact. Skin: Skin color, texture, turgor normal.  No rashes or lesions. Neurologic: Alert and oriented X 3, neurovascularly intact with sensory/motor intact upper extremities/lower extremities, bilaterally. Cranial nerves: II-XII intact, grossly non-focal.  Mental status: Alert, oriented, thought content appropriate. Capillary Refill: Acceptable  < 3 seconds  Peripheral Pulses: +3 Easily felt, not easily obliterated with pressure      CBC   Recent Labs     11/30/20  1123   WBC 7.1   HGB 14.4   HCT 43.1         RENAL  Recent Labs     11/30/20  1123      K 4.5      CO2 29   BUN 8   CREATININE <0.5*     LFT'S  Recent Labs     11/30/20  1123   AST 17   ALT 14   BILIDIR <0.2   BILITOT 0.5   ALKPHOS 120     COAG  No results for input(s): INR in the last 72 hours.   CARDIAC ENZYMES  Recent Labs     11/30/20  1123   TROPONINI 0.02*       U/A:    Lab Results   Component Value Date    COLORU YELLOW 08/24/2020    WBCUA 4 07/06/2020    RBCUA 5 07/06/2020    MUCUS Rare 10/31/2018    BACTERIA RARE 07/06/2020    CLARITYU Clear 08/24/2020    SPECGRAV 1.029 08/24/2020    LEUKOCYTESUR Negative 08/24/2020    BLOODU Negative 08/24/2020    GLUCOSEU Negative 08/24/2020       ABG  No results found for: SZM8GIU, BEART, S1EMKNPR, PHART, THGBART, TVB6BRZ, PO2ART, MZZ8XNR        Active Hospital Problems    Diagnosis Date Noted    NSTEMI (non-ST elevated myocardial infarction) ([facility] [I21.4]          PHYSICIANS CERTIFICATION:    I certify that Shruthi Reyes is expected to be hospitalized for more than 2 midnights based on the following assessment and plan:      ASSESSMENT/PLAN:    NSTEMI:  -Patient presented with left-sided chest pain improving with nitro  -Aspirin, heparin drip  -Continue to trend troponins  -N.p.o. until cleared by cardiology    History of COPD: Home inhalers restarted  History of DM2: Holding metoprolol and start sliding scale insulin  Mood disorder, chronic pain: Continue sertraline and Lyrica  History of CAD: Continue metoprolol      DVT Prophylaxis: Heparin drip  Diet: No diet orders on file  Code Status: Prior  PT/OT Eval Status: N/A    Dispo -MedSurg with telemetry       Brandie Tracey MD    Thank you Guerrero Moss DO for the opportunity to be involved in this patient's care. If you have any questions or concerns please feel free to contact me at [phone].

## 2020-11-30 NOTE — PROGRESS NOTES
Clinical Pharmacy Note  Heparin Dosing Consult    Alice Beltran is a 63 y.o. female ordered heparin per low dose nomogram by Dr. Ayla Andrew. Lab Results   Component Value Date    APTT 29.2 11/30/2020     Lab Results   Component Value Date    HGB 14.4 11/30/2020    HCT 43.1 11/30/2020     11/30/2020    INR 0.98 08/24/2020       Ht Readings from Last 1 Encounters:   10/29/20 5' 5\" (1.651 m)        Wt Readings from Last 1 Encounters:   11/30/20 155 lb 3.3 oz (70.4 kg)     Dosing weight: 70 kg    Assessment/Plan:  Initial bolus: 4000 units  Initial infusion rate: 8.4 mL/hr  Next aPTT: 11/30/20 2000    Pharmacy will continue to monitor adjust heparin based on aPTT results using nomogram below:     LOW DOSE HEPARIN PROTOCOL (ACS/STEMI/A FIB)     Initial Bolus: 60 units/kg Max Bolus: 4,000 units       Initial Rate: 12 units/kg/hr Max Initial Rate: 1,000 units/hr     aPTT < 36   Heparin 60 units/kg bolus Increase infusion by 4 units/kg/hr       (maximum 4,000 units)   aPTT 37-48   Heparin 30 units/kg bolus Increase infusion by 2 units/kg/hr       (maximum 2,000 units)   aPTT 49-76   No bolus   No change   aPTT 77-85   No bolus   Decrease infusion by 2 units/kg/hr   aPTT 86-94   Hold heparin for 1 hour Decrease infusion by 3 units/kg/hr   aPTT     Hold heparin for 1 hour Decrease infusion by 4 units/kg/hr   aPTT > 103   Hold heparin for 1 hour Decrease infusion by 6 units/kg/hr    Obtain aPTT 6 hours after initial bolus and 6 hours after any dose change until two consecutive therapeutic aPTTs are achieved - then daily.

## 2020-12-01 VITALS
HEART RATE: 75 BPM | BODY MASS INDEX: 24.47 KG/M2 | DIASTOLIC BLOOD PRESSURE: 71 MMHG | OXYGEN SATURATION: 92 % | TEMPERATURE: 98.5 F | RESPIRATION RATE: 17 BRPM | WEIGHT: 147.05 LBS | SYSTOLIC BLOOD PRESSURE: 145 MMHG

## 2020-12-01 LAB
ANION GAP SERPL CALCULATED.3IONS-SCNC: 9 MMOL/L (ref 3–16)
APTT: 49.1 SEC (ref 24.2–36.2)
BASOPHILS ABSOLUTE: 0.1 K/UL (ref 0–0.2)
BASOPHILS RELATIVE PERCENT: 0.8 %
BUN BLDV-MCNC: 10 MG/DL (ref 7–20)
CALCIUM SERPL-MCNC: 8.9 MG/DL (ref 8.3–10.6)
CHLORIDE BLD-SCNC: 104 MMOL/L (ref 99–110)
CO2: 29 MMOL/L (ref 21–32)
CREAT SERPL-MCNC: 0.5 MG/DL (ref 0.6–1.2)
EOSINOPHILS ABSOLUTE: 0.2 K/UL (ref 0–0.6)
EOSINOPHILS RELATIVE PERCENT: 3.2 %
GFR AFRICAN AMERICAN: >60
GFR NON-AFRICAN AMERICAN: >60
GLUCOSE BLD-MCNC: 117 MG/DL (ref 70–99)
HCT VFR BLD CALC: 39 % (ref 36–48)
HEMOGLOBIN: 13.2 G/DL (ref 12–16)
LYMPHOCYTES ABSOLUTE: 2 K/UL (ref 1–5.1)
LYMPHOCYTES RELATIVE PERCENT: 29.4 %
MCH RBC QN AUTO: 31.2 PG (ref 26–34)
MCHC RBC AUTO-ENTMCNC: 33.8 G/DL (ref 31–36)
MCV RBC AUTO: 92.4 FL (ref 80–100)
MONOCYTES ABSOLUTE: 0.5 K/UL (ref 0–1.3)
MONOCYTES RELATIVE PERCENT: 6.9 %
NEUTROPHILS ABSOLUTE: 4 K/UL (ref 1.7–7.7)
NEUTROPHILS RELATIVE PERCENT: 59.7 %
PDW BLD-RTO: 14.6 % (ref 12.4–15.4)
PLATELET # BLD: 188 K/UL (ref 135–450)
PMV BLD AUTO: 8.8 FL (ref 5–10.5)
POTASSIUM REFLEX MAGNESIUM: 4.4 MMOL/L (ref 3.5–5.1)
RBC # BLD: 4.23 M/UL (ref 4–5.2)
SODIUM BLD-SCNC: 142 MMOL/L (ref 136–145)
TROPONIN: <0.01 NG/ML
WBC # BLD: 6.7 K/UL (ref 4–11)

## 2020-12-01 PROCEDURE — 84484 ASSAY OF TROPONIN QUANT: CPT

## 2020-12-01 PROCEDURE — 96365 THER/PROPH/DIAG IV INF INIT: CPT

## 2020-12-01 PROCEDURE — 93017 CV STRESS TEST TRACING ONLY: CPT

## 2020-12-01 PROCEDURE — 96366 THER/PROPH/DIAG IV INF ADDON: CPT

## 2020-12-01 PROCEDURE — 78452 HT MUSCLE IMAGE SPECT MULT: CPT

## 2020-12-01 PROCEDURE — 94640 AIRWAY INHALATION TREATMENT: CPT

## 2020-12-01 PROCEDURE — 3430000000 HC RX DIAGNOSTIC RADIOPHARMACEUTICAL: Performed by: FAMILY MEDICINE

## 2020-12-01 PROCEDURE — 80048 BASIC METABOLIC PNL TOTAL CA: CPT

## 2020-12-01 PROCEDURE — G0378 HOSPITAL OBSERVATION PER HR: HCPCS

## 2020-12-01 PROCEDURE — 6370000000 HC RX 637 (ALT 250 FOR IP): Performed by: FAMILY MEDICINE

## 2020-12-01 PROCEDURE — 85025 COMPLETE CBC W/AUTO DIFF WBC: CPT

## 2020-12-01 PROCEDURE — 2580000003 HC RX 258: Performed by: FAMILY MEDICINE

## 2020-12-01 PROCEDURE — 6360000002 HC RX W HCPCS: Performed by: FAMILY MEDICINE

## 2020-12-01 PROCEDURE — 85730 THROMBOPLASTIN TIME PARTIAL: CPT

## 2020-12-01 PROCEDURE — A9502 TC99M TETROFOSMIN: HCPCS | Performed by: FAMILY MEDICINE

## 2020-12-01 PROCEDURE — 36415 COLL VENOUS BLD VENIPUNCTURE: CPT

## 2020-12-01 PROCEDURE — 94760 N-INVAS EAR/PLS OXIMETRY 1: CPT

## 2020-12-01 RX ORDER — DOBUTAMINE HYDROCHLORIDE 200 MG/100ML
10 INJECTION INTRAVENOUS CONTINUOUS
Status: DISCONTINUED | OUTPATIENT
Start: 2020-12-01 | End: 2020-12-01 | Stop reason: CLARIF

## 2020-12-01 RX ADMIN — FOLIC ACID 1 MG: 1 TABLET ORAL at 08:25

## 2020-12-01 RX ADMIN — METOPROLOL SUCCINATE 12.5 MG: 25 TABLET, EXTENDED RELEASE ORAL at 08:25

## 2020-12-01 RX ADMIN — ACETAMINOPHEN 650 MG: 325 TABLET ORAL at 06:24

## 2020-12-01 RX ADMIN — REGADENOSON 0.4 MG: 0.08 INJECTION, SOLUTION INTRAVENOUS at 10:20

## 2020-12-01 RX ADMIN — SODIUM CHLORIDE, PRESERVATIVE FREE 10 ML: 5 INJECTION INTRAVENOUS at 09:32

## 2020-12-01 RX ADMIN — TETROFOSMIN 10 MILLICURIE: 1.38 INJECTION, POWDER, LYOPHILIZED, FOR SOLUTION INTRAVENOUS at 09:02

## 2020-12-01 RX ADMIN — TETROFOSMIN 30 MILLICURIE: 1.38 INJECTION, POWDER, LYOPHILIZED, FOR SOLUTION INTRAVENOUS at 10:31

## 2020-12-01 RX ADMIN — PREGABALIN 75 MG: 75 CAPSULE ORAL at 08:25

## 2020-12-01 RX ADMIN — ASPIRIN 81 MG: 81 TABLET, CHEWABLE ORAL at 08:25

## 2020-12-01 RX ADMIN — SERTRALINE 100 MG: 100 TABLET, FILM COATED ORAL at 08:25

## 2020-12-01 RX ADMIN — TIOTROPIUM BROMIDE INHALATION SPRAY 2 PUFF: 3.12 SPRAY, METERED RESPIRATORY (INHALATION) at 08:06

## 2020-12-01 ASSESSMENT — PAIN SCALES - GENERAL
PAINLEVEL_OUTOF10: 7
PAINLEVEL_OUTOF10: 0
PAINLEVEL_OUTOF10: 0

## 2020-12-01 NOTE — CARE COORDINATION
Discharge Plan:  Patient to discharge home with family support. No Known needs at this time. SW available as needed.   Thierry ZEEW,MSW  [phone]

## 2020-12-01 NOTE — PROGRESS NOTES
Clinical Pharmacy Note  Heparin Dosing       Lab Results   Component Value Date    APTT 79.3 11/30/2020     Lab Results   Component Value Date    HGB 14.4 11/30/2020    HCT 43.1 11/30/2020     11/30/2020    INR 0.98 08/24/2020       Current Infusion Rate: 8.4 mL/hr    Plan:  Rate: Decrease to 7 mL/hr  Next aPTT: 0430  12/1/20    Pharmacy will continue to monitor and adjust based on aPTT results.     Terrie Wade, PharmD  11/30/2020 10:02 PM

## 2020-12-01 NOTE — PLAN OF CARE
Pt alert and oriented, follows directions and is not impulsive. Heparin drip titrated per pharmacy directions. No signs of bleeding.       Problem: Falls - Risk of:  Goal: Will remain free from falls  Description: Will remain free from falls  Outcome: Ongoing  Goal: Absence of physical injury  Description: Absence of physical injury  Outcome: Ongoing     Problem: Bleeding:  Goal: Will show no signs and symptoms of excessive bleeding  Description: Will show no signs and symptoms of excessive bleeding  Outcome: Ongoing

## 2020-12-01 NOTE — PROGRESS NOTES
Greta's stress perfusion study showed no ischemia. I do feel like her chest pain was atypical for angina. I would not pursue any further cardiovascular work-up at this time. We will sign off for now. Please call with questions. This was not a NSTEMI.

## 2020-12-01 NOTE — PLAN OF CARE
Problem: Falls - Risk of:  Goal: Will remain free from falls  Description: Will remain free from falls  12/1/2020 1029 by Ning Mon RN  Outcome: Ongoing     Problem: Falls - Risk of:  Goal: Absence of physical injury  Description: Absence of physical injury  12/1/2020 1029 by Ning Mon RN  Outcome: Ongoing     Problem: Bleeding:  Goal: Will show no signs and symptoms of excessive bleeding  Description: Will show no signs and symptoms of excessive bleeding  12/1/2020 1029 by Ning Mon RN  Outcome: Ongoing

## 2020-12-01 NOTE — PROGRESS NOTES
Physician Progress Note      Juju Choi  CSN #:                  [id number]  :                       1960  ADMIT DATE:       2020 10:28 AM  DISCH DATE:        2020 3:06 PM  RESPONDING  PROVIDER #:        Ashley Mahajan MD          QUERY TEXT:    Pt with CAD and previous MI admitted with suspected NSTEMI. Troponin 0.02,   <0.01. Per cardiology consult note, not convinced that pain is cardiac in   origin and could be GI in origin with pain radiating up from abdomen. Hx   GERD. If possible, please document in progress notes and discharge summary if   you are evaluating and/or treating any of the following: The medical record reflects the following:  Risk Factors: GERD, CAD with previous NSTEMI  Clinical Indicators: initial Troponin 0.02, second Troponin < 0.01  Treatment: cardiology consult, stress test pending    Thank you,  Emelia Bullard RN, BSN, CCDS  [email or web address]  Options provided:  -- Chest pain due to CAD with unstable angina  -- Chest pain due to NSTEMI  -- Chest pain due to GERD  -- Other - I will add my own diagnosis  -- Disagree - Not applicable / Not valid  -- Disagree - Clinically unable to determine / Unknown  -- Refer to Clinical Documentation Reviewer    PROVIDER RESPONSE TEXT:    This patient has chest pain due to GERD.     Query created by: Divina Craig on 2020 12:25 PM      Electronically signed by:  Ashley Mahajan MD 2020 6:41 PM

## 2020-12-01 NOTE — DISCHARGE SUMMARY
Hospital Medicine Discharge Summary    Patient ID: Jaret Chen      Patient's PCP: Bushra Martinez DO    Admit Date: 11/30/2020     Discharge Date: 12/1/2020      Admitting Physician: Wojciech Cash MD     Discharge Physician: Wojciech Cash MD     Discharge Diagnoses: Active Hospital Problems    Diagnosis Date Noted    NSTEMI (non-ST elevated myocardial infarction) ([facility] [I21.4]        The patient was seen and examined on day of discharge and this discharge summary is in conjunction with any daily progress note from day of discharge. Hospital Course:     NSTEMI: Resolved  -Patient presented with left-sided chest pain improving with nitro  -Aspirin, heparin drip  -On trend troponin returned to normal  -Stress test: Nuclear medicine stress test done on 12/1 and was negative     History of COPD: Home inhalers restarted  History of DM2: Holding metoprolol and start sliding scale insulin  Mood disorder, chronic pain: Continue sertraline and Lyrica  History of CAD: Continue metoprolol      Exam:     BP (!) 145/71   Pulse 75   Temp 98.5 °F (36.9 °C) (Oral)   Resp 17   Wt 147 lb 0.8 oz (66.7 kg)   SpO2 92%   BMI 24.47 kg/m²     General appearance: No apparent distress, appears stated age and cooperative. HEENT: Pupils equal, round, and reactive to light. Conjunctivae/corneas clear. Neck: Supple, with full range of motion. No jugular venous distention. Trachea midline. Respiratory:  Normal respiratory effort. Clear to auscultation, bilaterally without Rales/Wheezes/Rhonchi. Cardiovascular: Regular rate and rhythm with normal S1/S2 without murmurs, rubs or gallops. Abdomen: Soft, non-tender, non-distended with normal bowel sounds. Musculoskelatal: No clubbing, cyanosis or edema bilaterally. Full range of motion without deformity. Skin: Skin color, texture, turgor normal.  No rashes or lesions. Neurologic:  Neurovascularly intact without any focal sensory/motor deficits.  Cranial nerves: II-XII intact, grossly non-focal.  Psychiatric: Alert and oriented, thought content appropriate, normal insight      Consults:     IP CONSULT TO CARDIOLOGY    Significant Diagnostic Studies:       Conclusions          Summary     Normal myocardial perfusion study.     Normal LV size and systolic function.          Non-diagnostic EKG response due to failure to reach target heart rate.          Overall findings represent a low risk study. PCP/SNF to follow up: Risk factor modification of ACS    Disposition: Home    Condition on discharge: Stable    Discharge Instructions/Follow-up: Follow-up with PCP within 1 week    Code Status:  Prior     Activity: activity as tolerated    Diet: diabetic diet    Labs: For convenience and continuity at follow-up the following most recent labs are provided:      CBC:    Lab Results   Component Value Date    WBC 6.7 12/01/2020    HGB 13.2 12/01/2020    HCT 39.0 12/01/2020     12/01/2020       Renal:    Lab Results   Component Value Date     12/01/2020    K 4.4 12/01/2020     12/01/2020    CO2 29 12/01/2020    BUN 10 12/01/2020    CREATININE 0.5 12/01/2020    CALCIUM 8.9 12/01/2020       Discharge Medications:     Discharge Medication List as of 12/1/2020  2:25 PM           Details   sertraline (ZOLOFT) 100 MG tablet TAKE ONE TABLET BY MOUTH DAILY, Disp-60 tablet,R-1Normal      pregabalin (LYRICA) 75 MG capsule Take 1 capsule by mouth 2 times daily for 125 days. , Disp-60 capsule,R-3Normal      pantoprazole (PROTONIX) 20 MG tablet TAKE TWO TABLETS BY MOUTH DAILY, Disp-30 [id number]      folic acid (FOLVITE) 1 MG tablet Take 1 tablet by mouth daily, Disp-30 tablet,R-1Normal      albuterol sulfate HFA (PROVENTIL HFA) 108 (90 Base) MCG/ACT inhaler Inhale 4-6 puffs into the lungs every 4 hours as needed for Wheezing or Shortness of Breath, Disp-1 Inhaler, R-2Normal      metoprolol succinate (TOPROL XL) 25 MG extended release tablet Take 0.5 tablets by mouth daily, Disp-30 tablet, R-3Normal      Umeclidinium Bromide 62.5 MCG/INH AEPB Inhale 1 puff into the lungs daily, Disp-1 each, R-5Normal      metFORMIN (GLUCOPHAGE) 500 MG tablet Take 1 tablet by mouth daily (with breakfast), Disp-90 tablet, R-0Normal      triamcinolone (KENALOG) 0.1 % cream Apply topically 2 times daily. , Disp-45 g, R-0, Normal      atorvastatin (LIPITOR) 40 MG tablet Take 1 tablet by mouth nightly, Disp-30 tablet, R-3Normal      aspirin 81 MG chewable tablet CHEW ONE TABLET BY MOUTH DAILY, Disp-90 tablet, R-1Normal      diphenhydrAMINE-APAP, sleep, (TYLENOL PM EXTRA STRENGTH)  MG tablet Take 1 tablet by mouth nightly as needed Historical Med      mineral oil-hydrophilic petrolatum (AQUAPHOR) ointment Apply topically as needed. , Disp-396 g, R-0, Normal      acetaminophen (TYLENOL) 500 MG tablet Take 2 tablets by mouth every 6 hours as needed for Pain Do not take with other medications containing acetaminophen., Disp-30 tablet, R-0Print             Time Spent on discharge is more than 20 minutes in the examination, evaluation, counseling and review of medications and discharge plan. Signed: Cole Crockett MD   12/1/2020      Thank you Brodie Homans, DO for the opportunity to be involved in this patient's care. If you have any questions or concerns please feel free to contact me at [phone].

## 2020-12-01 NOTE — PROGRESS NOTES
Dr. Mahsa Albarado in to see pt, ok to d/c. D/c instructions reviewed with patient, denies any questions or concerns. IV and telemetry removed. Pt's sister on her way to pick pt up. Pt d/c with cell phone and all documented belongings. Pt ambulatory at d/c, transportation scheduled to take pt to vehicle.  Electronically signed by Julio Roberts RN on 12/1/2020 at 3:02 PM

## 2020-12-01 NOTE — PROGRESS NOTES
Clinical Pharmacy Note  Heparin Dosing       Lab Results   Component Value Date    APTT 49.1 12/01/2020     Lab Results   Component Value Date    HGB 13.2 12/01/2020    HCT 39.0 12/01/2020     12/01/2020    INR 0.98 08/24/2020       Current Infusion Rate: 7 mL/hr    Plan:  Rate: Continue  7 mL/hr  Next aPTT: 1200  12/1/20    Pharmacy will continue to monitor and adjust based on aPTT results.   Dede Gerardo RP,12/1/2020,7:48 AM

## 2020-12-01 NOTE — DISCHARGE INSTR - COC
Continuity of Care Form    Patient Name: Enriqueta Velasquez   :  1960  MRN:  [id number]    Admit date:  2020  Discharge date:  ***    Code Status Order: Full Code   Advance Directives:     Admitting Physician: Sumeet Peralta MD  PCP: Kishor Esquivel DO    Discharging Nurse: [facility] Unit/Room#: C4F-7892/5106-18  Discharging Unit Phone Number: ***    Emergency Contact:   Extended Emergency Contact Information  Primary Emergency Contact: Lawrence Louis  Address: [address] [place], [address] Phone: [phone]  Relation: Child   needed? No  Secondary Emergency Contact: AbelMaryanne   [facility] [address] [facility] Phone: [phone]  Mobile Phone: [phone]  Relation: Brother/Sister   needed?  No    Past Surgical History:  Past Surgical History:   Procedure Laterality Date    CARDIAC CATHETERIZATION      CHOLECYSTECTOMY      COLONOSCOPY      ESOPHAGEAL DILATATION N/A 2019    ESOPHAGEAL DILATION Juan Dose performed by Yani Muñoz MD at [facility] N/A 2019    EGD BIOPSY performed by Yani Muñoz MD at [place] [address]       Immunization History:   Immunization History   Administered Date(s) Administered    Pneumococcal Conjugate 13-valent Jennifer Jump) 01/10/2019    Pneumococcal Polysaccharide ([id number]) 2017    Tdap (Boostrix, Adacel) 2014, 2014       Active Problems:  Patient Active Problem List   Diagnosis Code    COPD, severe ([facility] J44.9    Former smoker Z87.891    Osteoporosis M81.0    Chronic back pain M54.9, G89.29    Positive test for human papillomavirus (HPV) [id number]    Anxiety F41.9    Hyperlipidemia E78.5    Emotional instability (HCC) F60.3    Osteoarthritis of hip M16.9    Body mass index between 25-29, adult [id number]    Chest pain R07.9    NSTEMI (non-ST elevated myocardial infarction) ([facility] I21.4    Essential hypertension I10    Tachycardia R00.0    Vitamin D deficiency E55.9    Gastroesophageal reflux disease without esophagitis K21.9    Type 2 diabetes mellitus without complication, without long-term current use of insulin (HCC) E11.9    Diarrhea R19.7    Sepsis (HCC) A41.9    Other hypotension I95.89    Peripheral vascular disease ([facility]) I73.9    Atherosclerosis of superior mesenteric artery ([facility]) K55.1       Isolation/Infection:   Isolation          No Isolation        Patient Infection Status     Infection Onset Added Last Indicated Last Indicated By Review Planned Expiration Resolved Resolved By    None active    Resolved    COVID-19 Rule Out 20 COVID-19 (Ordered)   20 Rule-Out Test Resulted    COVID-19 20 COVID-19   20     C-diff Rule Out 20 GI Bacterial Pathogens By PCR (Ordered)   20 Rule-Out Test Resulted          Nurse Assessment:  Last Vital Signs: BP (!) 145/71   Pulse 75   Temp 98.5 °F (36.9 °C) (Oral)   Resp 17   Wt 147 lb 0.8 oz (66.7 kg)   SpO2 92%   BMI 24.47 kg/m²     Last documented pain score (0-10 scale): Pain Level: 7(HA)  Last Weight:   Wt Readings from Last 1 Encounters:   20 147 lb 0.8 oz (66.7 kg)     Mental Status:  {IP PT MENTAL STATUS:51918}    IV Access:  { MURIEL IV ACCESS:390443728}    Nursing Mobility/ADLs:  Walking   {P DME FGZN:385488822}  Transfer  {[facility] DME CGZO:387545790}  Bathing  {P DME NJLD:577853387}  Dressing  {P DME NWNM:608064909}  Toileting  {P DME CXEJ:518643518}  Feeding  {P DME BFYU:568072513}  Med Admin  {P DME KMU}  Med Delivery   { MURIEL MED Delivery:348778672}    Wound Care Documentation and Therapy:        Elimination:  Continence:   · Bowel: {YES / GK:23688}  · Bladder: {YES / OP:12308}  Urinary Catheter: {Urinary Catheter:012654022}   Colostomy/Ileostomy/Ileal Conduit: {YES / EM:57031}       Date of Last BM: ***    Intake/Output Summary (Last 24 hours) at 2020 1407  Last data filed at 2020 4249  Gross per 24 hour   Intake 126.56 ml   Output 50 ml   Net 76.56 ml     I/O last 3 completed shifts:   In: 126.6 [I.V.:126.6]  Out: 50 [Urine:50]    Safety Concerns:     [address] MURIEL Safety Concerns:651069501}    Impairments/Disabilities:      [address] [facility] Impairments/Disabilities:097171255}    Nutrition Therapy:  Current Nutrition Therapy:   [address] [facility] Diet List:032173848}    Routes of Feeding: {CHP DME Other Feedings:466489463}  Liquids: {Slp liquid thickness:44200}  Daily Fluid Restriction: {CHP DME Yes amt example:144550617}  Last Modified Barium Swallow with Video (Video Swallowing Test): {Done Not Done FXCZ:410285774}    Treatments at the Time of Hospital Discharge:   Respiratory Treatments: ***  Oxygen Therapy:  {Therapy; copd oxygen:62480}  Ventilator:    { CC Vent OYSQ:846522338}    Rehab Therapies: {THERAPEUTIC INTERVENTION:1893391350}  Weight Bearing Status/Restrictions: [address]  Weight Bearin}  Other Medical Equipment (for information only, NOT a DME order):  {EQUIPMENT:949512760}  Other Treatments: ***    Patient's personal belongings (please select all that are sent with patient):  {[facility] DME Belongings:924728617}    RN SIGNATURE:  {Esignature:403024404}    CASE MANAGEMENT/SOCIAL WORK SECTION    Inpatient Status Date: ***    Readmission Risk Assessment Score:  Readmission Risk              Risk of Unplanned Readmission:        14           Discharging to Facility/ Agency   · Name:   · Address:  · Phone:  · Fax:    Dialysis Facility (if applicable)   · Name:  · Address:  · Dialysis Schedule:  · Phone:  · Fax:    / signature: {Esignature:088413441}    PHYSICIAN SECTION    Prognosis: {Prognosis:8298475012}    Condition at Discharge: [address] Patient Condition:147357970}    Rehab Potential (if transferring to Rehab): {Prognosis:1322526195}    Recommended Labs or Other Treatments After Discharge: ***    Physician Certification: I certify the above information and transfer of Willy Lester  is necessary for the continuing treatment of the diagnosis listed and that she requires {Admit to Appropriate Level of Care:40085} for {GREATER/LESS:387170975} 30 days.      Update Admission H&P: {CHP DME Changes in [facility]:268189459}    PHYSICIAN SIGNATURE:  {Esignature:572722805}

## 2020-12-11 ENCOUNTER — OFFICE VISIT (OUTPATIENT)
Dept: FAMILY MEDICINE CLINIC | Age: 60
End: 2020-12-11
Payer: MEDICARE

## 2020-12-11 VITALS
BODY MASS INDEX: 24.79 KG/M2 | TEMPERATURE: 96.9 F | RESPIRATION RATE: 17 BRPM | SYSTOLIC BLOOD PRESSURE: 126 MMHG | WEIGHT: 149 LBS | OXYGEN SATURATION: 96 % | HEART RATE: 77 BPM | DIASTOLIC BLOOD PRESSURE: 74 MMHG

## 2020-12-11 PROCEDURE — 1111F DSCHRG MED/CURRENT MED MERGE: CPT | Performed by: FAMILY MEDICINE

## 2020-12-11 PROCEDURE — 99214 OFFICE O/P EST MOD 30 MIN: CPT | Performed by: FAMILY MEDICINE

## 2020-12-11 PROCEDURE — G8427 DOCREV CUR MEDS BY ELIG CLIN: HCPCS | Performed by: FAMILY MEDICINE

## 2020-12-11 PROCEDURE — 1036F TOBACCO NON-USER: CPT | Performed by: FAMILY MEDICINE

## 2020-12-11 PROCEDURE — G8484 FLU IMMUNIZE NO ADMIN: HCPCS | Performed by: FAMILY MEDICINE

## 2020-12-11 PROCEDURE — G8420 CALC BMI NORM PARAMETERS: HCPCS | Performed by: FAMILY MEDICINE

## 2020-12-11 PROCEDURE — 3017F COLORECTAL CA SCREEN DOC REV: CPT | Performed by: FAMILY MEDICINE

## 2020-12-11 NOTE — PROGRESS NOTES
2020     Greta Whiting (:  1960) is a 63 y.o. female, here for evaluation of the following medical concerns:  Patient follow up today from recent one day hospitalization. She stated that she feels well and no complaints today. She was told she had an MI but believes her symptoms were secondary to GERD. HPI    Hospital follow up- chest pain- possible MI, patient believed that is was  GERD, had partial stress test but didn't finish the stress test? Patient is a poor historian, slightly elevated troponin's but returned to normal?  Dr. Ning Singh who stated:    \"I am not sure that Greta's chest pain is cardiac in nature. It did respond to nitrates per her report but she also had heartburn laying down with nausea. I think this could be GI in origin. The pain really radiated up from her abdomen. Regardless, I don't feel that she needs to continue on the heparin drip right now unless her troponin assays trend positive. GI has been consulted. If her troponin assays continue to be negative, she could have  Stress perfusion study tomorrow. \"    GERD/abdominal pain- patient has increased pain and burning across lower abdominal pain, denied pain in upper epigastric area, believes she has ulcer or reflux, certain foods make it worse, has followed up with GI, taking Protonix as prescribed. CT-    1. Chronic multifocal colitis. 2. Moderate superior mesenteric arterial stenosis. 3. No acute abdominopelvic abnormality. Today, she denied chest pain, sob, n, v, or diarrhea. Review of Systems   Constitutional: Negative for activity change, fatigue, fever and unexpected weight change. HENT: Negative for congestion, ear pain, rhinorrhea and sore throat. Respiratory: Negative for shortness of breath. Cardiovascular: Positive for chest pain. Negative for palpitations and leg swelling. Gastrointestinal: Positive for abdominal distention and abdominal pain.  Negative for diarrhea, nausea and vomiting. Musculoskeletal: Positive for arthralgias and back pain. Neurological: Negative for dizziness, light-headedness and headaches. Psychiatric/Behavioral: Negative for dysphoric mood. The patient is not nervous/anxious. Prior to Visit Medications    Medication Sig Taking? Authorizing Provider   sertraline (ZOLOFT) 100 MG tablet TAKE ONE TABLET BY MOUTH DAILY Yes Kevin Zapata, DO   pregabalin (LYRICA) 75 MG capsule Take 1 capsule by mouth 2 times daily for 125 days. Yes Kevin Zapata, DO   pantoprazole (PROTONIX) 20 MG tablet TAKE TWO TABLETS BY MOUTH DAILY Yes Kevin Zapata, DO   folic acid (FOLVITE) 1 MG tablet Take 1 tablet by mouth daily Yes Kevin Zapata, DO   albuterol sulfate HFA (PROVENTIL HFA) 108 (90 Base) MCG/ACT inhaler Inhale 4-6 puffs into the lungs every 4 hours as needed for Wheezing or Shortness of Breath Yes Tino Sheikh MD   metoprolol succinate (TOPROL XL) 25 MG extended release tablet Take 0.5 tablets by mouth daily Yes Vanessa Otero,    Umeclidinium Bromide 62.5 MCG/INH AEPB Inhale 1 puff into the lungs daily Yes Tino Sheikh MD   triamcinolone (KENALOG) 0.1 % cream Apply topically 2 times daily. Yes JOSE ANGEL Mayen CNP   atorvastatin (LIPITOR) 40 MG tablet Take 1 tablet by mouth nightly Yes JOSE ANGEL Mayen CNP   aspirin 81 MG chewable tablet CHEW ONE TABLET BY MOUTH DAILY Yes JOSE ANGEL Wilson CNP   diphenhydrAMINE-APAP, sleep, (TYLENOL PM EXTRA STRENGTH)  MG tablet Take 1 tablet by mouth nightly as needed  Yes Historical Provider, MD   mineral oil-hydrophilic petrolatum (AQUAPHOR) ointment Apply topically as needed. Yes JOSE ANGEL Mayen CNP   acetaminophen (TYLENOL) 500 MG tablet Take 2 tablets by mouth every 6 hours as needed for Pain Do not take with other medications containing acetaminophen.  Yes CARIDAD Bartlett   metFORMIN (GLUCOPHAGE) 500 MG tablet Take 1 tablet by mouth daily (with breakfast)  Patient not taking: Reported on 12/11/2020  Evette BrownJOSE ANGEL - CNP        Social History     Tobacco Use    Smoking status: Former Smoker     Packs/day: 1.00     Years: 37.00     Pack years: 37.00     Types: Cigarettes     Quit date: 1/1/2017     Years since quitting: 3.9    Smokeless tobacco: Never Used   Substance Use Topics    Alcohol use: No     Alcohol/week: 0.0 standard drinks        Vitals:    12/11/20 1505   BP: 126/74   Pulse: 77   Resp: 17   Temp: 96.9 °F (36.1 °C)   SpO2: 96%   Weight: 149 lb (67.6 kg)     Estimated body mass index is 24.79 kg/m² as calculated from the following:    Height as of 10/29/20: 5' 5\" (1.651 m). Weight as of this encounter: 149 lb (67.6 kg). Physical Exam    ASSESSMENT/PLAN:  1. NSTEMI (non-ST elevated myocardial infarction) ([facility] [address]  Stable  Continue with medication  Keep appointments with specialist.   Meghan Veronica questions      2. Essential hypertension  Well  controlled. Discussed signs and symptoms for immediate evaluation in the ER. Reduce sodium. Monitor diet, exercise and lose weight. Keep a BP log and bring it to your next appointment. Needs to rtc in one month for BP check    3. Gastroesophageal reflux disease without esophagitis  Stable  Continue with medication  Keep appointments with specialist.   Meghan Veronica questions  - Emerson Maldonado MD, Gastroenterology, [facility]    4. Abdominal pain  Patient is stable today 10 days past hospitalization  Much improved  Follows with Dr. Laura Carbajal   Needs to see specialist GI for possible evaluation  Last colonoscopy was 11/13/2014  Discussed signs and symptoms for immediate evaluation In the ER. Return in about 3 months (around 3/11/2021).

## 2020-12-14 ASSESSMENT — ENCOUNTER SYMPTOMS
SORE THROAT: 0
RHINORRHEA: 0
BACK PAIN: 1
ABDOMINAL PAIN: 1
VOMITING: 0
SHORTNESS OF BREATH: 0
DIARRHEA: 0
ABDOMINAL DISTENTION: 1
NAUSEA: 0

## 2021-01-08 ENCOUNTER — OFFICE VISIT (OUTPATIENT)
Dept: CARDIOLOGY CLINIC | Age: 61
End: 2021-01-08
Payer: MEDICARE

## 2021-01-08 VITALS
OXYGEN SATURATION: 96 % | HEART RATE: 73 BPM | DIASTOLIC BLOOD PRESSURE: 80 MMHG | BODY MASS INDEX: 24.96 KG/M2 | HEIGHT: 65 IN | TEMPERATURE: 97.1 F | WEIGHT: 149.8 LBS | SYSTOLIC BLOOD PRESSURE: 132 MMHG

## 2021-01-08 DIAGNOSIS — I10 ESSENTIAL HYPERTENSION: ICD-10-CM

## 2021-01-08 DIAGNOSIS — I25.10 CORONARY ARTERY DISEASE INVOLVING NATIVE CORONARY ARTERY OF NATIVE HEART WITHOUT ANGINA PECTORIS: ICD-10-CM

## 2021-01-08 DIAGNOSIS — E78.5 HYPERLIPIDEMIA LDL GOAL <70: ICD-10-CM

## 2021-01-08 DIAGNOSIS — R07.9 CHEST PAIN, UNSPECIFIED TYPE: Primary | ICD-10-CM

## 2021-01-08 PROCEDURE — G8427 DOCREV CUR MEDS BY ELIG CLIN: HCPCS | Performed by: INTERNAL MEDICINE

## 2021-01-08 PROCEDURE — 93000 ELECTROCARDIOGRAM COMPLETE: CPT | Performed by: INTERNAL MEDICINE

## 2021-01-08 PROCEDURE — 3017F COLORECTAL CA SCREEN DOC REV: CPT | Performed by: INTERNAL MEDICINE

## 2021-01-08 PROCEDURE — 99213 OFFICE O/P EST LOW 20 MIN: CPT | Performed by: INTERNAL MEDICINE

## 2021-01-08 PROCEDURE — G8420 CALC BMI NORM PARAMETERS: HCPCS | Performed by: INTERNAL MEDICINE

## 2021-01-08 PROCEDURE — 1036F TOBACCO NON-USER: CPT | Performed by: INTERNAL MEDICINE

## 2021-01-08 PROCEDURE — G8484 FLU IMMUNIZE NO ADMIN: HCPCS | Performed by: INTERNAL MEDICINE

## 2021-01-08 NOTE — PROGRESS NOTES
[facility]    Nancy Whiting  1960    CC: \" I have some stomach problems\"     HPI:  The patient is 63 y.o. female with a past medical history significant for nonobstructive CAD, hyperlipidemia, hypertension and GERD. She presented to [facility] with chest pain and underwent LHC 18 revealing nonobstructive coronary disease. She never followed up in the office at that time. She was admitted -20 after presenting with chest pain and this was felt to atypical and stress testing did not reveal any ischemia. She presents today for follow up. Today, she reports feeling \"okay\". She states Dr. Mark Dejesus wanted to her to be seen in follow up regarding her hospital admission. She does admit to some GI issues. Patient denies exertional chest pain/pressure, dyspnea at rest, M[place], PND, orthopnea, palpitations, lightheadedness, weight changes, changes in LE edema, and syncope. Review of Systems:  Constitutional: No fatigue, weakness, night sweats or fever. HEENT: No new vision difficulties or ringing in the ears. Respiratory: No new SOB, PND, orthopnea or cough. Cardiovascular: See HPI   GI: No n/v, diarrhea, constipation, abdominal pain or changes in bowel habits. No melena, no hematochezia  : No urinary frequency, urgency, incontinence, hematuria or dysuria. Skin: No cyanosis or skin lesions. Musculoskeletal: No new muscle or joint pain. Neurological: No syncope or TIA-like symptoms.   Psychiatric: No anxiety, insomnia or depression     Past Medical History:   Diagnosis Date    Back pain     CAD (coronary artery disease)     COPD (chronic obstructive pulmonary disease) (HCC)     Foot pain     from bone spurs    GERD (gastroesophageal reflux disease)     Headache     sharp pain going through head    Heart attack ([address]     Hip pain     Hyperlipidemia     Hypertension      (normal spontaneous vaginal delivery)     Osteoporosis     Vision abnormalities      Past Surgical History:   Procedure Laterality Date    CARDIAC CATHETERIZATION      CHOLECYSTECTOMY      COLONOSCOPY      ESOPHAGEAL DILATATION N/A 2019    ESOPHAGEAL DILATION Wilfrido Pica performed by Chong Epstein MD at [address] [place] [address] ENDOSCOPY N/A 2019    EGD BIOPSY performed by Chong Epstein MD at [facility]     Family History   Problem Relation Age of Onset    Heart Failure Mother         heart disease    Hypertension Mother     Emphysema Mother     Heart Failure Father         heart disease    COPD Father     Hypertension Paternal Grandmother      Social History     Tobacco Use    Smoking status: Former Smoker     Packs/day: 1.00     Years: 37.00     Pack years: 37.00     Types: Cigarettes     Quit date: 2017     Years since quittin.0    Smokeless tobacco: Never Used   Substance Use Topics    Alcohol use: No     Alcohol/week: 0.0 standard drinks    Drug use: No       Allergies   Allergen Reactions    Iodine Hives    Other Other (See Comments)     Flu vaccine  Felt like bones were on the outside of her body    Eggs Or Egg-Derived Products [Eggs Or Egg-Derived Products] Hives and Nausea And Vomiting    Eggs [Egg White] Nausea And Vomiting     PT STATES CAN EAT EGG WHITE BUT NOT EGG YOLK    Flexeril [Cyclobenzaprine Hcl] Rash    Levofloxacin Rash    Omeprazole Nausea And Vomiting and Other (See Comments)     Burns my stomach up        Current Outpatient Medications   Medication Sig Dispense Refill    sertraline (ZOLOFT) 100 MG tablet TAKE ONE TABLET BY MOUTH DAILY 60 tablet 1    pregabalin (LYRICA) 75 MG capsule Take 1 capsule by mouth 2 times daily for 125 days.  60 capsule 3    pantoprazole (PROTONIX) 20 MG tablet TAKE TWO TABLETS BY MOUTH DAILY 30 tablet 0    folic acid (FOLVITE) 1 MG tablet Take 1 tablet by mouth daily 30 tablet 1    albuterol sulfate HFA (PROVENTIL HFA) 108 (90 Base) MCG/ACT inhaler Inhale 4-6 puffs into the lungs every 4 hours as needed for Wheezing or Shortness of Breath 1 Inhaler 2    metoprolol succinate (TOPROL XL) 25 MG extended release tablet Take 0.5 tablets by mouth daily 30 tablet 3    Umeclidinium Bromide 62.5 MCG/INH AEPB Inhale 1 puff into the lungs daily 1 each 5    triamcinolone (KENALOG) 0.1 % cream Apply topically 2 times daily. 45 g 0    atorvastatin (LIPITOR) 40 MG tablet Take 1 tablet by mouth nightly 30 tablet 3    aspirin 81 MG chewable tablet CHEW ONE TABLET BY MOUTH DAILY 90 tablet 1    diphenhydrAMINE-APAP, sleep, (TYLENOL PM EXTRA STRENGTH)  MG tablet Take 1 tablet by mouth nightly as needed       mineral oil-hydrophilic petrolatum (AQUAPHOR) ointment Apply topically as needed. 396 g 0    acetaminophen (TYLENOL) 500 MG tablet Take 2 tablets by mouth every 6 hours as needed for Pain Do not take with other medications containing acetaminophen. 30 tablet 0    metFORMIN (GLUCOPHAGE) 500 MG tablet Take 1 tablet by mouth daily (with breakfast) (Patient not taking: Reported on 1/8/2021) 90 tablet 0     No current facility-administered medications for this visit. Physical Exam:   /80   Pulse 73   Temp 97.1 °F (36.2 °C)   Ht 5' 5\" (1.651 m)   Wt 149 lb 12.8 oz (67.9 kg)   SpO2 96%   BMI 24.93 kg/m²   No intake or output data in the 24 hours ending 01/08/21 1033  Wt Readings from Last 2 Encounters:   01/08/21 149 lb 12.8 oz (67.9 kg)   12/11/20 149 lb (67.6 kg)     Constitutional: She is oriented to person, place, and time. She appears well-developed and well-nourished. In no acute distress. Head: Normocephalic and atraumatic. Neck: Neck supple. No JVD present. Carotid bruit is not present. No mass and no thyromegaly present. No lymphadenopathy present. Cardiovascular: Normal rate, regular rhythm, normal heart sounds and intact distal pulses. Exam reveals no gallop and no friction rub. No murmur heard.   Pulmonary/Chest: Effort normal and breath sounds normal. No respiratory distress. She has no wheezes, rhonchi or rales. Abdominal: Soft, non-tender. Bowel sounds and aorta are normal. She exhibits no organomegaly, mass or bruit. Extremities: No edema, cyanosis, or clubbing. Pulses are 2+ radial/carotid/dorsalis pedis and posterior tibial bilaterally. Neurological: She is alert and oriented to person, place, and time. She has normal reflexes. No cranial nerve deficit. Coordination normal.   Skin: Skin is warm and dry. There is no rash or diaphoresis. Psychiatric: She has a normal mood and affect. Her speech is normal and behavior is normal.     EKG Interpretation: Sinus rhythm      Procedures:     [facility] 5/7/18 (Dr. Michelle Route)   LEFT VENTRICULOGRAM:  Reveals a normal left ventricular systolic function  with estimated EF of 50% to 55%.     OVERALL IMPRESSION:  1. Patent right coronary artery with mild disease of the posterior  descending artery branch of the RCA. 2.  Patent left main trunk. 3.  Mild nonobstructive plaque buildup in proximal LAD. 4.  Mild 30% plaque of the obtuse marginal branch of the circumflex artery. 5.  Normal left ventricular systolic function with estimated EF of 50% to  65%. Imaging:     [facility] 5/9/18  Normal left ventricle size, wall thickness and systolic function with an  estimated ejection fraction of 55%. No regional wall motion abnormalities are seen. Diastolic filling parameters suggest normal diastolic filing pressure. Mild mitral regurgitation is present. Stress perfusion 12/1/20  Normal myocardial perfusion study.    Normal LV size and systolic function.        Non-diagnostic EKG response due to failure to reach target heart rate.        Overall findings represent a low risk study.        Lab Review:   Lab Results   Component Value Date    TRIG 124 05/07/2018    HDL 43 10/25/2019    LDLCALC 99 10/25/2019    LABVLDL 36 10/25/2019     Lab Results   Component Value Date     12/01/2020    K 4.4 12/01/2020 BUN 10 12/01/2020    CREATININE 0.5 12/01/2020     No results for input(s): WBC, HGB, HCT, PLT in the last 72 hours. Assessment:  1. CAD of native coronary arteries without angina, nonobstructive  2. Hyperlipidemia with LDL goal <70 mg/dL   3. Essential hypertension     Plan:  I think that Ms. Whiting  is entirely stable from a cardiovascular standpoint. I see no need to make any changes currently in her medical regimen or pursue further testing. She is not endorsing any symptoms representing angina and her blood pressure is well controlled. Her previous heart catheterization revealed only mild disease and her recent stress test did not reveal any ischemia. I can see her at any time for new or worsening symptoms. This note was scribed in the presence of Star Eller MD by [employer], RN. Physician Attestation:  The scribes documentation has been prepared under my direction and personally reviewed by me in its entirety. I, Dr. Latosha Leahy personally performed the services described in this documentation as scribed by my RN in my presence, and I confirm that the note above accurately reflects all work, treatment, procedures, and medical decision making performed by me.

## 2021-01-20 DIAGNOSIS — I10 ESSENTIAL HYPERTENSION: ICD-10-CM

## 2021-01-20 RX ORDER — METOPROLOL SUCCINATE 25 MG/1
TABLET, EXTENDED RELEASE ORAL
Qty: 30 TABLET | Refills: 2 | Status: SHIPPED | OUTPATIENT
Start: 2021-01-20 | End: 2021-08-25

## 2021-01-27 ENCOUNTER — OFFICE VISIT (OUTPATIENT)
Dept: PULMONOLOGY | Age: 61
End: 2021-01-27
Payer: MEDICARE

## 2021-01-27 VITALS
RESPIRATION RATE: 14 BRPM | BODY MASS INDEX: 24.69 KG/M2 | SYSTOLIC BLOOD PRESSURE: 98 MMHG | TEMPERATURE: 97.5 F | DIASTOLIC BLOOD PRESSURE: 64 MMHG | OXYGEN SATURATION: 94 % | HEART RATE: 70 BPM | WEIGHT: 148.2 LBS | HEIGHT: 65 IN

## 2021-01-27 DIAGNOSIS — J44.9 COPD, SEVERE (HCC): ICD-10-CM

## 2021-01-27 PROCEDURE — G8484 FLU IMMUNIZE NO ADMIN: HCPCS | Performed by: INTERNAL MEDICINE

## 2021-01-27 PROCEDURE — 3017F COLORECTAL CA SCREEN DOC REV: CPT | Performed by: INTERNAL MEDICINE

## 2021-01-27 PROCEDURE — G8420 CALC BMI NORM PARAMETERS: HCPCS | Performed by: INTERNAL MEDICINE

## 2021-01-27 PROCEDURE — 3023F SPIROM DOC REV: CPT | Performed by: INTERNAL MEDICINE

## 2021-01-27 PROCEDURE — G8427 DOCREV CUR MEDS BY ELIG CLIN: HCPCS | Performed by: INTERNAL MEDICINE

## 2021-01-27 PROCEDURE — 99214 OFFICE O/P EST MOD 30 MIN: CPT | Performed by: INTERNAL MEDICINE

## 2021-01-27 PROCEDURE — 1036F TOBACCO NON-USER: CPT | Performed by: INTERNAL MEDICINE

## 2021-01-27 PROCEDURE — G8926 SPIRO NO PERF OR DOC: HCPCS | Performed by: INTERNAL MEDICINE

## 2021-01-27 RX ORDER — IPRATROPIUM BROMIDE AND ALBUTEROL SULFATE 2.5; .5 MG/3ML; MG/3ML
1 SOLUTION RESPIRATORY (INHALATION) EVERY 4 HOURS
Qty: 360 ML | Refills: 11 | Status: SHIPPED | OUTPATIENT
Start: 2021-01-27

## 2021-01-27 ASSESSMENT — COPD QUESTIONNAIRES
QUESTION8_ENERGYLEVEL: 4
QUESTION6_LEAVINGHOUSE: 3
QUESTION1_COUGHFREQUENCY: 5
QUESTION5_HOMEACTIVITIES: 3
QUESTION4_WALKINCLINE: 5
CAT_TOTALSCORE: 30
QUESTION7_SLEEPQUALITY: 4

## 2021-01-27 NOTE — ASSESSMENT & PLAN NOTE
Currently with poorly controlled COPD but out of medications. Refill DuoNeb via nebulizer and Incruse. Her sister is currently prescribed Incruse but is not taking it. She is having some cost issues. Advised that she could then get Incruse from her sister as long as is not . Expressed understanding for this.

## 2021-01-27 NOTE — PROGRESS NOTES
REASON FOR CONSULTATION/CC:    Chief Complaint   Patient presents with    COPD     f/u COPD        Consult at request of   Lance Gaitan DO for shortness of breath     PCP: Lance Gaitan DO    HISTORY OF PRESENT ILLNESS: Ap Quezada is a 63y.o. year old female with a history of tobacoco abuse who presents :     History  History of severe COPD with an FEV1 36% with prior treatments of Incruse, Advair, Symbicort. Current history      COPD  Having increased shortness of breath . Running low on nebulizer. Ran out of Incruse. Increased shortness of breath after running out of Incruse.       COPD Assessment Test (CAT)  Cough Assessment: 5  Phlegm Assessment: 3  Chest tightness: 3  Walking on an incline: 5  Home Activities: 3  Confident Leaving The Home: 3  Sleeping Soundly: 4  Have Energy: 4  Assessment Score: 30                 Social History     Tobacco Use   Smoking Status Former Smoker    Packs/day: 1.00    Years: 37.00    Pack years: 37.00    Types: Cigarettes    Quit date: 2017    Years since quittin.0   Smokeless Tobacco Never Used          PAST MEDICAL HISTORY:  Past Medical History:   Diagnosis Date    Back pain     CAD (coronary artery disease)     COPD (chronic obstructive pulmonary disease) (HCC)     Foot pain     from bone spurs    GERD (gastroesophageal reflux disease)     Headache     sharp pain going through head    Heart attack ([address]     Hip pain     Hyperlipidemia     Hypertension      (normal spontaneous vaginal delivery)     Osteoporosis     Vision abnormalities        PAST SURGICAL HISTORY:  Past Surgical History:   Procedure Laterality Date    CARDIAC CATHETERIZATION      CHOLECYSTECTOMY      COLONOSCOPY      ESOPHAGEAL DILATATION N/A 2019    ESOPHAGEAL DILATION TONG performed by Alona Royal MD at [address] [place] [address] N/A 2019    EGD BIOPSY performed by Alona Royal MD at [place] [address] FAMILY HISTORY:  family history includes COPD in her father; Emphysema in her mother; Heart Failure in her father and mother; Hypertension in her mother and paternal grandmother. SOCIAL HISTORY:   reports that she quit smoking about 4 years ago. Her smoking use included cigarettes. She has a 37.00 pack-year smoking history. She has never used smokeless tobacco.      ALLERGIES:  Patient is allergic to iodine; other; eggs or egg-derived products [eggs or egg-derived products]; eggs [egg white]; flexeril [cyclobenzaprine hcl]; levofloxacin; and omeprazole. Objective:   PHYSICAL EXAM:  Blood pressure 98/64, pulse 70, temperature 97.5 °F (36.4 °C), temperature source Infrared, resp. rate 14, height 5' 5\" (1.651 m), weight 148 lb 3.2 oz (67.2 kg), SpO2 94 %, not currently breastfeeding.'    Gen: No distress. Eyes:    ENT:    Neck:    Resp: No accessory muscle use. No crackles. No wheezes. No rhonchi. CV: Regular rate. Regular rhythm. No murmur or rub. No edema. GI:    Skin:    Lymph:    M/S: No cyanosis. No clubbing. Neuro: Moves all four extremities. Psych: Oriented x 3. No anxiety. Awake. Alert. Intact judgement and insight. Current Outpatient Medications   Medication Sig Dispense Refill    Umeclidinium Bromide 62.5 MCG/INH AEPB Inhale 1 puff into the lungs daily 1 each 11    ipratropium-albuterol (DUONEB) 0.5-2.5 (3) MG/3ML SOLN nebulizer solution Inhale 3 mLs into the lungs every 4 hours 360 mL 11    sertraline (ZOLOFT) 100 MG tablet TAKE ONE TABLET BY MOUTH DAILY 60 tablet 1    pregabalin (LYRICA) 75 MG capsule Take 1 capsule by mouth 2 times daily for 125 days.  60 capsule 3    pantoprazole (PROTONIX) 20 MG tablet TAKE TWO TABLETS BY MOUTH DAILY 30 tablet 0    folic acid (FOLVITE) 1 MG tablet Take 1 tablet by mouth daily 30 tablet 1    albuterol sulfate HFA (PROVENTIL HFA) 108 (90 Base) MCG/ACT inhaler Inhale 4-6 puffs into the lungs every 4 hours as needed for Wheezing or Shortness of Breath 1 Inhaler 2    triamcinolone (KENALOG) 0.1 % cream Apply topically 2 times daily. 45 g 0    atorvastatin (LIPITOR) 40 MG tablet Take 1 tablet by mouth nightly 30 tablet 3    aspirin 81 MG chewable tablet CHEW ONE TABLET BY MOUTH DAILY 90 tablet 1    diphenhydrAMINE-APAP, sleep, (TYLENOL PM EXTRA STRENGTH)  MG tablet Take 1 tablet by mouth nightly as needed       mineral oil-hydrophilic petrolatum (AQUAPHOR) ointment Apply topically as needed. 396 g 0    acetaminophen (TYLENOL) 500 MG tablet Take 2 tablets by mouth every 6 hours as needed for Pain Do not take with other medications containing acetaminophen. 30 tablet 0    metoprolol succinate (TOPROL XL) 25 MG extended release tablet TAKE ONE-HALF TABLET BY MOUTH DAILY (Patient not taking: Reported on 1/27/2021) 30 tablet 2    metFORMIN (GLUCOPHAGE) 500 MG tablet Take 1 tablet by mouth daily (with breakfast) (Patient not taking: Reported on 1/27/2021) 90 tablet 0     No current facility-administered medications for this visit. Data Reviewed:       Category 1 Data points:      Last CBC  Lab Results   Component Value Date    WBC 6.7 12/01/2020    RBC 4.23 12/01/2020    HGB 13.2 12/01/2020    MCV 92.4 12/01/2020     12/01/2020     Last Renal  Lab Results   Component Value Date     12/01/2020    K 4.4 12/01/2020     12/01/2020    CO2 29 12/01/2020    CO2 29 11/30/2020    CO2 26 08/26/2020    BUN 10 12/01/2020    CREATININE 0.5 12/01/2020    GLUCOSE 117 12/01/2020    CALCIUM 8.9 12/01/2020         Notes Reviewed:    Radiology Review:  Pertinent images / reports were reviewed as a part of this visit. CT Chest w/ contrast: No results found for this or any previous visit. CT Chest w/o contrast:   Results for orders placed during the hospital encounter of 08/24/20   CT CHEST WO CONTRAST    Addendum ADDENDUM: 3 D images were reviewed.        Shant Roy MD 8/24/2020 10:29 PM          Narrative EXAMINATION:  CT OF THE CHEST WITHOUT CONTRAST; CTA OF THE CHEST, ABDOMEN AND PELVIS WITH  CONTRAST 8/24/2020 8:26 pm prior studies including chest x-ray today, CT  chest May 2018    TECHNIQUE:  CT of the chest was performed without the administration of intravenous  contrast. Multiplanar reformatted images are provided for review. Dose  modulation, iterative reconstruction, and/or weight based adjustment of the  mA/kV was utilized to reduce the radiation dose to as low as reasonably  achievable.; CTA of the chest, abdomen and pelvis was performed after the  administration of intravenous contrast.  Multiplanar reformatted images are  provided for review. MIP images are provided for review. Dose modulation,  iterative reconstruction, and/or weight based adjustment of the mA/kV was  utilized to reduce the radiation dose to as low as reasonably achievable. COMPARISON:  None. HISTORY:  ORDERING SYSTEM PROVIDED HISTORY: per protocol  TECHNOLOGIST PROVIDED HISTORY:  Reason for exam:->per protocol  Reason for Exam: per protocol  Acuity: Acute  Type of Exam: Initial  Relevant Medical/Surgical History: hx hypertension, copd, diabetes    History of obstructive lung disease, gastroesophageal reflux, coronary artery  disease, myocardial infarction, osteoporosis. Hypotension, dizziness,  lightheadedness today. Some abdominal pain as well. Diarrhea for several  days. Chronic back pain headache    FINDINGS:  Chest:    CTA: The thoracic aorta is normal in caliber and course without acute  abnormality. No aortic dissection is identified. Mild-to-moderate  atherosclerotic changes the thoracic aorta and its branches are evident. The  branches of the aortic arch show no significant stenosis. The central pulmonary arteries are normal in caliber and course without acute  abnormality. Mediastinum: The heart size is within normal limits. Trace pericardial  fluid. No mediastinal adenopathy. Lungs/pleura:  The central airways are patent. Mild upper lung predominant  centrilobular emphysema is present. Lingular and right middle lobe scarring  are unchanged. The lungs are otherwise clear. Soft Tissues/Bones: No acute bony abnormality. Abdomen and pelvis:    CTA: The moderate atherosclerotic changes of the abdominal aorta and its  branches are evident. The celiac, renal, and inferior mesenteric arteries  show no significant stenosis. There is moderate approximately 70% luminal  narrowing of the origin of the superior mesenteric artery due to noncalcified  plaque. Mild narrowing of the common in external iliac arteries is evident due to  calcified and noncalcified plaque. There is a focal dissection or ulceration  of the left common iliac artery. Organs: The liver, spleen, pancreas, adrenal glands, and kidneys show no  acute or focal abnormality. The gallbladder is surgically absent. GI/bowel: The bowel is within normal limits in caliber and course. Normal  appendix. There is a moderate amount of stool throughout much of the colon. Pelvis: The uterus and urinary bladder are within normal limits. Bones/soft tissues: No acute bony abnormality. No free fluid or adenopathy. Impression No acute intrathoracic nor abdominopelvic abnormality. No aortic dissection. Moderate narrowing of the proximal superior mesenteric artery due to  noncalcified plaque. Focal ulceration or dissection of the left common iliac artery. CTPA: No results found for this or any previous visit. CXR PA/LAT:   Results for orders placed during the hospital encounter of 07/15/20   XR CHEST STANDARD (2 VW)    Narrative EXAMINATION:  TWO XRAY VIEWS OF THE CHEST    7/15/2020 9:58 pm    COMPARISON:  06/19/2020.     HISTORY:  ORDERING SYSTEM PROVIDED HISTORY: Shortness of breath  TECHNOLOGIST PROVIDED HISTORY:  Reason for exam:->Shortness of breath  Reason for Exam: sob, copd    FINDINGS:  The cardiac silhouette and mediastinal contours are normal.  Atelectasis is  noted in the left lung base. There is eventration of the hemidiaphragms. No  focal lung infiltrate. No pleural effusion. Degenerative changes are noted  in the spine. Impression 1. Increased atelectasis in the left lung base. CXR portable:   Results for orders placed during the hospital encounter of 20   XR CHEST PORTABLE    Narrative EXAMINATION:  ONE XRAY VIEW OF THE CHEST    2020 8:10 pm    COMPARISON:  07/15/2020    HISTORY:  ORDERING SYSTEM PROVIDED HISTORY: hypotension  TECHNOLOGIST PROVIDED HISTORY:  Reason for exam:->hypotension  Reason for Exam: hypotension  Acuity: Acute  Type of Exam: Initial    FINDINGS:  The cardiomediastinal silhouette is unremarkable. The lungs are clear. No  infiltrate, pleural fluid or evidence of overt failure. Impression No acute cardiopulmonary disease. Total labs reviewed 3+    Category 2 Data points:    Radiology Review:  Independent interpretation of emphysema noted. No signs of malignancy. Category 3 Data points:    Discussed management or interpretation of test with external provider: *0    Assessment:     ·  Severe COPD FEV1 36%  · History tobacco abuse  · Lung cancer screening 2020      Plan:      Problem List Items Addressed This Visit     COPD, severe ([address]     Currently with poorly controlled COPD but out of medications. Refill DuoNeb via nebulizer and Incruse. Her sister is currently prescribed Incruse but is not taking it. She is having some cost issues. Advised that she could then get Incruse from her sister as long as is not . Expressed understanding for this. Relevant Medications    Umeclidinium Bromide 62.5 MCG/INH AEPB    ipratropium-albuterol (DUONEB) 0.5-2.5 (3) MG/3ML SOLN nebulizer solution              This note was transcribed using [address]27 "[employer]. Please disregard any translational errors.     [address]

## 2021-02-11 ENCOUNTER — HOSPITAL ENCOUNTER (EMERGENCY)
Age: 61
Discharge: HOME | End: 2021-02-11
Attending: STUDENT IN AN ORGANIZED HEALTH CARE EDUCATION/TRAINING PROGRAM
Payer: MEDICARE

## 2021-02-11 ENCOUNTER — APPOINTMENT (OUTPATIENT)
Dept: GENERAL RADIOLOGY | Age: 61
End: 2021-02-11
Payer: MEDICARE

## 2021-02-11 VITALS
SYSTOLIC BLOOD PRESSURE: 123 MMHG | RESPIRATION RATE: 18 BRPM | HEART RATE: 69 BPM | DIASTOLIC BLOOD PRESSURE: 100 MMHG | TEMPERATURE: 97.9 F

## 2021-02-11 DIAGNOSIS — S92.355A CLOSED NONDISPLACED FRACTURE OF FIFTH METATARSAL BONE OF LEFT FOOT, INITIAL ENCOUNTER: Primary | ICD-10-CM

## 2021-02-11 PROCEDURE — 73630 X-RAY EXAM OF FOOT: CPT

## 2021-02-11 PROCEDURE — 99283 EMERGENCY DEPT VISIT LOW MDM: CPT

## 2021-02-11 ASSESSMENT — PAIN SCALES - GENERAL: PAINLEVEL_OUTOF10: 8

## 2021-02-11 ASSESSMENT — PAIN - FUNCTIONAL ASSESSMENT: PAIN_FUNCTIONAL_ASSESSMENT: 0-10

## 2021-02-11 ASSESSMENT — PAIN DESCRIPTION - DESCRIPTORS: DESCRIPTORS: ACHING

## 2021-02-11 ASSESSMENT — PAIN DESCRIPTION - ORIENTATION: ORIENTATION: LEFT

## 2021-02-11 ASSESSMENT — PAIN DESCRIPTION - LOCATION: LOCATION: FOOT

## 2021-02-11 NOTE — ED PROVIDER NOTES
Primary Care Physician: Letha Allen DO   Attending Physician: No att. providers found     History   Chief Complaint   Patient presents with    Foot Pain     left foot pain after slipping and falling on ice yesterday. ARELY Ball is a 63 y.o. female w/ h/o COPD, HTN, HLD, and CAD, who presents this afternoon with c/o left foot pain after a fall 2/10/21. Patient stated that yesterday she was walking down her steps to get into her house and slipped on ice and her hurt left foot. She describes the pain and throbbing and shooting. She took Tylenol for the pain yesterday as well as put an ice pack on her foot, resulting in little relief. Patient stated that the pain has been getting worse so she decided to come into the emergency department today. Patient denies hitting her head when she fell and denies any LOC. She stated that she fell and braced herself with her left hand and her hurt her left foot. Her sister drove her to the emergency department and she was able to walk in.      Past Medical History:   Diagnosis Date    Back pain     CAD (coronary artery disease)     COPD (chronic obstructive pulmonary disease) (HCC)     Foot pain     from bone spurs    GERD (gastroesophageal reflux disease)     Headache     sharp pain going through head    Heart attack ([address]     Hip pain     Hyperlipidemia     Hypertension      (normal spontaneous vaginal delivery)     Osteoporosis     Vision abnormalities         Past Surgical History:   Procedure Laterality Date    CARDIAC CATHETERIZATION      CHOLECYSTECTOMY      COLONOSCOPY      ESOPHAGEAL DILATATION N/A 2019    ESOPHAGEAL DILATION TONG performed by Jordon Simon MD at [address] [place] [address] N/A 2019    EGD BIOPSY performed by Jordon Simon MD at [facility] ENDOSCOPY        Family History   Problem Relation Age of Onset    Heart Failure Mother         heart disease    Hypertension Mother  Emphysema Mother     Heart Failure Father         heart disease    COPD Father     Hypertension Paternal Grandmother         Social History     Socioeconomic History    Marital status: Legally      Spouse name: None    Number of children: None    Years of education: None    Highest education level: None   Occupational History    None   Social Needs    Financial resource strain: None    Food insecurity     Worry: None     Inability: None    Transportation needs     Medical: None     Non-medical: None   Tobacco Use    Smoking status: Former Smoker     Packs/day: 1.00     Years: 37.00     Pack years: 37.00     Types: Cigarettes     Quit date: 2017     Years since quittin.1    Smokeless tobacco: Never Used   Substance and Sexual Activity    Alcohol use: No     Alcohol/week: 0.0 standard drinks    Drug use: No    Sexual activity: Not Currently   Lifestyle    Physical activity     Days per week: None     Minutes per session: None    Stress: None   Relationships    Social connections     Talks on phone: None     Gets together: None     Attends Mandaen service: None     Active member of club or organization: None     Attends meetings of clubs or organizations: None     Relationship status: None    Intimate partner violence     Fear of current or ex partner: None     Emotionally abused: None     Physically abused: None     Forced sexual activity: None   Other Topics Concern    None   Social History Narrative    None        Review of Systems   10 total systems reviewed and found to be negative unless otherwise noted in HPI     Physical Exam   BP (!) 123/100   Pulse 69   Temp 97.9 °F (36.6 °C) (Oral)   Resp 18      CONSTITUTIONAL: Well appearing, in no acute distress   HEAD: atraumatic, normocephalic   EYES: PERRL, No injection, discharge or scleral icterus. ENT: Moist mucous membranes. NECK: Normal ROM, NO LAD   CARDIOVASCULAR: Regular rate and rhythm. No murmurs or gallop. PULMONARY/CHEST: Airway patent. No retractions. Breath sounds clear with good air entry bilaterally. ABDOMEN: Soft, Non-distended and non-tender, without guarding or rebound. SKIN: Acyanotic, warm, dry   MUSCULOSKELETAL: Minimal edema in left foot, small patch of erythema near her left outermost toe. Able to wingle all toes. Moderate dorsal flexion and plantar flexion. No obvious deformities. NEUROLOGICAL: Awake and oriented x 4. Pulses intact. Grossly nonfocal   Nursing note and vitals reviewed. ED Course & Medical Decision Making   Medications - No data to display   Labs Reviewed - No data to display   XR FOOT LEFT (MIN 3 VIEWS)   Final Result   Nondisplaced distal 5th metatarsal fracture            No results found. EKG INTERPRETATION:  Not indicated at this time. PROCEDURES:   Procedures    ASSESSMENT AND PLAN:  Cosme Mancuso is a 63 y.o. female w/ h/ as above who presents with left foot pain. On exam, afebrile and hemodynamically stable. No lab work was indicated at this but x-rays of left foot were obtained and showed a nondisplaced distal 5th metatarsal fracture. A post-op boot was order and placed on the patient. Patient should call Dr. Gerhardt Angst office in two and make an appointment with him for follow-up. Patient was instructed to take Tylenol for pain, elevate the extremity, and use ice for supportive care and pain relief. ClINICAL IMPRESSION:  1.  Closed nondisplaced fracture of fifth metatarsal bone of left foot, initial encounter          PATIENT REFERRED TO:  Ulises Medina MD  [facility] [address]    Schedule an appointment as soon as possible for a visit in 2 days        DISCHARGE MEDICATIONS:  Discharge Medication List as of 2/11/2021  1:55 PM        DISCONTINUED MEDICATIONS:  Discharge Medication List as of 2/11/2021  1:55 PM        DISPOSITION Decision To Discharge 02/11/2021 01:49:04 PM  -We have instructed the patient, (Greta Whiting) to return to the ED or call PCP if pain/symptoms worsen. -Findings and recommendations explained to patient. She expressed understanding and agreed with the plan. Greg Bowers MD (electronically signed)  2/12/2021  _________________________________________________________________________________________  _________________________________________________________________________________________  This record is transcribed utilizing voice recognition technology. There are inherent limitations in this technology. In addition, there may be limitations in editing of this report. If there are any discrepancies, please contact me directly.       Greg Bowers MD  02/12/21 1200

## 2021-02-12 ENCOUNTER — CARE COORDINATION (OUTPATIENT)
Dept: CARE COORDINATION | Age: 61
End: 2021-02-12

## 2021-02-12 ENCOUNTER — OFFICE VISIT (OUTPATIENT)
Dept: ORTHOPEDIC SURGERY | Age: 61
End: 2021-02-12
Payer: MEDICARE

## 2021-02-12 VITALS
WEIGHT: 148 LBS | HEART RATE: 77 BPM | BODY MASS INDEX: 24.66 KG/M2 | DIASTOLIC BLOOD PRESSURE: 50 MMHG | HEIGHT: 65 IN | TEMPERATURE: 98.1 F | SYSTOLIC BLOOD PRESSURE: 110 MMHG

## 2021-02-12 DIAGNOSIS — S92.352A CLOSED DISPLACED FRACTURE OF FIFTH METATARSAL BONE OF LEFT FOOT, INITIAL ENCOUNTER: Primary | ICD-10-CM

## 2021-02-12 PROCEDURE — 3017F COLORECTAL CA SCREEN DOC REV: CPT | Performed by: ORTHOPAEDIC SURGERY

## 2021-02-12 PROCEDURE — G8427 DOCREV CUR MEDS BY ELIG CLIN: HCPCS | Performed by: ORTHOPAEDIC SURGERY

## 2021-02-12 PROCEDURE — 28470 CLTX METATARSAL FX WO MNP EA: CPT | Performed by: ORTHOPAEDIC SURGERY

## 2021-02-12 PROCEDURE — G8420 CALC BMI NORM PARAMETERS: HCPCS | Performed by: ORTHOPAEDIC SURGERY

## 2021-02-12 PROCEDURE — G8484 FLU IMMUNIZE NO ADMIN: HCPCS | Performed by: ORTHOPAEDIC SURGERY

## 2021-02-12 PROCEDURE — 1036F TOBACCO NON-USER: CPT | Performed by: ORTHOPAEDIC SURGERY

## 2021-02-12 PROCEDURE — 99203 OFFICE O/P NEW LOW 30 MIN: CPT | Performed by: ORTHOPAEDIC SURGERY

## 2021-02-12 NOTE — CARE COORDINATION
Ambulatory Care Coordination Note  2/12/2021  CM Risk Score: 14  Charlson 10 Year Mortality Risk Score: 98%     ACC: Serene Jaimes RN    Summary Note:   Acm out reach call placed to pt follow ing ed visit . Pt states she feel down her stairs after taking dogs out yesterday. fracturing her left 5th toe, states she has already seen the ortho Dr Daniel Comment and was given a walking boot and instructions for sup[ortive care. Reviewed dc instructions of icing, elevating and tylenol for discomfort  Also reviewed covid safety precautions with vu. Discussed care coordination and future call with pt agreeing. Medication reconciliation completed, pt states she needs refills on her \"stomach and cholesterol medication. Will send provider a message. Fall precaution education started this date. plan:  Plan:  Complete MURIEL  Introduce zone tool  disease and medication management education  Assess for additional resources  Ambulatory Care Coordination Assessment    Care Coordination Protocol  Program Enrollment: Rising Risk  Referral from Primary Care Provider: No  Week 1 - Initial Assessment                             Suggested Interventions and Community Resources  Diabetes Education: Not Started   Fall Risk Prevention: Not Started Zone Management Tools: Not Started                  Prior to Admission medications    Medication Sig Start Date End Date Taking?  Authorizing Provider   Umeclidinium Bromide 62.5 MCG/INH AEPB Inhale 1 puff into the lungs daily 1/27/21  Yes Kaitlin Husain MD   ipratropium-albuterol (DUONEB) 0.5-2.5 (3) MG/3ML SOLN nebulizer solution Inhale 3 mLs into the lungs every 4 hours 1/27/21  Yes Kaitlin Husain MD   metoprolol succinate (TOPROL XL) 25 MG extended release tablet TAKE ONE-HALF TABLET BY MOUTH DAILY 1/20/21  Yes Kevin Zapata DO   sertraline (ZOLOFT) 100 MG tablet TAKE ONE TABLET BY MOUTH DAILY 10/5/20  Yes Kevin Zapata DO   pantoprazole (PROTONIX) 20 MG tablet TAKE TWO TABLETS BY MOUTH DAILY 8/17/20  Yes Marcelino Child DO   folic acid (FOLVITE) 1 MG tablet Take 1 tablet by mouth daily 8/5/20  Yes Kevin Zapata DO   albuterol sulfate HFA (PROVENTIL HFA) 108 (90 Base) MCG/ACT inhaler Inhale 4-6 puffs into the lungs every 4 hours as needed for Wheezing or Shortness of Breath 6/23/20  Yes Fan Choi MD   metFORMIN (GLUCOPHAGE) 500 MG tablet Take 1 tablet by mouth daily (with breakfast) 11/6/19  Yes JOSE ANGEL Farmer CNP   triamcinolone (KENALOG) 0.1 % cream Apply topically 2 times daily. 10/9/19  Yes JOSE ANGEL Farmer CNP   atorvastatin (LIPITOR) 40 MG tablet Take 1 tablet by mouth nightly 8/28/19  Yes JOSE ANGEL Farmer CNP   aspirin 81 MG chewable tablet CHEW ONE TABLET BY MOUTH DAILY 6/7/19  Yes JOSE ANGEL Farmer CNP   diphenhydrAMINE-APAP, sleep, (TYLENOL PM EXTRA STRENGTH)  MG tablet Take 1 tablet by mouth nightly as needed    Yes Historical Provider, MD   mineral oil-hydrophilic petrolatum (AQUAPHOR) ointment Apply topically as needed. 2/6/19  Yes JOSE ANGEL Farmer CNP   acetaminophen (TYLENOL) 500 MG tablet Take 2 tablets by mouth every 6 hours as needed for Pain Do not take with other medications containing acetaminophen. 9/2/18  Yes CARIDAD Jones   pregabalin (LYRICA) 75 MG capsule Take 1 capsule by mouth 2 times daily for 125 days.  9/23/20 1/27/21  Marcelino Child DO       Future Appointments   Date Time Provider Lashell Prabhakar   3/12/2021  9:30 AM SCHEDULE, VASCULAR LAB 1 [facility]   3/12/2021 10:15 AM Krysten Kelley MD [facility]   3/26/2021 10:15 AM Oralia Latham MD [facility]   8/9/2021  8:20 AM Fan Choi  [facility]     , ACC: Serene Jaimes RN  CM Risk Score: 14  Charlson 10 Year Mortality Risk Score: 98%     Care Coordination Interventions    Program Enrollment: Rising Risk  Referral from Primary Care Provider: No  Suggested Interventions and Community Resources  Diabetes Education: Not Started  Fall Risk Prevention: Not Started  Zone Management Tools: Not Started        and   General Assessment    Do you have any symptoms that are causing concern?: No

## 2021-02-14 PROBLEM — S92.352A CLOSED DISPLACED FRACTURE OF FIFTH METATARSAL BONE OF LEFT FOOT: Status: ACTIVE | Noted: 2021-02-14

## 2021-02-14 NOTE — PROGRESS NOTES
CHIEF COMPLAINT: Left foot pain/ 5th MT neck minimally displaced fracture. 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 Jose Pollard 63 y.o.  female presents today for the first visit for evaluation of a left foot injury which occurred when she fell. She is complaining of lateral foot pain and swelling. Rates pain a 5/10 VAS. This is better with elevation and worse with bearing any wt. The pain is sharp and not radiating. No other complaint. She was seen 1st at [facility], where she was x-rayed and splinted and asked to f/u with orthopaedics.     Past Medical History:   Diagnosis Date    Back pain     CAD (coronary artery disease)     COPD (chronic obstructive pulmonary disease) ([facility])     Foot pain     from bone spurs    GERD (gastroesophageal reflux disease)     Headache     sharp pain going through head    Heart attack ([address]     Hip pain     Hyperlipidemia     Hypertension      (normal spontaneous vaginal delivery)     Osteoporosis     Vision abnormalities        Past Surgical History:   Procedure Laterality Date    CARDIAC CATHETERIZATION      CHOLECYSTECTOMY      COLONOSCOPY      ESOPHAGEAL DILATATION N/A 2019    ESOPHAGEAL DILATION TONG performed by Tra Spears MD at [address] N/A 2019    EGD BIOPSY performed by Tra Spears MD at [facility] History     Socioeconomic History    Marital status: Legally      Spouse name: Not on file    Number of children: Not on file    Years of education: Not on file    Highest education level: Not on file   Occupational History    Not on file   Social Needs    Financial resource strain: Not on file    Food insecurity     Worry: Not on file     Inability: Not on file    Transportation needs     Medical: Not on file     Non-medical: Not on file   Tobacco Use    Smoking status: Former Smoker     Packs/day: 1.00     Years: 37.00     Pack years: 37.00     Types: Cigarettes     Quit date: 2017     Years since quittin.1    Smokeless tobacco: Never Used   Substance and Sexual Activity    Alcohol use: No     Alcohol/week: 0.0 standard drinks    Drug use: No    Sexual activity: Not Currently   Lifestyle    Physical activity     Days per week: Not on file     Minutes per session: Not on file    Stress: Not on file   Relationships    Social connections     Talks on phone: Not on file     Gets together: Not on file     Attends Rastafari service: Not on file     Active member of club or organization: Not on file     Attends meetings of clubs or organizations: Not on file     Relationship status: Not on file    Intimate partner violence     Fear of current or ex partner: Not on file     Emotionally abused: Not on file     Physically abused: Not on file     Forced sexual activity: Not on file   Other Topics Concern    Not on file   Social History Narrative    Not on file       Family History   Problem Relation Age of Onset    Heart Failure Mother         heart disease    Hypertension Mother     Emphysema Mother     Heart Failure Father         heart disease    COPD Father     Hypertension Paternal Grandmother        Current Outpatient Medications on File Prior to Visit   Medication Sig Dispense Refill    Umeclidinium Bromide 62.5 MCG/INH AEPB Inhale 1 puff into the lungs daily 1 each 11    ipratropium-albuterol (DUONEB) 0.5-2.5 (3) MG/3ML SOLN nebulizer solution Inhale 3 mLs into the lungs every 4 hours 360 mL 11    metoprolol succinate (TOPROL XL) 25 MG extended release tablet TAKE ONE-HALF TABLET BY MOUTH DAILY 30 tablet 2    sertraline (ZOLOFT) 100 MG tablet TAKE ONE TABLET BY MOUTH DAILY 60 tablet 1    pregabalin (LYRICA) 75 MG capsule Take 1 capsule by mouth 2 times daily for 125 days.  60 capsule 3    pantoprazole (PROTONIX) 20 MG tablet TAKE TWO TABLETS BY MOUTH DAILY 30 tablet 0    folic acid (FOLVITE) 1 MG tablet Take 1 tablet by mouth daily 30 tablet 1    albuterol sulfate HFA (PROVENTIL HFA) 108 (90 Base) MCG/ACT inhaler Inhale 4-6 puffs into the lungs every 4 hours as needed for Wheezing or Shortness of Breath 1 Inhaler 2    metFORMIN (GLUCOPHAGE) 500 MG tablet Take 1 tablet by mouth daily (with breakfast) 90 tablet 0    triamcinolone (KENALOG) 0.1 % cream Apply topically 2 times daily. 45 g 0    atorvastatin (LIPITOR) 40 MG tablet Take 1 tablet by mouth nightly 30 tablet 3    aspirin 81 MG chewable tablet CHEW ONE TABLET BY MOUTH DAILY 90 tablet 1    diphenhydrAMINE-APAP, sleep, (TYLENOL PM EXTRA STRENGTH)  MG tablet Take 1 tablet by mouth nightly as needed       mineral oil-hydrophilic petrolatum (AQUAPHOR) ointment Apply topically as needed. 396 g 0    acetaminophen (TYLENOL) 500 MG tablet Take 2 tablets by mouth every 6 hours as needed for Pain Do not take with other medications containing acetaminophen. 30 tablet 0     No current facility-administered medications on file prior to visit. Pertinent items are noted in HPI  Review of systems reviewed from Patient History Form dated on 2/12/2021 and available in the patient's chart under the Media tab. PHYSICAL EXAMINATION:  Ms. Arpita Calderon is a very pleasant 63 y.o.  female who presents today in no acute distress, awake, alert, and oriented. She is well dressed, nourished and  groomed. Patient with normal affect. Height is  5' 5\" (1.651 m), weight is 148 lb (67.1 kg), Body mass index is 24.63 kg/m². Resting respiratory rate is 16. Examination of the gait, showed that the patient walks with a limp, WB left leg in postop shoe. Examination of both ankles showing a decreased range of motion of the left ankle compare to the other side because of foot pain. There is moderate swelling that can be seen, as well as ecchymosis over lateral side of the left foot. She  has intact sensation and good pedal pulses.   She has significant tenderness on deep palpation over the 5th MT neck left foot        IMAGING: Xray's were reviewed, dated 2/11/2021 ,  3 views of the left foot, and showed a minimally displaced 5th MT neck fracture. IMPRESSION: Left 5th MT neck minimally displaced fracture. PLAN: I discussed that the overall alignment of this fracture is good and that we can try to treat this non-operatively in a postop shoewith WBAT. We discussed the risk of nonunion and  malunion. We will see her  back in 6 weeks at which time we will get a new xray of the left foot.          Newton Hernandez MD

## 2021-03-01 ENCOUNTER — CARE COORDINATION (OUTPATIENT)
Dept: CARE COORDINATION | Age: 61
End: 2021-03-01

## 2021-03-01 DIAGNOSIS — F41.9 ANXIETY: ICD-10-CM

## 2021-03-01 RX ORDER — SERTRALINE HYDROCHLORIDE 100 MG/1
TABLET, FILM COATED ORAL
Qty: 60 TABLET | Refills: 0 | Status: SHIPPED | OUTPATIENT
Start: 2021-03-01 | End: 2021-05-20

## 2021-03-12 ENCOUNTER — PROCEDURE VISIT (OUTPATIENT)
Dept: SURGERY | Age: 61
End: 2021-03-12
Payer: MEDICARE

## 2021-03-12 ENCOUNTER — OFFICE VISIT (OUTPATIENT)
Dept: SURGERY | Age: 61
End: 2021-03-12
Payer: MEDICARE

## 2021-03-12 VITALS
HEIGHT: 65 IN | DIASTOLIC BLOOD PRESSURE: 70 MMHG | BODY MASS INDEX: 24.16 KG/M2 | SYSTOLIC BLOOD PRESSURE: 126 MMHG | WEIGHT: 145 LBS

## 2021-03-12 DIAGNOSIS — E78.00 PURE HYPERCHOLESTEROLEMIA: ICD-10-CM

## 2021-03-12 DIAGNOSIS — K55.1 ATHEROSCLEROSIS OF SUPERIOR MESENTERIC ARTERY (HCC): Primary | ICD-10-CM

## 2021-03-12 DIAGNOSIS — I10 ESSENTIAL HYPERTENSION: ICD-10-CM

## 2021-03-12 DIAGNOSIS — E11.9 TYPE 2 DIABETES MELLITUS WITHOUT COMPLICATION, WITHOUT LONG-TERM CURRENT USE OF INSULIN (HCC): ICD-10-CM

## 2021-03-12 DIAGNOSIS — Z87.891 FORMER SMOKER: ICD-10-CM

## 2021-03-12 DIAGNOSIS — K22.2 ESOPHAGEAL STRICTURE: ICD-10-CM

## 2021-03-12 DIAGNOSIS — I73.9 PERIPHERAL VASCULAR DISEASE (HCC): ICD-10-CM

## 2021-03-12 PROCEDURE — G8427 DOCREV CUR MEDS BY ELIG CLIN: HCPCS | Performed by: SURGERY

## 2021-03-12 PROCEDURE — 3046F HEMOGLOBIN A1C LEVEL >9.0%: CPT | Performed by: SURGERY

## 2021-03-12 PROCEDURE — 93978 VASCULAR STUDY: CPT | Performed by: STUDENT IN AN ORGANIZED HEALTH CARE EDUCATION/TRAINING PROGRAM

## 2021-03-12 PROCEDURE — 1036F TOBACCO NON-USER: CPT | Performed by: SURGERY

## 2021-03-12 PROCEDURE — G8420 CALC BMI NORM PARAMETERS: HCPCS | Performed by: SURGERY

## 2021-03-12 PROCEDURE — 3017F COLORECTAL CA SCREEN DOC REV: CPT | Performed by: SURGERY

## 2021-03-12 PROCEDURE — 99214 OFFICE O/P EST MOD 30 MIN: CPT | Performed by: SURGERY

## 2021-03-12 PROCEDURE — 2022F DILAT RTA XM EVC RTNOPTHY: CPT | Performed by: SURGERY

## 2021-03-12 PROCEDURE — G8484 FLU IMMUNIZE NO ADMIN: HCPCS | Performed by: SURGERY

## 2021-03-12 ASSESSMENT — ENCOUNTER SYMPTOMS
ALLERGIC/IMMUNOLOGIC NEGATIVE: 1
VOMITING: 0
ABDOMINAL DISTENTION: 0
EYE ITCHING: 0
ANAL BLEEDING: 0
DIARRHEA: 0
EYE PAIN: 0
NAUSEA: 0
RESPIRATORY NEGATIVE: 1
EYE DISCHARGE: 0
CONSTIPATION: 0
PHOTOPHOBIA: 0
BACK PAIN: 1
EYE REDNESS: 0
BLOOD IN STOOL: 0
RECTAL PAIN: 0
ABDOMINAL PAIN: 1

## 2021-03-12 NOTE — PROGRESS NOTES
Daily Progress Note   Shmuel Johnson MD      3/12/2021    Chief Complaint   Patient presents with    Follow-up     6m for aorta iliac scan & ov. pt states she is having problems swallowing. HISTORY OF PRESENT ILLNESS:                The patient is a 63 y.o. female who presents with a six month follow up for an aorta iliac scan and office visit for results. Mrs. Whiting is here today wearing an orthopedic boot on her left foot as she broke her little toe. She says she was carrying her dog and slipped on some ice on her description it sounds like she broke the fifth metatarsal.     She is complaining today also about pain when swallowing. She says she chokes on food, and is trying to keep cutting it in small pieces. Her scan results today show her mesenteric is at about 50%. She also has some right sided iliac narrowing, approximately 50%.        Past Medical History:   Diagnosis Date    Back pain     CAD (coronary artery disease)     COPD (chronic obstructive pulmonary disease) ([facility])     Foot pain     from bone spurs    GERD (gastroesophageal reflux disease)     Headache     sharp pain going through head    Heart attack ([address]     Hip pain     Hyperlipidemia     Hypertension      (normal spontaneous vaginal delivery)     Osteoporosis     Vision abnormalities        Past Surgical History:   Procedure Laterality Date    CARDIAC CATHETERIZATION      CHOLECYSTECTOMY      COLONOSCOPY      ESOPHAGEAL DILATATION N/A 2019    ESOPHAGEAL DILATION TONG performed by Lubna Hanley MD at [address] [facility] N/A 2019    EGD BIOPSY performed by Lubna Hanley MD at [id number] [facility] Marital status: Legally      Spouse name: Not on file    Number of children: Not on file    Years of education: Not on file    Highest education level: Not on file   Occupational History    Not on file Social Needs    Financial resource strain: Not on file    Food insecurity     Worry: Not on file     Inability: Not on file    Transportation needs     Medical: Not on file     Non-medical: Not on file   Tobacco Use    Smoking status: Former Smoker     Packs/day: 1.00     Years: 37.00     Pack years: 37.00     Types: Cigarettes     Quit date: 2017     Years since quittin.1    Smokeless tobacco: Never Used   Substance and Sexual Activity    Alcohol use: No     Alcohol/week: 0.0 standard drinks    Drug use: No    Sexual activity: Not Currently   Lifestyle    Physical activity     Days per week: Not on file     Minutes per session: Not on file    Stress: Not on file   Relationships    Social connections     Talks on phone: Not on file     Gets together: Not on file     Attends Jehovah's witness service: Not on file     Active member of club or organization: Not on file     Attends meetings of clubs or organizations: Not on file     Relationship status: Not on file    Intimate partner violence     Fear of current or ex partner: Not on file     Emotionally abused: Not on file     Physically abused: Not on file     Forced sexual activity: Not on file   Other Topics Concern    Not on file   Social History Narrative    Not on file       Family History   Problem Relation Age of Onset    Heart Failure Mother         heart disease    Hypertension Mother     Emphysema Mother     Heart Failure Father         heart disease    COPD Father     Hypertension Paternal Grandmother          Current Outpatient Medications:     sertraline (ZOLOFT) 100 MG tablet, TAKE ONE TABLET BY MOUTH DAILY, Disp: 60 tablet, Rfl: 0    Umeclidinium Bromide 62.5 MCG/INH AEPB, Inhale 1 puff into the lungs daily, Disp: 1 each, Rfl: 11    ipratropium-albuterol (DUONEB) 0.5-2.5 (3) MG/3ML SOLN nebulizer solution, Inhale 3 mLs into the lungs every 4 hours, Disp: 360 mL, Rfl: 11    metoprolol succinate (TOPROL XL) 25 MG extended release tablet, TAKE ONE-HALF TABLET BY MOUTH DAILY, Disp: 30 tablet, Rfl: 2    pregabalin (LYRICA) 75 MG capsule, Take 1 capsule by mouth 2 times daily for 125 days. , Disp: 60 capsule, Rfl: 3    pantoprazole (PROTONIX) 20 MG tablet, TAKE TWO TABLETS BY MOUTH DAILY, Disp: 30 tablet, Rfl: 0    folic acid (FOLVITE) 1 MG tablet, Take 1 tablet by mouth daily, Disp: 30 tablet, Rfl: 1    albuterol sulfate HFA (PROVENTIL HFA) 108 (90 Base) MCG/ACT inhaler, Inhale 4-6 puffs into the lungs every 4 hours as needed for Wheezing or Shortness of Breath, Disp: 1 Inhaler, Rfl: 2    metFORMIN (GLUCOPHAGE) 500 MG tablet, Take 1 tablet by mouth daily (with breakfast), Disp: 90 tablet, Rfl: 0    triamcinolone (KENALOG) 0.1 % cream, Apply topically 2 times daily. , Disp: 45 g, Rfl: 0    atorvastatin (LIPITOR) 40 MG tablet, Take 1 tablet by mouth nightly, Disp: 30 tablet, Rfl: 3    aspirin 81 MG chewable tablet, CHEW ONE TABLET BY MOUTH DAILY, Disp: 90 tablet, Rfl: 1    diphenhydrAMINE-APAP, sleep, (TYLENOL PM EXTRA STRENGTH)  MG tablet, Take 1 tablet by mouth nightly as needed , Disp: , Rfl:     mineral oil-hydrophilic petrolatum (AQUAPHOR) ointment, Apply topically as needed. , Disp: 396 g, Rfl: 0    acetaminophen (TYLENOL) 500 MG tablet, Take 2 tablets by mouth every 6 hours as needed for Pain Do not take with other medications containing acetaminophen., Disp: 30 tablet, Rfl: 0    Iodine, Other, Eggs or egg-derived products [eggs or egg-derived products], Eggs [egg white], Flexeril [cyclobenzaprine hcl], Levofloxacin, and Omeprazole    Vitals:    03/12/21 1007   BP: 126/70   Site: Left Upper Arm   Weight: 145 lb (65.8 kg)   Height: 5' 5\" (1.651 m)       Admission on 11/30/2020, Discharged on 12/01/2020   Component Date Value Ref Range Status    Ventricular Rate 11/30/2020 85  BPM Final    Atrial Rate 11/30/2020 69  BPM Final    P-R Interval 11/30/2020 144  ms Final    QRS Duration 11/30/2020 50  ms Final    Q-T 11/30/2020 >60  >60 Final    Comment: >60 mL/min/1.73m2 EGFR, calc. for ages 25 and older using the  MDRD formula (not corrected for weight), is valid for stable  renal function.  GFR  11/30/2020 >60  >60 Final    Comment: Chronic Kidney Disease: less than 60 ml/min/1.73 sq.m. Kidney Failure: less than 15 ml/min/1.73 sq.m. Results valid for patients 18 years and older.  Calcium 11/30/2020 9.1  8.3 - 10.6 mg/dL Final    Total Protein 11/30/2020 7.3  6.4 - 8.2 g/dL Final    Albumin 11/30/2020 4.5  3.4 - 5.0 g/dL Final    Alkaline Phosphatase 11/30/2020 120  40 - 129 U/L Final    ALT 11/30/2020 14  10 - 40 U/L Final    AST 11/30/2020 17  15 - 37 U/L Final    Total Bilirubin 11/30/2020 0.5  0.0 - 1.0 mg/dL Final    Bilirubin, Direct 11/30/2020 <0.2  0.0 - 0.3 mg/dL Final    Bilirubin, Indirect 11/30/2020 see below  0.0 - 1.0 mg/dL Final    Comment: Indirect Bilirubin cannot be calculated since Total Bilirubin  and/or Direct Bilirubin is below measurable range.  Lipase 11/30/2020 16.0  13.0 - 60.0 U/L Final    Troponin 11/30/2020 0.02* <0.01 ng/mL Final    Methodology by Troponin T    aPTT 11/30/2020 29.2  24.2 - 36.2 sec Final    Comment: Therapeutic range: 49.0 - 76.0 sec    Effective 11-19-19 9:00am EST  Please note reference ranges have  changed for PTT Testing.  aPTT 11/30/2020 79.3* 24.2 - 36.2 sec Final    Comment: REVIEWED BY TECHNOLOGIST. Therapeutic range: 49.0 - 76.0 sec    Effective 11-19-19 9:00am EST  Please note reference ranges have  changed for PTT Testing.  SARS-CoV-2, NAAT 11/30/2020 Not Detected  Not Detected Final    Comment: Rapid NAAT:   Negative results should be treated as presumptive and,  if inconsistent with clinical signs and symptoms or necessary for  patient management, should be tested with an alternative molecular  assay.  Negative results do not preclude SARS-CoV-2 infection and  should not be used as the sole basis for patient Absolute 12/01/2020 4.0  1.7 - 7.7 K/uL Final    Lymphocytes Absolute 12/01/2020 2.0  1.0 - 5.1 K/uL Final    Monocytes Absolute 12/01/2020 0.5  0.0 - 1.3 K/uL Final    Eosinophils Absolute 12/01/2020 0.2  0.0 - 0.6 K/uL Final    Basophils Absolute 12/01/2020 0.1  0.0 - 0.2 K/uL Final    Troponin 12/01/2020 <0.01  <0.01 ng/mL Final    Methodology by Troponin T    aPTT 12/01/2020 49.1* 24.2 - 36.2 sec Final    Comment: REVIEWED BY TECHNOLOGIST. Therapeutic range: 49.0 - 76.0 sec    Effective 11-19-19 9:00am EST  Please note reference ranges have  changed for PTT Testing. Review of Systems   Constitutional: Negative. HENT: Negative. Eyes: Positive for visual disturbance. Negative for photophobia, pain, discharge, redness and itching. Respiratory: Negative. Cardiovascular: Negative. Gastrointestinal: Positive for abdominal pain. Negative for abdominal distention, anal bleeding, blood in stool, constipation, diarrhea, nausea, rectal pain and vomiting. Endocrine: Negative. Genitourinary: Negative. Musculoskeletal: Positive for back pain. Negative for arthralgias, gait problem, joint swelling, myalgias and neck pain. Skin: Negative. Allergic/Immunologic: Negative. Neurological: Negative. Hematological: Negative. Psychiatric/Behavioral: Negative. All other systems reviewed and are negative. Physical Exam  Vitals signs and nursing note reviewed. Constitutional:       General: She is not in acute distress. Appearance: Normal appearance. She is well-developed. She is not ill-appearing or toxic-appearing. HENT:      Head: Normocephalic and atraumatic. Right Ear: External ear normal.      Left Ear: External ear normal.      Mouth/Throat:      Pharynx: No oropharyngeal exudate. Eyes:      General: No scleral icterus. Conjunctiva/sclera: Conjunctivae normal.      Pupils: Pupils are equal, round, and reactive to light.    Neck:      Musculoskeletal: Normal range of motion and neck supple. No edema or erythema. Thyroid: No thyromegaly. Vascular: Normal carotid pulses. No carotid bruit or JVD. Trachea: No tracheal deviation. Cardiovascular:      Rate and Rhythm: Normal rate and regular rhythm. Pulses:           Carotid pulses are 2+ on the right side and 2+ on the left side. Radial pulses are 1+ on the right side and 1+ on the left side. Femoral pulses are 2+ on the right side and 2+ on the left side. Popliteal pulses are 2+ on the right side and 2+ on the left side. Dorsalis pedis pulses are 1+ on the right side and 2+ on the left side. Posterior tibial pulses are 2+ on the right side and 2+ on the left side. Heart sounds: Normal heart sounds and S1 normal. No murmur. Comments: Doppler 9/11/2020:  Rt DP: biphasic   Rt PT: biphasic  Rt AT:     Lt DP: biphasic  Lt PT: monophasic  Lt AT:    Pulmonary:      Effort: Pulmonary effort is normal. No tachypnea, accessory muscle usage or respiratory distress. Breath sounds: Normal breath sounds. No stridor. No wheezing or rales. Abdominal:      General: Bowel sounds are normal. There is no distension. Palpations: Abdomen is soft. There is no mass. Tenderness: There is no abdominal tenderness. There is no guarding or rebound. Hernia: No hernia is present. There is no hernia in the ventral area or left inguinal area. Genitourinary:     Comments: Rectal exam/stool guaiac not indicated. Musculoskeletal: Normal range of motion. General: No tenderness. Right shoulder: She exhibits normal range of motion, no deformity and no pain. Feet:    Lymphadenopathy:      Head:      Right side of head: No submandibular, preauricular, posterior auricular or occipital adenopathy. Left side of head: No submandibular, preauricular, posterior auricular or occipital adenopathy. Cervical: No cervical adenopathy.       Right cervical: No superficial, deep or posterior cervical adenopathy. Left cervical: No superficial, deep or posterior cervical adenopathy. Upper Body:      Right upper body: No supraclavicular or pectoral adenopathy. Left upper body: No supraclavicular or pectoral adenopathy. Skin:     General: Skin is warm and dry. Coloration: Skin is not pale. Findings: No bruising, erythema, laceration, lesion or rash. Neurological:      Mental Status: She is alert and oriented to person, place, and time. Cranial Nerves: No cranial nerve deficit. Sensory: No sensory deficit. Motor: No atrophy or abnormal muscle tone. Coordination: Coordination normal.      Gait: Gait normal.      Deep Tendon Reflexes: Reflexes are normal and symmetric. Psychiatric:         Speech: Speech normal.         Behavior: Behavior normal.         Thought Content: Thought content normal.         Judgment: Judgment normal.       Mrs. Whiting has had her gallbladder removed. She has a lazy eye, and keeps her left eye closed often. She says this is a habit. She has had her esophagus stretched by Dr. Jackie Moore about a year ago. ASSESSMENT:    Problem List Items Addressed This Visit     Type 2 diabetes mellitus without complication, without long-term current use of insulin ([address]    Peripheral vascular disease ([address]    Hyperlipidemia    Former smoker    Essential hypertension    Atherosclerosis of superior mesenteric artery ([address] - Primary          PLAN:  She still has a wide open celiac and inferior mesenteric the SMA has about a 50% stenosis with a velocity of 285/28 she did have a repeat CAT scan on November 30, 2020 that showed moderate stenosis. I told her we could easily follow this with ultrasound that she did not need CAT scan angiograms to follow it.   Interesting information from the CAT scan they worried about the left common iliac being either ulcerated or dissected but we have not had any elevated velocity here today however there was a new finding of right common iliac stenosis with a velocity of 239. I can still feel her femoral and pedal pulses on the right I do not think this stenosis is giving her any problems. She does complain of pain in the groin intermittently think this has anything to do with her arteries. She is a former smoker has hypertension and high cholesterol and diabetes so she needs to manage her risk factors to keep her arteries going  She does complain of significant swallowing difficulties food getting stuck in her throat coughing it back up \"choking\". She had a esophageal stricture and dilation by Dr. Elaine Blunt she says a year ago,(I looked it up and it was 5 of 23 when she had her stricture dilated) I think she needs to get back in and see him and have this taken care of  Please return in six months for an aorta iliac scan and office visit. The patient will need to fast for at least 5 hours prior to the ultrasound scan. Please understand that by not doing so may result in having an inaccurate ultrasound and may cause your ultrasound to be rescheduled. Ginette Anguiano MA am scribing for and in the presence of Krysten Kelley MD on this date of 03/12/21      I Ramez Cisneros MD personally performed the services described in this documentation as scribed by the Medical Assistant Geovanni Walker in my presence and it is both accurate and complete.         Electronically signed by Krysten Kelley MD on 3/12/2021 at 10:37 AM

## 2021-03-12 NOTE — PATIENT INSTRUCTIONS
Please return in six months for an aorta iliac scan and office visit. The patient will need to fast for at least 5 hours prior to the ultrasound scan. Please understand that by not doing so may result in having an inaccurate ultrasound and may cause your ultrasound to be rescheduled.

## 2021-03-18 ENCOUNTER — CARE COORDINATION (OUTPATIENT)
Dept: CARE COORDINATION | Age: 61
End: 2021-03-18

## 2021-03-26 ENCOUNTER — OFFICE VISIT (OUTPATIENT)
Dept: ORTHOPEDIC SURGERY | Age: 61
End: 2021-03-26

## 2021-03-26 VITALS — HEIGHT: 65 IN | WEIGHT: 145 LBS | BODY MASS INDEX: 24.16 KG/M2 | RESPIRATION RATE: 16 BRPM | TEMPERATURE: 98.2 F

## 2021-03-26 DIAGNOSIS — S92.352A CLOSED DISPLACED FRACTURE OF FIFTH METATARSAL BONE OF LEFT FOOT, INITIAL ENCOUNTER: Primary | ICD-10-CM

## 2021-03-26 PROCEDURE — 99024 POSTOP FOLLOW-UP VISIT: CPT | Performed by: ORTHOPAEDIC SURGERY

## 2021-03-28 NOTE — PROGRESS NOTES
CHIEF COMPLAINT: Left foot pain/ 5th MT neck minimally displaced fracture. 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 Shelley Service 63 y.o.  female presents today for f/u evaluation of a left foot injury which occurred when she fell. She is still complaining of minimal lateral foot pain and swelling. Rates pain a 2/10 VAS. This is better with elevation and worse with bearing wt. The pain is achy and not radiating. No other complaint. She was seen 1st at [facility], where she was x-rayed and splinted and asked to f/u with orthopaedics.     Past Medical History:   Diagnosis Date    Back pain     CAD (coronary artery disease)     [facility]ch[facility] disease) ([facility])     Foot pain     from bone spurs    GERD (gastroesophageal reflux disease)     Headache     sharp pain going through head    Heart attack ([address]     Hip pain     Hyperlipidemia     Hypertension      (normal spontaneous vaginal delivery)     Osteoporosis     Vision abnormalities        Past Surgical History:   Procedure Laterality Date    CARDIAC CATHETERIZATION      CHOLECYSTECTOMY      COLONOSCOPY      ESOPHAGEAL DILATATION N/A 2019    ESOPHAGEAL DILATION TONG performed by Clare Medina MD at [address] [place] [address] N/A 2019    EGD BIOPSY performed by Clare Medina MD at [address] [place] [address] History     Socioeconomic History    Marital status: Legally      Spouse name: Not on file    Number of children: Not on file    Years of education: Not on file    Highest education level: Not on file   Occupational History    Not on file   Social Needs    Financial resource strain: Not on file    Food insecurity     Worry: Not on file     Inability: Not on file    Transportation needs     Medical: Not on file     Non-medical: Not on file   Tobacco Use    Smoking status: Former Smoker     Packs/day: 1.00     Years: 37.00     Pack years: 37.00 Types: Cigarettes     Quit date: 2017     Years since quittin.2    Smokeless tobacco: Never Used   Substance and Sexual Activity    Alcohol use: No     Alcohol/week: 0.0 standard drinks    Drug use: No    Sexual activity: Not Currently   Lifestyle    Physical activity     Days per week: Not on file     Minutes per session: Not on file    Stress: Not on file   Relationships    Social connections     Talks on phone: Not on file     Gets together: Not on file     Attends Taoism service: Not on file     Active member of club or organization: Not on file     Attends meetings of clubs or organizations: Not on file     Relationship status: Not on file    Intimate partner violence     Fear of current or ex partner: Not on file     Emotionally abused: Not on file     Physically abused: Not on file     Forced sexual activity: Not on file   Other Topics Concern    Not on file   Social History Narrative    Not on file       Family History   Problem Relation Age of Onset    Heart Failure Mother         heart disease    Hypertension Mother     Emphysema Mother     Heart Failure Father         heart disease    COPD Father     Hypertension Paternal Grandmother        Current Outpatient Medications on File Prior to Visit   Medication Sig Dispense Refill    sertraline (ZOLOFT) 100 MG tablet TAKE ONE TABLET BY MOUTH DAILY 60 tablet 0    Umeclidinium Bromide 62.5 MCG/INH AEPB Inhale 1 puff into the lungs daily 1 each 11    ipratropium-albuterol (DUONEB) 0.5-2.5 (3) MG/3ML SOLN nebulizer solution Inhale 3 mLs into the lungs every 4 hours 360 mL 11    metoprolol succinate (TOPROL XL) 25 MG extended release tablet TAKE ONE-HALF TABLET BY MOUTH DAILY 30 tablet 2    pregabalin (LYRICA) 75 MG capsule Take 1 capsule by mouth 2 times daily for 125 days.  60 capsule 3    pantoprazole (PROTONIX) 20 MG tablet TAKE TWO TABLETS BY MOUTH DAILY 30 tablet 0    folic acid (FOLVITE) 1 MG tablet Take 1 tablet by mouth daily 30 tablet 1    albuterol sulfate HFA (PROVENTIL HFA) 108 (90 Base) MCG/ACT inhaler Inhale 4-6 puffs into the lungs every 4 hours as needed for Wheezing or Shortness of Breath 1 Inhaler 2    metFORMIN (GLUCOPHAGE) 500 MG tablet Take 1 tablet by mouth daily (with breakfast) 90 tablet 0    triamcinolone (KENALOG) 0.1 % cream Apply topically 2 times daily. 45 g 0    atorvastatin (LIPITOR) 40 MG tablet Take 1 tablet by mouth nightly 30 tablet 3    aspirin 81 MG chewable tablet CHEW ONE TABLET BY MOUTH DAILY 90 tablet 1    diphenhydrAMINE-APAP, sleep, (TYLENOL PM EXTRA STRENGTH)  MG tablet Take 1 tablet by mouth nightly as needed       mineral oil-hydrophilic petrolatum (AQUAPHOR) ointment Apply topically as needed. 396 g 0    acetaminophen (TYLENOL) 500 MG tablet Take 2 tablets by mouth every 6 hours as needed for Pain Do not take with other medications containing acetaminophen. 30 tablet 0     No current facility-administered medications on file prior to visit. Pertinent items are noted in HPI  Review of systems reviewed from Patient History Form dated on 2/12/2021 and available in the patient's chart under the Media tab. No change. PHYSICAL EXAMINATION:  Ms. Wing Vega is a very pleasant 63 y.o.  female who presents today in no acute distress, awake, alert, and oriented. She is well dressed, nourished and  groomed. Patient with normal affect. Height is  5' 5\" (1.651 m), weight is 145 lb (65.8 kg), Body mass index is 24.13 kg/m². Resting respiratory rate is 16. Examination of the gait, showed that the patient walks with minimal limp, WB left leg in postop shoe. Examination of both ankles showing a good range of motion of the left ankle compare to the other side. There is minimal swelling that can be seen, no ecchymosis over lateral side of the left foot. She  has intact sensation and good pedal pulses.   She has minimal tenderness on deep palpation over the 5th MT neck left foot        IMAGING: Xray's were reviewed, taken today in the office, 3 views of the left foot, and showed a minimally displaced 5th MT neck fracture. IMPRESSION: Left 5th MT neck minimally displaced fracture. PLAN: I discussed that the overall alignment of this fracture is still good and healed well. We discussed the risk of nonunion and malunion. We will see her  back in 2 months at which time we will get a new xray of the left foot.        Tj Anne MD

## 2021-03-31 ENCOUNTER — CARE COORDINATION (OUTPATIENT)
Dept: CARE COORDINATION | Age: 61
End: 2021-03-31

## 2021-04-02 NOTE — CARE COORDINATION
No answer to pt phone, message left, waited for return call, none received will attempt again at later date

## 2021-04-06 ENCOUNTER — APPOINTMENT (OUTPATIENT)
Dept: CT IMAGING | Age: 61
End: 2021-04-06
Payer: MEDICARE

## 2021-04-06 ENCOUNTER — HOSPITAL ENCOUNTER (EMERGENCY)
Age: 61
Discharge: HOME | End: 2021-04-06
Attending: EMERGENCY MEDICINE
Payer: MEDICARE

## 2021-04-06 VITALS
HEART RATE: 60 BPM | OXYGEN SATURATION: 97 % | DIASTOLIC BLOOD PRESSURE: 75 MMHG | BODY MASS INDEX: 24.13 KG/M2 | RESPIRATION RATE: 10 BRPM | WEIGHT: 145 LBS | SYSTOLIC BLOOD PRESSURE: 137 MMHG | TEMPERATURE: 98.1 F

## 2021-04-06 DIAGNOSIS — S09.90XA INJURY OF HEAD, INITIAL ENCOUNTER: Primary | ICD-10-CM

## 2021-04-06 LAB
ANION GAP SERPL CALCULATED.3IONS-SCNC: 7 MMOL/L (ref 3–16)
BASOPHILS ABSOLUTE: 0.1 K/UL (ref 0–0.2)
BASOPHILS RELATIVE PERCENT: 1 %
BUN BLDV-MCNC: 14 MG/DL (ref 7–20)
CALCIUM SERPL-MCNC: 8.9 MG/DL (ref 8.3–10.6)
CHLORIDE BLD-SCNC: 100 MMOL/L (ref 99–110)
CO2: 29 MMOL/L (ref 21–32)
CREAT SERPL-MCNC: 0.6 MG/DL (ref 0.6–1.2)
EKG ATRIAL RATE: 61 BPM
EKG DIAGNOSIS: NORMAL
EKG P AXIS: 66 DEGREES
EKG P-R INTERVAL: 122 MS
EKG Q-T INTERVAL: 404 MS
EKG QRS DURATION: 76 MS
EKG QTC CALCULATION (BAZETT): 406 MS
EKG R AXIS: 71 DEGREES
EKG T AXIS: 67 DEGREES
EKG VENTRICULAR RATE: 61 BPM
EOSINOPHILS ABSOLUTE: 0.3 K/UL (ref 0–0.6)
EOSINOPHILS RELATIVE PERCENT: 3.4 %
GFR AFRICAN AMERICAN: >60
GFR NON-AFRICAN AMERICAN: >60
GLUCOSE BLD-MCNC: 109 MG/DL (ref 70–99)
HCT VFR BLD CALC: 37.1 % (ref 36–48)
HEMOGLOBIN: 12.6 G/DL (ref 12–16)
LYMPHOCYTES ABSOLUTE: 2.7 K/UL (ref 1–5.1)
LYMPHOCYTES RELATIVE PERCENT: 33.6 %
MCH RBC QN AUTO: 32.3 PG (ref 26–34)
MCHC RBC AUTO-ENTMCNC: 34.1 G/DL (ref 31–36)
MCV RBC AUTO: 94.8 FL (ref 80–100)
MONOCYTES ABSOLUTE: 0.5 K/UL (ref 0–1.3)
MONOCYTES RELATIVE PERCENT: 5.7 %
NEUTROPHILS ABSOLUTE: 4.4 K/UL (ref 1.7–7.7)
NEUTROPHILS RELATIVE PERCENT: 56.3 %
PDW BLD-RTO: 16.4 % (ref 12.4–15.4)
PLATELET # BLD: 202 K/UL (ref 135–450)
PMV BLD AUTO: 8.4 FL (ref 5–10.5)
POTASSIUM REFLEX MAGNESIUM: 5 MMOL/L (ref 3.5–5.1)
RBC # BLD: 3.91 M/UL (ref 4–5.2)
SODIUM BLD-SCNC: 136 MMOL/L (ref 136–145)
WBC # BLD: 7.9 K/UL (ref 4–11)

## 2021-04-06 PROCEDURE — 93005 ELECTROCARDIOGRAM TRACING: CPT | Performed by: EMERGENCY MEDICINE

## 2021-04-06 PROCEDURE — 36415 COLL VENOUS BLD VENIPUNCTURE: CPT

## 2021-04-06 PROCEDURE — 70450 CT HEAD/BRAIN W/O DYE: CPT

## 2021-04-06 PROCEDURE — 93010 ELECTROCARDIOGRAM REPORT: CPT | Performed by: INTERNAL MEDICINE

## 2021-04-06 PROCEDURE — 85025 COMPLETE CBC W/AUTO DIFF WBC: CPT

## 2021-04-06 PROCEDURE — 99283 EMERGENCY DEPT VISIT LOW MDM: CPT

## 2021-04-06 PROCEDURE — 80048 BASIC METABOLIC PNL TOTAL CA: CPT

## 2021-04-06 ASSESSMENT — ENCOUNTER SYMPTOMS
VOMITING: 0
COUGH: 0
ABDOMINAL PAIN: 0
COLOR CHANGE: 0
SHORTNESS OF BREATH: 0
EYE ITCHING: 0
NAUSEA: 1
EYE DISCHARGE: 0
CONSTIPATION: 0

## 2021-04-06 ASSESSMENT — PAIN SCALES - GENERAL: PAINLEVEL_OUTOF10: 6

## 2021-04-06 ASSESSMENT — PAIN DESCRIPTION - LOCATION: LOCATION: HEAD

## 2021-04-06 ASSESSMENT — PAIN DESCRIPTION - FREQUENCY: FREQUENCY: CONTINUOUS

## 2021-04-06 ASSESSMENT — PAIN - FUNCTIONAL ASSESSMENT: PAIN_FUNCTIONAL_ASSESSMENT: ACTIVITIES ARE NOT PREVENTED

## 2021-04-06 NOTE — ED PROVIDER NOTES
[address] [facility]      Pt Name: Nafisa Bernal  MRN: [id number]  [place] 1960  Date of evaluation: 4/6/2021  Provider: Nancy Craig MD    CHIEF COMPLAINT       Chief Complaint   Patient presents with    Head Injury         HISTORY OF PRESENT ILLNESS    Gwen Whiting is a 63 y.o. female who presents to the emergency department with head injury. Patient states she hit her head on her car door 2 days prior. States has had a headache and some lightheadedness intermittently since. Pain currently is 5 out of 10 achy in nature. Endorses hitting her head on the frontal portion of her head. No loss of consciousness. Denies being on blood thinners. Denies chest pain or shortness of breath. No other associated symptoms. Nursing Notes were reviewed. Including nursing noted for FM, Surgical History, Past Medical History, Social History, vitals, and allergies; agree with all. REVIEW OF SYSTEMS       Review of Systems   Constitutional: Negative for diaphoresis and unexpected weight change. HENT: Negative for congestion and dental problem. Eyes: Negative for discharge and itching. Respiratory: Negative for cough and shortness of breath. Cardiovascular: Negative for chest pain and leg swelling. Gastrointestinal: Positive for nausea. Negative for abdominal pain, constipation and vomiting. Endocrine: Negative for cold intolerance and heat intolerance. Genitourinary: Negative for vaginal bleeding, vaginal discharge and vaginal pain. Musculoskeletal: Negative for neck pain and neck stiffness. Skin: Negative for color change and pallor. Neurological: Positive for light-headedness and headaches. Negative for tremors and weakness. Psychiatric/Behavioral: Negative for agitation and behavioral problems. Except as noted above the remainder of the review of systems was reviewed and negative.      PAST MEDICAL HISTORY     Past Medical History:   Diagnosis Date    Back pain     CAD (coronary artery disease)     [facility]     Foot pain     from bone spurs    GERD (gastroesophageal reflux disease)     Headache     sharp pain going through head    Heart attack ([address]     Hip pain     Hyperlipidemia     Hypertension      (normal spontaneous vaginal delivery)     Osteoporosis     Vision abnormalities        SURGICAL HISTORY       Past Surgical History:   Procedure Laterality Date    CARDIAC CATHETERIZATION      CHOLECYSTECTOMY      COLONOSCOPY      ESOPHAGEAL DILATATION N/A 2019    ESOPHAGEAL DILATION [place] Spells performed by Angela López MD at [address] 2019    EGD BIOPSY performed by Angela López MD at [address] [facility]       Discharge Medication List as of 2021  3:43 AM      CONTINUE these medications which have NOT CHANGED    Details   sertraline (ZOLOFT) 100 MG tablet TAKE ONE TABLET BY MOUTH DAILY, Disp-60 tablet, R-0Normal      Umeclidinium Bromide 62.5 MCG/INH AEPB Inhale 1 puff into the lungs daily, Disp-1 each, R-11Normal      ipratropium-albuterol (DUONEB) 0.5-2.5 (3) MG/3ML SOLN nebulizer solution Inhale 3 mLs into the lungs every 4 hours, Disp-360 mL, R-11Normal      metoprolol succinate (TOPROL XL) 25 MG extended release tablet TAKE ONE-HALF TABLET BY MOUTH DAILY, Disp-30 tablet, R-2Normal      pregabalin (LYRICA) 75 MG capsule Take 1 capsule by mouth 2 times daily for 125 days. , Disp-60 capsule, R-3Normal      pantoprazole (PROTONIX) 20 MG tablet TAKE TWO TABLETS BY MOUTH DAILY, Disp-30 XOZMEL,H-[id number]      folic acid (FOLVITE) 1 MG tablet Take 1 tablet by mouth daily, Disp-30 tablet,R-1Normal      albuterol sulfate HFA (PROVENTIL HFA) 108 (90 Base) MCG/ACT inhaler Inhale 4-6 puffs into the lungs every 4 hours as needed for Wheezing or Shortness of Breath, Disp-1 Inhaler, R-2Normal      metFORMIN (GLUCOPHAGE) 500 MG tablet Take 1 tablet by mouth daily (with breakfast), Disp-90 tablet, R-0Normal      triamcinolone (KENALOG) 0.1 % cream Apply topically 2 times daily. , Disp-45 g, R-0, Normal      atorvastatin (LIPITOR) 40 MG tablet Take 1 tablet by mouth nightly, Disp-30 tablet, R-3Normal      aspirin 81 MG chewable tablet CHEW ONE TABLET BY MOUTH DAILY, Disp-90 tablet, R-1Normal      diphenhydrAMINE-APAP, sleep, (TYLENOL PM EXTRA STRENGTH)  MG tablet Take 1 tablet by mouth nightly as needed Historical Med      mineral oil-hydrophilic petrolatum (AQUAPHOR) ointment Apply topically as needed. , Disp-396 g, R-0, Normal      acetaminophen (TYLENOL) 500 MG tablet Take 2 tablets by mouth every 6 hours as needed for Pain Do not take with other medications containing acetaminophen., Disp-30 tablet, R-0Print             ALLERGIES     Iodine, Other, Eggs or egg-derived products [eggs or egg-derived products], Eggs [egg white], Flexeril [cyclobenzaprine hcl], Levofloxacin, and Omeprazole    FAMILY HISTORY        Family History   Problem Relation Age of Onset    Heart Failure Mother         heart disease    Hypertension Mother     Emphysema Mother     Heart Failure Father         heart disease    COPD Father     Hypertension Paternal Grandmother        SOCIAL HISTORY       Social History     Socioeconomic History    Marital status: Legally      Spouse name: None    Number of children: None    Years of education: None    Highest education level: None   Occupational History    None   Social Needs    Financial resource strain: None    Food insecurity     Worry: None     Inability: None    Transportation needs     Medical: None     Non-medical: None   Tobacco Use    Smoking status: Former Smoker     Packs/day: 1.00     Years: 37.00     Pack years: 37.00     Types: Cigarettes     Quit date: 2017     Years since quittin.2    Smokeless tobacco: Never Used   Substance distension. Palpations: Abdomen is soft. There is no mass. Tenderness: There is no abdominal tenderness. There is no guarding or rebound. Genitourinary:     Comments: Deferred  Musculoskeletal: Normal range of motion. General: No deformity. Skin:     General: Skin is warm. Findings: No erythema or rash. Neurological:      Mental Status: She is alert and oriented to person, place, and time. She is not disoriented. Cranial Nerves: No cranial nerve deficit. Motor: No atrophy or abnormal muscle tone. Coordination: Coordination normal.   Psychiatric:         Behavior: Behavior normal.         Thought Content: Thought content normal.         DIAGNOSTIC RESULTS     RADIOLOGY:   Non-plain film images such as CT, Ultrasoundand MRI are read by the radiologist. Plain radiographic images are visualized and preliminarily interpreted by the emergency physician with the below findings:    Imaging reassuring    ED BEDSIDE ULTRASOUND:   Performed by ED Physician - none    LABS:  Labs Reviewed   CBC WITH AUTO DIFFERENTIAL - Abnormal; Notable for the following components:       Result Value    RBC 3.91 (*)     RDW 16.4 (*)     All other components within normal limits    Narrative:     Performed at:  [address] [address]   Phone [phone]   BASIC METABOLIC PANEL W/ REFLEX TO MG FOR LOW K - Abnormal; Notable for the following components:    Glucose 109 (*)     All other components within normal limits    Narrative:     Performed at:  [address] [address]   Phone [phone]       All other labs were withinnormal range or not returned as of this dictation.     EMERGENCY DEPARTMENT COURSE and DIFFERENTIAL DIAGNOSIS/MDM:     PMH, Surgical Hx, FH, Social Hx reviewed by myself (ETOH usage, Tobacco usage, Drug usage reviewed by myself, no pertinent Hx)- No Pertinent Hx     Old records were reviewed by me     Reassuring imaging. Patient feels better without really any interventions. Feels safe going home. Discussed close PCP follow-up. She most likely has a concussion. I estimate there is LOW risk for Sepsis, MI, Stroke, Tamponade, PTX, Toxicity or other life threatening etiology thus I consider the discharge disposition reasonable. The patient is at low risk for mortality based on demographic, history and clinical factors. Given the best available information and clinical assessment, I estimate the risk of hospitalization to be greater than risk of treatment at home. I have explained to the patient that the risk could rapidly change, given precautions for return and instructions. Explained to patient that the risk for mortality is low based on demographic, history and clinical factors. I discussed with patient the results of evaluation in the ED, diagnosis, care, and prognosis. The plan is to discharge to home. Patient is in agreement with plan and questions have been answered. I also discussed with patient the reasons which may require a return visit and the importance of follow-up care. The patient is well-appearing, nontoxic, and improved at the time of discharge. Patient agrees to call to arrange follow-up care as directed. Patient understands to return immediately for worsening/change in symptoms. CRITICAL CARE TIME   Total Critical Caretime was 21 minutes, excluding separately reportable procedures. There was a high probability of clinically significant/life threatening deterioration in the patient's condition which required my urgent intervention. PROCEDURES:  Unlessotherwise noted below, none    FINAL IMPRESSION      1.  Injury of head, initial encounter          DISPOSITION/PLAN   DISPOSITION Decision To Discharge 04/06/2021 03:37:40 AM    PATIENT REFERRED TO:  Lisa Clinton, [address] [place] [address] [address]:  Discharge Medication List as of 4/6/2021  3:43 AM             (Please note that portions ofthis note were completed with a voice recognition program.  Efforts were made to edit the dictations but occasionally words are mis-transcribed.)    Jaden Flores MD(electronically signed)  Attending Emergency Physician         Jaden Flores MD  04/06/21 [id number]

## 2021-04-06 NOTE — ED TRIAGE NOTES
Pt states she hit her head on a car door one day ago, states since she has been dizzy with nausea.  Takes ASA daily

## 2021-04-07 ENCOUNTER — CARE COORDINATION (OUTPATIENT)
Dept: CARE COORDINATION | Age: 61
End: 2021-04-07

## 2021-04-07 NOTE — CARE COORDINATION
Ambulatory Care Coordination Note  4/7/2021  CM Risk Score: 14  Charlson 10 Year Mortality Risk Score: 98%     ACC: Serene Jaimes RN    Summary Note:   Ed follow up/ care coordinator call completed,  Pt reports opening her car door and was not paying attention and hit self in head with door, headache and dizziness for a couple days so she went for evaluation at ed. Reports she still has small bump on head that is sore to touch, and still expericing headaches tat she is controlling with motrin. Fall/ safety precautions education initiated. covid precautions reviewed with vu. Pt will call her self to schedule follow up with PCP. med's reconciled  No other issue or concerns noted. Plan:  Continue education on safety precautions,   Introduce copd zone tools  Assess for needed resources        Care Coordination Interventions    Program Enrollment: Rising Risk  Referral from Primary Care Provider: No  Suggested Interventions and Community Resources  Fall Risk Prevention: In Process  Zone Management Tools: Not Started         Goals Addressed                 This Visit's Progress     Conditions and Symptoms   On track     I will schedule office visits, as directed by my provider. I will keep my appointment or reschedule if I have to cancel. I will notify my provider of any barriers to my plan of care. I will follow my Zone Management tool to seek urgent or emergent care. I will notify my provider of any symptoms that indicate a worsening of my condition. Barriers: overwhelmed by complexity of regimen and lack of education  Plan for overcoming my barriers: care coordination  Confidence: 7/10  Anticipated Goal Completion Date: 5/31/2021         Medication Management   On track     I will take my medication as directed. I will notify my provider of any problems with medications, like adverse effects or side effects. I will notify my provider/Care Coordinator if I am unable to afford my medications.   I will notify my provider for advice before I stop taking any of my medication. Barriers: lack of support and lack of education  Plan for overcoming my barriers: care coordination  Confidence: 7/10  Anticipated Goal Completion Date:5/31 2021       Reduce Falls    On track     I will reduce my risk of falls by the following: Use walking aids like cane or walker    Barriers: overwhelmed by complexity of regimen and lack of education  Plan for overcoming my barriers: care  coordination  Confidence: 7/10  Anticipated Goal Completion Date: 5/31/2021            Prior to Admission medications    Medication Sig Start Date End Date Taking? Authorizing Provider   sertraline (ZOLOFT) 100 MG tablet TAKE ONE TABLET BY MOUTH DAILY 3/1/21   Galina Choi DO   Umeclidinium Bromide 62.5 MCG/INH AEPB Inhale 1 puff into the lungs daily 1/27/21   Glenna Pompa MD   ipratropium-albuterol (DUONEB) 0.5-2.5 (3) MG/3ML SOLN nebulizer solution Inhale 3 mLs into the lungs every 4 hours 1/27/21   Glenna Pompa MD   metoprolol succinate (TOPROL XL) 25 MG extended release tablet TAKE ONE-HALF TABLET BY MOUTH DAILY 1/20/21   Galina Choi DO   pregabalin (LYRICA) 75 MG capsule Take 1 capsule by mouth 2 times daily for 125 days. 9/23/20 1/27/21  Galina Choi DO   pantoprazole (PROTONIX) 20 MG tablet TAKE TWO TABLETS BY MOUTH DAILY 8/17/20   Galina Choi DO   folic acid (FOLVITE) 1 MG tablet Take 1 tablet by mouth daily 8/5/20   Galina Choi DO   albuterol sulfate HFA (PROVENTIL HFA) 108 (90 Base) MCG/ACT inhaler Inhale 4-6 puffs into the lungs every 4 hours as needed for Wheezing or Shortness of Breath 6/23/20   Glenna Pompa MD   metFORMIN (GLUCOPHAGE) 500 MG tablet Take 1 tablet by mouth daily (with breakfast) 11/6/19   JOSE ANGEL Hurd CNP   triamcinolone (KENALOG) 0.1 % cream Apply topically 2 times daily.  10/9/19   JOSE ANGEL Hurd CNP   atorvastatin (LIPITOR) 40 MG tablet Take 1 tablet by mouth nightly 8/28/19   Maryanne Jennifer Honey, APRN - CNP   aspirin 81 MG chewable tablet CHEW ONE TABLET BY MOUTH DAILY 6/7/19   Maryanne Jennifer Honey, APRN - CNP   diphenhydrAMINE-APAP, sleep, (TYLENOL PM EXTRA STRENGTH)  MG tablet Take 1 tablet by mouth nightly as needed     Historical Provider, MD   mineral oil-hydrophilic petrolatum (AQUAPHOR) ointment Apply topically as needed. 2/6/19   Maryanne Jennifer Honey, APRN - CNP   acetaminophen (TYLENOL) 500 MG tablet Take 2 tablets by mouth every 6 hours as needed for Pain Do not take with other medications containing acetaminophen.  9/2/18   Mati CARIDAD Clemons       Future Appointments   Date Time Provider Lashell Prabhakar   5/26/2021  9:00 AM Mary Calderon MD [facility]   8/9/2021  8:20 AM Sherrod Fothergill, MD [facility]   9/14/2021  1:00 PM SCHEDULE, VASCULAR LAB 1 [facility]YS[facility]   9/14/2021  1:45 PM Tomás Caal MD [facility]      and   COPD Assessment    Does the patient understand envrionmental exposure?: No  Is the patient able to verbalize Rescue vs. Long Acting medications?: No  Does the patient have a nebulizer?: No  Does the patient use a space with inhaled medications?: No            Symptoms:   (Comment: cough)      Symptom course: stable  Change in chronic cough?: No/At Baseline  Change in sputum?: No/At Baseline  Self Monitoring - SaO2: No  Have you had a recent diagnosis of pneumonia either by PCP or at a hospital?: No

## 2021-04-07 NOTE — CARE COORDINATION
Ambulatory Care Coordination  ED Follow up Call    Reason for ED visit: Head Injury/ dizziness  Status:     improved    Did you call your PCP prior to going to the ED?  no    Did you receive a discharge instructions from the Emergency Room? yes  Review of Instructions:     Understands what to report/when to return?:  yes   Understands discharge instructions?:  YES    Following discharge instructions?:  YES     Are there any new complaints of pain? no  New Pain Meds? no   Constipation prophylaxis needed? N/A    If you have a wound is the dressing clean, dry, and intact? N/A  Understands wound care regimen? N/A    Are there any other complaints/concerns that you wish to tell your provider? no    FU appts/Provider:    Future Appointments   Date Time Provider Lashell Prabhakar   5/26/2021  9:00 Nusrat Casey MD [facility]   8/9/2021  8:20 AM Martina Mabry [facility]   9/14/2021  1:00 PM SCHEDULE, VASCULAR LAB 1 [facility]   9/14/2021  1:45 PM Eladio Pruitt MD [facility]           New Medications?:   no      Medication Reconciliation by phone - YES   Understands Medications? YES   Taking Medications? {YES   Can you swallow your pills? YES/NO    Any further needs in the home i.e. Equipment?   YES    Link to services in community?:  no   Which services:

## 2021-04-16 ENCOUNTER — HOSPITAL ENCOUNTER (EMERGENCY)
Age: 61
Discharge: HOME | End: 2021-04-16
Payer: MEDICARE

## 2021-04-16 ENCOUNTER — CARE COORDINATION (OUTPATIENT)
Dept: CARE COORDINATION | Age: 61
End: 2021-04-16

## 2021-04-16 VITALS
WEIGHT: 145.5 LBS | DIASTOLIC BLOOD PRESSURE: 63 MMHG | OXYGEN SATURATION: 94 % | TEMPERATURE: 97.6 F | RESPIRATION RATE: 20 BRPM | BODY MASS INDEX: 26.78 KG/M2 | HEART RATE: 76 BPM | SYSTOLIC BLOOD PRESSURE: 128 MMHG | HEIGHT: 62 IN

## 2021-04-16 DIAGNOSIS — B02.9 HERPES ZOSTER WITHOUT COMPLICATION: Primary | ICD-10-CM

## 2021-04-16 PROCEDURE — 99283 EMERGENCY DEPT VISIT LOW MDM: CPT

## 2021-04-16 RX ORDER — PREDNISONE 10 MG/1
TABLET ORAL
Qty: 30 TABLET | Refills: 0 | Status: SHIPPED | OUTPATIENT
Start: 2021-04-16 | End: 2021-05-09 | Stop reason: ALTCHOICE

## 2021-04-16 RX ORDER — VALACYCLOVIR HYDROCHLORIDE 1 G/1
1000 TABLET, FILM COATED ORAL 3 TIMES DAILY
Qty: 21 TABLET | Refills: 0 | Status: SHIPPED | OUTPATIENT
Start: 2021-04-16 | End: 2021-04-23

## 2021-04-16 RX ORDER — HYDROCODONE BITARTRATE AND ACETAMINOPHEN 5; 325 MG/1; MG/1
1 TABLET ORAL EVERY 6 HOURS PRN
Qty: 10 TABLET | Refills: 0 | Status: SHIPPED | OUTPATIENT
Start: 2021-04-16 | End: 2021-04-19

## 2021-04-16 ASSESSMENT — ENCOUNTER SYMPTOMS
SHORTNESS OF BREATH: 0
CHEST TIGHTNESS: 0
ABDOMINAL PAIN: 0
VOMITING: 0
NAUSEA: 0

## 2021-04-16 ASSESSMENT — PAIN DESCRIPTION - DESCRIPTORS: DESCRIPTORS: BURNING;ITCHING

## 2021-04-16 ASSESSMENT — PAIN DESCRIPTION - ORIENTATION: ORIENTATION: LEFT

## 2021-04-16 ASSESSMENT — PAIN SCALES - GENERAL: PAINLEVEL_OUTOF10: 7

## 2021-04-16 NOTE — CARE COORDINATION
Diabetes Assessment    Medic Alert ID: No  Meal Planning: Avoidance of concentrated sweets   Do you have barriers with adherence to non-pharmacologic self-management interventions?  (Nutrition/Exercise/Self-Monitoring): No   Have you ever had to go to the ED for symptoms of low blood sugar?: No       No patient-reported symptoms   Do you have hyperglycemia symptoms?: No   Do you have hypoglycemia symptoms?: No   Blood Sugar Trends: No Change       and   General Assessment    Do you have any symptoms that are causing concern?: No

## 2021-04-17 NOTE — ED NOTES
D/C: Order noted for d/c. Pt confirmed d/c paperwork and prescriptions have correct name. Discharge and education instructions reviewed with patient. Teach-back successful. Pt verbalized understanding and signed d/c papers. Pt denied questions at this time. No acute distress noted. Patient instructed to follow-up as noted - return to emergency department if symptoms worsen. Patient verbalized understanding. Discharged per EDMD with discharge instructions. Pt discharged and ambulated to private vehicle. Patient stable upon departure. Thanked patient for choosing [facility]) for care.             Cleve Moncada RN  04/16/21 5551

## 2021-04-17 NOTE — ED PROVIDER NOTES
Neurological: Negative. Psychiatric/Behavioral: Negative for behavioral problems and confusion. Except as noted above the remainder of the review of systems was reviewed and negative. PAST MEDICAL HISTORY         Diagnosis Date    Back pain     CAD (coronary artery disease)     COPD (chronic obstructive pulmonary disease) (HCC)     Foot pain     from bone spurs    GERD (gastroesophageal reflux disease)     Headache     sharp pain going through head    Heart attack ([address]     Hip pain     Hyperlipidemia     Hypertension      (normal spontaneous vaginal delivery)     Osteoporosis     Vision abnormalities        SURGICAL HISTORY           Procedure Laterality Date    CARDIAC CATHETERIZATION      CHOLECYSTECTOMY      COLONOSCOPY      ESOPHAGEAL DILATATION N/A 2019    ESOPHAGEAL DILATION Stanley Dove performed by Waqar Reveles MD at [facility] N/A 2019    EGD BIOPSY performed by Waqar Reveles MD at [address]     [unfilled]    ALLERGIES     Iodine, Other, Eggs or egg-derived products [eggs or egg-derived products], Eggs [egg white], Flexeril [cyclobenzaprine hcl], Levofloxacin, and Omeprazole    FAMILY HISTORY           Problem Relation Age of Onset    Heart Failure Mother         heart disease    Hypertension Mother     Emphysema Mother     Heart Failure Father         heart disease    COPD Father     Hypertension Paternal Grandmother      Family Status   Relation Name Status    Mother           at 43years of age   Chery Father      MGM      MGF      [address]          SOCIAL HISTORY      reports that she quit smoking about 4 years ago. Her smoking use included cigarettes. She has a 37.00 pack-year smoking history. She has never used smokeless tobacco. She reports that she does not drink alcohol or use drugs.     PHYSICAL EXAM    (up to 7 for level 4, 8 or more for level 5)     ED Triage Vitals [04/16/21 1928]   Enc Vitals Group      /63      Pulse 76      Resp 20      Temp 97.6 °F (36.4 °C)      Temp Source Oral      SpO2 94 %      Weight 145 lb 8.1 oz (66 kg)      Height 5' 1.5\" (1.562 m)      Head Circumference       Peak Flow       Pain Score       Pain Loc       Pain Edu? Excl. in [address]? Physical Exam  Vitals signs reviewed. Constitutional:       Appearance: Normal appearance. HENT:      Head: Normocephalic and atraumatic. Neck:      Musculoskeletal: Normal range of motion and neck supple. Pulmonary:      Effort: Pulmonary effort is normal. No respiratory distress. Musculoskeletal: Normal range of motion. Skin:         Neurological:      General: No focal deficit present. Mental Status: She is alert. Psychiatric:         Mood and Affect: Mood normal.         Behavior: Behavior normal.           DIAGNOSTIC RESULTS     EKG: All EKG's are interpreted by the Emergency Department Physician who either signs or Co-signs this chart in the absence of a cardiologist.    RADIOLOGY:   Non-plain film images such as CT, Ultrasound and MRI are read by the radiologist. Plain radiographic images are visualized and preliminarilyinterpreted by the emergency physician with the below findings:    Interpretation per the Radiologist below,if available at the time of this note:    No orders to display         LABS:  Labs Reviewed - No data to display    All other labs were within normal range or not returned as of this dictation. EMERGENCY DEPARTMENT COURSE and DIFFERENTIAL DIAGNOSIS/MDM:   Vitals:    Vitals:    04/16/21 1928   BP: 128/63   Pulse: 76   Resp: 20   Temp: 97.6 °F (36.4 °C)   TempSrc: Oral   SpO2: 94%   Weight: 145 lb 8.1 oz (66 kg)   Height: 5' 1.5\" (1.562 m)       MDM     Patient presents ED with HPI noted above. She is afebrile nontoxic-appearing. She is hemodynamically stable. Physical exam as above.   Exam consistent with herpes zoster. Patient not received her shingles vaccine. Will start patient on Valtrex. Patient also placed on a steroid taper. She has history of diabetes. States she is taken prednisone before without complications. Told to monitor glucose closely at home. Glucose became elevated contact PCP and discontinue steroids. Patient extremely uncomfortable secondary to rash. Has tried ibuprofen and Tylenol with no significant relief. Will place on [place].  Told not drive, operate vehicle drink alcohol while taking. Told only take if absolutely necessary. Courage patient follow-up for reevaluation in 3 to 5 days. She was educated return precautions. She was educated on spread of shingles and contact precautions. Patient discharged home in stable condition. The patient tolerated their visit well. I have discussed the findings of today's workup with the patient and addressed the patient's questions and concerns. Important warning signs as well as new or worsening symptoms which would necessitate immediate return to the ED were discussed. The plan is to discharge from the ED at this time, and the patient is in stable condition. The patient acknowledged understanding is agreeable with this plan. CONSULTS:  None    PROCEDURES:  Procedures    FINAL IMPRESSION      1. Herpes zoster without complication          DISPOSITION/PLAN   [unfilled]    PATIENT REFERRED TO:  [facility] Emergency Department  [address] [address]  [phone]  Go to   If symptoms worsen    Sunday Musa, DO  [address] [facility]  [address] [place] [address]  [phone]    Call in 2 days  For follow up, reevaluation. DISCHARGE MEDICATIONS:  New Prescriptions    HYDROCODONE-ACETAMINOPHEN (NORCO) 5-325 MG PER TABLET    Take 1 tablet by mouth every 6 hours as needed for Pain for up to 3 days.     PREDNISONE (DELTASONE) 10 MG TABLET    Take 4 tabs for 3 days, 3 tabs for 3 days, 2 tabs for 3 days and then 1 tab for 3 days.     VALACYCLOVIR (VALTREX) 1 G TABLET    Take 1 tablet by mouth 3 times daily for 7 days       (Please note that portions of this note were completed with a voice recognition program.  Efforts were made to edit the dictations but occasionally words are mis-transcribed.)    [address]          [address] [place] [address]  04/16/21 1714

## 2021-04-20 ENCOUNTER — APPOINTMENT (OUTPATIENT)
Dept: CT IMAGING | Age: 61
End: 2021-04-20
Payer: MEDICARE

## 2021-04-20 ENCOUNTER — HOSPITAL ENCOUNTER (EMERGENCY)
Age: 61
Discharge: HOME | End: 2021-04-20
Attending: EMERGENCY MEDICINE
Payer: MEDICARE

## 2021-04-20 VITALS
HEART RATE: 79 BPM | TEMPERATURE: 98 F | RESPIRATION RATE: 16 BRPM | WEIGHT: 145 LBS | HEIGHT: 62 IN | OXYGEN SATURATION: 96 % | BODY MASS INDEX: 26.68 KG/M2 | SYSTOLIC BLOOD PRESSURE: 131 MMHG | DIASTOLIC BLOOD PRESSURE: 77 MMHG

## 2021-04-20 DIAGNOSIS — S09.90XA SCALP INJURY, INITIAL ENCOUNTER: Primary | ICD-10-CM

## 2021-04-20 DIAGNOSIS — G44.209 TENSION HEADACHE: ICD-10-CM

## 2021-04-20 PROCEDURE — 99284 EMERGENCY DEPT VISIT MOD MDM: CPT

## 2021-04-20 PROCEDURE — 70450 CT HEAD/BRAIN W/O DYE: CPT

## 2021-04-20 PROCEDURE — 6370000000 HC RX 637 (ALT 250 FOR IP): Performed by: PHYSICIAN ASSISTANT

## 2021-04-20 RX ORDER — ACETAMINOPHEN, ASPIRIN AND CAFFEINE 250; 250; 65 MG/1; MG/1; MG/1
1 TABLET, FILM COATED ORAL EVERY 6 HOURS PRN
Status: DISCONTINUED | OUTPATIENT
Start: 2021-04-20 | End: 2021-04-20 | Stop reason: HOSPADM

## 2021-04-20 RX ORDER — ONDANSETRON 4 MG/1
4 TABLET, ORALLY DISINTEGRATING ORAL ONCE
Status: COMPLETED | OUTPATIENT
Start: 2021-04-20 | End: 2021-04-20

## 2021-04-20 RX ORDER — ACETAMINOPHEN 500 MG
1000 TABLET ORAL ONCE
Status: DISCONTINUED | OUTPATIENT
Start: 2021-04-20 | End: 2021-04-20

## 2021-04-20 RX ADMIN — ONDANSETRON 4 MG: 4 TABLET, ORALLY DISINTEGRATING ORAL at 19:50

## 2021-04-20 RX ADMIN — ACETAMINOPHEN, ASPIRIN AND CAFFEINE 1 TABLET: 250; 250; 65 TABLET, FILM COATED ORAL at 20:18

## 2021-04-20 ASSESSMENT — PAIN DESCRIPTION - LOCATION: LOCATION: HEAD

## 2021-04-20 ASSESSMENT — PAIN DESCRIPTION - PAIN TYPE: TYPE: ACUTE PAIN

## 2021-04-20 ASSESSMENT — PAIN SCALES - GENERAL
PAINLEVEL_OUTOF10: 10
PAINLEVEL_OUTOF10: 10

## 2021-04-20 ASSESSMENT — PAIN DESCRIPTION - ONSET: ONSET: SUDDEN

## 2021-04-20 ASSESSMENT — PAIN DESCRIPTION - DESCRIPTORS: DESCRIPTORS: ACHING;RADIATING

## 2021-04-20 ASSESSMENT — PAIN DESCRIPTION - FREQUENCY: FREQUENCY: CONTINUOUS

## 2021-04-20 ASSESSMENT — PAIN DESCRIPTION - PROGRESSION: CLINICAL_PROGRESSION: GRADUALLY WORSENING

## 2021-04-20 NOTE — ED PROVIDER NOTES
[address] [facility]        Pt Name: Fe Greenberg  MRN: [id number]  [place] 1960  Date of evaluation: 4/20/2021  Provider: Kenn Reece PA-C  PCP: Stuart Crump DO     I have seen and evaluated this patient with my supervising physician Brooke Monreal DO.    CHIEF COMPLAINT       Chief Complaint   Patient presents with    Head Injury     hit her head on car door        HISTORY OF PRESENT ILLNESS   (Location, Timing/Onset, Context/Setting, Quality, Duration, Modifying Factors, Severity, Associated Signs and Symptoms)  Note limiting factors. eF Greenberg is a 63 y.o. female who indicates that she came in because she is not feeling well. She associates this with an incident that she states was around 430 this afternoon when she accidentally hit the brow over the left eye on a car door. When asked whether or not she remembers doing this also a couple of weeks ago she seems a bit confused, seeming not to recognize or be able to explain the similarities. When asked about her symptoms, patient states that the pain seems to be located around the sides of the head and in the top of the head, not particularly where she indicateed she struck her head with a car door. She denies any particular neck pain or stiffness. Patient is denying any loss of consciousness or fall type injury. Patient mentions that she does feel a bit nauseated but also indicates that she does feel a bit nauseated from time to time at home anyway and that does not seem to be particularly out of normal.  She also states that she has intermittent dizziness that is not acutely changed but that her family doctor has told her before that because of her ongoing dizziness issues that she really should not be driving. Therefore she is not driving this evening.   Patient mentions that she did take some medicine for pain earlier today but that it was relatively early this morning and it was an Norco tablet because of left chest tingles that she has been treated for. No actual vomiting. No loss of consciousness. No acute vision change. No anterior head pain or face pain congestion fevers chills cough congestion or acute loss of coordination. Headache pain is relatively severe according to patient and she indicates that she has not been treated in the past for tension type headaches. Nursing Notes were all reviewed and agreed with or any disagreements were addressed in the HPI. REVIEW OF SYSTEMS    (2-9 systems for level 4, 10 or more for level 5)     Review of Systems  Positive history as above with no recent fevers or chills cough or congestion vision change neck pain or stiffness. No acute change in dizziness or any vomiting or change in intermittent nausea. No difficulty drinking or eating. No shortness of breath or heart palpitations. Positive for rash on the left chest from shingles with no other acute chest pain. No abdominal pain or acute urine or stool changes or extremity acute loss range of motion or strength or sensation or coordination or rash. Positives and Pertinent negatives as per HPI.          PAST MEDICAL HISTORY     Past Medical History:   Diagnosis Date    Back pain     CAD (coronary artery disease)     [facility]ch[facility] pulmonary disease) ([facility]     Foot pain     from bone spurs    GERD (gastroesophageal reflux disease)     Headache     sharp pain going through head    Heart attack ([address]     Hip pain     Hyperlipidemia     Hypertension      (normal spontaneous vaginal delivery)     Osteoporosis     Vision abnormalities          SURGICAL HISTORY     Past Surgical History:   Procedure Laterality Date    CARDIAC CATHETERIZATION      CHOLECYSTECTOMY      COLONOSCOPY      ESOPHAGEAL DILATATION N/A 2019    ESOPHAGEAL DILATION RAN performed by Kevin Jones MD at [address] [facility] ENDOSCOPY N/A 5/1/2019    EGD BIOPSY performed by Kody Thurman MD at [facility]       Previous Medications    ACETAMINOPHEN (TYLENOL) 500 MG TABLET    Take 2 tablets by mouth every 6 hours as needed for Pain Do not take with other medications containing acetaminophen. ALBUTEROL SULFATE HFA (PROVENTIL HFA) 108 (90 BASE) MCG/ACT INHALER    Inhale 4-6 puffs into the lungs every 4 hours as needed for Wheezing or Shortness of Breath    ASPIRIN 81 MG CHEWABLE TABLET    CHEW ONE TABLET BY MOUTH DAILY    ATORVASTATIN (LIPITOR) 40 MG TABLET    Take 1 tablet by mouth nightly    DIPHENHYDRAMINE-APAP, SLEEP, (TYLENOL PM EXTRA STRENGTH)  MG TABLET    Take 1 tablet by mouth nightly as needed     FOLIC ACID (FOLVITE) 1 MG TABLET    Take 1 tablet by mouth daily    IPRATROPIUM-ALBUTEROL (DUONEB) 0.5-2.5 (3) MG/3ML SOLN NEBULIZER SOLUTION    Inhale 3 mLs into the lungs every 4 hours    METFORMIN (GLUCOPHAGE) 500 MG TABLET    Take 1 tablet by mouth daily (with breakfast)    METOPROLOL SUCCINATE (TOPROL XL) 25 MG EXTENDED RELEASE TABLET    TAKE ONE-HALF TABLET BY MOUTH DAILY    MINERAL OIL-HYDROPHILIC PETROLATUM (AQUAPHOR) OINTMENT    Apply topically as needed. PANTOPRAZOLE (PROTONIX) 20 MG TABLET    TAKE TWO TABLETS BY MOUTH DAILY    PREDNISONE (DELTASONE) 10 MG TABLET    Take 4 tabs for 3 days, 3 tabs for 3 days, 2 tabs for 3 days and then 1 tab for 3 days. PREGABALIN (LYRICA) 75 MG CAPSULE    Take 1 capsule by mouth 2 times daily for 125 days. SERTRALINE (ZOLOFT) 100 MG TABLET    TAKE ONE TABLET BY MOUTH DAILY    TRIAMCINOLONE (KENALOG) 0.1 % CREAM    Apply topically 2 times daily.     UMECLIDINIUM BROMIDE 62.5 MCG/INH AEPB    Inhale 1 puff into the lungs daily    VALACYCLOVIR (VALTREX) 1 G TABLET    Take 1 tablet by mouth 3 times daily for 7 days         ALLERGIES     Iodine, Other, Eggs or egg-derived products [eggs or egg-derived products], Eggs [egg white], Flexeril [cyclobenzaprine hcl], Levofloxacin, and Omeprazole    FAMILYHISTORY       Family History   Problem Relation Age of Onset    Heart Failure Mother         heart disease    Hypertension Mother     Emphysema Mother     Heart Failure Father         heart disease    COPD Father     Hypertension Paternal Grandmother           SOCIAL HISTORY       Social History     Tobacco Use    Smoking status: Former Smoker     Packs/day: 1.00     Years: 37.00     Pack years: 37.00     Types: Cigarettes     Quit date: 2017     Years since quittin.3    Smokeless tobacco: Never Used   Substance Use Topics    Alcohol use: No     Alcohol/week: 0.0 standard drinks    Drug use: No       SCREENINGS             PHYSICAL EXAM    (up to 7 for level 4, 8 or more for level 5)     ED Triage Vitals [21 1836]   BP Temp Temp Source Pulse Resp SpO2 Height Weight   135/72 97.7 °F (36.5 °C) Temporal 79 16 94 % 5' 1.5\" (1.562 m) 145 lb (65.8 kg)       Physical Exam  Constitutional:       General: She is not in acute distress. Appearance: Normal appearance. She is normal weight. She is not ill-appearing, toxic-appearing or diaphoretic. HENT:      Head:      Comments: Tenderness primarily around the lateral aspects of the ears and in the scalp and towards the superior portion of the scalp. No hematoma throughout. Questionable abrasion over the left eyebrow without any localizing tenderness or complaint of pain to the area. Nose: Nose normal.      Mouth/Throat:      Mouth: Mucous membranes are moist.   Eyes:      General:         Right eye: No discharge. Left eye: No discharge. Conjunctiva/sclera: Conjunctivae normal.   Neck:      Musculoskeletal: Normal range of motion and neck supple. No neck rigidity or muscular tenderness. Cardiovascular:      Rate and Rhythm: Normal rate and regular rhythm. Pulses: Normal pulses. Heart sounds: Normal heart sounds. No murmur. No gallop.     Pulmonary: Effort: Pulmonary effort is normal. No respiratory distress. Breath sounds: Normal breath sounds. No wheezing, rhonchi or rales. Abdominal:      General: Bowel sounds are normal. There is no distension. Palpations: Abdomen is soft. There is no mass. Tenderness: There is no abdominal tenderness. There is no right CVA tenderness, left CVA tenderness, guarding or rebound. Musculoskeletal: Normal range of motion. General: No swelling, tenderness, deformity or signs of injury. Right lower leg: No edema. Left lower leg: No edema. Skin:     General: Skin is warm and dry. Capillary Refill: Capillary refill takes less than 2 seconds. Coloration: Skin is not pale. Comments: Left lateral chest wall rash consistent with shingles localizing tenderness. Neurological:      General: No focal deficit present. Mental Status: She is oriented to person, place, and time. Mental status is at baseline. Sensory: No sensory deficit. Motor: No weakness. Coordination: Coordination normal.         DIAGNOSTIC RESULTS   LABS:    Labs Reviewed - No data to display    All other labs were within normal range or not returned as of this dictation. EKG: All EKG's are interpreted by the Emergency Department Physician in the absence of a cardiologist.  Please see their note for interpretation of EKG. RADIOLOGY:   Non-plain film images such as CT, Ultrasound and MRI are read by the radiologist. Plain radiographic images are visualized and preliminarily interpreted by the ED Provider with the below findings:        Interpretation per the Radiologist below, if available at the time of this note:    CT Head WO Contrast   Final Result   No acute intracranial abnormality. Scattered nonspecific white matter hypodensities which are nonspecific but   could represent the sequela of chronic small vessel ischemic disease. No results found.         PROCEDURES   Unless otherwise noted below, none     Procedures    CRITICAL CARE TIME   N/A    CONSULTS:  None      EMERGENCY DEPARTMENT COURSE and DIFFERENTIAL DIAGNOSIS/MDM:   Vitals:    Vitals:    04/20/21 1836 04/20/21 2019 04/20/21 2056   BP: 135/72 137/70 131/77   Pulse: 79 76 79   Resp: 16 18 16   Temp: 97.7 °F (36.5 °C) 98 °F (36.7 °C)    TempSrc: Temporal     SpO2: 94% 96%    Weight: 145 lb (65.8 kg)     Height: 5' 1.5\" (1.562 m)         Patient was given the following medications:  Medications   aspirin-acetaminophen-caffeine (EXCEDRIN MIGRAINE) per tablet 1 tablet (1 tablet Oral Given 4/20/21 2018)   ondansetron (ZOFRAN-ODT) disintegrating tablet 4 mg (4 mg Oral Given 4/20/21 1950)         This patient presents as above and evaluation and treatment is begun here. Vital signs are stable with patient having no fever or tachycardia, no respiratory difficulties or acute BP changes. A review of Epic shows recent presentation about 2 weeks ago with nearly identical story of presentation and negative work-up at that time including CT head and CT C-spine. Overall patient appears to be in reasonably good condition with no clear indication of acute changes from baseline. However after examination by Dr. Karthikeyan Fernandes, and out of an abundance of caution for the patient, he does order a CT head (plain) exam, which returns negative as above. No concerns or indication of acute intracranial bleed, concussion, TIA or CVA present at this time. Patient observed to be wearing glasses and contentedly occupied with her smart phone in the room, demonstrating good comfort. It remains unclear whether or not patient had a new head contusion at this time though her complaint does indicate that potential.  At this time any potential injury would be in the nature of a mild scalp injury, and headache is felt to be more tension-like in nature. No suspicion of acute infectious etiology of symptoms.   Conservative home care with close outpatient follow-up with with family doctor is recommended at this time. Patient is educated concerning the above and the following discharge home plan and verbalizes understanding and agreement with it. Home in good condition to ice affected areas 15 to 20 minutes every couple hours as needed for the next couple of days, drink plenty of water and rest.  Continue refraining from driving based on the instructions you have already received from your family doctor because of intermittent ongoing dizziness. May consider using over-the-counter migraine type headache such as Excedrin if needed and follow-up outpatient with your doctor for further evaluation and treatment. Return to the emergency department for any emergency medical condition. FINAL IMPRESSION      1. Scalp injury, initial encounter    2.  Tension headache          DISPOSITION/PLAN   DISPOSITION Decision To Discharge 04/20/2021 09:40:33 PM      PATIENT REFERREDTO:  Andre Murphy DO  [address]    Schedule an appointment as soon as possible for a visit   Further care and treatment as needed      DISCHARGE MEDICATIONS:  New Prescriptions    No medications on file       DISCONTINUED MEDICATIONS:  Discontinued Medications    No medications on file              (Please note that portions of this note were completed with a voice recognition program.  Efforts were made to edit the dictations but occasionally words are mis-transcribed.)    Katherne Closs, PA-C (electronically signed)           Katherne Closs, PA-C  04/20/21 2575

## 2021-04-21 ENCOUNTER — CARE COORDINATION (OUTPATIENT)
Dept: CARE COORDINATION | Age: 61
End: 2021-04-21

## 2021-04-21 ENCOUNTER — TELEPHONE (OUTPATIENT)
Dept: FAMILY MEDICINE CLINIC | Age: 61
End: 2021-04-21

## 2021-04-21 NOTE — TELEPHONE ENCOUNTER
Please check with the patient. It stated superficial injury and to follow up if needed. Let her know we have no appointments today perhaps later this week or early next week. If she is having complications and residual symptoms that are worse she needs to follow up in the ER.

## 2021-04-21 NOTE — CARE COORDINATION
Ambulatory Care Coordination  ED Follow up Call    Reason for ED visit:  Head injury  Status:     No change  Did you call your PCP prior to going to the ED? no    Did you receive a discharge instructions from the Emergency Room? Yes  Review of Instructions:     Understands what to report/when to return?:  {YES    Understands discharge instructions?:   YES    Following discharge instructions?:   YES     Are there any new complaints of pain? No  New Pain Meds? No    Constipation prophylaxis needed? No     If you have a wound is the dressing clean, dry, and intact? Na  Understands wound care regimen? na    Are there any other complaints/concerns that you wish to tell your provider? Yes she is not taking prednisone, states pharmacist told her that it would weaken bones    FU appts/Provider:    Future Appointments   Date Time Provider Lashell Prabhakar   5/1/2021  3:20 PM Kathy EDDY, PFIZER [facility]   5/26/2021  9:00 AM Rizwana Boyer MD  ORTHO [facility]   8/9/2021  8:20 AM Benito Raya MD [facility] PULM [facility]   9/14/2021  1:00 PM SCHEDULE, VASCULAR LAB 1 [id number]   9/14/2021  1:45 PM Leah Ellsworth MD [facility]           New Medications?:   no      Medication Reconciliation by phone - {YES   Understands Medications? YES   Taking Medications? YES   Can you swallow your pills?   YES    Any further needs in the home i.e. Equipment?  no    Link to services in community?:  N/A:   Which services:

## 2021-04-21 NOTE — TELEPHONE ENCOUNTER
Pt needs to come in for a hfu for a head injury. She was in the ER last night. She was hit in the head with the car door. There are no available appt.  Please give pt a all [phone]

## 2021-04-21 NOTE — TELEPHONE ENCOUNTER
Patient informed of Dr. Amador Oneal message below. Offered to schedule with another doctor if needed but patient declined. Patient states she is still dizzy and her head hurts and was informed to go to the ER if her symptoms get worse.

## 2021-04-21 NOTE — ED PROVIDER NOTES
I independently performed a history and physical on Greta Whiting. All diagnostic, treatment, and disposition decisions were made by myself in conjunction with the advanced practice provider. Briefly, this is a 63 y.o. female here for left-sided headache which began after patient states she struck her forehead on a car door at about 1630 this afternoon. Patient reports had similar episode several weeks ago, however this is a new injury today. Nothing seems to make her headache any better or worse. Severity moderate. She denies worst headache of life or thunderclap onset. .    On exam, Patient afebrile and nontoxic. No distress. Heart RRR. Lungs CTAB. A&Ox4. PERRL, left amblyopia noted which patient states is chronic and unchanged. Speech clear, face symmetric. CN 2-12 intact. 5/5 motor and sensation grossly intact all extremities. No pronator drift. Normal finger to nose, normal heel to shin. Normal gait observed. Screenings            MDM    Patient afebrile and nontoxic. No distress. Neuro exam is benign, no findings to suggest CVA/TIA. No red flags for SAH/ICH. CT head without evidence of hemorrhage, skull fracture or mass-effect. Patient reports headache improved with ED treatment. [place] safe for discharge to self-care with close PCP follow-up. Strict return precautions were discussed. Patient Referrals: Jordy Sunshine DO  [address] [address]  [phone]    Schedule an appointment as soon as possible for a visit   Further care and treatment as needed      Discharge Medications:  Discharge Medication List as of 4/20/2021  9:47 PM          FINAL IMPRESSION  1. Scalp injury, initial encounter    2. Tension headache        Blood pressure 131/77, pulse 79, temperature 98 °F (36.7 °C), resp. rate 16, height 5' 1.5\" (1.562 m), weight 145 lb (65.8 kg), SpO2 96 %, not currently breastfeeding.      For further details of Brianne Mina Whiting's emergency department encounter, please see documentation by advanced practice provider, Alli Arreguin.     Homero Tejeda DO (electronically signed)  Attending Emergency Physician       Homero Tejeda DO  04/20/21 6232

## 2021-04-22 ENCOUNTER — APPOINTMENT (OUTPATIENT)
Dept: CT IMAGING | Age: 61
End: 2021-04-22
Payer: MEDICARE

## 2021-04-22 ENCOUNTER — HOSPITAL ENCOUNTER (EMERGENCY)
Age: 61
Discharge: HOME | End: 2021-04-22
Attending: EMERGENCY MEDICINE
Payer: MEDICARE

## 2021-04-22 VITALS
HEART RATE: 84 BPM | RESPIRATION RATE: 17 BRPM | BODY MASS INDEX: 27.97 KG/M2 | SYSTOLIC BLOOD PRESSURE: 109 MMHG | TEMPERATURE: 98.6 F | DIASTOLIC BLOOD PRESSURE: 87 MMHG | HEIGHT: 61 IN | WEIGHT: 148.15 LBS | OXYGEN SATURATION: 96 %

## 2021-04-22 DIAGNOSIS — S06.0X0A CONCUSSION WITHOUT LOSS OF CONSCIOUSNESS, INITIAL ENCOUNTER: Primary | ICD-10-CM

## 2021-04-22 PROCEDURE — 70450 CT HEAD/BRAIN W/O DYE: CPT

## 2021-04-22 PROCEDURE — 99283 EMERGENCY DEPT VISIT LOW MDM: CPT

## 2021-04-22 NOTE — ED PROVIDER NOTES
[facility]      Pt Name: Sandi Tellez  MRN: [id number]  [place] 1960  Date of evaluation: 4/22/2021  Provider: Moira Merrill DO    CHIEF COMPLAINT       Chief Complaint   Patient presents with    Head Injury     checked out Tuesday for head injury; still experience headache and nausea         HISTORY OF PRESENT ILLNESS   (Location/Symptom, Timing/Onset, Context/Setting, Quality, Duration, Modifying Factors, Severity)  Note limiting factors. Sandi Tellez is a 63 y.o. female who presents to the emergency department with a complaint of a persistent headache for the last 2 days. The patient states that on Tuesday, 2 days ago on April 20, she bumped her left forehead above the eyebrow on the car door. There was no loss of consciousness. However, she complains of a dull throbbing pressure pain in the left frontal area that radiates to the vertex of her head since that time. She has been nauseated at times but denies any vomiting. She denies any neck pain, vision change, facial droop, difficulty speaking, focal weakness, numbness, slurred speech, or difficulty walking. There was no loss of consciousness. She takes aspirin 81 mg daily but does not take any other anticoagulants. She did not fall to the ground. No other injury. She came to the emergency department on April 20 and underwent CT head which was negative. She states \"I think I have a concussion\". He denies any fever, chills, cough or cold symptoms. No earache sore throat or sinus drainage. No neck or jaw pain. No chest pain or shortness of breath. No abdominal pain vomiting or diarrhea. Nursing Notes were reviewed. HPI        REVIEW OF SYSTEMS    (2-9 systems for level 4, 10 or more for level 5)       Constitutional: Negative for fever or chills. HENT: Negative for rhinorrhea and sore throat. Eyes: Negative for redness or drainage.    Respiratory: Negative for shortness of breath or dyspnea on exertion. Cardiovascular: Negative for chest pain. Gastrointestinal: Negative for abdominal pain. Negative for vomiting or diarrhea. Genitourinary: Negative for flank pain. Negative for dysuria. Negative for hematuria. Musculoskeletal:  Negative edema. Hematological: Negative for adenopathy. All systems are reviewed and are negative except for those listed above in the history of present illness and ROS. PAST MEDICAL HISTORY     Past Medical History:   Diagnosis Date    Back pain     CAD (coronary artery disease)     COPD (chronic obstructive pulmonary disease) (HCC)     Foot pain     from bone spurs    GERD (gastroesophageal reflux disease)     Headache     sharp pain going through head    Heart attack ([facility] [address]     Hip pain     Hyperlipidemia     Hypertension      (normal spontaneous vaginal delivery)     Osteoporosis     Vision abnormalities          SURGICAL HISTORY       Past Surgical History:   Procedure Laterality Date    CARDIAC CATHETERIZATION      CHOLECYSTECTOMY      COLONOSCOPY      ESOPHAGEAL DILATATION N/A 2019    ESOPHAGEAL DILATION [place] performed by Radha Wyatt MD at [address] [facility] N/A 2019    EGD BIOPSY performed by Radha Wyatt MD at [facility]       Previous Medications    ACETAMINOPHEN (TYLENOL) 500 MG TABLET    Take 2 tablets by mouth every 6 hours as needed for Pain Do not take with other medications containing acetaminophen.     ALBUTEROL SULFATE HFA (PROVENTIL HFA) 108 (90 BASE) MCG/ACT INHALER    Inhale 4-6 puffs into the lungs every 4 hours as needed for Wheezing or Shortness of Breath    ASPIRIN 81 MG CHEWABLE TABLET    CHEW ONE TABLET BY MOUTH DAILY    ATORVASTATIN (LIPITOR) 40 MG TABLET    Take 1 tablet by mouth nightly    DIPHENHYDRAMINE-APAP, SLEEP, (TYLENOL PM EXTRA STRENGTH)  MG TABLET    Take 1 Medical: None     Non-medical: None   Tobacco Use    Smoking status: Former Smoker     Packs/day: 1.00     Years: 37.00     Pack years: 37.00     Types: Cigarettes     Quit date: 2017     Years since quittin.3    Smokeless tobacco: Never Used   Substance and Sexual Activity    Alcohol use: No     Alcohol/week: 0.0 standard drinks    Drug use: No    Sexual activity: Not Currently   Lifestyle    Physical activity     Days per week: None     Minutes per session: None    Stress: None   Relationships    Social connections     Talks on phone: None     Gets together: None     Attends Muslim service: None     Active member of club or organization: None     Attends meetings of clubs or organizations: None     Relationship status: None    Intimate partner violence     Fear of current or ex partner: None     Emotionally abused: None     Physically abused: None     Forced sexual activity: None   Other Topics Concern    None   Social History Narrative    None       SCREENINGS             PHYSICAL EXAM    (up to 7 for level 4, 8 or more for level 5)     ED Triage Vitals [21 0208]   BP Temp Temp Source Pulse Resp SpO2 Height Weight   (!) 155/76 98.5 °F (36.9 °C) Oral 77 17 95 % 5' 1\" (1.549 m) 148 lb 2.4 oz (67.2 kg)         Physical Exam   Constitutional: Awake and alert. Very pleasant. Appears comfortable. Head: Scalp tenderness noted to the left forehead and left frontal parietal area. No step-off or deformity. No periorbital edema. No orbital injury. No hyphema. Normocephalic. Eyes: Pupils equal and reactive. No photophobia. Conjunctiva normal.  Extraocular muscles were intact. No pain with extraocular movement. Mandible nontender reformed motion. No malocclusion. No mid facial tenderness. HENT: Oral mucosa moist.  Airway patent. Pharynx without erythema. Nares were clear. Neck:  Soft and supple. Nontender. Heart:  Regular rate and rhythm. No murmur.   Lungs:  Clear to 95%   Weight: 148 lb 2.4 oz (67.2 kg)   Height: 5' 1\" (1.549 m)         MDM      Patient presents with persistent headache since a head injury 2 days ago. She did have a CT head without contrast on April 20 which was reviewed. No acute findings. I suspect that she likely has a concussion with postconcussion headache. Repeat CT head without contrast was obtained because she does take aspirin. She is neurologically intact with a GCS of 15. REASSESSMENT          3:53 AM: Repeat CT head is unremarkable. Advised her to follow-up with her primary care physician in 1 to 2 days for reexamination. She will be given head injury and concussion instructions. She was given Tylenol in the emergency department with improvement. I recommended that she continue Tylenol as directed. Avoid activities in which you could sustain a second head injury for at least 1 week including climbing ladders, riding bicycles or motorcycles, or sports. Avoid activities for at least 3 to 5 days which require intense concentration such as playing video games, computer games, cell phone games, taking a test, etc.  Follow-up with a primary care physician in 1 to 2 days for reexamination and for clearance to return to normal activity. If condition worsens or new symptoms develop, return immediately to the emergency department. CRITICAL CARE TIME   Total Critical Care time was 0 minutes, excluding separately reportable procedures. There was a high probability of clinically significant/life threatening deterioration in the patient's condition which required my urgent intervention. CONSULTS:  None    PROCEDURES:  Unless otherwise noted below, none     Procedures        FINAL IMPRESSION      1.  Concussion without loss of consciousness, initial encounter          DISPOSITION/PLAN   DISPOSITION        PATIENT REFERRED TO:  [facility] [place], [address] [place] [address]  [phone]    Call

## 2021-04-22 NOTE — ED TRIAGE NOTES
Patient alert and orientated x4 states was seen on Tuesday when she hit her head on the car door. Patient states she is still experiencing headache and nausea from injury. No slurred speech noted. Strength equal in all four extremities. Denies emesis.

## 2021-04-23 ENCOUNTER — CARE COORDINATION (OUTPATIENT)
Dept: CARE COORDINATION | Age: 61
End: 2021-04-23

## 2021-04-23 NOTE — CARE COORDINATION
Pt presented to Ed again with continued complaints of headache states they gave her tylenol and it helped some, states she was told to continue tylenol and follow up with pcp.  States she will call [facility] to make appoimtment

## 2021-05-03 ENCOUNTER — CARE COORDINATION (OUTPATIENT)
Dept: CARE COORDINATION | Age: 61
End: 2021-05-03

## 2021-05-03 NOTE — CARE COORDINATION
Summary:  ACM out reach call placed to check patient status, no answer.  Will attempt to reach again at later date

## 2021-05-08 ENCOUNTER — HOSPITAL ENCOUNTER (EMERGENCY)
Age: 61
Discharge: HOME | End: 2021-05-09
Attending: STUDENT IN AN ORGANIZED HEALTH CARE EDUCATION/TRAINING PROGRAM
Payer: MEDICARE

## 2021-05-08 ENCOUNTER — APPOINTMENT (OUTPATIENT)
Dept: GENERAL RADIOLOGY | Age: 61
End: 2021-05-08
Payer: MEDICARE

## 2021-05-08 DIAGNOSIS — J44.1 COPD EXACERBATION (HCC): Primary | ICD-10-CM

## 2021-05-08 LAB
ANION GAP SERPL CALCULATED.3IONS-SCNC: 10 MMOL/L (ref 3–16)
BASOPHILS ABSOLUTE: 0 K/UL (ref 0–0.2)
BASOPHILS RELATIVE PERCENT: 0.5 %
BUN BLDV-MCNC: 7 MG/DL (ref 7–20)
CALCIUM SERPL-MCNC: 8.8 MG/DL (ref 8.3–10.6)
CHLORIDE BLD-SCNC: 102 MMOL/L (ref 99–110)
CO2: 27 MMOL/L (ref 21–32)
CREAT SERPL-MCNC: 0.5 MG/DL (ref 0.6–1.2)
D DIMER: <200 NG/ML DDU (ref 0–229)
EOSINOPHILS ABSOLUTE: 0.2 K/UL (ref 0–0.6)
EOSINOPHILS RELATIVE PERCENT: 3.7 %
GFR AFRICAN AMERICAN: >60
GFR NON-AFRICAN AMERICAN: >60
GLUCOSE BLD-MCNC: 97 MG/DL (ref 70–99)
HCT VFR BLD CALC: 39 % (ref 36–48)
HEMOGLOBIN: 13.3 G/DL (ref 12–16)
LYMPHOCYTES ABSOLUTE: 1.5 K/UL (ref 1–5.1)
LYMPHOCYTES RELATIVE PERCENT: 22.1 %
MCH RBC QN AUTO: 32.5 PG (ref 26–34)
MCHC RBC AUTO-ENTMCNC: 34.2 G/DL (ref 31–36)
MCV RBC AUTO: 95 FL (ref 80–100)
MONOCYTES ABSOLUTE: 0.5 K/UL (ref 0–1.3)
MONOCYTES RELATIVE PERCENT: 7.2 %
NEUTROPHILS ABSOLUTE: 4.4 K/UL (ref 1.7–7.7)
NEUTROPHILS RELATIVE PERCENT: 66.5 %
PDW BLD-RTO: 15.8 % (ref 12.4–15.4)
PLATELET # BLD: 216 K/UL (ref 135–450)
PMV BLD AUTO: 8 FL (ref 5–10.5)
POTASSIUM REFLEX MAGNESIUM: 4.3 MMOL/L (ref 3.5–5.1)
RBC # BLD: 4.1 M/UL (ref 4–5.2)
SODIUM BLD-SCNC: 139 MMOL/L (ref 136–145)
TROPONIN: <0.01 NG/ML
WBC # BLD: 6.6 K/UL (ref 4–11)

## 2021-05-08 PROCEDURE — 94761 N-INVAS EAR/PLS OXIMETRY MLT: CPT

## 2021-05-08 PROCEDURE — 80048 BASIC METABOLIC PNL TOTAL CA: CPT

## 2021-05-08 PROCEDURE — 6370000000 HC RX 637 (ALT 250 FOR IP): Performed by: PHYSICIAN ASSISTANT

## 2021-05-08 PROCEDURE — 94640 AIRWAY INHALATION TREATMENT: CPT

## 2021-05-08 PROCEDURE — 84484 ASSAY OF TROPONIN QUANT: CPT

## 2021-05-08 PROCEDURE — 85025 COMPLETE CBC W/AUTO DIFF WBC: CPT

## 2021-05-08 PROCEDURE — 99283 EMERGENCY DEPT VISIT LOW MDM: CPT

## 2021-05-08 PROCEDURE — 85379 FIBRIN DEGRADATION QUANT: CPT

## 2021-05-08 PROCEDURE — 93005 ELECTROCARDIOGRAM TRACING: CPT | Performed by: PHYSICIAN ASSISTANT

## 2021-05-08 PROCEDURE — 71046 X-RAY EXAM CHEST 2 VIEWS: CPT

## 2021-05-08 RX ORDER — IPRATROPIUM BROMIDE AND ALBUTEROL SULFATE 2.5; .5 MG/3ML; MG/3ML
1 SOLUTION RESPIRATORY (INHALATION) ONCE
Status: COMPLETED | OUTPATIENT
Start: 2021-05-08 | End: 2021-05-08

## 2021-05-08 RX ADMIN — IPRATROPIUM BROMIDE AND ALBUTEROL SULFATE 1 AMPULE: .5; 3 SOLUTION RESPIRATORY (INHALATION) at 22:53

## 2021-05-08 ASSESSMENT — ENCOUNTER SYMPTOMS
NAUSEA: 0
SHORTNESS OF BREATH: 1
ABDOMINAL PAIN: 1
VOMITING: 0
COUGH: 1
CHEST TIGHTNESS: 1

## 2021-05-08 ASSESSMENT — PAIN DESCRIPTION - LOCATION: LOCATION: NECK;CHEST

## 2021-05-08 ASSESSMENT — PAIN SCALES - GENERAL: PAINLEVEL_OUTOF10: 6

## 2021-05-09 VITALS
SYSTOLIC BLOOD PRESSURE: 133 MMHG | OXYGEN SATURATION: 95 % | BODY MASS INDEX: 28.72 KG/M2 | RESPIRATION RATE: 18 BRPM | HEIGHT: 61 IN | WEIGHT: 152.12 LBS | DIASTOLIC BLOOD PRESSURE: 71 MMHG | TEMPERATURE: 97.6 F | HEART RATE: 74 BPM

## 2021-05-09 PROCEDURE — 6370000000 HC RX 637 (ALT 250 FOR IP): Performed by: PHYSICIAN ASSISTANT

## 2021-05-09 RX ORDER — PREDNISONE 20 MG/1
40 TABLET ORAL ONCE
Status: COMPLETED | OUTPATIENT
Start: 2021-05-09 | End: 2021-05-09

## 2021-05-09 RX ORDER — PREDNISONE 20 MG/1
40 TABLET ORAL DAILY
Qty: 8 TABLET | Refills: 0 | Status: SHIPPED | OUTPATIENT
Start: 2021-05-09 | End: 2021-05-13

## 2021-05-09 RX ORDER — ACETAMINOPHEN 325 MG/1
650 TABLET ORAL ONCE
Status: COMPLETED | OUTPATIENT
Start: 2021-05-09 | End: 2021-05-09

## 2021-05-09 RX ADMIN — PREDNISONE 40 MG: 20 TABLET ORAL at 01:18

## 2021-05-09 RX ADMIN — ACETAMINOPHEN 650 MG: 325 TABLET ORAL at 01:18

## 2021-05-09 ASSESSMENT — PAIN SCALES - GENERAL: PAINLEVEL_OUTOF10: 6

## 2021-05-09 NOTE — ED NOTES
D/C: Order noted for d/c. Pt confirmed d/c paperwork. Discharge and education instructions reviewed with patient. Teach-back successful. Pt verbalized understanding and signed d/c papers. Pt denied questions at this time. No acute distress noted. Patient instructed to follow-up as noted - return to emergency department if symptoms worsen. Patient verbalized understanding. Discharged per EDMD with discharge instructions. Pt discharged to private vehicle. Patient stable upon departure. Thanked patient for choosing [facility] for care. Provider aware of patient pain at time of discharge.      Kim Ortega RN  05/09/21 9118

## 2021-05-10 LAB
EKG ATRIAL RATE: 68 BPM
EKG DIAGNOSIS: NORMAL
EKG P AXIS: 72 DEGREES
EKG P-R INTERVAL: 128 MS
EKG Q-T INTERVAL: 380 MS
EKG QRS DURATION: 72 MS
EKG QTC CALCULATION (BAZETT): 404 MS
EKG R AXIS: 71 DEGREES
EKG T AXIS: 79 DEGREES
EKG VENTRICULAR RATE: 68 BPM

## 2021-05-10 PROCEDURE — 93010 ELECTROCARDIOGRAM REPORT: CPT | Performed by: INTERNAL MEDICINE

## 2021-05-11 ENCOUNTER — CARE COORDINATION (OUTPATIENT)
Dept: CARE COORDINATION | Age: 61
End: 2021-05-11

## 2021-05-12 ENCOUNTER — OFFICE VISIT (OUTPATIENT)
Dept: FAMILY MEDICINE CLINIC | Age: 61
End: 2021-05-12
Payer: MEDICARE

## 2021-05-12 VITALS
DIASTOLIC BLOOD PRESSURE: 80 MMHG | BODY MASS INDEX: 28.32 KG/M2 | WEIGHT: 150 LBS | HEIGHT: 61 IN | SYSTOLIC BLOOD PRESSURE: 120 MMHG

## 2021-05-12 DIAGNOSIS — H53.002 LAZY EYE OF LEFT SIDE: ICD-10-CM

## 2021-05-12 DIAGNOSIS — S06.0X0S CONCUSSION WITHOUT LOSS OF CONSCIOUSNESS, SEQUELA (HCC): ICD-10-CM

## 2021-05-12 DIAGNOSIS — S09.90XS TRAUMATIC INJURY OF HEAD, SEQUELA: Primary | ICD-10-CM

## 2021-05-12 DIAGNOSIS — R90.82 WHITE MATTER DISEASE: ICD-10-CM

## 2021-05-12 LAB
ANION GAP SERPL CALCULATED.3IONS-SCNC: 12 MMOL/L (ref 3–16)
BUN BLDV-MCNC: 6 MG/DL (ref 7–20)
CALCIUM SERPL-MCNC: 9.6 MG/DL (ref 8.3–10.6)
CHLORIDE BLD-SCNC: 104 MMOL/L (ref 99–110)
CO2: 28 MMOL/L (ref 21–32)
CREAT SERPL-MCNC: 0.6 MG/DL (ref 0.6–1.2)
ETHANOL: NORMAL MG/DL (ref 0–0.08)
FOLATE: 4.86 NG/ML (ref 4.78–24.2)
GFR AFRICAN AMERICAN: >60
GFR NON-AFRICAN AMERICAN: >60
GLUCOSE BLD-MCNC: 90 MG/DL (ref 70–99)
POTASSIUM SERPL-SCNC: 4.8 MMOL/L (ref 3.5–5.1)
REASON FOR REJECTION: NORMAL
REJECTED TEST: NORMAL
SODIUM BLD-SCNC: 144 MMOL/L (ref 136–145)
TSH REFLEX FT4: 0.63 UIU/ML (ref 0.27–4.2)
VITAMIN B-12: 325 PG/ML (ref 211–911)

## 2021-05-12 PROCEDURE — 36415 COLL VENOUS BLD VENIPUNCTURE: CPT | Performed by: FAMILY MEDICINE

## 2021-05-12 PROCEDURE — 99214 OFFICE O/P EST MOD 30 MIN: CPT | Performed by: FAMILY MEDICINE

## 2021-05-12 PROCEDURE — G8419 CALC BMI OUT NRM PARAM NOF/U: HCPCS | Performed by: FAMILY MEDICINE

## 2021-05-12 PROCEDURE — 1036F TOBACCO NON-USER: CPT | Performed by: FAMILY MEDICINE

## 2021-05-12 PROCEDURE — G8427 DOCREV CUR MEDS BY ELIG CLIN: HCPCS | Performed by: FAMILY MEDICINE

## 2021-05-12 PROCEDURE — 3017F COLORECTAL CA SCREEN DOC REV: CPT | Performed by: FAMILY MEDICINE

## 2021-05-12 NOTE — PROGRESS NOTES
Musculoskeletal: Positive for arthralgias. Neurological: Positive for light-headedness. Negative for dizziness, numbness and headaches. Psychiatric/Behavioral: Positive for decreased concentration. Negative for dysphoric mood. The patient is not nervous/anxious. Prior to Visit Medications    Medication Sig Taking? Authorizing Provider   atorvastatin (LIPITOR) 80 MG tablet Take 1 tablet by mouth daily Yes Kevin Zapata DO   naproxen (NAPROSYN) 500 MG tablet Take 1 tablet by mouth 2 times daily as needed for Pain  Young Gil MD   albuterol (PROVENTIL) (5 MG/ML) 0.5% nebulizer solution Take 0.5 mLs by nebulization every 6 hours as needed for Wheezing  Laura Mosley PA-C   sertraline (ZOLOFT) 100 MG tablet TAKE ONE TABLET BY MOUTH DAILY  Kevin Zapata DO   Umeclidinium Bromide 62.5 MCG/INH AEPB Inhale 1 puff into the lungs daily  Theo Guaman MD   ipratropium-albuterol (DUONEB) 0.5-2.5 (3) MG/3ML SOLN nebulizer solution Inhale 3 mLs into the lungs every 4 hours  Theo Guaman MD   metoprolol succinate (TOPROL XL) 25 MG extended release tablet TAKE ONE-HALF TABLET BY MOUTH DAILY  Kevin Zapata DO   pregabalin (LYRICA) 75 MG capsule Take 1 capsule by mouth 2 times daily for 125 days. Kevin Zapata DO   pantoprazole (PROTONIX) 20 MG tablet TAKE TWO TABLETS BY MOUTH DAILY  Syedarmin Luis,    folic acid (FOLVITE) 1 MG tablet Take 1 tablet by mouth daily  Kevin Zapata DO   metFORMIN (GLUCOPHAGE) 500 MG tablet Take 1 tablet by mouth daily (with breakfast)  JOSE ANGEL Duggan CNP   triamcinolone (KENALOG) 0.1 % cream Apply topically 2 times daily.   JOSE ANGEL Duggan CNP   aspirin 81 MG chewable tablet CHEW ONE TABLET BY MOUTH DAILY  JOSE ANGEL Moise CNP   diphenhydrAMINE-APAP, sleep, (TYLENOL PM EXTRA STRENGTH)  MG tablet Take 1 tablet by mouth nightly as needed   Historical Provider, MD   mineral oil-hydrophilic petrolatum (AQUAPHOR) ointment Apply topically Coordination abnormal.      Gait: Gait normal.   Psychiatric:         Behavior: Behavior normal.         ASSESSMENT/PLAN:  1. Traumatic injury of head, sequela  Discusses signs and symptoms for immediate evaluation in the ER. Will order labs and MRI  Questions answered  Will refer to Neurology for additional evaluation  - Vitamin B12 & Folate  - TSH WITH REFLEX TO FT4  - ESTHER  - ETHANOL  - MRI BRAIN WO CONTRAST; Future  - Basic Metabolic Panel  - DRUG SCREEN MULTI URINE  - SIDNEY - Chaparrita Robb MD, Neurology, [facility]    2. Concussion without loss of consciousness, sequela ([address]  Discussed brain rest etc..  Patient had questions answered  She is adamant that she is not using alcohol or drugs  Seems to be slightly more confused today  - Vitamin B12 & Folate  - TSH WITH REFLEX TO FT4  - ESTHER  - ETHANOL  - MRI BRAIN WO CONTRAST; Future  - Basic Metabolic Panel  - DRUG SCREEN MULTI URINE  - SIDNEY - Chaparrita Robb MD, Neurology, [facility]    3. Lazy eye of left side  See hx  - Vitamin B12 & Folate  - TSH WITH REFLEX TO FT4  - ESTHER  - ETHANOL  - MRI BRAIN WO CONTRAST; Future  - Basic Metabolic Panel  - SIDNEY - Chaparrita Robb MD, Neurology, [facility]    4. White matter disease  Increase Lipitor  Is on Aspirin  Will need additional evaluation to rule out other etiologies. Continue with medication  Keep appointments with specialist.   Suzy Deal questions  Will follow up with phone call due to the patient's history. No follow-ups on file. Patent is motivated to get better

## 2021-05-13 ENCOUNTER — TELEPHONE (OUTPATIENT)
Dept: FAMILY MEDICINE CLINIC | Age: 61
End: 2021-05-13

## 2021-05-13 LAB
AMPHETAMINE SCREEN, URINE: NORMAL
BARBITURATE SCREEN URINE: NORMAL
BENZODIAZEPINE SCREEN, URINE: NORMAL
CANNABINOID SCREEN URINE: NORMAL
COCAINE METABOLITE SCREEN URINE: NORMAL
Lab: NORMAL
METHADONE SCREEN, URINE: NORMAL
OPIATE SCREEN URINE: NORMAL
OXYCODONE URINE: NORMAL
PH UA: 5.5
PHENCYCLIDINE SCREEN URINE: NORMAL
PROPOXYPHENE SCREEN: NORMAL

## 2021-05-14 ENCOUNTER — HOSPITAL ENCOUNTER (EMERGENCY)
Age: 61
Discharge: HOME | End: 2021-05-14
Attending: EMERGENCY MEDICINE
Payer: MEDICARE

## 2021-05-14 ENCOUNTER — APPOINTMENT (OUTPATIENT)
Dept: CT IMAGING | Age: 61
End: 2021-05-14
Payer: MEDICARE

## 2021-05-14 VITALS
TEMPERATURE: 98.1 F | SYSTOLIC BLOOD PRESSURE: 121 MMHG | WEIGHT: 149.91 LBS | BODY MASS INDEX: 27.59 KG/M2 | HEART RATE: 74 BPM | OXYGEN SATURATION: 98 % | RESPIRATION RATE: 18 BRPM | HEIGHT: 62 IN | DIASTOLIC BLOOD PRESSURE: 74 MMHG

## 2021-05-14 DIAGNOSIS — S09.90XA TRAUMATIC INJURY OF HEAD, INITIAL ENCOUNTER: Primary | ICD-10-CM

## 2021-05-14 LAB — ANTI-NUCLEAR ANTIBODY (ANA): NEGATIVE

## 2021-05-14 PROCEDURE — 6360000002 HC RX W HCPCS: Performed by: EMERGENCY MEDICINE

## 2021-05-14 PROCEDURE — 70450 CT HEAD/BRAIN W/O DYE: CPT

## 2021-05-14 PROCEDURE — 99284 EMERGENCY DEPT VISIT MOD MDM: CPT

## 2021-05-14 PROCEDURE — 96372 THER/PROPH/DIAG INJ SC/IM: CPT

## 2021-05-14 RX ORDER — KETOROLAC TROMETHAMINE 30 MG/ML
15 INJECTION, SOLUTION INTRAMUSCULAR; INTRAVENOUS ONCE
Status: COMPLETED | OUTPATIENT
Start: 2021-05-14 | End: 2021-05-14

## 2021-05-14 RX ORDER — NAPROXEN 500 MG/1
500 TABLET ORAL 2 TIMES DAILY PRN
Qty: 30 TABLET | Refills: 0 | Status: SHIPPED | OUTPATIENT
Start: 2021-05-14 | End: 2021-10-27

## 2021-05-14 RX ADMIN — KETOROLAC TROMETHAMINE 15 MG: 30 INJECTION, SOLUTION INTRAMUSCULAR; INTRAVENOUS at 05:03

## 2021-05-14 ASSESSMENT — PAIN SCALES - GENERAL
PAINLEVEL_OUTOF10: 7
PAINLEVEL_OUTOF10: 6

## 2021-05-14 ASSESSMENT — ENCOUNTER SYMPTOMS
VOMITING: 0
EYE ITCHING: 0
COLOR CHANGE: 0
EYE DISCHARGE: 0
CONSTIPATION: 0
ABDOMINAL PAIN: 0
COUGH: 0
SHORTNESS OF BREATH: 0

## 2021-05-14 ASSESSMENT — PAIN - FUNCTIONAL ASSESSMENT: PAIN_FUNCTIONAL_ASSESSMENT: 0-10

## 2021-05-14 NOTE — TELEPHONE ENCOUNTER
Let the patient know her labs were wnl.   I have referred her to a Neurologist. Please give patient information concerning referral.

## 2021-05-14 NOTE — ED PROVIDER NOTES
for tremors and weakness. Psychiatric/Behavioral: Negative for agitation and behavioral problems. Except as noted above the remainder of the review of systems was reviewed and negative. PAST MEDICAL HISTORY     Past Medical History:   Diagnosis Date    Back pain     CAD (coronary artery disease)     [facility] disease) ([facility])     Foot pain     from bone spurs    GERD (gastroesophageal reflux disease)     Headache     sharp pain going through head    Heart attack ([address]     Hip pain     Hyperlipidemia     Hypertension      (normal spontaneous vaginal delivery)     Osteoporosis     Vision abnormalities        SURGICAL HISTORY       Past Surgical History:   Procedure Laterality Date    CARDIAC CATHETERIZATION      CHOLECYSTECTOMY      COLONOSCOPY      ESOPHAGEAL DILATATION N/A 2019    ESOPHAGEAL DILATION [place] performed by Josselyn Vuong MD at [address] [place] [address] N/A 2019    EGD BIOPSY performed by Josselyn Vuong MD at [address] [place] [address]       Discharge Medication List as of 2021  6:43 AM      CONTINUE these medications which have NOT CHANGED    Details   albuterol (PROVENTIL) (5 MG/ML) 0.5% nebulizer solution Take 0.5 mLs by nebulization every 6 hours as needed for Wheezing, Disp-30 vial, R-0Print      sertraline (ZOLOFT) 100 MG tablet TAKE ONE TABLET BY MOUTH DAILY, Disp-60 tablet, R-0Normal      Umeclidinium Bromide 62.5 MCG/INH AEPB Inhale 1 puff into the lungs daily, Disp-1 each, R-11Normal      ipratropium-albuterol (DUONEB) 0.5-2.5 (3) MG/3ML SOLN nebulizer solution Inhale 3 mLs into the lungs every 4 hours, Disp-360 mL, R-11Normal      metoprolol succinate (TOPROL XL) 25 MG extended release tablet TAKE ONE-HALF TABLET BY MOUTH DAILY, Disp-30 tablet, R-2Normal      pregabalin (LYRICA) 75 MG capsule Take 1 capsule by mouth 2 times daily for 125 days. , Disp-60 capsule, R-3Normal pantoprazole (PROTONIX) 20 MG tablet TAKE TWO TABLETS BY MOUTH DAILY, Disp-30 JANICE,[id number]      folic acid (FOLVITE) 1 MG tablet Take 1 tablet by mouth daily, Disp-30 tablet,R-1Normal      metFORMIN (GLUCOPHAGE) 500 MG tablet Take 1 tablet by mouth daily (with breakfast), Disp-90 tablet, R-0Normal      triamcinolone (KENALOG) 0.1 % cream Apply topically 2 times daily. , Disp-45 g, R-0, Normal      atorvastatin (LIPITOR) 40 MG tablet Take 1 tablet by mouth nightly, Disp-30 tablet, R-3Normal      aspirin 81 MG chewable tablet CHEW ONE TABLET BY MOUTH DAILY, Disp-90 tablet, R-1Normal      diphenhydrAMINE-APAP, sleep, (TYLENOL PM EXTRA STRENGTH)  MG tablet Take 1 tablet by mouth nightly as needed Historical Med      mineral oil-hydrophilic petrolatum (AQUAPHOR) ointment Apply topically as needed. , Disp-396 g, R-0, Normal      acetaminophen (TYLENOL) 500 MG tablet Take 2 tablets by mouth every 6 hours as needed for Pain Do not take with other medications containing acetaminophen., Disp-30 tablet, R-0Print             ALLERGIES     Iodine, Other, Eggs or egg-derived products [eggs or egg-derived products], Eggs [egg white], Flexeril [cyclobenzaprine hcl], Levofloxacin, and Omeprazole    FAMILY HISTORY        Family History   Problem Relation Age of Onset    Heart Failure Mother         heart disease    Hypertension Mother     Emphysema Mother     Heart Failure Father         heart disease    COPD Father     Hypertension Paternal Grandmother        SOCIAL HISTORY       Social History     Socioeconomic History    Marital status: Legally      Spouse name: None    Number of children: None    Years of education: None    Highest education level: None   Occupational History    None   Social Needs    Financial resource strain: None    Food insecurity     Worry: None     Inability: None    Transportation needs     Medical: None     Non-medical: None   Tobacco Use    Smoking status: Former Smoker Packs/day: 1.00     Years: 37.00     Pack years: 37.00     Types: Cigarettes     Quit date: 2017     Years since quittin.3    Smokeless tobacco: Never Used   Substance and Sexual Activity    Alcohol use: No     Alcohol/week: 0.0 standard drinks    Drug use: No    Sexual activity: Not Currently   Lifestyle    Physical activity     Days per week: None     Minutes per session: None    Stress: None   Relationships    Social connections     Talks on phone: None     Gets together: None     Attends Yarsanism service: None     Active member of club or organization: None     Attends meetings of clubs or organizations: None     Relationship status: None    Intimate partner violence     Fear of current or ex partner: None     Emotionally abused: None     Physically abused: None     Forced sexual activity: None   Other Topics Concern    None   Social History Narrative    None       PHYSICAL EXAM       ED Triage Vitals [21 0359]   BP Temp Temp Source Pulse Resp SpO2 Height Weight   122/81 98.1 °F (36.7 °C) Oral 70 16 95 % 5' 1.5\" (1.562 m) 149 lb 14.6 oz (68 kg)       Physical Exam  Vitals signs and nursing note reviewed. Constitutional:       General: She is not in acute distress. Appearance: She is well-developed. She is not ill-appearing, toxic-appearing or diaphoretic. HENT:      Head: Normocephalic and atraumatic. Right Ear: External ear normal.      Left Ear: External ear normal.   Eyes:      General:         Right eye: No discharge. Left eye: No discharge. Conjunctiva/sclera: Conjunctivae normal.      Pupils: Pupils are equal, round, and reactive to light. Neck:      Musculoskeletal: Normal range of motion and neck supple. Cardiovascular:      Rate and Rhythm: Normal rate and regular rhythm. Heart sounds: No murmur. Pulmonary:      Effort: Pulmonary effort is normal. No respiratory distress. Breath sounds: Normal breath sounds. No wheezing or rales. Abdominal:      General: Bowel sounds are normal. There is no distension. Palpations: Abdomen is soft. There is no mass. Tenderness: There is no abdominal tenderness. There is no guarding or rebound. Genitourinary:     Comments: Deferred  Musculoskeletal: Normal range of motion. General: No deformity. Skin:     General: Skin is warm. Findings: No erythema or rash. Neurological:      General: No focal deficit present. Mental Status: She is alert and oriented to person, place, and time. She is not disoriented. Cranial Nerves: No cranial nerve deficit. Motor: No weakness, atrophy or abnormal muscle tone. Coordination: Coordination normal.   Psychiatric:         Behavior: Behavior normal.         Thought Content: Thought content normal.         DIAGNOSTIC RESULTS     EKG: All EKG's are interpreted by the Emergency Department Physician who either signs or Co-signs this chart in the absence of acardiologist.    None    RADIOLOGY:   Non-plain film images such as CT, Ultrasoundand MRI are read by the radiologist. Plain radiographic images are visualized and preliminarily interpreted by the emergency physician with the below findings:    Impression   No acute intracranial abnormality.       Stable moderate to severe periventricular white disease. ED BEDSIDE ULTRASOUND:   Performed by ED Physician - none    LABS:  Labs Reviewed - No data to display    All other labs were withinnormal range or not returned as of this dictation. EMERGENCY DEPARTMENT COURSE and DIFFERENTIAL DIAGNOSIS/MDM:     PMH, Surgical Hx, FH, Social Hx reviewed by myself (ETOH usage, Tobacco usage, Drug usage reviewed by myself, no pertinent Hx)- No Pertinent Hx     Old records were reviewed by me    Head CT reassuring. No focal deficits. Denies suicidal or homicidal ideation. Toradol given and patient feels significantly better. Discussed that she probably has a concussion.   She has follow-up [id number]  [phone]    Call today        DISCHARGE MEDICATIONS:  Discharge Medication List as of 5/14/2021  6:43 AM      START taking these medications    Details   naproxen (NAPROSYN) 500 MG tablet Take 1 tablet by mouth 2 times daily as needed for Pain, Disp-30 tablet, R-0Print                (Please note that portions ofthis note were completed with a voice recognition program.  Efforts were made to edit the dictations but occasionally words are mis-transcribed.)    Yazan Villa MD(electronically signed)  Attending Emergency Physician        Yazan Villa MD  05/14/21 8056

## 2021-05-14 NOTE — ED TRIAGE NOTES
Headache,hx of migraines. Pt states pressure behind eyes. Denies other symptoms or concerns. VS noted and stable. Patient A&Ox4. Respirations easy and unlabored. Skin warm and dry and appropriate for ethnicity. No acute distress noted at this time. Patient placed on continuous pulse ox upon arrival to room.

## 2021-05-14 NOTE — ED NOTES
Provider order placed for patient's discharge. Provider reviewed decision to discharge with the patient. Discharge paperwork and any prescriptions were reviewed with the patient. Patient verbalized understanding of discharge education and any prescriptions and has no further questions or further needs at this time. Patient left with all personal belongings and was stable upon departure. Patient thanked for choosing [facility] ([facility]) and informed to return should any need arise.        Beltran Pike RN  05/14/21 9247

## 2021-05-17 RX ORDER — ATORVASTATIN CALCIUM 80 MG/1
80 TABLET, FILM COATED ORAL DAILY
Qty: 30 TABLET | Refills: 3 | Status: SHIPPED | OUTPATIENT
Start: 2021-05-17 | End: 2021-09-23 | Stop reason: SDUPTHER

## 2021-05-17 ASSESSMENT — ENCOUNTER SYMPTOMS
SHORTNESS OF BREATH: 0
COUGH: 0
DIARRHEA: 0
NAUSEA: 0
SINUS PRESSURE: 0
RHINORRHEA: 0
SORE THROAT: 0
VOMITING: 0
ABDOMINAL PAIN: 0

## 2021-05-20 ENCOUNTER — HOSPITAL ENCOUNTER (OUTPATIENT)
Dept: MRI IMAGING | Age: 61
Discharge: HOME | End: 2021-05-20
Payer: MEDICARE

## 2021-05-20 DIAGNOSIS — F41.9 ANXIETY: ICD-10-CM

## 2021-05-20 DIAGNOSIS — S09.90XS TRAUMATIC INJURY OF HEAD, SEQUELA: ICD-10-CM

## 2021-05-20 DIAGNOSIS — H53.002 LAZY EYE OF LEFT SIDE: ICD-10-CM

## 2021-05-20 DIAGNOSIS — S06.0X0S CONCUSSION WITHOUT LOSS OF CONSCIOUSNESS, SEQUELA (HCC): ICD-10-CM

## 2021-05-20 PROCEDURE — 70551 MRI BRAIN STEM W/O DYE: CPT

## 2021-05-20 RX ORDER — SERTRALINE HYDROCHLORIDE 100 MG/1
TABLET, FILM COATED ORAL
Qty: 90 TABLET | Refills: 0 | Status: SHIPPED | OUTPATIENT
Start: 2021-05-20 | End: 2021-05-21

## 2021-05-21 DIAGNOSIS — F41.9 ANXIETY: ICD-10-CM

## 2021-05-21 DIAGNOSIS — M79.7 FIBROMYALGIA: ICD-10-CM

## 2021-05-21 RX ORDER — SERTRALINE HYDROCHLORIDE 100 MG/1
TABLET, FILM COATED ORAL
Qty: 60 TABLET | Refills: 0 | Status: SHIPPED | OUTPATIENT
Start: 2021-05-21 | End: 2021-07-26

## 2021-05-21 RX ORDER — PREGABALIN 75 MG/1
CAPSULE ORAL
Qty: 60 CAPSULE | Refills: 2 | Status: SHIPPED | OUTPATIENT
Start: 2021-05-21 | End: 2021-12-22

## 2021-05-26 ENCOUNTER — CARE COORDINATION (OUTPATIENT)
Dept: CARE COORDINATION | Age: 61
End: 2021-05-26

## 2021-06-02 ENCOUNTER — CARE COORDINATION (OUTPATIENT)
Dept: CARE COORDINATION | Age: 61
End: 2021-06-02

## 2021-06-02 NOTE — CARE COORDINATION
acm out reach call placed to assess pt needs and offer disease specific education, no answer topt phone, hippa compliant message left on vm.  Will attempt again at pater date

## 2021-06-15 ENCOUNTER — CLINICAL DOCUMENTATION (OUTPATIENT)
Dept: OTHER | Age: 61
End: 2021-06-15

## 2021-06-16 ENCOUNTER — CARE COORDINATION (OUTPATIENT)
Dept: CARE COORDINATION | Age: 61
End: 2021-06-16

## 2021-06-17 NOTE — CARE COORDINATION
.  Ambulatory Care Coordination Note  6/17/2021  CM Risk Score: 14  Charlson 10 Year Mortality Risk Score: 98%     ACC: Serene Jaimes RN    Summary Note:   acm out reach call placed to pt, reports she is doing ok had EEG done since last conversation and will follow up with MD for results in couple week. . Reports no recent falls blood sugar has been flucating some. States she has been taking her metformin intermittenly as it upsets her stomach at times,, discussed importance of taking meds as prescribed and discussing any side effects with PCP, education this date consisted of reviewing her meds purpose uses and side effects, pt was able to vu will review at future call. Denies any needed resources at this time  Plan:  Continue disease and medication education  Assess for any needed resource        Care Coordination Interventions    Program Enrollment: Rising Risk  Referral from Primary Care Provider: No  Suggested Interventions and Community Resources  Diabetes Education: In Process  Fall Risk Prevention: Completed  Zone Management Tools: In Process         Goals Addressed    None         Prior to Admission medications    Medication Sig Start Date End Date Taking?  Authorizing Provider   sertraline (ZOLOFT) 100 MG tablet TAKE ONE TABLET BY MOUTH DAILY 5/21/21   Ant Bach, DO   pregabalin (LYRICA) 75 MG capsule TAKE ONE CAPSULE BY MOUTH TWICE A DAY 5/21/21 7/20/21  Ant Bach, DO   atorvastatin (LIPITOR) 80 MG tablet Take 1 tablet by mouth daily 5/17/21   Ant Bach, DO   naproxen (NAPROSYN) 500 MG tablet Take 1 tablet by mouth 2 times daily as needed for Pain 5/14/21   Tamia Engel MD   albuterol (PROVENTIL) (5 MG/ML) 0.5% nebulizer solution Take 0.5 mLs by nebulization every 6 hours as needed for Wheezing 5/9/21   Laura Mosley PA-C   Umeclidinium Bromide 62.5 MCG/INH AEPB Inhale 1 puff into the lungs daily 1/27/21   Melvi Felipe MD   ipratropium-albuterol (DUONEB) 0.5-2.5 (3) MG/3ML medications?: No  Does the patient have a nebulizer?: No  Does the patient use a space with inhaled medications?: No            Symptoms:

## 2021-06-30 ENCOUNTER — HOSPITAL ENCOUNTER (EMERGENCY)
Age: 61
Discharge: HOME | End: 2021-06-30
Attending: EMERGENCY MEDICINE
Payer: MEDICARE

## 2021-06-30 VITALS
RESPIRATION RATE: 15 BRPM | WEIGHT: 148.5 LBS | BODY MASS INDEX: 28.04 KG/M2 | DIASTOLIC BLOOD PRESSURE: 69 MMHG | TEMPERATURE: 98.6 F | HEIGHT: 61 IN | HEART RATE: 61 BPM | SYSTOLIC BLOOD PRESSURE: 94 MMHG | OXYGEN SATURATION: 97 %

## 2021-06-30 DIAGNOSIS — M54.50 ACUTE EXACERBATION OF CHRONIC LOW BACK PAIN: Primary | ICD-10-CM

## 2021-06-30 DIAGNOSIS — R82.81 PYURIA: ICD-10-CM

## 2021-06-30 DIAGNOSIS — G89.29 ACUTE EXACERBATION OF CHRONIC LOW BACK PAIN: Primary | ICD-10-CM

## 2021-06-30 LAB
BACTERIA: ABNORMAL /HPF
BILIRUBIN URINE: ABNORMAL
BLOOD, URINE: NEGATIVE
CLARITY: ABNORMAL
COLOR: YELLOW
COMMENT UA: ABNORMAL
CRYSTALS, UA: ABNORMAL /HPF
EPITHELIAL CELLS, UA: 12 /HPF (ref 0–5)
GLUCOSE URINE: NEGATIVE MG/DL
KETONES, URINE: NEGATIVE MG/DL
LEUKOCYTE ESTERASE, URINE: ABNORMAL
MICROSCOPIC EXAMINATION: YES
NITRITE, URINE: NEGATIVE
PH UA: 5 (ref 5–8)
PROTEIN UA: NEGATIVE MG/DL
RBC UA: ABNORMAL /HPF (ref 0–4)
SPECIFIC GRAVITY UA: 1.02 (ref 1–1.03)
URINE REFLEX TO CULTURE: YES
URINE TYPE: ABNORMAL
UROBILINOGEN, URINE: 1 E.U./DL
WBC UA: 35 /HPF (ref 0–5)

## 2021-06-30 PROCEDURE — 87086 URINE CULTURE/COLONY COUNT: CPT

## 2021-06-30 PROCEDURE — 81001 URINALYSIS AUTO W/SCOPE: CPT

## 2021-06-30 PROCEDURE — 6370000000 HC RX 637 (ALT 250 FOR IP): Performed by: EMERGENCY MEDICINE

## 2021-06-30 PROCEDURE — 99285 EMERGENCY DEPT VISIT HI MDM: CPT

## 2021-06-30 RX ORDER — HYDROCODONE BITARTRATE AND ACETAMINOPHEN 5; 325 MG/1; MG/1
1 TABLET ORAL ONCE
Status: COMPLETED | OUTPATIENT
Start: 2021-06-30 | End: 2021-06-30

## 2021-06-30 RX ORDER — HYDROCODONE BITARTRATE AND ACETAMINOPHEN 5; 325 MG/1; MG/1
1 TABLET ORAL EVERY 6 HOURS PRN
Qty: 10 TABLET | Refills: 0 | Status: SHIPPED | OUTPATIENT
Start: 2021-06-30 | End: 2021-07-03

## 2021-06-30 RX ORDER — TIZANIDINE 2 MG/1
2 TABLET ORAL 3 TIMES DAILY PRN
Qty: 30 TABLET | Refills: 0 | Status: SHIPPED | OUTPATIENT
Start: 2021-06-30 | End: 2021-10-27

## 2021-06-30 RX ORDER — CEPHALEXIN 500 MG/1
500 CAPSULE ORAL 3 TIMES DAILY
Qty: 30 CAPSULE | Refills: 0 | Status: SHIPPED | OUTPATIENT
Start: 2021-06-30 | End: 2021-07-10

## 2021-06-30 RX ORDER — ONDANSETRON 4 MG/1
4 TABLET, ORALLY DISINTEGRATING ORAL ONCE
Status: COMPLETED | OUTPATIENT
Start: 2021-06-30 | End: 2021-06-30

## 2021-06-30 RX ADMIN — HYDROCODONE BITARTRATE AND ACETAMINOPHEN 1 TABLET: 5; 325 TABLET ORAL at 04:25

## 2021-06-30 RX ADMIN — ONDANSETRON 4 MG: 4 TABLET, ORALLY DISINTEGRATING ORAL at 04:24

## 2021-06-30 ASSESSMENT — ENCOUNTER SYMPTOMS
DIARRHEA: 0
NAUSEA: 0
VOMITING: 0
WHEEZING: 0
SHORTNESS OF BREATH: 0
COUGH: 0
EYE PAIN: 0
RHINORRHEA: 0
BACK PAIN: 1
ABDOMINAL PAIN: 0
EYE DISCHARGE: 0
SORE THROAT: 0

## 2021-06-30 ASSESSMENT — PAIN SCALES - GENERAL
PAINLEVEL_OUTOF10: 8
PAINLEVEL_OUTOF10: 0
PAINLEVEL_OUTOF10: 8

## 2021-06-30 ASSESSMENT — PAIN DESCRIPTION - PROGRESSION: CLINICAL_PROGRESSION: NOT CHANGED

## 2021-06-30 ASSESSMENT — PAIN - FUNCTIONAL ASSESSMENT
PAIN_FUNCTIONAL_ASSESSMENT: ACTIVITIES ARE NOT PREVENTED
PAIN_FUNCTIONAL_ASSESSMENT: ACTIVITIES ARE NOT PREVENTED

## 2021-06-30 ASSESSMENT — PAIN DESCRIPTION - PAIN TYPE: TYPE: ACUTE PAIN

## 2021-06-30 ASSESSMENT — PAIN DESCRIPTION - LOCATION: LOCATION: BACK;FLANK

## 2021-06-30 ASSESSMENT — PAIN DESCRIPTION - FREQUENCY
FREQUENCY: CONTINUOUS
FREQUENCY: CONTINUOUS

## 2021-06-30 ASSESSMENT — PAIN DESCRIPTION - ONSET: ONSET: AWAKENED FROM SLEEP

## 2021-06-30 ASSESSMENT — PAIN DESCRIPTION - DESCRIPTORS: DESCRIPTORS: TENDER;THROBBING;SHARP

## 2021-06-30 ASSESSMENT — PAIN DESCRIPTION - ORIENTATION: ORIENTATION: LOWER;LEFT

## 2021-06-30 NOTE — ED TRIAGE NOTES
Patient ambulated to ED Room 4 with a steady. Patient is alert and oriented x 4, no SOB, no abdominal pain, no CP, and skin is warm and dry. Patient states that she is having lower left back pain that radiates to her flank and down her left leg at times for three days. Patient states that she does have some pain with urination, no bright  blood or discharge with urination. Patient does describe her urine smells \"bad\" and looks \"very cloudy. \"
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
## 2021-07-01 ENCOUNTER — CARE COORDINATION (OUTPATIENT)
Dept: CARE COORDINATION | Age: 61
End: 2021-07-01

## 2021-07-01 LAB — URINE CULTURE, ROUTINE: NORMAL

## 2021-07-01 NOTE — CARE COORDINATION
Call to patient as follow up from ER  No answer, LM on VM with this ACM name and number to please call.     Pt active w ACM ( Serene) , this ACM covering for her for PTO    Will continue outreach

## 2021-07-02 ENCOUNTER — TELEPHONE (OUTPATIENT)
Dept: FAMILY MEDICINE CLINIC | Age: 61
End: 2021-07-02

## 2021-07-02 ENCOUNTER — CARE COORDINATION (OUTPATIENT)
Dept: CARE COORDINATION | Age: 61
End: 2021-07-02

## 2021-07-02 NOTE — TELEPHONE ENCOUNTER
----- Message from Fabiana Cowan sent at 7/2/2021  1:43 PM EDT -----  Subject: Appointment Request    Reason for Call: Routine ED Follow Up Visit    QUESTIONS  Type of Appointment? Established Patient  Reason for appointment request? Available appointments did not meet   patient need  Additional Information for Provider? Pt needs a ED follow she was saw at   [address] for back pain on 6/30. Offered VV., she said she wants to be   seen in the office. ---------------------------------------------------------------------------  --------------  Delmi MARTINO  What is the best way for the office to contact you? OK to leave message on   voicemail  Preferred Call Back Phone Number? [phone]  ---------------------------------------------------------------------------  --------------  SCRIPT ANSWERS  Relationship to Patient? Self  Appointment reason? Well Care/Follow Ups  Select a Well Care/Follow Ups appointment reason? Adult ED Follow Up   [Emergency Room, Emergency Department]  (Patient requests to see provider urgently. )? No  Do you have any questions for your primary care provider that need to be   answered prior to your appointment? No  Have you been diagnosed with, awaiting test results for, or told that you   are suspected of having COVID-19 (Coronavirus)? (If patient has tested   negative or was tested as a requirement for work, school, or travel and   not based on symptoms, answer no)? No  Do you currently have flu-like symptoms including fever or chills, cough,   shortness of breath, difficulty breathing, or new loss of taste or smell? No  Have you had close contact with someone with COVID-19 in the last 14 days? No  (Service Expert  click yes below to proceed with [employer] As Usual   Scheduling)?  Yes

## 2021-07-09 ENCOUNTER — CARE COORDINATION (OUTPATIENT)
Dept: CARE COORDINATION | Age: 61
End: 2021-07-09

## 2021-07-18 ENCOUNTER — APPOINTMENT (OUTPATIENT)
Dept: GENERAL RADIOLOGY | Age: 61
End: 2021-07-18
Payer: MEDICARE

## 2021-07-18 ENCOUNTER — HOSPITAL ENCOUNTER (EMERGENCY)
Age: 61
Discharge: HOME | End: 2021-07-18
Attending: EMERGENCY MEDICINE
Payer: MEDICARE

## 2021-07-18 VITALS
DIASTOLIC BLOOD PRESSURE: 54 MMHG | RESPIRATION RATE: 16 BRPM | TEMPERATURE: 98.4 F | SYSTOLIC BLOOD PRESSURE: 118 MMHG | BODY MASS INDEX: 27.26 KG/M2 | HEIGHT: 61 IN | WEIGHT: 144.4 LBS | OXYGEN SATURATION: 95 % | HEART RATE: 58 BPM

## 2021-07-18 DIAGNOSIS — Z87.19 HISTORY OF ACUTE GASTRITIS: ICD-10-CM

## 2021-07-18 DIAGNOSIS — R10.13 ABDOMINAL PAIN, EPIGASTRIC: Primary | ICD-10-CM

## 2021-07-18 LAB
ANION GAP SERPL CALCULATED.3IONS-SCNC: 12 MMOL/L (ref 3–16)
BASOPHILS ABSOLUTE: 0.1 K/UL (ref 0–0.2)
BASOPHILS RELATIVE PERCENT: 0.9 %
BUN BLDV-MCNC: 10 MG/DL (ref 7–20)
CALCIUM SERPL-MCNC: 9.2 MG/DL (ref 8.3–10.6)
CHLORIDE BLD-SCNC: 102 MMOL/L (ref 99–110)
CO2: 25 MMOL/L (ref 21–32)
CREAT SERPL-MCNC: 0.5 MG/DL (ref 0.6–1.2)
EOSINOPHILS ABSOLUTE: 0.2 K/UL (ref 0–0.6)
EOSINOPHILS RELATIVE PERCENT: 2.2 %
GFR AFRICAN AMERICAN: >60
GFR NON-AFRICAN AMERICAN: >60
GLUCOSE BLD-MCNC: 120 MG/DL (ref 70–99)
HCT VFR BLD CALC: 42.7 % (ref 36–48)
HEMOGLOBIN: 14.6 G/DL (ref 12–16)
LYMPHOCYTES ABSOLUTE: 2.5 K/UL (ref 1–5.1)
LYMPHOCYTES RELATIVE PERCENT: 26.5 %
MCH RBC QN AUTO: 32.1 PG (ref 26–34)
MCHC RBC AUTO-ENTMCNC: 34.1 G/DL (ref 31–36)
MCV RBC AUTO: 94.3 FL (ref 80–100)
MONOCYTES ABSOLUTE: 0.5 K/UL (ref 0–1.3)
MONOCYTES RELATIVE PERCENT: 5.2 %
NEUTROPHILS ABSOLUTE: 6 K/UL (ref 1.7–7.7)
NEUTROPHILS RELATIVE PERCENT: 65.2 %
PDW BLD-RTO: 14.8 % (ref 12.4–15.4)
PLATELET # BLD: 243 K/UL (ref 135–450)
PMV BLD AUTO: 8.2 FL (ref 5–10.5)
POTASSIUM REFLEX MAGNESIUM: 4 MMOL/L (ref 3.5–5.1)
RBC # BLD: 4.53 M/UL (ref 4–5.2)
SODIUM BLD-SCNC: 139 MMOL/L (ref 136–145)
TROPONIN: <0.01 NG/ML
TROPONIN: <0.01 NG/ML
WBC # BLD: 9.3 K/UL (ref 4–11)

## 2021-07-18 PROCEDURE — 71045 X-RAY EXAM CHEST 1 VIEW: CPT

## 2021-07-18 PROCEDURE — 99285 EMERGENCY DEPT VISIT HI MDM: CPT

## 2021-07-18 PROCEDURE — 6370000000 HC RX 637 (ALT 250 FOR IP): Performed by: PHYSICIAN ASSISTANT

## 2021-07-18 PROCEDURE — 36415 COLL VENOUS BLD VENIPUNCTURE: CPT

## 2021-07-18 PROCEDURE — 93005 ELECTROCARDIOGRAM TRACING: CPT | Performed by: EMERGENCY MEDICINE

## 2021-07-18 PROCEDURE — 80048 BASIC METABOLIC PNL TOTAL CA: CPT

## 2021-07-18 PROCEDURE — 84484 ASSAY OF TROPONIN QUANT: CPT

## 2021-07-18 PROCEDURE — 85025 COMPLETE CBC W/AUTO DIFF WBC: CPT

## 2021-07-18 RX ORDER — PANTOPRAZOLE SODIUM 40 MG/1
40 TABLET, DELAYED RELEASE ORAL
Qty: 30 TABLET | Refills: 0 | Status: SHIPPED | OUTPATIENT
Start: 2021-07-18 | End: 2021-12-08

## 2021-07-18 RX ORDER — SUCRALFATE 1 G/1
1 TABLET ORAL 4 TIMES DAILY
Qty: 120 TABLET | Refills: 0 | Status: SHIPPED | OUTPATIENT
Start: 2021-07-18 | End: 2021-10-27

## 2021-07-18 RX ORDER — NITROGLYCERIN 0.4 MG/1
0.4 TABLET SUBLINGUAL EVERY 5 MIN PRN
Status: DISCONTINUED | OUTPATIENT
Start: 2021-07-18 | End: 2021-07-18 | Stop reason: HOSPADM

## 2021-07-18 RX ADMIN — MAGNESIUM HYDROXIDE/ALUMINUM HYDROXICE/SIMETHICONE: 120; 1200; 1200 SUSPENSION ORAL at 09:09

## 2021-07-18 RX ADMIN — NITROGLYCERIN 0.4 MG: 0.4 TABLET, ORALLY DISINTEGRATING SUBLINGUAL at 08:46

## 2021-07-18 ASSESSMENT — ENCOUNTER SYMPTOMS
NAUSEA: 1
VOMITING: 0
ABDOMINAL PAIN: 0
SHORTNESS OF BREATH: 1
CHEST TIGHTNESS: 0
COUGH: 0

## 2021-07-18 ASSESSMENT — PAIN DESCRIPTION - PROGRESSION: CLINICAL_PROGRESSION: NOT CHANGED

## 2021-07-18 ASSESSMENT — PAIN SCALES - GENERAL
PAINLEVEL_OUTOF10: 0
PAINLEVEL_OUTOF10: 6
PAINLEVEL_OUTOF10: 1
PAINLEVEL_OUTOF10: 3

## 2021-07-18 ASSESSMENT — PAIN DESCRIPTION - FREQUENCY: FREQUENCY: CONTINUOUS

## 2021-07-18 ASSESSMENT — PAIN - FUNCTIONAL ASSESSMENT: PAIN_FUNCTIONAL_ASSESSMENT: PREVENTS OR INTERFERES SOME ACTIVE ACTIVITIES AND ADLS

## 2021-07-18 ASSESSMENT — PAIN DESCRIPTION - LOCATION: LOCATION: CHEST

## 2021-07-18 ASSESSMENT — PAIN DESCRIPTION - ORIENTATION: ORIENTATION: MID

## 2021-07-18 ASSESSMENT — PAIN DESCRIPTION - PAIN TYPE: TYPE: ACUTE PAIN

## 2021-07-18 ASSESSMENT — PAIN DESCRIPTION - DESCRIPTORS: DESCRIPTORS: ACHING

## 2021-07-18 ASSESSMENT — PAIN DESCRIPTION - ONSET: ONSET: ON-GOING

## 2021-07-18 NOTE — ED NOTES
Pt received SL nitro x1 without relief. CARIDAD Sanchez made aware.       Sundar Wood RN  07/18/21 6615

## 2021-07-18 NOTE — ED PROVIDER NOTES
[address]  [address] [address]  Dept: [phone]  Loc: [phone]  eMERGENCYdEPARTMENT eNCOUnter      Pt Name: Vito Shah  MRN: [id number]  Jean 1960  Date of evaluation: 7/18/2021  Provider:Laura Mosley PA-C    CHIEF COMPLAINT       Chief Complaint   Patient presents with    Chest Pain     Pt to ED via [facility] with c/o sob, nausea and mid sternal chest pain that started about 2 hrs ago. EMS gave pt 324mg asa prior to ED arrival.  Hx of MI.        CRITICAL CARE TIME   Total Critical Care time was 10 minutes, excluding separately reportable procedures. There was a high probability of clinically significant/life threatening deterioration in the patient's condition which required my urgentintervention. HISTORY OF PRESENT ILLNESS  (Location/Symptom, Timing/Onset, Context/Setting, Quality, Duration,Modifying Factors, Severity.)   Sandy Whiting is a 63 y.o. female who presents to the emergency department by EMS complaining of chest pain. Patient states chest pain began about an hour and half prior to arrival.  Pain began after taking morning medications. Pain rated as a 10/10, it is located centrally and described as sharp and burning in sensation. Pain does not radiate. Pain worsens when laying down. No other significant alleviating or aggravating factors. States she is had the same pain over the past several mornings however not this severe. States she feels nauseous and slightly short of breath, denies any vomiting. Denies diaphoresis, cough, fevers, chills, abdominal pain. Nursing Notes were reviewedand agreed with or any disagreements were addressed in the HPI. REVIEW OF SYSTEMS    (2-9 systems for level 4, 10 or more for level 5)     Review of Systems   Constitutional: Negative. Negative for chills and fever. HENT: Negative. Respiratory: Positive for shortness of breath. Negative for cough and chest tightness. Cardiovascular: Positive for chest pain. Gastrointestinal: Positive for nausea. Negative for abdominal pain and vomiting. Genitourinary: Negative. Musculoskeletal: Negative for arthralgias and myalgias. Skin: Negative. Neurological: Negative for dizziness and light-headedness. Psychiatric/Behavioral: Negative for behavioral problems and confusion. Except as noted above the remainder of the review of systems was reviewed and negative.        PAST MEDICAL HISTORY         Diagnosis Date    Back pain     CAD (coronary artery disease)     COPD (chronic obstructive pulmonary disease) (HCC)     Foot pain     from bone spurs    GERD (gastroesophageal reflux disease)     Headache     sharp pain going through head    Heart attack ([facility] [address]     Hip pain     Hyperlipidemia     Hypertension      (normal spontaneous vaginal delivery)     Osteoporosis     Vision abnormalities        SURGICAL HISTORY           Procedure Laterality Date    CARDIAC CATHETERIZATION      CHOLECYSTECTOMY      COLONOSCOPY      ESOPHAGEAL DILATATION N/A 2019    ESOPHAGEAL DILATION Jorden Spatz performed by Henri Little MD at [address] [place] [address] N/A 2019    EGD BIOPSY performed by Henri Little MD at [facility]     [unfilled]    ALLERGIES     Iodine, Other, Eggs or egg-derived products [eggs or egg-derived products], Eggs [egg white], Flexeril [cyclobenzaprine hcl], Levofloxacin, and Omeprazole    FAMILY HISTORY           Problem Relation Age of Onset    Heart Failure Mother         heart disease    Hypertension Mother     Emphysema Mother     Heart Failure Father         heart disease    COPD Father     Hypertension Paternal Grandmother      Family Status   Relation Name Status    Mother           at 43years of age   [facility] Father      MGM      MGF      PGM         SOCIAL HISTORY      reports that she quit smoking about 4 years ago. Her smoking use included cigarettes. She has a 37.00 pack-year smoking history. She has never used smokeless tobacco. She reports that she does not drink alcohol and does not use drugs. PHYSICAL EXAM    (up to 7 for level 4, 8 or more for level 5)     ED Triage Vitals [21 0815]   Enc Vitals Group      BP (!) 146/85      Pulse 68      Resp 18      Temp 98.6 °F (37 °C)      Temp Source Oral      SpO2 96 %      Weight 144 lb 6.4 oz (65.5 kg)      Height 5' 1\" (1.549 m)      Head Circumference       Peak Flow       Pain Score       Pain Loc       Pain Edu? Excl. in [address]? Physical Exam  Vitals reviewed. Constitutional:       Appearance: Normal appearance. Pulmonary:      Effort: Pulmonary effort is normal. No respiratory distress. Abdominal:      Palpations: Abdomen is soft. Tenderness: There is no abdominal tenderness. Musculoskeletal:         General: Normal range of motion. Cervical back: Neck supple. Skin:     General: Skin is warm. Neurological:      General: No focal deficit present. Mental Status: She is alert and oriented to person, place, and time. Psychiatric:         Mood and Affect: Mood normal.         Behavior: Behavior normal.           DIAGNOSTIC RESULTS     EKG: All EKG's are interpreted by the Emergency Department Physician who either signs or Co-signs this chart in the absence of a cardiologist.    RADIOLOGY:   Non-plain film images such as CT, Ultrasound and MRI are read by the radiologist. Plain radiographic images are visualized and preliminarilyinterpreted by the emergency physician with the below findings:    Interpretation per the Radiologist below,if available at the time of this note:    XR CHEST PORTABLE   Final Result   Normal appearing chest.  No acute abnormality.                LABS:  Labs Reviewed   BASIC METABOLIC PANEL W/ REFLEX TO MG FOR LOW K - Abnormal; Notable for the following components:       Result Value    Glucose 120 (*)     CREATININE 0.5 (*)     All other components within normal limits    Narrative:     Performed at:  [facility]  [address] [facility] [place] [address]   Phone [phone]   CBC WITH AUTO DIFFERENTIAL    Narrative:     Performed at:  [facility]  [address] [facility] [place] [address]   Phone [phone]   TROPONIN    Narrative:     Performed at:  [facility]  [address] [facility] [address]   Phone [phone]   TROPONIN    Narrative:     Performed at:  [facility]  [address] [facility] [place] [address]   Phone [phone]       All other labs were within normal range or not returned as of this dictation. EMERGENCY DEPARTMENT COURSE and DIFFERENTIAL DIAGNOSIS/MDM:   Vitals:    Vitals:    07/18/21 1202 07/18/21 1232 07/18/21 1302 07/18/21 1410   BP: 137/74 (!) 107/42 126/61 (!) 118/54   Pulse: 61 60 52 58   Resp: 17 11 13 16   Temp:    98.4 °F (36.9 °C)   TempSrc:    Oral   SpO2: 97%  94% 95%   Weight:       Height:           MDM     Patient presents ED with HPI noted above. Differential diagnosis includes ACS, TAD, esophagitis, PUD, GERD, gastritis, pericarditis, other. Cardiac work-up obtained. EKG showed no ischemic pattern, 3-hour delta troponin is negative. Chest pain presents when laying down and early in the morning. Pain is burning sensation. Patient relief of pain with GI cocktail. Review of previous records show she has history of gastritis on previous EGD. Patient previous on Protonix. Not currently taking. Patient placed back on Protonix as well as Carafate. Do suspect gastritis/GERD. Patient follow-up with her GI specialist.    Remainder of labs work-up as above. Believe patient safe for discharge home.   Patient educated on return precautions prior to discharge. She is comfortable with plan. She was discharged home in stable condition. The patient tolerated their visit well. They were seen and evaluated by the attending physician, Dr. Wade Rodríguez who agreed with the assessment and plan. The patient and / or the family were informed of the results of any tests, a time was given to answer questions, a plan was proposed and they agreed with plan. CONSULTS:  None    PROCEDURES:  Procedures    FINAL IMPRESSION      1. Abdominal pain, epigastric    2.  History of acute gastritis          DISPOSITION/PLAN   [unfilled]    PATIENT REFERRED TO:  April Reyes, DO  [address] [facility]  [address]    Schedule an appointment as soon as possible for a visit   For follow up appointment    Ijeoma Peters MD  [address] [facility] [address] [facility]  [phone]    Schedule an appointment as soon as possible for a visit   For follow up appointment with [address], MD  [address] [facility] [address] [place] [address]  [phone]    Schedule an appointment as soon as possible for a visit   For follow up appointment, As needed      DISCHARGE MEDICATIONS:  Discharge Medication List as of 7/18/2021  2:12 PM      START taking these medications    Details   sucralfate (CARAFATE) 1 GM tablet Take 1 tablet by mouth 4 times daily, Disp-120 tablet, R-0Normal             (Please note that portions of this note were completed with a voice recognition program.  Efforts were made to edit the dictations but occasionally words are mis-transcribed.)    LANE Pulliam PA-C  07/29/21 [address]  07/29/21 [id number]

## 2021-07-18 NOTE — ED PROVIDER NOTES
Date of evaluation: 2021    ED Attending Attestation Note     CHIEF COMPLAINT     I have pain in my chest  HISTORY OF PRESENT ILLNESS  (Location/Symptom, Timing/Onset,Context/Setting, Quality, Duration, Modifying Factors, Severity). Note limiting factors. This patient was seen by the advance practice provider. I have seen and examined the patient, agree with the workup, evaluation, management and diagnosis. The care plan has been discussed. Chief Complaint   Patient presents with    Chest Pain     Pt to ED via [facility] with c/o sob, nausea and mid sternal chest pain that started about 2 hrs ago. EMS gave pt 324mg asa prior to ED arrival.  Hx of MIRon Shah is a 63 y.o. female who presents to the emergency department secondary to concern for chest pain. On my exam when I am talking to her she reports the pain is located in the epigastric area. This is where she points. Pain is worse in the morning when she wakes up. She does not have any shortness of breath. She states she has been having no fevers or chills. No known sick contacts. Past medical history significant for coronary artery disease, back pain, COPD, foot pain, gastric reflux, headaches, hip pain, hyperlipidemia, hypertension, osteoporosis.     Social History     Socioeconomic History    Marital status: Legally      Spouse name: None    Number of children: None    Years of education: None    Highest education level: None   Occupational History    None   Tobacco Use    Smoking status: Former Smoker     Packs/day: 1.00     Years: 37.00     Pack years: 37.00     Types: Cigarettes     Quit date: 2017     Years since quittin.5    Smokeless tobacco: Never Used   Vaping Use    Vaping Use: Never used   Substance and Sexual Activity    Alcohol use: No     Alcohol/week: 0.0 standard drinks    Drug use: No    Sexual activity: Not Currently   Other Topics Concern    None   Social History Narrative    None     Social Determinants of Health     Financial Resource Strain:     Difficulty of Paying Living Expenses:    Food Insecurity:     Worried About Running Out of Food in the Last Year:     [address] in the Last Year:    Transportation Needs:     Lack of Transportation (Medical):  Lack of Transportation (Non-Medical):    Physical Activity:     Days of Exercise per Week:     Minutes of Exercise per Session:    Stress:     Feeling of Stress :    Social Connections:     Frequency of Communication with Friends and Family:     Frequency of Social Gatherings with Friends and Family:     Attends [employer] Services:     Active Member of Clubs or Organizations:     Attends Club or Organization Meetings:     Marital Status:    Intimate Partner Violence:     Fear of Current or Ex-Partner:     Emotionally Abused:     Physically Abused:     Sexually Abused:      Aside from what is stated above denies any other symptoms or modifying factors. Nursing Notes reviewed. Past Surgical History:   Procedure Laterality Date    CARDIAC CATHETERIZATION      CHOLECYSTECTOMY  1999    COLONOSCOPY      ESOPHAGEAL DILATATION N/A 5/1/2019    ESOPHAGEAL DILATION Roverto Jewels performed by Cookie Diop MD at [address] [place] [address] N/A 5/1/2019    EGD BIOPSY performed by Cookie Diop MD at [place] [address]       Family History   Problem Relation Age of Onset    Heart Failure Mother         heart disease    Hypertension Mother     Emphysema Mother     Heart Failure Father         heart disease    COPD Father     Hypertension Paternal Grandmother        CURRENT MEDICATIONS       Previous Medications    ACETAMINOPHEN (TYLENOL) 500 MG TABLET    Take 2 tablets by mouth every 6 hours as needed for Pain Do not take with other medications containing acetaminophen.     ALBUTEROL (PROVENTIL) (5 MG/ML) 0.5% NEBULIZER SOLUTION    Take 0.5 mLs by nebulization every 6 hours as needed for Wheezing    ASPIRIN 81 MG CHEWABLE TABLET    CHEW ONE TABLET BY MOUTH DAILY    ATORVASTATIN (LIPITOR) 80 MG TABLET    Take 1 tablet by mouth daily    DIPHENHYDRAMINE-APAP, SLEEP, (TYLENOL PM EXTRA STRENGTH)  MG TABLET    Take 1 tablet by mouth nightly as needed     FOLIC ACID (FOLVITE) 1 MG TABLET    Take 1 tablet by mouth daily    IPRATROPIUM-ALBUTEROL (DUONEB) 0.5-2.5 (3) MG/3ML SOLN NEBULIZER SOLUTION    Inhale 3 mLs into the lungs every 4 hours    METFORMIN (GLUCOPHAGE) 500 MG TABLET    Take 1 tablet by mouth daily (with breakfast)    METOPROLOL SUCCINATE (TOPROL XL) 25 MG EXTENDED RELEASE TABLET    TAKE ONE-HALF TABLET BY MOUTH DAILY    MINERAL OIL-HYDROPHILIC PETROLATUM (AQUAPHOR) OINTMENT    Apply topically as needed. NAPROXEN (NAPROSYN) 500 MG TABLET    Take 1 tablet by mouth 2 times daily as needed for Pain    PREGABALIN (LYRICA) 75 MG CAPSULE    TAKE ONE CAPSULE BY MOUTH TWICE A DAY    SERTRALINE (ZOLOFT) 100 MG TABLET    TAKE ONE TABLET BY MOUTH DAILY    TIZANIDINE (ZANAFLEX) 2 MG TABLET    Take 1 tablet by mouth 3 times daily as needed (back pain)    TRIAMCINOLONE (KENALOG) 0.1 % CREAM    Apply topically 2 times daily. UMECLIDINIUM BROMIDE 62.5 MCG/INH AEPB    Inhale 1 puff into the lungs daily      DIAGNOSTIC RESULTS   EKG: interpreted by the Emergency Department Physician who either signs or Co-signs this chart in the absence of a cardiologist.  EKG Indication:  Chest pain  EKG Interpretation: Rate 64, rhythm sinus, axis normal.  LA/QRS/QTc within normal limits. Comparison to prior EKG from May 8, 2021 shows no significant change. RADIOLOGY:   Interpretation per Radiologist below, if available at the time of this note:  XR CHEST PORTABLE   Final Result   Normal appearing chest.  No acute abnormality.            Patient was given the following medications:  Orders Placed This Encounter   Medications    nitroGLYCERIN (NITROSTAT) SL tablet 0.4 mg    aluminum & magnesium hydroxide-simethicone (MAALOX) 30 mL, lidocaine viscous hcl (XYLOCAINE) 5 mL (GI COCKTAIL)    pantoprazole (PROTONIX) 40 MG tablet     Sig: Take 1 tablet by mouth every morning (before breakfast)     Dispense:  30 tablet     Refill:  0    sucralfate (CARAFATE) 1 GM tablet     Sig: Take 1 tablet by mouth 4 times daily     Dispense:  120 tablet     Refill:  0       INITIAL VITALS: BP: (!) 146/85, Temp: 98.6 °F (37 °C), Pulse: 68, Resp: 18, SpO2: 96 %   RECENT VITALS:  BP: 126/61,Temp: 98.4 °F (36.9 °C), Pulse: 52, Resp: 13, SpO2: 94 %     My assessment reveals an overall well-appearing white female who on reassessment after receiving the GI cocktail is feeling significantly better. Lungs are clear, abdomen is benign, no significant peripheral edema noted. She is a little sleepy but wakes up appropriately and answers questions in complete sentences appropriately. Discussed results of her labs, imaging, and EKG. EKG is unchanged, labs unremarkable including negative troponin x2, chest x-ray is negative. Her symptoms today are most consistent with acid reflux. It is noted on review of medical record she has had prior EGD showing gastritis and she previously has been prescribed pantoprazole though she is not currently taking it. We discussed taking it and also will write a prescription for Carafate. We discussed following up with her GI physician Dr. Darrell Garcia. She expressed understanding of importance of following up as well as return precautions prior to discharge. With her history of heart disease we talked about following up with her primary care and cardiologist as needed as well. FINAL IMPRESSION      1. Abdominal pain, epigastric    2.  History of acute gastritis        DISPOSITION/PLAN   DISPOSITION Decision To Discharge 07/18/2021 01:53:03 PM      PATIENT REFERRED TO:  Néstor Bella DO  [address] [place] [address]  [phone]    Schedule an appointment as soon as possible for a visit   For follow up appointment    Rio Lawrence MD  [address] [place]  [phone]    Schedule an appointment as soon as possible for a visit   For follow up appointment with LV Somers MD  [address] [facility] [address] [place] [address]  [phone]    Schedule an appointment as soon as possible for a visit   For follow up appointment, As needed      DISCHARGE MEDICATIONS:  New Prescriptions    PANTOPRAZOLE (PROTONIX) 40 MG TABLET    Take 1 tablet by mouth every morning (before breakfast)    SUCRALFATE (CARAFATE) 1 GM TABLET    Take 1 tablet by mouth 4 times daily            (Please note that portions of this note were completed with a voice recognition program. Efforts were made to edit the dictations but occasionally words are mis-transcribed.)    Martine Webber MD (electronically signed)  Attending Emergency Physician     Martine Webber MD  07/18/21 [address]  Teodoro Mustafa MD  07/25/21 Carmelo Soler

## 2021-07-18 NOTE — ED NOTES
Discharge and education instructions reviewed. Patient verbalized understanding, teach-back successful. Patient denied questions at this time. No acute distress noted. Patient instructed to follow-up as noted - return to emergency department if symptoms worsen. Patient verbalized understanding. Discharged per EDMD with discharge instructions.         Andrea Burgess RN  07/18/21 8586

## 2021-07-19 ENCOUNTER — CARE COORDINATION (OUTPATIENT)
Dept: CARE COORDINATION | Age: 61
End: 2021-07-19

## 2021-07-19 LAB
EKG ATRIAL RATE: 64 BPM
EKG DIAGNOSIS: NORMAL
EKG P AXIS: -26 DEGREES
EKG P-R INTERVAL: 120 MS
EKG Q-T INTERVAL: 418 MS
EKG QRS DURATION: 74 MS
EKG QTC CALCULATION (BAZETT): 431 MS
EKG R AXIS: 54 DEGREES
EKG T AXIS: 79 DEGREES
EKG VENTRICULAR RATE: 64 BPM

## 2021-07-19 PROCEDURE — 93010 ELECTROCARDIOGRAM REPORT: CPT | Performed by: INTERNAL MEDICINE

## 2021-07-20 NOTE — CARE COORDINATION
Ambulatory Care Coordination Note  7/20/2021  CM Risk Score: 14  Charlson 10 Year Mortality Risk Score: 98%     ACC: Serene Jaimes RN    Summary Note:   ACM out reach call placed to pt who reports feeling somewhat better, no new symptoms since ed visit. Reviewed when/ how to seek care. Reviewed education and goals for understanding. Denies any current care coordinator needs  Plan:  Discharge from care coordination at next call        Care Coordination Interventions    Program Enrollment: Rising Risk  Referral from Primary Care Provider: No  Suggested Interventions and Community Resources  Diabetes Education: In Process  Fall Risk Prevention: Completed  Zone Management Tools: In Process         Goals Addressed                 This Visit's Progress     Conditions and Symptoms   On track     I will schedule office visits, as directed by my provider. I will keep my appointment or reschedule if I have to cancel. I will notify my provider of any barriers to my plan of care. I will follow my Zone Management tool to seek urgent or emergent care. I will notify my provider of any symptoms that indicate a worsening of my condition. Barriers: overwhelmed by complexity of regimen and lack of education  Plan for overcoming my barriers: care coordination  Confidence: 7/10  Anticipated Goal Completion Date: 5/31/2021         Medication Management   On track     I will take my medication as directed. I will notify my provider of any problems with medications, like adverse effects or side effects. I will notify my provider/Care Coordinator if I am unable to afford my medications. I will notify my provider for advice before I stop taking any of my medication.     Barriers: lack of support and lack of education  Plan for overcoming my barriers: care coordination  Confidence: 7/10  Anticipated Goal Completion Date:5/31 2021       Reduce Falls    On track     I will reduce my risk of falls by the following: Use Apply topically 2 times daily. 10/9/19   JOSE ANGEL Mayen CNP   aspirin 81 MG chewable tablet CHEW ONE TABLET BY MOUTH DAILY 6/7/19   JOSE ANGEL Mayen CNP   diphenhydrAMINE-APAP, sleep, (TYLENOL PM EXTRA STRENGTH)  MG tablet Take 1 tablet by mouth nightly as needed     Historical Provider, MD   mineral oil-hydrophilic petrolatum (AQUAPHOR) ointment Apply topically as needed. 2/6/19   JOSE ANGEL Mayen CNP   acetaminophen (TYLENOL) 500 MG tablet Take 2 tablets by mouth every 6 hours as needed for Pain Do not take with other medications containing acetaminophen.  9/2/18   Mati CARIDAD Handy       Future Appointments   Date Time Provider Lashell Prabhakar   8/30/2021  8:20 AM Tino Sheikh MD [facility]   9/14/2021  1:00 PM SCHEDULE, VASCULAR LAB 1 PHYSGVS [facility]   9/14/2021  1:45 PM Ángel Alaniz MD [facility]      and   General Assessment    Do you have any symptoms that are causing concern?: No

## 2021-07-26 DIAGNOSIS — F41.9 ANXIETY: ICD-10-CM

## 2021-07-26 RX ORDER — SERTRALINE HYDROCHLORIDE 100 MG/1
TABLET, FILM COATED ORAL
Qty: 60 TABLET | Refills: 0 | Status: SHIPPED | OUTPATIENT
Start: 2021-07-26 | End: 2022-01-21

## 2021-09-12 NOTE — TELEPHONE ENCOUNTER
ISSUE:   Tacrolimus IR level 4.8 on 2/24, goal 5-8, dose 1 mg BID.    PLAN:   Please call patient and confirm this was an accurate 12-hour trough. Verify Tacrolimus IR dose 1 mg BID. Confirm no new medications or illness. Confirm no missed doses. If accurate trough and accurate dose, increase Tacrolimus IR dose to 1.5mg AM, 1 mg PM and repeat labs in 2 week (as scheduled).    Alesha Hartley RN      OUTCOME:   Spoke with patient, they confirm accurate trough level and current dose 1 mg BID. Patient confirmed dose change to 1.5/1 mg BID and to repeat labs in 2 weeks. Orders sent to preferred pharmacy for dose change and lab for repeat labs. Patient voiced understanding of plan.     
Pt called with some questions about covid. Stated she tested positive on 11/11 and was told to quarantine for 14 days. I asked the pt if she was still having symptoms and she said still a little cough and congestion, but with her COPD she doesn't know if it's from that or covid. I offered to schedule the pt for a virtual visit and she declined and wished to wait until after her quarantine was over. I told her we would need a negative test result or we could do a virtual with her and pt voiced understanding.
Statement Selected

## 2021-09-23 ENCOUNTER — TELEPHONE (OUTPATIENT)
Dept: FAMILY MEDICINE CLINIC | Age: 61
End: 2021-09-23

## 2021-09-23 RX ORDER — ATORVASTATIN CALCIUM 80 MG/1
80 TABLET, FILM COATED ORAL DAILY
Qty: 90 TABLET | Refills: 1 | Status: SHIPPED | OUTPATIENT
Start: 2021-09-23

## 2021-09-23 NOTE — TELEPHONE ENCOUNTER
Marciano Linares, DO, patient eligible for 90-day supply cost/copay savings.  Rx pended for your signature/modification as appropriate    LOV: 5/12/21  Next: to be scheduled    Thank you,  Brigida Garcia, PharmD, Create  Department, toll free: [phone], option 1

## 2021-09-23 NOTE — TELEPHONE ENCOUNTER
POPULATION HEALTH CLINICAL PHARMACY REVIEW: ADHERENCE REVIEW  Identified care gap per [place]; fills at [facility]: Statin adherence    Last Visit: 5/12/21    Patient identified as LIS = 2, therefore patient may be able to receive a 90-day supply for the same cost as a 30-day supply    Patient found in Outcomes MTM and is not currently eligible for CMR/TIP    ASSESSMENT  STATIN ADHERENCE    Per Insurance Records through 9/19/21 (YTD Oliver Ma = Filled only once; Potential Fail Date: 9/29/21): Atorvastatin 80mg daily last filled on 5/17/21 for 90 day supply. Next refill due: 8/15/21    Per chart, increased to 80mg daily @5/17/21; rx for #30, 3 refills    Lab Results   Component Value Date    CHOL 230 (H) 05/07/2018    TRIG 124 05/07/2018    HDL 43 10/25/2019    LDLCALC 99 10/25/2019     ALT   Date Value Ref Range Status   11/30/2020 14 10 - 40 U/L Final     AST   Date Value Ref Range Status   11/30/2020 17 15 - 37 U/L Final     The ASCVD Risk score (Savana Adamson et al., 2013) failed to calculate for the following reasons: The patient has a prior MI or stroke diagnosis     PLAN  The following are interventions that have been identified:   - Patient overdue refilling atorvastatin (refill was due @8/15) and active on home medication list.   - Patient eligible for 90 day supply of atorvastatin and appears to only have 30-ds refill remaining    Attempting to reach patient to review.  Left message asking for return call. No future appointments.     Edy Escobar, PharmD, [facility]  Department, toll free: [phone], option 1

## 2021-09-24 NOTE — TELEPHONE ENCOUNTER
Noted atorvastatin 90-ds rx reordered, thank you! Spoke to [facility] - confirm rx received, in process of being filled for ; billed to [facility], cost to patient $1.30.

## 2021-09-27 NOTE — TELEPHONE ENCOUNTER
Attempting again to reach patient.  Left another message and will send Mychart.    =======================================================   For Pharmacy [id number] in place:  No   Recommendation Provided To: Provider: 1 via Note to Provider and Pharmacy: 1   Intervention Detail: New Rx: 1, reason: Improve Adherence   Gap Closed?: No    Intervention Accepted By: Provider: 1 and Pharmacy: 1   Time Spent (min): 15

## 2021-09-28 DIAGNOSIS — J44.9 COPD, SEVERE (HCC): ICD-10-CM

## 2021-09-28 NOTE — TELEPHONE ENCOUNTER
Patient called back regarding adherence call. Patient confirmed that she picked up Atorvastatin at pharmacy over the weekend.      Morgan Rodriguez, [address] [facility]   Phone: [phone]

## 2021-09-28 NOTE — TELEPHONE ENCOUNTER
Noted, thank you    For Pharmacy Admin Tracking Only - update to prev     CPA in place:  No   Recommendation Provided To: Provider: 1 via Note to Provider, Patient/Caregiver: 1 via Telephone and Pharmacy: 1   Intervention Detail: Adherence Monitorin and New Rx: 1, reason: Improve Adherence   Gap Closed?: Yes    Intervention Accepted By: Provider: 1, Patient/Caregiver: 1 and Pharmacy: 1   Time Spent (min): 20

## 2021-09-28 NOTE — TELEPHONE ENCOUNTER
LOV 5/12/21  No upcoming appt scheduled
No wheezing/clear to mild end expiratory wheeze or scattered expiratory wheeze

## 2021-09-29 ENCOUNTER — CARE COORDINATION (OUTPATIENT)
Dept: CARE COORDINATION | Age: 61
End: 2021-09-29

## 2021-09-29 RX ORDER — ALBUTEROL SULFATE 90 UG/1
2 AEROSOL, METERED RESPIRATORY (INHALATION) EVERY 6 HOURS PRN
Qty: 1 EACH | Refills: 1 | Status: SHIPPED | OUTPATIENT
Start: 2021-09-29

## 2021-09-30 NOTE — CARE COORDINATION
Hippa compliant message left for pt with request for return call, no resonse back will make 1 more attempt to reach pt

## 2021-10-01 ENCOUNTER — HOSPITAL ENCOUNTER (EMERGENCY)
Age: 61
Discharge: HOME | End: 2021-10-02
Payer: MEDICARE

## 2021-10-01 VITALS
TEMPERATURE: 98.6 F | HEIGHT: 62 IN | WEIGHT: 142.86 LBS | OXYGEN SATURATION: 94 % | RESPIRATION RATE: 17 BRPM | DIASTOLIC BLOOD PRESSURE: 65 MMHG | BODY MASS INDEX: 26.29 KG/M2 | SYSTOLIC BLOOD PRESSURE: 126 MMHG | HEART RATE: 74 BPM

## 2021-10-01 DIAGNOSIS — H57.89 IRRITATION OF EYE: Primary | ICD-10-CM

## 2021-10-01 PROCEDURE — 99283 EMERGENCY DEPT VISIT LOW MDM: CPT

## 2021-10-01 ASSESSMENT — PAIN SCALES - GENERAL: PAINLEVEL_OUTOF10: 6

## 2021-10-01 ASSESSMENT — PAIN DESCRIPTION - ONSET: ONSET: ON-GOING

## 2021-10-01 ASSESSMENT — PAIN DESCRIPTION - ORIENTATION: ORIENTATION: LEFT

## 2021-10-01 ASSESSMENT — PAIN DESCRIPTION - FREQUENCY: FREQUENCY: CONTINUOUS

## 2021-10-01 ASSESSMENT — PAIN DESCRIPTION - DESCRIPTORS: DESCRIPTORS: BURNING

## 2021-10-01 ASSESSMENT — PAIN DESCRIPTION - PAIN TYPE: TYPE: ACUTE PAIN

## 2021-10-01 ASSESSMENT — PAIN DESCRIPTION - LOCATION: LOCATION: EYE

## 2021-10-02 PROCEDURE — 6370000000 HC RX 637 (ALT 250 FOR IP): Performed by: PHYSICIAN ASSISTANT

## 2021-10-02 RX ORDER — TETRACAINE HYDROCHLORIDE 5 MG/ML
1 SOLUTION OPHTHALMIC ONCE
Status: COMPLETED | OUTPATIENT
Start: 2021-10-02 | End: 2021-10-02

## 2021-10-02 RX ORDER — ERYTHROMYCIN 5 MG/G
OINTMENT OPHTHALMIC
Qty: 3.5 G | Refills: 0 | Status: SHIPPED | OUTPATIENT
Start: 2021-10-02 | End: 2021-10-12

## 2021-10-02 RX ADMIN — TETRACAINE HYDROCHLORIDE 1 DROP: 5 SOLUTION OPHTHALMIC at 00:36

## 2021-10-02 RX ADMIN — FLUORESCEIN SODIUM 1 MG: 1 STRIP OPHTHALMIC at 00:36

## 2021-10-02 NOTE — ED NOTES
Visual accuity 20/30 bilateral eyes. No corrective lenses or contacts. Pt states \"I can see it just hurts. \"     Lisette Santiago  10/02/21 0048

## 2021-10-02 NOTE — ED NOTES
Bed: D-39  Expected date: 10/1/21  Expected time: 11:28 PM  Means of arrival: Walk In  Comments:  Eye pain     Riddhi Orona RN  10/01/21 5527

## 2021-10-02 NOTE — ED PROVIDER NOTES
[address] [facility]  [address] [address]  Dept: [phone]  Loc: [address] ENCOUNTER        This patient was not seen or evaluated by the attending physician. I evaluated this patient, the attending physician was available for consultation. CHIEF COMPLAINT    Chief Complaint   Patient presents with    Eye Pain     left eye x 1 week; no relief with over the counter meds       HPI    Chloé Neil is a 57 y.o. female who presents with left eye discomfort. Onset was about a week ago. The duration has been constant since the onset. The quality is that it is a sharp pain. She states she thought she got something in her eye about a week ago, and was scratching it, causing a scratch at the corner of her eye. Since then she has had persistent pain in the area. She is used over-the-counter eyewash with no improvement to her symptoms. She denies any new light sensitivity or visual disturbance. She states she had cataract surgery done about a year and a half ago but has not seen an eye physician since then. She denies any fever, chills, facial rash. She has no further complaints at this time.      REVIEW OF SYSTEMS    Eyes: see HPI  General: No fevers or chills  GI: No nausea or vomiting  Skin: No new rash      PAST MEDICAL and SURGICAL HISTORY    Past Medical History:   Diagnosis Date    Back pain     CAD (coronary artery disease)     COPD (chronic obstructive pulmonary disease) (HCC)     Foot pain     from bone spurs    GERD (gastroesophageal reflux disease)     Headache     sharp pain going through head    Heart attack ([address]     Hip pain     Hyperlipidemia     Hypertension      (normal spontaneous vaginal delivery)     Osteoporosis     Vision abnormalities      Past Surgical History:   Procedure Laterality Date    CARDIAC CATHETERIZATION      CHOLECYSTECTOMY      COLONOSCOPY  ESOPHAGEAL DILATATION N/A 5/1/2019    ESOPHAGEAL DILATION Brad Madyson performed by Johan Altamirano MD at [address] [facility] N/A 5/1/2019    EGD BIOPSY performed by Johan Altamirano MD at [place] [address]  (may include discharge medications prescribed in the ED)  Current Outpatient Rx   Medication Sig Dispense Refill    erythromycin (ROMYCIN) 5 MG/GM ophthalmic ointment Place 0.5cm ribbon to left eye three times daily for 10 days 3.5 g 0    albuterol sulfate HFA (PROVENTIL HFA) 108 (90 Base) MCG/ACT inhaler Inhale 2 puffs into the lungs every 6 hours as needed for Wheezing or Shortness of Breath 1 each 1    atorvastatin (LIPITOR) 80 MG tablet Take 1 tablet by mouth daily 90 tablet 1    metoprolol succinate (TOPROL XL) 25 MG extended release tablet TAKE 1/2 TABLET BY MOUTH DAILY 30 tablet 1    sertraline (ZOLOFT) 100 MG tablet TAKE ONE TABLET BY MOUTH DAILY 60 tablet 0    pantoprazole (PROTONIX) 40 MG tablet Take 1 tablet by mouth every morning (before breakfast) 30 tablet 0    sucralfate (CARAFATE) 1 GM tablet Take 1 tablet by mouth 4 times daily 120 tablet 0    tiZANidine (ZANAFLEX) 2 MG tablet Take 1 tablet by mouth 3 times daily as needed (back pain) 30 tablet 0    pregabalin (LYRICA) 75 MG capsule TAKE ONE CAPSULE BY MOUTH TWICE A DAY 60 capsule 2    naproxen (NAPROSYN) 500 MG tablet Take 1 tablet by mouth 2 times daily as needed for Pain (Patient not taking: Reported on 7/2/2021) 30 tablet 0    albuterol (PROVENTIL) (5 MG/ML) 0.5% nebulizer solution Take 0.5 mLs by nebulization every 6 hours as needed for Wheezing 30 vial 0    Umeclidinium Bromide 62.5 MCG/INH AEPB Inhale 1 puff into the lungs daily 1 each 11    ipratropium-albuterol (DUONEB) 0.5-2.5 (3) MG/3ML SOLN nebulizer solution Inhale 3 mLs into the lungs every 4 hours 748 mL 11    folic acid (FOLVITE) 1 MG tablet Take 1 tablet by mouth daily 30 tablet 1    metFORMIN (GLUCOPHAGE) 500 MG tablet Take 1 tablet by mouth daily (with breakfast) 90 tablet 0    triamcinolone (KENALOG) 0.1 % cream Apply topically 2 times daily. 45 g 0    aspirin 81 MG chewable tablet CHEW ONE TABLET BY MOUTH DAILY 90 tablet 1    diphenhydrAMINE-APAP, sleep, (TYLENOL PM EXTRA STRENGTH)  MG tablet Take 1 tablet by mouth nightly as needed       mineral oil-hydrophilic petrolatum (AQUAPHOR) ointment Apply topically as needed. 396 g 0    acetaminophen (TYLENOL) 500 MG tablet Take 2 tablets by mouth every 6 hours as needed for Pain Do not take with other medications containing acetaminophen.  30 tablet 0       ALLERGIES    Allergies   Allergen Reactions    Iodine Hives    Other Other (See Comments)     Flu vaccine  Felt like bones were on the outside of her body    Eggs Or Egg-Derived Products [Eggs Or Egg-Derived Products] Hives and Nausea And Vomiting    Eggs [Egg White] Nausea And Vomiting     PT STATES CAN EAT EGG WHITE BUT NOT EGG YOLK    Flexeril [Cyclobenzaprine Hcl] Rash    Levofloxacin Rash    Omeprazole Nausea And Vomiting and Other (See Comments)     Burns my stomach up          SOCIAL and FAMILY HISTORY    Social History     Socioeconomic History    Marital status: Legally      Spouse name: None    Number of children: None    Years of education: None    Highest education level: None   Occupational History    None   Tobacco Use    Smoking status: Former Smoker     Packs/day: 1.00     Years: 37.00     Pack years: 37.00     Types: Cigarettes     Quit date: 2017     Years since quittin.7    Smokeless tobacco: Never Used   Vaping Use    Vaping Use: Never used   Substance and Sexual Activity    Alcohol use: No     Alcohol/week: 0.0 standard drinks    Drug use: No    Sexual activity: Not Currently   Other Topics Concern    None   Social History Narrative    None     Social Determinants of Health     Financial Resource Strain:     Difficulty of Paying Living Expenses: Food Insecurity:     Worried About Running Out of Food in the Last Year:     [address] in the Last Year:    Transportation Needs:     Lack of Transportation (Medical):      Lack of Transportation (Non-Medical):    Physical Activity:     Days of Exercise per Week:     Minutes of Exercise per Session:    Stress:     Feeling of Stress :    Social Connections:     Frequency of Communication with Friends and Family:     Frequency of Social Gatherings with Friends and Family:     Attends Jain Services:     Active Member of Clubs or Organizations:     Attends Club or Organization Meetings:     Marital Status:    Intimate Partner Violence:     Fear of Current or Ex-Partner:     Emotionally Abused:     Physically Abused:     Sexually Abused:      Family History   Problem Relation Age of Onset    Heart Failure Mother         heart disease    Hypertension Mother     Emphysema Mother     Heart Failure Father         heart disease    COPD Father     Hypertension Paternal Grandmother        PHYSICAL EXAM    VITAL SIGNS: /65   Pulse 74   Temp 98.6 °F (37 °C)   Resp 17   Ht 5' 1.5\" (1.562 m)   Wt 142 lb 13.7 oz (64.8 kg)   SpO2 94%   BMI 26.56 kg/m²   Constitutional:  Well developed, well nourished, no acute distress  Eyes:  Pupils equally round and reactive to light, extraocular movements intact, conjunctiva of the affected eye is not injected, no chemosis, no discharge, no hyphema, no hypopyon, no pain when contralateral eye is exposed to light, no foreign body visualized, globe intact without deformity  Visual acuity, not corrected:   20/30 on Right  20/30 on Left  Woods-lamp examination: no fluorescein uptake, no Nely sign, no dendritic or pseudodendritic lesions  HENT:  Atraumatic, external ears normal, ear canals clear bilaterally, nose normal, no periorbital edema or erythema  Neck: no neck swelling   Respiratory:  No retractions  Cardiovascular:  No JVD  Integument:  Warm dry skin, no obvious rash  Neurologic: Awake, alert and oriented, no slurred speech    RADIOLOGY  No orders to display       PROCEDURE  Lattie Adin Lamp Procedure Note  Indication: eye irritation  Procedure: The patient's head was positioned appropriately to provide adequate exposure of the left eye using a wood's lamp. Anesthesia was tetracaine drops. Please see physical exam for findings. The patient tolerated the procedure well. Complications: None    ED COURSE & MEDICAL DECISION MAKING    See electronic medical records for medications prescribed    Differential diagnosis: Iritis, Uveitis, Conjunctivitis, Herpes Keratitis, Corneal Ulcer, Corneal Abrasion, Acute Angle Glaucoma, Orbital Cellulitis/Abscess, Periorbital/Preseptal Cellulitis, other. Patient is afebrile and nontoxic in appearance. She did not have evidence of shingles. Patient with a small abrasion to the left lateral lid, with tenderness at that site. Will be treated with Romycin ointment, referral to her primary care provider. She is given very strict return precautions. The patient was instructed to follow up as an outpatient in 1 day. The patient was instructed to return to the ED immediately for any new or worsening symptoms. The patient verbalized understanding. FINAL IMPRESSION    1.  Irritation of eye        PLAN  Discharge with medication and followup with PCP (see EMR)      (Please note that this note was completed with a voice recognition program.  Every attempt was made to edit the dictations, but inevitably there remain words that are mis-transcribed.)          [place]  10/02/21 0054

## 2021-10-04 ENCOUNTER — CARE COORDINATION (OUTPATIENT)
Dept: CARE COORDINATION | Age: 61
End: 2021-10-04

## 2021-10-04 NOTE — CARE COORDINATION
Ambulatory Care Coordination Note  10/4/2021  CM Risk Score: 10  Charlson 10 Year Mortality Risk Score: 98%     ACC: Serene Jaimes RN    Summary Note:   Spoke with pt, following ed visit yesterday for c/o of eye pain. Pt has vu of dc instructions has picked up and started abc ointment, denies emil questions or other concerns,  Has follow up with pcp harshal. All pt education completed, will dc from care coordination at next out reach call        Care Coordination Interventions    Program Enrollment: Rising Risk  Referral from Primary Care Provider: No  Suggested Interventions and Community Resources  Diabetes Education: Completed  Fall Risk Prevention: Completed  Zone Management Tools: Completed         Goals Addressed                 This Visit's Progress     Conditions and Symptoms   On track     I will schedule office visits, as directed by my provider. I will keep my appointment or reschedule if I have to cancel. I will notify my provider of any barriers to my plan of care. I will follow my Zone Management tool to seek urgent or emergent care. I will notify my provider of any symptoms that indicate a worsening of my condition. Barriers: overwhelmed by complexity of regimen and lack of education  Plan for overcoming my barriers: care coordination  Confidence: 7/10  Anticipated Goal Completion Date: 5/31/2021         Medication Management   On track     I will take my medication as directed. I will notify my provider of any problems with medications, like adverse effects or side effects. I will notify my provider/Care Coordinator if I am unable to afford my medications. I will notify my provider for advice before I stop taking any of my medication.     Barriers: lack of support and lack of education  Plan for overcoming my barriers: care coordination  Confidence: 7/10  Anticipated Goal Completion Date:5/31 2021       Reduce Falls    On track     I will reduce my risk of falls by the following: Use walking aids like cane or walker    Barriers: overwhelmed by complexity of regimen and lack of education  Plan for overcoming my barriers: care  coordination  Confidence: 7/10  Anticipated Goal Completion Date: 5/31/2021            Prior to Admission medications    Medication Sig Start Date End Date Taking?  Authorizing Provider   erythromycin [facility]) 5 MG/GM ophthalmic ointment Place 0.5cm ribbon to left eye three times daily for 10 days 10/2/21 10/12/21  CARIDAD Paul   albuterol sulfate HFA (PROVENTIL HFA) 108 (90 Base) MCG/ACT inhaler Inhale 2 puffs into the lungs every 6 hours as needed for Wheezing or Shortness of Breath 9/29/21   [facility]   atorvastatin (LIPITOR) 80 MG tablet Take 1 tablet by mouth daily 9/23/21   [facility], DO   metoprolol succinate (TOPROL XL) 25 MG extended release tablet TAKE 1/2 TABLET BY MOUTH DAILY 8/25/21   [facility], DO   sertraline (ZOLOFT) 100 MG tablet TAKE ONE TABLET BY MOUTH DAILY 7/26/21   [facility], DO   pantoprazole (PROTONIX) 40 MG tablet Take 1 tablet by mouth every morning (before breakfast) 7/18/21 8/17/21  Saeg Borden MD   sucralfate (CARAFATE) 1 GM tablet Take 1 tablet by mouth 4 times daily 7/18/21   Sage Borden MD   tiZANidine (ZANAFLEX) 2 MG tablet Take 1 tablet by mouth 3 times daily as needed (back pain) 6/30/21   Trisha Alvarado MD   pregabalin (LYRICA) 75 MG capsule TAKE ONE CAPSULE BY MOUTH TWICE A DAY 5/21/21 7/20/21  [facility], DO   naproxen (NAPROSYN) 500 MG tablet Take 1 tablet by mouth 2 times daily as needed for Pain  Patient not taking: Reported on 7/2/2021 5/14/21   Laila De La Garza MD   albuterol (PROVENTIL) (5 MG/ML) 0.5% nebulizer solution Take 0.5 mLs by nebulization every 6 hours as needed for Wheezing 5/9/21   Laura Mosley PA-C   Umeclidinium Bromide 62.5 MCG/INH AEPB Inhale 1 puff into the lungs daily 1/27/21   Dennie Molt, MD   ipratropium-albuterol (DUONEB) 0.5-2.5 (3) MG/3ML SOLN nebulizer solution Inhale 3 mLs into the lungs every 4 hours 1/27/21   Ariel Nageotte, MD   folic acid (FOLVITE) 1 MG tablet Take 1 tablet by mouth daily 8/5/20   Liz Bertrand DO   metFORMIN (GLUCOPHAGE) 500 MG tablet Take 1 tablet by mouth daily (with breakfast) 11/6/19   JOSE ANGEL Reid CNP   triamcinolone (KENALOG) 0.1 % cream Apply topically 2 times daily. 10/9/19   JOSE ANGEL Reid CNP   aspirin 81 MG chewable tablet CHEW ONE TABLET BY MOUTH DAILY 6/7/19   JOSE ANGEL Reid CNP   diphenhydrAMINE-APAP, sleep, (TYLENOL PM EXTRA STRENGTH)  MG tablet Take 1 tablet by mouth nightly as needed     Historical Provider, MD   mineral oil-hydrophilic petrolatum (AQUAPHOR) ointment Apply topically as needed. 2/6/19   JOSE ANGEL Reid CNP   acetaminophen (TYLENOL) 500 MG tablet Take 2 tablets by mouth every 6 hours as needed for Pain Do not take with other medications containing acetaminophen. 9/2/18   CARIDAD Kingston       Future Appointments   Date Time Provider Lashell Prabhakar   10/5/2021 10:30 AM Liz Bertrand DO [place]     ,   Diabetes Assessment    Medic Alert ID: No  Meal Planning: Avoidance of concentrated sweets   How often do you test your blood sugar?: Daily   Do you have barriers with adherence to non-pharmacologic self-management interventions?  (Nutrition/Exercise/Self-Monitoring): No   Have you ever had to go to the ED for symptoms of low blood sugar?: No       No patient-reported symptoms      ,   COPD Assessment    Does the patient understand envrionmental exposure?: No  Is the patient able to verbalize Rescue vs. Long Acting medications?: No  Does the patient have a nebulizer?: No  Does the patient use a space with inhaled medications?: No     No patient-reported symptoms         Symptoms:         and   General Assessment    Do you have any symptoms that are causing concern?: Yes  Progression since Onset: Unchanged  Reported Symptoms: Other (Comment: eye pain)

## 2021-10-05 ENCOUNTER — OFFICE VISIT (OUTPATIENT)
Dept: FAMILY MEDICINE CLINIC | Age: 61
End: 2021-10-05
Payer: MEDICARE

## 2021-10-05 VITALS
DIASTOLIC BLOOD PRESSURE: 70 MMHG | WEIGHT: 142 LBS | HEART RATE: 74 BPM | OXYGEN SATURATION: 96 % | BODY MASS INDEX: 26.4 KG/M2 | SYSTOLIC BLOOD PRESSURE: 136 MMHG

## 2021-10-05 DIAGNOSIS — I10 ESSENTIAL HYPERTENSION: ICD-10-CM

## 2021-10-05 DIAGNOSIS — G62.9 NEUROPATHY: ICD-10-CM

## 2021-10-05 DIAGNOSIS — E11.9 TYPE 2 DIABETES MELLITUS WITHOUT COMPLICATION, WITHOUT LONG-TERM CURRENT USE OF INSULIN (HCC): Primary | ICD-10-CM

## 2021-10-05 DIAGNOSIS — I47.1 PSVT (PAROXYSMAL SUPRAVENTRICULAR TACHYCARDIA) (HCC): ICD-10-CM

## 2021-10-05 PROBLEM — I47.10 PSVT (PAROXYSMAL SUPRAVENTRICULAR TACHYCARDIA): Status: ACTIVE | Noted: 2021-10-05

## 2021-10-05 PROBLEM — E11.40 TYPE 2 DIABETES MELLITUS WITH DIABETIC NEUROPATHY (HCC): Status: ACTIVE | Noted: 2021-10-05

## 2021-10-05 LAB — HBA1C MFR BLD: 6 %

## 2021-10-05 PROCEDURE — G8427 DOCREV CUR MEDS BY ELIG CLIN: HCPCS | Performed by: FAMILY MEDICINE

## 2021-10-05 PROCEDURE — 1036F TOBACCO NON-USER: CPT | Performed by: FAMILY MEDICINE

## 2021-10-05 PROCEDURE — 83036 HEMOGLOBIN GLYCOSYLATED A1C: CPT | Performed by: FAMILY MEDICINE

## 2021-10-05 PROCEDURE — G8419 CALC BMI OUT NRM PARAM NOF/U: HCPCS | Performed by: FAMILY MEDICINE

## 2021-10-05 PROCEDURE — 99214 OFFICE O/P EST MOD 30 MIN: CPT | Performed by: FAMILY MEDICINE

## 2021-10-05 PROCEDURE — 3017F COLORECTAL CA SCREEN DOC REV: CPT | Performed by: FAMILY MEDICINE

## 2021-10-05 PROCEDURE — 3044F HG A1C LEVEL LT 7.0%: CPT | Performed by: FAMILY MEDICINE

## 2021-10-05 PROCEDURE — G8484 FLU IMMUNIZE NO ADMIN: HCPCS | Performed by: FAMILY MEDICINE

## 2021-10-05 PROCEDURE — 2022F DILAT RTA XM EVC RTNOPTHY: CPT | Performed by: FAMILY MEDICINE

## 2021-10-05 SDOH — ECONOMIC STABILITY: FOOD INSECURITY: WITHIN THE PAST 12 MONTHS, YOU WORRIED THAT YOUR FOOD WOULD RUN OUT BEFORE YOU GOT MONEY TO BUY MORE.: NEVER TRUE

## 2021-10-05 SDOH — ECONOMIC STABILITY: FOOD INSECURITY: WITHIN THE PAST 12 MONTHS, THE FOOD YOU BOUGHT JUST DIDN'T LAST AND YOU DIDN'T HAVE MONEY TO GET MORE.: NEVER TRUE

## 2021-10-05 ASSESSMENT — PATIENT HEALTH QUESTIONNAIRE - PHQ9
2. FEELING DOWN, DEPRESSED OR HOPELESS: 0
SUM OF ALL RESPONSES TO PHQ QUESTIONS 1-9: 0
SUM OF ALL RESPONSES TO PHQ9 QUESTIONS 1 & 2: 0
SUM OF ALL RESPONSES TO PHQ QUESTIONS 1-9: 0
1. LITTLE INTEREST OR PLEASURE IN DOING THINGS: 0
SUM OF ALL RESPONSES TO PHQ QUESTIONS 1-9: 0

## 2021-10-05 ASSESSMENT — SOCIAL DETERMINANTS OF HEALTH (SDOH): HOW HARD IS IT FOR YOU TO PAY FOR THE VERY BASICS LIKE FOOD, HOUSING, MEDICAL CARE, AND HEATING?: NOT HARD AT ALL

## 2021-10-05 NOTE — PROGRESS NOTES
10/5/2021     Greta Whiting (:  1960) is a 57 y.o. female, here for evaluation of the following medical concerns:    [facility]     Hospital follow up- went to Er last week due to eye irritation, much improved today. Jack Belcher is a 57 y.o. female who presents with left eye discomfort. Onset was about a week ago. The duration has been constant since the onset. The quality is that it is a sharp pain. She states she thought she got something in her eye about a week ago, and was scratching it, causing a scratch at the corner of her eye. Since then she has had persistent pain in the area. She is used over-the-counter eyewash with no improvement to her symptoms. She denies any new light sensitivity or visual disturbance. She states she had cataract surgery done about a year and a half ago but has not seen an eye physician since then. She denies any fever, chills, facial rash. She has no further complaints at this time. \"    Has upcoming appointment for eye exam.     Neuropathy- in both feet for years, hasn't had EMG, is concerned could be secondary to her blood glucose being elevated. DM II- has had this for several years, on Metfomrin occasionally. All other chronic medical conditions are well controlled. Today, denied chest pain, sob,n,v or diarrhea.        Review of Systems   Constitutional: Positive for fatigue. Negative for activity change, fever and unexpected weight change. HENT: Negative for congestion, ear pain, rhinorrhea, sinus pressure and sore throat. Eyes: Negative for pain, discharge, itching and visual disturbance. Respiratory: Negative for cough and shortness of breath. Cardiovascular: Negative for chest pain and leg swelling. Gastrointestinal: Negative for abdominal pain, diarrhea, nausea and vomiting. Endocrine: Negative for cold intolerance, heat intolerance, polydipsia and polyphagia. Musculoskeletal: Positive for arthralgias. Skin: Negative for rash. Neurological: Positive for numbness. Negative for dizziness, tremors, syncope and headaches. Psychiatric/Behavioral: Negative for dysphoric mood. The patient is not nervous/anxious. Prior to Visit Medications    Medication Sig Taking? Authorizing Provider   albuterol sulfate HFA (PROVENTIL HFA) 108 (90 Base) MCG/ACT inhaler Inhale 2 puffs into the lungs every 6 hours as needed for Wheezing or Shortness of Breath Yes Kevin Zapata DO   atorvastatin (LIPITOR) 80 MG tablet Take 1 tablet by mouth daily Yes Kevin Zapata DO   metoprolol succinate (TOPROL XL) 25 MG extended release tablet TAKE 1/2 TABLET BY MOUTH DAILY Yes Kevin Zapata DO   sertraline (ZOLOFT) 100 MG tablet TAKE ONE TABLET BY MOUTH DAILY Yes Kevin Zapata DO   sucralfate (CARAFATE) 1 GM tablet Take 1 tablet by mouth 4 times daily Yes Ml Patterson MD   tiZANidine (ZANAFLEX) 2 MG tablet Take 1 tablet by mouth 3 times daily as needed (back pain) Yes Soren Webster MD   naproxen (NAPROSYN) 500 MG tablet Take 1 tablet by mouth 2 times daily as needed for Pain Yes Chetna Dean MD   albuterol (PROVENTIL) (5 MG/ML) 0.5% nebulizer solution Take 0.5 mLs by nebulization every 6 hours as needed for Wheezing Yes Laura Mosley PA-C   Umeclidinium Bromide 62.5 MCG/INH AEPB Inhale 1 puff into the lungs daily Yes Freedom Jimenez MD   ipratropium-albuterol (DUONEB) 0.5-2.5 (3) MG/3ML SOLN nebulizer solution Inhale 3 mLs into the lungs every 4 hours Yes Freedom Jimenez MD   folic acid (FOLVITE) 1 MG tablet Take 1 tablet by mouth daily Yes Marcelino Chau DO   metFORMIN (GLUCOPHAGE) 500 MG tablet Take 1 tablet by mouth daily (with breakfast) Yes JOSE ANGEL Connolly CNP   triamcinolone (KENALOG) 0.1 % cream Apply topically 2 times daily.  Yes JOSE ANGEL Connolly CNP   aspirin 81 MG chewable tablet CHEW ONE TABLET BY MOUTH DAILY Yes Maryanne A Lauro, APRN - CNP   diphenhydrAMINE-APAP, sleep, (TYLENOL PM EXTRA STRENGTH)  MG tablet Take 1 tablet by mouth nightly as needed  Yes Historical Provider, MD   mineral oil-hydrophilic petrolatum (AQUAPHOR) ointment Apply topically as needed. Yes JOSE ANGEL Abel CNP   acetaminophen (TYLENOL) 500 MG tablet Take 2 tablets by mouth every 6 hours as needed for Pain Do not take with other medications containing acetaminophen. Yes MatiCARIDAD Espino   pantoprazole (PROTONIX) 40 MG tablet Take 1 tablet by mouth every morning (before breakfast)  Gauri Barrios MD   pregabalin (LYRICA) 75 MG capsule TAKE ONE CAPSULE BY MOUTH TWICE A DAY  Missy Boykin DO        Social History     Tobacco Use    Smoking status: Former Smoker     Packs/day: 1.00     Years: 37.00     Pack years: 37.00     Types: Cigarettes     Quit date: 2017     Years since quittin.7    Smokeless tobacco: Never Used   Substance Use Topics    Alcohol use: No     Alcohol/week: 0.0 standard drinks        Vitals:    10/05/21 1031   BP: 136/70   Pulse: 74   SpO2: 96%   Weight: 142 lb (64.4 kg)     Estimated body mass index is 26.4 kg/m² as calculated from the following:    Height as of 10/1/21: 5' 1.5\" (1.562 m). Weight as of this encounter: 142 lb (64.4 kg). Physical Exam  Vitals and nursing note reviewed. Constitutional:       Appearance: She is well-developed. HENT:      Head: Normocephalic and atraumatic. Right Ear: Tympanic membrane, ear canal and external ear normal.      Left Ear: Tympanic membrane, ear canal and external ear normal.      Nose: Nose normal.   Eyes:      Conjunctiva/sclera: Conjunctivae normal.      Pupils: Pupils are equal, round, and reactive to light. Cardiovascular:      Rate and Rhythm: Normal rate and regular rhythm. Heart sounds: Normal heart sounds. Pulmonary:      Effort: Pulmonary effort is normal.      Breath sounds: Normal breath sounds. No wheezing. Abdominal:      General: Bowel sounds are normal.      Palpations: Abdomen is soft. Tenderness:  There is no abdominal tenderness. Skin:     General: Skin is warm and dry. Neurological:      General: No focal deficit present. Mental Status: She is alert and oriented to person, place, and time. Sensory: Sensory deficit present. Coordination: Coordination normal.      Deep Tendon Reflexes: Reflexes normal.   Psychiatric:         Behavior: Behavior normal.         ASSESSMENT/PLAN:  1. Type 2 diabetes mellitus without complication, without long-term current use of insulin ([facility] [address]  Patient will need to keep a log of his glucose readings and bring them to the office. He needs to monitor his diet and exercise. Attempt to lose weight. Discussed proper eating habits. Continue to take medication as prescribed. Will obtain A1C at this visit. Educated on signs and symptoms for immediate evaluation in the ER.   - POCT glycosylated hemoglobin (Hb A1C)    2. PSVT (paroxysmal supraventricular tachycardia) ([facility] [address]  Stable  Continue with medication  Keep appointments with specialist.   Shon Frankel questions    3. Essential hypertension  wel controlled. Discussed signs and symptoms for immediate evaluation in the ER. Reduce sodium. Monitor diet, exercise and lose weight. Keep a BP log and bring it to your next appointment. Needs to rtc in one month for BP check    4. Neuropathy  Stable  Continue with medication  Keep appointments with specialist.   Answered questions  - EMG; Future      No follow-ups on file.

## 2021-10-13 ASSESSMENT — ENCOUNTER SYMPTOMS
EYE ITCHING: 0
ABDOMINAL PAIN: 0
SORE THROAT: 0
EYE PAIN: 0
SHORTNESS OF BREATH: 0
DIARRHEA: 0
NAUSEA: 0
RHINORRHEA: 0
COUGH: 0
EYE DISCHARGE: 0
SINUS PRESSURE: 0
VOMITING: 0

## 2021-10-14 ENCOUNTER — TELEPHONE (OUTPATIENT)
Dept: FAMILY MEDICINE CLINIC | Age: 61
End: 2021-10-14

## 2021-10-14 NOTE — TELEPHONE ENCOUNTER
Stephanie from [address] [place] [address] Drive states the Diagnosis Code for the EMG is not covered by [employer]. Please advise if there is another Diagnosis Code to try.

## 2021-10-17 ENCOUNTER — APPOINTMENT (OUTPATIENT)
Dept: CT IMAGING | Age: 61
End: 2021-10-17
Payer: MEDICARE

## 2021-10-17 ENCOUNTER — APPOINTMENT (OUTPATIENT)
Dept: GENERAL RADIOLOGY | Age: 61
End: 2021-10-17
Payer: MEDICARE

## 2021-10-17 ENCOUNTER — HOSPITAL ENCOUNTER (EMERGENCY)
Age: 61
Discharge: HOME | End: 2021-10-17
Attending: EMERGENCY MEDICINE
Payer: MEDICARE

## 2021-10-17 VITALS
OXYGEN SATURATION: 97 % | HEIGHT: 61 IN | SYSTOLIC BLOOD PRESSURE: 170 MMHG | RESPIRATION RATE: 16 BRPM | HEART RATE: 70 BPM | DIASTOLIC BLOOD PRESSURE: 79 MMHG | BODY MASS INDEX: 26.81 KG/M2 | WEIGHT: 142 LBS | TEMPERATURE: 98.1 F

## 2021-10-17 DIAGNOSIS — S09.90XA TRAUMATIC INJURY OF HEAD, INITIAL ENCOUNTER: ICD-10-CM

## 2021-10-17 DIAGNOSIS — R07.89 CHEST WALL PAIN: Primary | ICD-10-CM

## 2021-10-17 LAB
A/G RATIO: 1.7 (ref 1.1–2.2)
ALBUMIN SERPL-MCNC: 4.3 G/DL (ref 3.4–5)
ALP BLD-CCNC: 120 U/L (ref 40–129)
ALT SERPL-CCNC: 13 U/L (ref 10–40)
ANION GAP SERPL CALCULATED.3IONS-SCNC: 10 MMOL/L (ref 3–16)
AST SERPL-CCNC: 13 U/L (ref 15–37)
BASOPHILS ABSOLUTE: 0 K/UL (ref 0–0.2)
BASOPHILS RELATIVE PERCENT: 0.5 %
BILIRUB SERPL-MCNC: 0.5 MG/DL (ref 0–1)
BUN BLDV-MCNC: 6 MG/DL (ref 7–20)
CALCIUM SERPL-MCNC: 8.9 MG/DL (ref 8.3–10.6)
CHLORIDE BLD-SCNC: 104 MMOL/L (ref 99–110)
CO2: 26 MMOL/L (ref 21–32)
CREAT SERPL-MCNC: <0.5 MG/DL (ref 0.6–1.2)
EOSINOPHILS ABSOLUTE: 0.3 K/UL (ref 0–0.6)
EOSINOPHILS RELATIVE PERCENT: 3.8 %
GFR AFRICAN AMERICAN: >60
GFR NON-AFRICAN AMERICAN: >60
GLOBULIN: 2.6 G/DL
GLUCOSE BLD-MCNC: 91 MG/DL (ref 70–99)
HCT VFR BLD CALC: 38.3 % (ref 36–48)
HEMOGLOBIN: 12.9 G/DL (ref 12–16)
LYMPHOCYTES ABSOLUTE: 1.6 K/UL (ref 1–5.1)
LYMPHOCYTES RELATIVE PERCENT: 21.7 %
MCH RBC QN AUTO: 31.7 PG (ref 26–34)
MCHC RBC AUTO-ENTMCNC: 33.8 G/DL (ref 31–36)
MCV RBC AUTO: 93.7 FL (ref 80–100)
MONOCYTES ABSOLUTE: 0.4 K/UL (ref 0–1.3)
MONOCYTES RELATIVE PERCENT: 5.6 %
NEUTROPHILS ABSOLUTE: 5.1 K/UL (ref 1.7–7.7)
NEUTROPHILS RELATIVE PERCENT: 68.4 %
PDW BLD-RTO: 16.2 % (ref 12.4–15.4)
PLATELET # BLD: 222 K/UL (ref 135–450)
PMV BLD AUTO: 7.7 FL (ref 5–10.5)
POTASSIUM REFLEX MAGNESIUM: 3.8 MMOL/L (ref 3.5–5.1)
RBC # BLD: 4.08 M/UL (ref 4–5.2)
SODIUM BLD-SCNC: 140 MMOL/L (ref 136–145)
TOTAL PROTEIN: 6.9 G/DL (ref 6.4–8.2)
TROPONIN: <0.01 NG/ML
WBC # BLD: 7.5 K/UL (ref 4–11)

## 2021-10-17 PROCEDURE — 71045 X-RAY EXAM CHEST 1 VIEW: CPT

## 2021-10-17 PROCEDURE — 80053 COMPREHEN METABOLIC PANEL: CPT

## 2021-10-17 PROCEDURE — 85025 COMPLETE CBC W/AUTO DIFF WBC: CPT

## 2021-10-17 PROCEDURE — 96374 THER/PROPH/DIAG INJ IV PUSH: CPT

## 2021-10-17 PROCEDURE — 70450 CT HEAD/BRAIN W/O DYE: CPT

## 2021-10-17 PROCEDURE — 84484 ASSAY OF TROPONIN QUANT: CPT

## 2021-10-17 PROCEDURE — 99283 EMERGENCY DEPT VISIT LOW MDM: CPT

## 2021-10-17 PROCEDURE — 6360000002 HC RX W HCPCS: Performed by: EMERGENCY MEDICINE

## 2021-10-17 PROCEDURE — 93005 ELECTROCARDIOGRAM TRACING: CPT | Performed by: EMERGENCY MEDICINE

## 2021-10-17 RX ORDER — KETOROLAC TROMETHAMINE 30 MG/ML
30 INJECTION, SOLUTION INTRAMUSCULAR; INTRAVENOUS ONCE
Status: COMPLETED | OUTPATIENT
Start: 2021-10-17 | End: 2021-10-17

## 2021-10-17 RX ADMIN — KETOROLAC TROMETHAMINE 30 MG: 30 INJECTION, SOLUTION INTRAMUSCULAR at 21:59

## 2021-10-17 ASSESSMENT — PAIN DESCRIPTION - LOCATION
LOCATION: HEAD;CHEST
LOCATION: CHEST;HEAD

## 2021-10-17 ASSESSMENT — PAIN DESCRIPTION - ORIENTATION: ORIENTATION: PROXIMAL

## 2021-10-17 ASSESSMENT — PAIN SCALES - GENERAL
PAINLEVEL_OUTOF10: 8
PAINLEVEL_OUTOF10: 7

## 2021-10-17 ASSESSMENT — PAIN DESCRIPTION - FREQUENCY: FREQUENCY: CONTINUOUS

## 2021-10-17 ASSESSMENT — PAIN DESCRIPTION - DESCRIPTORS
DESCRIPTORS: ACHING
DESCRIPTORS: ACHING

## 2021-10-17 ASSESSMENT — PAIN - FUNCTIONAL ASSESSMENT: PAIN_FUNCTIONAL_ASSESSMENT: ACTIVITIES ARE NOT PREVENTED

## 2021-10-17 ASSESSMENT — PAIN DESCRIPTION - PAIN TYPE: TYPE: ACUTE PAIN

## 2021-10-17 ASSESSMENT — PAIN DESCRIPTION - ONSET: ONSET: ON-GOING

## 2021-10-17 ASSESSMENT — PAIN DESCRIPTION - PROGRESSION: CLINICAL_PROGRESSION: NOT CHANGED

## 2021-10-18 ENCOUNTER — CARE COORDINATION (OUTPATIENT)
Dept: CARE COORDINATION | Age: 61
End: 2021-10-18

## 2021-10-18 LAB
EKG ATRIAL RATE: 60 BPM
EKG DIAGNOSIS: NORMAL
EKG P AXIS: 60 DEGREES
EKG P-R INTERVAL: 108 MS
EKG Q-T INTERVAL: 446 MS
EKG QRS DURATION: 76 MS
EKG QTC CALCULATION (BAZETT): 446 MS
EKG R AXIS: 74 DEGREES
EKG T AXIS: 75 DEGREES
EKG VENTRICULAR RATE: 60 BPM

## 2021-10-18 PROCEDURE — 93010 ELECTROCARDIOGRAM REPORT: CPT | Performed by: INTERNAL MEDICINE

## 2021-10-18 ASSESSMENT — ENCOUNTER SYMPTOMS
COUGH: 0
EYE ITCHING: 0
VOMITING: 0
EYE DISCHARGE: 0
COLOR CHANGE: 0
SHORTNESS OF BREATH: 0
CONSTIPATION: 0
ABDOMINAL PAIN: 0

## 2021-10-18 NOTE — CARE COORDINATION
Out reach call placed to pt , who presented to Ed this weekend. No answer to her phone, hipaa compliant message left.  Will attempt again  at later date

## 2021-10-18 NOTE — ED PROVIDER NOTES
[facility] [place]      Pt Name: Haritha Dhillon  MRN: [id number]  [place] 1960  Date of evaluation: 10/17/2021  Provider: Jocelyne Garcia MD    CHIEF COMPLAINT       Chief Complaint   Patient presents with    Chest Pain     Chest pain off and on all day, also was hit in back of head last night by a falling window and has headache and concussive symptoms per patient.  Dizziness    Head Injury       HISTORY OF PRESENT ILLNESS    Rosario Whiting is a 57 y.o. female who presents to the emergency department with multiple complaints. Patient endorses a window falling and hitting her head 24 hours prior to arrival.  Since then has had 3 out of 10 achy headache, lightheadedness, chest wall pain. Worse with movement. Better with rest.  Denies loss of consciousness. Denies neck pain or back pain. Denies any other injury or insult. Has been taking over-the-counter medications with minimal relief. Concerned she might have a concussion. Positive for photophobia. No nausea or vomiting. No other associated symptoms. Nursing Notes were reviewed. Including nursing noted for FM, Surgical History, Past Medical History, Social History, vitals, and allergies; agree with all. REVIEW OF SYSTEMS       Review of Systems   Constitutional: Negative for diaphoresis and unexpected weight change. HENT: Negative for congestion and dental problem. Eyes: Negative for discharge and itching. Respiratory: Negative for cough and shortness of breath. Cardiovascular: Positive for chest pain. Negative for leg swelling. Gastrointestinal: Negative for abdominal pain, constipation and vomiting. Endocrine: Negative for cold intolerance and heat intolerance. Genitourinary: Negative for vaginal bleeding, vaginal discharge and vaginal pain. Musculoskeletal: Negative for neck pain and neck stiffness. Skin: Negative for color change and pallor.    Neurological: Positive for light-headedness and headaches. Negative for tremors and weakness. Psychiatric/Behavioral: Negative for agitation and behavioral problems. Except as noted above the remainder of the review of systems was reviewed and negative.      PAST MEDICAL HISTORY     Past Medical History:   Diagnosis Date    Back pain     CAD (coronary artery disease)     COPD (chronic obstructive pulmonary disease) (HCC)     Foot pain     from bone spurs    GERD (gastroesophageal reflux disease)     Headache     sharp pain going through head    Heart attack ([address]     Hip pain     Hyperlipidemia     Hypertension      (normal spontaneous vaginal delivery)     Osteoporosis     Vision abnormalities        SURGICAL HISTORY       Past Surgical History:   Procedure Laterality Date    CARDIAC CATHETERIZATION      CHOLECYSTECTOMY      COLONOSCOPY      ESOPHAGEAL DILATATION N/A 2019    ESOPHAGEAL DILATION [place] performed by Raul Medina MD at [address] N/A 2019    EGD BIOPSY performed by Raul Medina MD at [facility]       Discharge Medication List as of 10/17/2021 10:44 PM      CONTINUE these medications which have NOT CHANGED    Details   albuterol sulfate HFA (PROVENTIL HFA) 108 (90 Base) MCG/ACT inhaler Inhale 2 puffs into the lungs every 6 hours as needed for Wheezing or Shortness of Breath, Disp-1 each, R-1Normal      atorvastatin (LIPITOR) 80 MG tablet Take 1 tablet by mouth daily, Disp-90 tablet, R-1Normal      metoprolol succinate (TOPROL XL) 25 MG extended release tablet TAKE 1/2 TABLET BY MOUTH DAILY, Disp-30 tablet, R-1Normal      sertraline (ZOLOFT) 100 MG tablet TAKE ONE TABLET BY MOUTH DAILY, Disp-60 tablet, R-0Normal      pantoprazole (PROTONIX) 40 MG tablet Take 1 tablet by mouth every morning (before breakfast), Disp-30 tablet, R-0Normal      sucralfate (CARAFATE) 1 GM tablet Take 1 tablet by mouth 4 times daily, Disp-120 tablet, R-0Normal      tiZANidine (ZANAFLEX) 2 MG tablet Take 1 tablet by mouth 3 times daily as needed (back pain), Disp-30 tablet, R-0Print      pregabalin (LYRICA) 75 MG capsule TAKE ONE CAPSULE BY MOUTH TWICE A DAY, Disp-60 capsule, R-2Normal      naproxen (NAPROSYN) 500 MG tablet Take 1 tablet by mouth 2 times daily as needed for Pain, Disp-30 tablet, R-0Print      albuterol (PROVENTIL) (5 MG/ML) 0.5% nebulizer solution Take 0.5 mLs by nebulization every 6 hours as needed for Wheezing, Disp-30 vial, R-0Print      Umeclidinium Bromide 62.5 MCG/INH AEPB Inhale 1 puff into the lungs daily, Disp-1 each, R-11Normal      ipratropium-albuterol (DUONEB) 0.5-2.5 (3) MG/3ML SOLN nebulizer solution Inhale 3 mLs into the lungs every 4 hours, Disp-360 mL, [id number]      folic acid (FOLVITE) 1 MG tablet Take 1 tablet by mouth daily, Disp-30 tablet,R-1Normal      metFORMIN (GLUCOPHAGE) 500 MG tablet Take 1 tablet by mouth daily (with breakfast), Disp-90 tablet, R-0Normal      triamcinolone (KENALOG) 0.1 % cream Apply topically 2 times daily. , Disp-45 g, R-0, Normal      aspirin 81 MG chewable tablet CHEW ONE TABLET BY MOUTH DAILY, Disp-90 tablet, R-1Normal      diphenhydrAMINE-APAP, sleep, (TYLENOL PM EXTRA STRENGTH)  MG tablet Take 1 tablet by mouth nightly as needed Historical Med      mineral oil-hydrophilic petrolatum (AQUAPHOR) ointment Apply topically as needed. , Disp-396 g, R-0, Normal      acetaminophen (TYLENOL) 500 MG tablet Take 2 tablets by mouth every 6 hours as needed for Pain Do not take with other medications containing acetaminophen., Disp-30 tablet, R-0Print             ALLERGIES     Iodine, Other, Eggs or egg-derived products [eggs or egg-derived products], Eggs [egg white], Flexeril [cyclobenzaprine hcl], Levofloxacin, and Omeprazole    FAMILY HISTORY        Family History   Problem Relation Age of Onset    Heart Failure Mother         heart disease    Hypertension Mother  Emphysema Mother     Heart Failure Father         heart disease    COPD Father     Hypertension Paternal Grandmother        SOCIAL HISTORY       Social History     Socioeconomic History    Marital status: Legally      Spouse name: None    Number of children: None    Years of education: None    Highest education level: None   Occupational History    None   Tobacco Use    Smoking status: Former Smoker     Packs/day: 1.00     Years: 37.00     Pack years: 37.00     Types: Cigarettes     Quit date: 2017     Years since quittin.7    Smokeless tobacco: Never Used   Vaping Use    Vaping Use: Never used   Substance and Sexual Activity    Alcohol use: No     Alcohol/week: 0.0 standard drinks    Drug use: No    Sexual activity: Not Currently   Other Topics Concern    None   Social History Narrative    None     Social Determinants of Health     Financial Resource Strain: Low Risk     Difficulty of Paying Living Expenses: Not hard at all   Food Insecurity: No Food Insecurity    Worried About Running Out of Food in the Last Year: Never true    [place]onte of Food in the Last Year: Never true   Transportation Needs:     Lack of Transportation (Medical):      Lack of Transportation (Non-Medical):    Physical Activity:     Days of Exercise per Week:     Minutes of Exercise per Session:    Stress:     Feeling of Stress :    Social Connections:     Frequency of Communication with Friends and Family:     Frequency of Social Gatherings with Friends and Family:     Attends [employer] Services:     Active Member of Clubs or Organizations:     Attends Club or Organization Meetings:     Marital Status:    Intimate Partner Violence:     Fear of Current or Ex-Partner:     Emotionally Abused:     Physically Abused:     Sexually Abused:        PHYSICAL EXAM       ED Triage Vitals   BP Temp Temp Source Pulse Resp SpO2 Height Weight   10/17/21 2045 10/17/21 2045 10/17/21 2230 10/17/21 2045 10/17/21 2045 10/17/21 2045 10/17/21 2045 10/17/21 2045   (!) 176/97 97.4 °F (36.3 °C) Oral 68 16 98 % 5' 1\" (1.549 m) 142 lb (64.4 kg)       Physical Exam  Vitals and nursing note reviewed. Constitutional:       General: She is not in acute distress. Appearance: She is well-developed. She is not ill-appearing, toxic-appearing or diaphoretic. HENT:      Head: Normocephalic and atraumatic. Right Ear: External ear normal.      Left Ear: External ear normal.   Eyes:      General:         Right eye: No discharge. Left eye: No discharge. Conjunctiva/sclera: Conjunctivae normal.      Pupils: Pupils are equal, round, and reactive to light. Cardiovascular:      Rate and Rhythm: Normal rate and regular rhythm. Heart sounds: No murmur heard. Pulmonary:      Effort: Pulmonary effort is normal. No respiratory distress. Breath sounds: Normal breath sounds. No wheezing or rales. Chest:      Chest wall: Tenderness present. Abdominal:      General: Bowel sounds are normal. There is no distension. Palpations: Abdomen is soft. There is no mass. Tenderness: There is no abdominal tenderness. There is no guarding or rebound. Genitourinary:     Comments: Deferred  Musculoskeletal:         General: No deformity. Normal range of motion. Cervical back: Normal range of motion and neck supple. Skin:     General: Skin is warm. Findings: No erythema or rash. Neurological:      Mental Status: She is alert and oriented to person, place, and time. She is not disoriented. Cranial Nerves: No cranial nerve deficit. Motor: No atrophy or abnormal muscle tone. Coordination: Coordination normal.   Psychiatric:         Behavior: Behavior normal.         Thought Content:  Thought content normal.         DIAGNOSTIC RESULTS     EKG: All EKG's are interpreted by the Emergency Department Physician who either signs or Co-signs this chart in the absence of acardiologist.    EKG normal sinus rhythm nonspecific EKG changes no ectopy    RADIOLOGY:   Non-plain film images such as CT, Ultrasoundand MRI are read by the radiologist. Plain radiographic images are visualized and preliminarily interpreted by the emergency physician with the below findings:    Imaging reassuring    ED BEDSIDE ULTRASOUND:   Performed by ED Physician - none    LABS:  Labs Reviewed   CBC WITH AUTO DIFFERENTIAL - Abnormal; Notable for the following components:       Result Value    RDW 16.2 (*)     All other components within normal limits    Narrative:     Performed at:  [place] [address]   Phone [phone]   COMPREHENSIVE METABOLIC PANEL W/ REFLEX TO MG FOR LOW K - Abnormal; Notable for the following components:    BUN 6 (*)     CREATININE <0.5 (*)     AST 13 (*)     All other components within normal limits    Narrative:     Performed at:  [address] [address]   Phone [phone]   TROPONIN    Narrative:     Performed at:  [place] [address]   Phone [phone]       All other labs were withinnormal range or not returned as of this dictation. EMERGENCY DEPARTMENT COURSE and DIFFERENTIAL DIAGNOSIS/MDM:     PMH, Surgical Hx, FH, Social Hx reviewed by myself (ETOH usage, Tobacco usage, Drug usage reviewed by myself, no pertinent Hx)- No Pertinent Hx     Old records were reviewed by me     79-year-old female with reassuring work-up. Most likely contusion to the head as well as the chest.  There is some tenderness palpation over chest wall. Her EKG and troponins are reassuring. Her head CT was reassuring. She is feeling better after Toradol. No focal deficits. Discussed close outpatient follow-up.     I estimate there is LOW risk for Sepsis, MI, Stroke, Tamponade, PTX, Toxicity or other life threatening etiology thus I consider the discharge disposition reasonable. The patient is at low risk for mortality based on demographic, history and clinical factors. Given the best available information and clinical assessment, I estimate the risk of hospitalization to be greater than risk of treatment at home. I have explained to the patient that the risk could rapidly change, given precautions for return and instructions. Explained to patient that the risk for mortality is low based on demographic, history and clinical factors. I discussed with patient the results of evaluation in the ED, diagnosis, care, and prognosis. The plan is to discharge to home. Patient is in agreement with plan and questions have been answered. I also discussed with patient the reasons which may require a return visit and the importance of follow-up care. The patient is well-appearing, nontoxic, and improved at the time of discharge. Patient agrees to call to arrange follow-up care as directed. Patient understands to return immediately for worsening/change in symptoms. CRITICAL CARE TIME   Total Critical Caretime was 0 minutes, excluding separately reportable procedures. There was a high probability of clinically significant/life threatening deterioration in the patient's condition which required my urgent intervention. PROCEDURES:  Unlessotherwise noted below, none    FINAL IMPRESSION      1. Chest wall pain    2.  Traumatic injury of head, initial encounter          DISPOSITION/PLAN   DISPOSITION Decision To Discharge 10/17/2021 10:36:01 PM    PATIENT REFERRED TO:  Sarina Santana DO  [id number] [place] [id number]  [phone]            DISCHARGE MEDICATIONS:  Discharge Medication List as of 10/17/2021 10:44 PM             (Please note that portions ofthis note were completed with a voice recognition program.  Efforts were made to edit the dictations but occasionally words are mis-transcribed.)    Giovani Lopez MD(electronically signed)  Attending Emergency Physician        Giovani Lopez MD  10/18/21 5498

## 2021-10-18 NOTE — ED TRIAGE NOTES
Pt arrived to the ED via private vehicle with c/o chest pain, dizziness, and a head injury. Pt states she has had some chest pain that began around 5pm this evening and has been on and off since. Pt has a cardaic hx. Pt states she was also hit on the top of her head by a falling window last night. Pt states she has a headache and feels confused after she hit her head.

## 2021-10-18 NOTE — ED NOTES
Discharge instructions, and follow up care reviewed with pt. All questions answered, pt verbalized understanding. Pt advised to visit [facility] ED if symptoms worsen. Pt ambulatory with steady gait, no signs of acute distress noted upon discharge.       Sarah Lopez RN  10/17/21 5032

## 2021-10-19 ENCOUNTER — CARE COORDINATION (OUTPATIENT)
Dept: CARE COORDINATION | Age: 61
End: 2021-10-19

## 2021-10-19 NOTE — CARE COORDINATION
Attempted to reach pt again, with no success was to dc from care coordination pt has not responded back to last couple of attempts to reach, will end episode

## 2021-10-21 ENCOUNTER — TELEPHONE (OUTPATIENT)
Dept: FAMILY MEDICINE CLINIC | Age: 61
End: 2021-10-21

## 2021-10-21 DIAGNOSIS — E11.49 OTHER DIABETIC NEUROLOGICAL COMPLICATION ASSOCIATED WITH TYPE 2 DIABETES MELLITUS (HCC): Primary | ICD-10-CM

## 2021-10-21 NOTE — TELEPHONE ENCOUNTER
Central scheduling called requesting a new order for an EMG be placed in the patient's chart with a passing diagnosis code.  Please notify central scheduling so patient can be contacted to schedule

## 2021-10-21 NOTE — PROGRESS NOTES
[facility] phoned stating can't use dx code will not go through.   Looked at problem list and use different dx code found on list.  New order completed

## 2021-10-27 ENCOUNTER — HOSPITAL ENCOUNTER (OUTPATIENT)
Age: 61
Setting detail: OBSERVATION
Discharge: HOME | End: 2021-10-30
Attending: EMERGENCY MEDICINE | Admitting: INTERNAL MEDICINE
Payer: MEDICARE

## 2021-10-27 ENCOUNTER — APPOINTMENT (OUTPATIENT)
Dept: CT IMAGING | Age: 61
End: 2021-10-27
Payer: MEDICARE

## 2021-10-27 ENCOUNTER — APPOINTMENT (OUTPATIENT)
Dept: GENERAL RADIOLOGY | Age: 61
End: 2021-10-27
Payer: MEDICARE

## 2021-10-27 DIAGNOSIS — R10.13 ABDOMINAL PAIN, EPIGASTRIC: ICD-10-CM

## 2021-10-27 DIAGNOSIS — R07.9 CHEST PAIN, UNSPECIFIED TYPE: Primary | ICD-10-CM

## 2021-10-27 PROBLEM — I25.10 NONOBSTRUCTIVE ATHEROSCLEROSIS OF CORONARY ARTERY: Status: ACTIVE | Noted: 2021-10-27

## 2021-10-27 PROBLEM — R11.2 NAUSEA & VOMITING: Status: ACTIVE | Noted: 2021-10-27

## 2021-10-27 LAB
A/G RATIO: 1.7 (ref 1.1–2.2)
ALBUMIN SERPL-MCNC: 4.7 G/DL (ref 3.4–5)
ALP BLD-CCNC: 133 U/L (ref 40–129)
ALT SERPL-CCNC: 15 U/L (ref 10–40)
ANION GAP SERPL CALCULATED.3IONS-SCNC: 11 MMOL/L (ref 3–16)
AST SERPL-CCNC: 17 U/L (ref 15–37)
BASOPHILS ABSOLUTE: 0 K/UL (ref 0–0.2)
BASOPHILS RELATIVE PERCENT: 0.5 %
BILIRUB SERPL-MCNC: 0.4 MG/DL (ref 0–1)
BILIRUBIN URINE: NEGATIVE
BLOOD, URINE: NEGATIVE
BUN BLDV-MCNC: 12 MG/DL (ref 7–20)
CALCIUM SERPL-MCNC: 9.2 MG/DL (ref 8.3–10.6)
CHLORIDE BLD-SCNC: 98 MMOL/L (ref 99–110)
CHOLESTEROL, TOTAL: 185 MG/DL (ref 0–199)
CLARITY: CLEAR
CO2: 29 MMOL/L (ref 21–32)
COLOR: YELLOW
CREAT SERPL-MCNC: 0.6 MG/DL (ref 0.6–1.2)
EKG ATRIAL RATE: 66 BPM
EKG ATRIAL RATE: 67 BPM
EKG DIAGNOSIS: NORMAL
EKG DIAGNOSIS: NORMAL
EKG P AXIS: 51 DEGREES
EKG P AXIS: 71 DEGREES
EKG P-R INTERVAL: 128 MS
EKG P-R INTERVAL: 204 MS
EKG Q-T INTERVAL: 444 MS
EKG Q-T INTERVAL: 516 MS
EKG QRS DURATION: 172 MS
EKG QRS DURATION: 80 MS
EKG QTC CALCULATION (BAZETT): 469 MS
EKG QTC CALCULATION (BAZETT): 540 MS
EKG R AXIS: 13 DEGREES
EKG R AXIS: 70 DEGREES
EKG T AXIS: 33 DEGREES
EKG T AXIS: 94 DEGREES
EKG VENTRICULAR RATE: 66 BPM
EKG VENTRICULAR RATE: 67 BPM
EOSINOPHILS ABSOLUTE: 0.4 K/UL (ref 0–0.6)
EOSINOPHILS RELATIVE PERCENT: 5.8 %
EPITHELIAL CELLS, UA: 4 /HPF (ref 0–5)
GFR AFRICAN AMERICAN: >60
GFR NON-AFRICAN AMERICAN: >60
GLOBULIN: 2.7 G/DL
GLUCOSE BLD-MCNC: 123 MG/DL (ref 70–99)
GLUCOSE BLD-MCNC: 164 MG/DL (ref 70–99)
GLUCOSE BLD-MCNC: 189 MG/DL (ref 70–99)
GLUCOSE URINE: NEGATIVE MG/DL
HCT VFR BLD CALC: 43.6 % (ref 36–48)
HDLC SERPL-MCNC: 40 MG/DL (ref 40–60)
HEMOGLOBIN: 14.7 G/DL (ref 12–16)
HYALINE CASTS: 1 /LPF (ref 0–8)
KETONES, URINE: NEGATIVE MG/DL
LDL CHOLESTEROL CALCULATED: 118 MG/DL
LEUKOCYTE ESTERASE, URINE: ABNORMAL
LIPASE: 22 U/L (ref 13–60)
LYMPHOCYTES ABSOLUTE: 1.6 K/UL (ref 1–5.1)
LYMPHOCYTES RELATIVE PERCENT: 23 %
MCH RBC QN AUTO: 31.5 PG (ref 26–34)
MCHC RBC AUTO-ENTMCNC: 33.7 G/DL (ref 31–36)
MCV RBC AUTO: 93.3 FL (ref 80–100)
MICROSCOPIC EXAMINATION: YES
MONOCYTES ABSOLUTE: 0.3 K/UL (ref 0–1.3)
MONOCYTES RELATIVE PERCENT: 4.9 %
NEUTROPHILS ABSOLUTE: 4.7 K/UL (ref 1.7–7.7)
NEUTROPHILS RELATIVE PERCENT: 65.8 %
NITRITE, URINE: NEGATIVE
PDW BLD-RTO: 16.1 % (ref 12.4–15.4)
PERFORMED ON: ABNORMAL
PERFORMED ON: ABNORMAL
PH UA: 5.5 (ref 5–8)
PLATELET # BLD: 232 K/UL (ref 135–450)
PMV BLD AUTO: 8.3 FL (ref 5–10.5)
POTASSIUM REFLEX MAGNESIUM: 3.9 MMOL/L (ref 3.5–5.1)
PRO-BNP: 320 PG/ML (ref 0–124)
PROTEIN UA: NEGATIVE MG/DL
RBC # BLD: 4.67 M/UL (ref 4–5.2)
RBC UA: 1 /HPF (ref 0–4)
SODIUM BLD-SCNC: 138 MMOL/L (ref 136–145)
SPECIFIC GRAVITY UA: >1.03 (ref 1–1.03)
TOTAL PROTEIN: 7.4 G/DL (ref 6.4–8.2)
TRIGL SERPL-MCNC: 133 MG/DL (ref 0–150)
TROPONIN: 0.02 NG/ML
TROPONIN: 0.04 NG/ML
TROPONIN: <0.01 NG/ML
URINE REFLEX TO CULTURE: ABNORMAL
URINE TYPE: ABNORMAL
UROBILINOGEN, URINE: 0.2 E.U./DL
VLDLC SERPL CALC-MCNC: 27 MG/DL
WBC # BLD: 7.1 K/UL (ref 4–11)
WBC UA: 3 /HPF (ref 0–5)

## 2021-10-27 PROCEDURE — 96374 THER/PROPH/DIAG INJ IV PUSH: CPT

## 2021-10-27 PROCEDURE — 81001 URINALYSIS AUTO W/SCOPE: CPT

## 2021-10-27 PROCEDURE — 6360000002 HC RX W HCPCS: Performed by: EMERGENCY MEDICINE

## 2021-10-27 PROCEDURE — 36415 COLL VENOUS BLD VENIPUNCTURE: CPT

## 2021-10-27 PROCEDURE — 80061 LIPID PANEL: CPT

## 2021-10-27 PROCEDURE — 6360000002 HC RX W HCPCS: Performed by: INTERNAL MEDICINE

## 2021-10-27 PROCEDURE — 93005 ELECTROCARDIOGRAM TRACING: CPT | Performed by: EMERGENCY MEDICINE

## 2021-10-27 PROCEDURE — 6370000000 HC RX 637 (ALT 250 FOR IP): Performed by: INTERNAL MEDICINE

## 2021-10-27 PROCEDURE — 71045 X-RAY EXAM CHEST 1 VIEW: CPT

## 2021-10-27 PROCEDURE — 74174 CTA ABD&PLVS W/CONTRAST: CPT

## 2021-10-27 PROCEDURE — G0378 HOSPITAL OBSERVATION PER HR: HCPCS

## 2021-10-27 PROCEDURE — 85025 COMPLETE CBC W/AUTO DIFF WBC: CPT

## 2021-10-27 PROCEDURE — 83880 ASSAY OF NATRIURETIC PEPTIDE: CPT

## 2021-10-27 PROCEDURE — 84484 ASSAY OF TROPONIN QUANT: CPT

## 2021-10-27 PROCEDURE — C9113 INJ PANTOPRAZOLE SODIUM, VIA: HCPCS | Performed by: INTERNAL MEDICINE

## 2021-10-27 PROCEDURE — 83690 ASSAY OF LIPASE: CPT

## 2021-10-27 PROCEDURE — 96375 TX/PRO/DX INJ NEW DRUG ADDON: CPT

## 2021-10-27 PROCEDURE — 94760 N-INVAS EAR/PLS OXIMETRY 1: CPT

## 2021-10-27 PROCEDURE — 6360000004 HC RX CONTRAST MEDICATION: Performed by: EMERGENCY MEDICINE

## 2021-10-27 PROCEDURE — 6370000000 HC RX 637 (ALT 250 FOR IP): Performed by: EMERGENCY MEDICINE

## 2021-10-27 PROCEDURE — 80053 COMPREHEN METABOLIC PANEL: CPT

## 2021-10-27 PROCEDURE — 99285 EMERGENCY DEPT VISIT HI MDM: CPT

## 2021-10-27 PROCEDURE — 93010 ELECTROCARDIOGRAM REPORT: CPT | Performed by: INTERNAL MEDICINE

## 2021-10-27 PROCEDURE — 2700000000 HC OXYGEN THERAPY PER DAY

## 2021-10-27 RX ORDER — METHYLPREDNISOLONE SODIUM SUCCINATE 125 MG/2ML
125 INJECTION, POWDER, LYOPHILIZED, FOR SOLUTION INTRAMUSCULAR; INTRAVENOUS ONCE
Status: COMPLETED | OUTPATIENT
Start: 2021-10-27 | End: 2021-10-27

## 2021-10-27 RX ORDER — ASPIRIN 81 MG/1
81 TABLET, CHEWABLE ORAL DAILY
Status: DISCONTINUED | OUTPATIENT
Start: 2021-10-28 | End: 2021-10-30 | Stop reason: HOSPADM

## 2021-10-27 RX ORDER — MORPHINE SULFATE 2 MG/ML
4 INJECTION, SOLUTION INTRAMUSCULAR; INTRAVENOUS ONCE
Status: COMPLETED | OUTPATIENT
Start: 2021-10-27 | End: 2021-10-27

## 2021-10-27 RX ORDER — PANTOPRAZOLE SODIUM 40 MG/10ML
40 INJECTION, POWDER, LYOPHILIZED, FOR SOLUTION INTRAVENOUS ONCE
Status: COMPLETED | OUTPATIENT
Start: 2021-10-27 | End: 2021-10-27

## 2021-10-27 RX ORDER — PANTOPRAZOLE SODIUM 40 MG/1
40 TABLET, DELAYED RELEASE ORAL
Status: DISCONTINUED | OUTPATIENT
Start: 2021-10-28 | End: 2021-10-30 | Stop reason: HOSPADM

## 2021-10-27 RX ORDER — METOPROLOL SUCCINATE 25 MG/1
12.5 TABLET, EXTENDED RELEASE ORAL DAILY
Status: DISCONTINUED | OUTPATIENT
Start: 2021-10-28 | End: 2021-10-30 | Stop reason: HOSPADM

## 2021-10-27 RX ORDER — NICOTINE POLACRILEX 4 MG
15 LOZENGE BUCCAL PRN
Status: DISCONTINUED | OUTPATIENT
Start: 2021-10-27 | End: 2021-10-30 | Stop reason: HOSPADM

## 2021-10-27 RX ORDER — FOLIC ACID 1 MG/1
1 TABLET ORAL DAILY
Status: DISCONTINUED | OUTPATIENT
Start: 2021-10-28 | End: 2021-10-30 | Stop reason: HOSPADM

## 2021-10-27 RX ORDER — INSULIN GLARGINE 100 [IU]/ML
0.25 INJECTION, SOLUTION SUBCUTANEOUS NIGHTLY
Status: DISCONTINUED | OUTPATIENT
Start: 2021-10-27 | End: 2021-10-30 | Stop reason: HOSPADM

## 2021-10-27 RX ORDER — ONDANSETRON 2 MG/ML
4 INJECTION INTRAMUSCULAR; INTRAVENOUS ONCE
Status: COMPLETED | OUTPATIENT
Start: 2021-10-27 | End: 2021-10-27

## 2021-10-27 RX ORDER — SERTRALINE HYDROCHLORIDE 100 MG/1
100 TABLET, FILM COATED ORAL DAILY
Status: DISCONTINUED | OUTPATIENT
Start: 2021-10-28 | End: 2021-10-30 | Stop reason: HOSPADM

## 2021-10-27 RX ORDER — ONDANSETRON 4 MG/1
4 TABLET, ORALLY DISINTEGRATING ORAL EVERY 4 HOURS PRN
Status: DISCONTINUED | OUTPATIENT
Start: 2021-10-27 | End: 2021-10-30 | Stop reason: HOSPADM

## 2021-10-27 RX ORDER — DIPHENHYDRAMINE HYDROCHLORIDE 50 MG/ML
25 INJECTION INTRAMUSCULAR; INTRAVENOUS ONCE
Status: COMPLETED | OUTPATIENT
Start: 2021-10-27 | End: 2021-10-27

## 2021-10-27 RX ORDER — PROMETHAZINE HYDROCHLORIDE 25 MG/ML
12.5 INJECTION, SOLUTION INTRAMUSCULAR; INTRAVENOUS EVERY 4 HOURS PRN
Status: DISCONTINUED | OUTPATIENT
Start: 2021-10-27 | End: 2021-10-30 | Stop reason: HOSPADM

## 2021-10-27 RX ORDER — MULTIVIT-MIN/IRON/FOLIC ACID/K 18-600-40
1 CAPSULE ORAL DAILY
COMMUNITY

## 2021-10-27 RX ORDER — SODIUM CHLORIDE 9 MG/ML
25 INJECTION, SOLUTION INTRAVENOUS PRN
Status: DISCONTINUED | OUTPATIENT
Start: 2021-10-27 | End: 2021-10-29

## 2021-10-27 RX ORDER — LANOLIN ALCOHOL/MO/W.PET/CERES
1000 CREAM (GRAM) TOPICAL DAILY
COMMUNITY

## 2021-10-27 RX ORDER — PROMETHAZINE HYDROCHLORIDE 25 MG/1
12.5 TABLET ORAL EVERY 4 HOURS PRN
Status: DISCONTINUED | OUTPATIENT
Start: 2021-10-27 | End: 2021-10-30 | Stop reason: HOSPADM

## 2021-10-27 RX ORDER — PREGABALIN 75 MG/1
75 CAPSULE ORAL 2 TIMES DAILY
Status: DISCONTINUED | OUTPATIENT
Start: 2021-10-27 | End: 2021-10-30 | Stop reason: HOSPADM

## 2021-10-27 RX ORDER — DEXTROSE MONOHYDRATE 25 G/50ML
12.5 INJECTION, SOLUTION INTRAVENOUS PRN
Status: DISCONTINUED | OUTPATIENT
Start: 2021-10-27 | End: 2021-10-30 | Stop reason: HOSPADM

## 2021-10-27 RX ORDER — POTASSIUM CHLORIDE 7.45 MG/ML
10 INJECTION INTRAVENOUS PRN
Status: DISCONTINUED | OUTPATIENT
Start: 2021-10-27 | End: 2021-10-30 | Stop reason: HOSPADM

## 2021-10-27 RX ORDER — SODIUM CHLORIDE 0.9 % (FLUSH) 0.9 %
10 SYRINGE (ML) INJECTION PRN
Status: DISCONTINUED | OUTPATIENT
Start: 2021-10-27 | End: 2021-10-29

## 2021-10-27 RX ORDER — LANOLIN ALCOHOL/MO/W.PET/CERES
3 CREAM (GRAM) TOPICAL NIGHTLY PRN
Status: DISCONTINUED | OUTPATIENT
Start: 2021-10-27 | End: 2021-10-30 | Stop reason: HOSPADM

## 2021-10-27 RX ORDER — ATORVASTATIN CALCIUM 80 MG/1
80 TABLET, FILM COATED ORAL NIGHTLY
Status: DISCONTINUED | OUTPATIENT
Start: 2021-10-27 | End: 2021-10-30 | Stop reason: HOSPADM

## 2021-10-27 RX ORDER — SUCRALFATE 1 G/1
1 TABLET ORAL 4 TIMES DAILY
Status: DISCONTINUED | OUTPATIENT
Start: 2021-10-27 | End: 2021-10-30 | Stop reason: HOSPADM

## 2021-10-27 RX ORDER — IPRATROPIUM BROMIDE AND ALBUTEROL SULFATE 2.5; .5 MG/3ML; MG/3ML
1 SOLUTION RESPIRATORY (INHALATION) 4 TIMES DAILY PRN
Status: DISCONTINUED | OUTPATIENT
Start: 2021-10-27 | End: 2021-10-30 | Stop reason: HOSPADM

## 2021-10-27 RX ORDER — ONDANSETRON 2 MG/ML
4 INJECTION INTRAMUSCULAR; INTRAVENOUS EVERY 4 HOURS PRN
Status: DISCONTINUED | OUTPATIENT
Start: 2021-10-27 | End: 2021-10-30 | Stop reason: HOSPADM

## 2021-10-27 RX ORDER — MAGNESIUM HYDROXIDE/ALUMINUM HYDROXICE/SIMETHICONE 120; 1200; 1200 MG/30ML; MG/30ML; MG/30ML
30 SUSPENSION ORAL EVERY 6 HOURS PRN
Status: DISCONTINUED | OUTPATIENT
Start: 2021-10-27 | End: 2021-10-30 | Stop reason: HOSPADM

## 2021-10-27 RX ORDER — ASPIRIN 81 MG/1
324 TABLET, CHEWABLE ORAL ONCE
Status: COMPLETED | OUTPATIENT
Start: 2021-10-27 | End: 2021-10-27

## 2021-10-27 RX ORDER — MAGNESIUM SULFATE 1 G/100ML
1000 INJECTION INTRAVENOUS PRN
Status: DISCONTINUED | OUTPATIENT
Start: 2021-10-27 | End: 2021-10-30 | Stop reason: HOSPADM

## 2021-10-27 RX ORDER — ACETAMINOPHEN 325 MG/1
650 TABLET ORAL EVERY 6 HOURS PRN
Status: DISCONTINUED | OUTPATIENT
Start: 2021-10-27 | End: 2021-10-29 | Stop reason: SDUPTHER

## 2021-10-27 RX ORDER — DEXTROSE MONOHYDRATE 50 MG/ML
100 INJECTION, SOLUTION INTRAVENOUS PRN
Status: DISCONTINUED | OUTPATIENT
Start: 2021-10-27 | End: 2021-10-30 | Stop reason: HOSPADM

## 2021-10-27 RX ORDER — POTASSIUM CHLORIDE 20 MEQ/1
40 TABLET, EXTENDED RELEASE ORAL PRN
Status: DISCONTINUED | OUTPATIENT
Start: 2021-10-27 | End: 2021-10-30 | Stop reason: HOSPADM

## 2021-10-27 RX ORDER — ACETAMINOPHEN 650 MG/1
650 SUPPOSITORY RECTAL EVERY 6 HOURS PRN
Status: DISCONTINUED | OUTPATIENT
Start: 2021-10-27 | End: 2021-10-30 | Stop reason: HOSPADM

## 2021-10-27 RX ADMIN — INSULIN LISPRO 1 UNITS: 100 INJECTION, SOLUTION INTRAVENOUS; SUBCUTANEOUS at 21:14

## 2021-10-27 RX ADMIN — ONDANSETRON 4 MG: 2 INJECTION INTRAMUSCULAR; INTRAVENOUS at 11:32

## 2021-10-27 RX ADMIN — PANTOPRAZOLE SODIUM 40 MG: 40 INJECTION, POWDER, FOR SOLUTION INTRAVENOUS at 16:36

## 2021-10-27 RX ADMIN — ENOXAPARIN SODIUM 40 MG: 100 INJECTION SUBCUTANEOUS at 21:04

## 2021-10-27 RX ADMIN — DIPHENHYDRAMINE HYDROCHLORIDE 25 MG: 50 INJECTION, SOLUTION INTRAMUSCULAR; INTRAVENOUS at 11:33

## 2021-10-27 RX ADMIN — SUCRALFATE 1 G: 1 TABLET ORAL at 20:59

## 2021-10-27 RX ADMIN — MORPHINE SULFATE 4 MG: 2 INJECTION, SOLUTION INTRAMUSCULAR; INTRAVENOUS at 11:36

## 2021-10-27 RX ADMIN — IOPAMIDOL 75 ML: 755 INJECTION, SOLUTION INTRAVENOUS at 12:40

## 2021-10-27 RX ADMIN — INSULIN GLARGINE 16 UNITS: 100 INJECTION, SOLUTION SUBCUTANEOUS at 21:15

## 2021-10-27 RX ADMIN — ATORVASTATIN CALCIUM 80 MG: 80 TABLET, FILM COATED ORAL at 20:59

## 2021-10-27 RX ADMIN — METHYLPREDNISOLONE SODIUM SUCCINATE 125 MG: 125 INJECTION, POWDER, FOR SOLUTION INTRAMUSCULAR; INTRAVENOUS at 11:35

## 2021-10-27 RX ADMIN — PREGABALIN 75 MG: 75 CAPSULE ORAL at 20:59

## 2021-10-27 RX ADMIN — ASPIRIN 324 MG: 81 TABLET, CHEWABLE ORAL at 15:06

## 2021-10-27 ASSESSMENT — PAIN SCALES - GENERAL
PAINLEVEL_OUTOF10: 0
PAINLEVEL_OUTOF10: 4
PAINLEVEL_OUTOF10: 9
PAINLEVEL_OUTOF10: 6
PAINLEVEL_OUTOF10: 4

## 2021-10-27 ASSESSMENT — PAIN DESCRIPTION - LOCATION
LOCATION: ABDOMEN;CHEST
LOCATION: CHEST;ABDOMEN
LOCATION: ABDOMEN;BREAST

## 2021-10-27 ASSESSMENT — PAIN DESCRIPTION - DESCRIPTORS
DESCRIPTORS: ACHING
DESCRIPTORS: BURNING
DESCRIPTORS: ACHING

## 2021-10-27 ASSESSMENT — PAIN DESCRIPTION - PAIN TYPE
TYPE: ACUTE PAIN

## 2021-10-27 ASSESSMENT — PAIN DESCRIPTION - ORIENTATION: ORIENTATION: MID;UPPER

## 2021-10-27 NOTE — ED NOTES
Pharmacy Medication Reconciliation Note     List of medications patient is currently taking is complete. Source of information:   1. EMR  2. Disp rec  3. patient    Notes regarding home medications:   1. Reports taking AM meds today  2. Had been out of aspirin at home, but received some today in ED.      Denies taking any other OTC or herbal medications    Andre Hunter PharmD, BCPS  10/27/2021  5:18 PM

## 2021-10-27 NOTE — H&P
Hospital Medicine History & Physical      Patient:  Amparo Veronica  :   1960  MRN:   [id number]  Date of Service: 10/27/21    Chief Complaint   Patient presents with    Chest Pain     PT arrives via ems with chest pain, emesisx1 and sob onset one hour ago-hx mi \"few years ago\", ulcers    Abdominal Pain       HISTORY OF PRESENT ILLNESS:    Bong Whiting is a 57 y.o. female. She presents from home via ambulance. She complains of nausea, vomiting, and epigastric pain. Onset was 9-10am this morning soon after taking her morning medications. She describes the pain as epigastric, sharp, cutting, and burning w/ radiation to her chest, between both shoulders anteriorly, and her throat. She vomited once shortly after onset. She also vomited once last night though at the time wasn't having any pain. She denies hematemesis and coffee grounds. Stools have been \"dark\" but not clearly melenic. States she has a h/o peptic ulcers. Also her esophagus has been dilated in the past.    Per chart review she underwent EGD for dysphagia May, 2019 by Dr. Willy Francis. An esophageal stricture was found and dilated. Gastritis was also noted. She was prescribed pantoprazole and sucralfate. She has only been taking pantoprazole. She has not followed up with GI. Review of Systems:  All pertinent positives and negatives are as noted in the HPI section. All other systems were reviewed and are negative.     Past Medical History:   Diagnosis Date    Back pain     CAD (coronary artery disease)     COPD (chronic obstructive pulmonary disease) (HCC)     Foot pain     from bone spurs    GERD (gastroesophageal reflux disease)     Headache     sharp pain going through head    Heart attack ([address]     Hip pain     Hyperlipidemia     Hypertension      (normal spontaneous vaginal delivery)     Osteoporosis     Vision abnormalities        Past Surgical History:   Procedure Laterality Date    CARDIAC CATHETERIZATION  CHOLECYSTECTOMY  1999    COLONOSCOPY      ESOPHAGEAL DILATATION N/A 5/1/2019    ESOPHAGEAL DILATION TONG performed by Courtney Wolf MD at [facility] N/A 5/1/2019    EGD BIOPSY performed by Courtney Wolf MD at [facility]         Prior to Admission medications    Medication Sig Start Date End Date Taking?  Authorizing Provider   albuterol sulfate HFA (PROVENTIL HFA) 108 (90 Base) MCG/ACT inhaler Inhale 2 puffs into the lungs every 6 hours as needed for Wheezing or Shortness of Breath 9/29/21   [facility], DO   atorvastatin (LIPITOR) 80 MG tablet Take 1 tablet by mouth daily 9/23/21   [facility], DO   metoprolol succinate (TOPROL XL) 25 MG extended release tablet TAKE 1/2 TABLET BY MOUTH DAILY 8/25/21   [facility], DO   sertraline (ZOLOFT) 100 MG tablet TAKE ONE TABLET BY MOUTH DAILY 7/26/21   [facility], DO   pantoprazole (PROTONIX) 40 MG tablet Take 1 tablet by mouth every morning (before breakfast) 7/18/21 8/17/21  Sohan Reyes MD   sucralfate (CARAFATE) 1 GM tablet Take 1 tablet by mouth 4 times daily 7/18/21   Sohan Reyes MD   tiZANidine (ZANAFLEX) 2 MG tablet Take 1 tablet by mouth 3 times daily as needed (back pain) 6/30/21   Robby Witt MD   pregabalin (LYRICA) 75 MG capsule TAKE ONE CAPSULE BY MOUTH TWICE A DAY 5/21/21 7/20/21  [facility],    naproxen (NAPROSYN) 500 MG tablet Take 1 tablet by mouth 2 times daily as needed for Pain 5/14/21   Marci Cox MD   albuterol (PROVENTIL) (5 MG/ML) 0.5% nebulizer solution Take 0.5 mLs by nebulization every 6 hours as needed for Wheezing 5/9/21   Laura Mosley PA-C   Umeclidinium Bromide 62.5 MCG/INH AEPB Inhale 1 puff into the lungs daily 1/27/21   Jeana Walters MD   ipratropium-albuterol (DUONEB) 0.5-2.5 (3) MG/3ML SOLN nebulizer solution Inhale 3 mLs into the lungs every 4 hours 1/27/21   Jeana Walters MD   folic acid (FOLVITE) 1 MG tablet Take 1 tablet by mouth daily 8/5/20   Ulysses Skelton, DO   metFORMIN (GLUCOPHAGE) 500 MG tablet Take 1 tablet by mouth daily (with breakfast) 11/6/19   JOSE ANGEL Dubon CNP   triamcinolone (KENALOG) 0.1 % cream Apply topically 2 times daily. 10/9/19   JOSE ANGEL Dubon CNP   aspirin 81 MG chewable tablet CHEW ONE TABLET BY MOUTH DAILY 6/7/19   JOSE ANGEL Dubon CNP   diphenhydrAMINE-APAP, sleep, (TYLENOL PM EXTRA STRENGTH)  MG tablet Take 1 tablet by mouth nightly as needed     Historical Provider, MD   mineral oil-hydrophilic petrolatum (AQUAPHOR) ointment Apply topically as needed. 2/6/19   JOSE ANGEL Dubon CNP   acetaminophen (TYLENOL) 500 MG tablet Take 2 tablets by mouth every 6 hours as needed for Pain Do not take with other medications containing acetaminophen. 9/2/18   MatiCARIDAD Arroyo       Allergies:   Iodine, Other, Eggs or egg-derived products [eggs or egg-derived products], Eggs [egg white], Flexeril [cyclobenzaprine hcl], Levofloxacin, and Omeprazole    Social:   reports that she quit smoking about 4 years ago. Her smoking use included cigarettes. She has a 37.00 pack-year smoking history. She has never used smokeless tobacco.   reports no history of alcohol use. Social History     Substance and Sexual Activity   Drug Use No       Family History   Problem Relation Age of Onset    Heart Failure Mother         heart disease    Hypertension Mother     Emphysema Mother     Heart Failure Father         heart disease    COPD Father     Hypertension Paternal Grandmother        PHYSICAL EXAM:  I performed this physical examination.     Vitals:  Patient Vitals for the past 24 hrs:   BP Temp Temp src Pulse Resp SpO2 Height Weight   10/27/21 1501 110/64 -- -- 63 -- -- -- --   10/27/21 1408 100/61 -- -- 59 10 98 % -- --   10/27/21 1346 119/62 -- -- 58 9 97 % -- --   10/27/21 1334 119/73 -- -- 63 16 92 % -- --   10/27/21 1316 (!) 95/54 -- -- 64 (!) 1 94 % -- --   10/27/21 1301 (!) 118/48 -- -- 62 11 93 % -- --   10/27/21 1245 (!) 131/114 -- -- 57 12 99 % -- --   10/27/21 1200 129/67 -- -- 61 12 94 % -- --   10/27/21 1130 (!) 155/96 -- -- 66 14 93 % -- --   10/27/21 1056 (!) 169/93 98.4 °F (36.9 °C) Oral 72 18 94 % 5' 1\" (1.549 m) 141 lb 15.6 oz (64.4 kg)     No intake or output data in the 24 hours ending 10/27/21 1524    Vent Settings:  2L/min O2    GEN:  Appearance:  Age appropriate female in NAD . Level of Consciousness:  alert . Orientation:  full    HEENT: Sclera anicteric.  no conjunctival chemosis. moist mucus membranes. no specific or diagnostic oral lesions. NECK:  no signs of meningismus. Jugular veins not distended. Carotid pulses  2+.  no cervical lymphadenopathy. no thyromegaly. CV:  regular rhythm. normral S1 & S2.    no murmur. no rub.  no gallop. PULM:  Chest excursion is symmetric. Breath sounds are globally diminished but generally vesicular. Adventitious sounds:  none    AB:  Abdominal shape is normal.  Bowel sounds are active. Generally soft to palpation. Generalized tenderness is present. Though without involuntary or rebound guarding. EXTR:  Skin is warm. Capillary refill brisk. no specific or pathognomic rash. no clubbing. no pitting edema. no active wound or ulcer. LABS:  Lab Results   Component Value Date    WBC 7.1 10/27/2021    HGB 14.7 10/27/2021    HCT 43.6 10/27/2021    MCV 93.3 10/27/2021     10/27/2021     Lab Results   Component Value Date    CREATININE 0.6 10/27/2021    BUN 12 10/27/2021     10/27/2021    K 3.9 10/27/2021    CL 98 (L) 10/27/2021    CO2 29 10/27/2021     Lab Results   Component Value Date    ALT 15 10/27/2021    AST 17 10/27/2021    ALKPHOS 133 (H) 10/27/2021    BILITOT 0.4 10/27/2021     Lab Results   Component Value Date    TROPONINI <0.01 10/27/2021     No results for input(s): PHART, YVN7HOQ, PO2ART in the last 72 hours.     IMAGING:  XR CHEST PORTABLE    Result Date: 10/27/2021  EXAMINATION: ONE XRAY VIEW OF THE CHEST 10/27/2021 11:32 am COMPARISON: Chest radiograph 10/17/2021 HISTORY: ORDERING SYSTEM PROVIDED HISTORY: Chest Pain; Abdominal Pain Acuity: Acute Type of Exam: Initial FINDINGS: Calcific atherosclerotic disease aorta. The cardiomediastinal and hilar silhouettes appear otherwise unremarkable. Chronic appearing coarse interstitial densities predominate perihilar regions and lung bases, typical of sequela from smoking or other previous infectious/inflammatory process. Nonspecific tapering of the vasculature and increased lucency both upper lungs. The lungs appear otherwise clear. No pleural effusion evident. No pneumothorax is seen. No acute osseous abnormality is identified. 1. No definite radiographic evidence of acute cardiopulmonary disease. 2. Pulmonary sequela typical of that seen with smoking, including COPD; correlate with clinical history. 3. Calcific atherosclerotic disease aorta. CTA CHEST ABDOMEN PELVIS W CONTRAST    Result Date: 10/27/2021  EXAMINATION: CTA OF THE CHEST, ABDOMEN AND PELVIS WITH CONTRAST 10/27/2021 11:40 am TECHNIQUE: CTA of the chest, abdomen and pelvis was performed after the administration of intravenous contrast.  Multiplanar reformatted images are provided for review. MIP images are provided for review. Dose modulation, iterative reconstruction, and/or weight based adjustment of the mA/kV was utilized to reduce the radiation dose to as low as reasonably achievable.  COMPARISON: 11/30/2020 HISTORY: ORDERING SYSTEM PROVIDED HISTORY: abdominal and chest pain, hx SMA disease TECHNOLOGIST PROVIDED HISTORY: Reason for exam:->abdominal and chest pain, hx SMA disease Decision Support Exception - unselect if not a suspected or confirmed emergency medical condition->Emergency Medical Condition (MA) Reason for Exam: abdominal and chest pain, hx SMA disease Acuity: Acute Type of Exam: Initial FINDINGS: CTA CHEST: Thoracic aorta: No evidence of thoracic aortic aneurysm or dissection. No acute abnormality of the aorta. Mediastinum: No evidence of mediastinal lymphadenopathy. The heart and pericardium demonstrate no acute abnormality. Lungs/Pleura: The lungs are without acute process. No focal consolidation or pulmonary edema. No evidence of pleural effusion or pneumothorax. Soft Tissues/Bones: No acute bone or soft tissue abnormality. CTA ABDOMEN: Abdominal aorta/Branches: Abdominal aorta is not aneurysmal.  There is mild stenosis at the origin of the SMA. No evidence of dissection. Mild stenosis at the origin of the celiac due to arcuate ligament compression. Renal circulation and HUANG are patent. No evidence of aortic dissection. Organs: The liver appears unremarkable. Status post hysterectomy. Pancreas and spleen, adrenals, kidneys and IVC appear unremarkable. GI/Bowel: No evidence of bowel obstruction or perforation. No evidence of colitis. Mild constipation. Small bowel appears unremarkable. Peritoneum/Retroperitoneum: No evidence of lymphadenopathy or acute mesenteric findings. Bones/Soft Tissues: No acute abnormality. CTA PELVIS: Aorta/Iliacs: The iliac and femoral circulation appear unremarkable. Other: The uterus, adnexa and urinary bladder appear unremarkable. Bones/Soft Tissues: No acute abnormality. 1. No evidence of aortic aneurysm or dissection in the chest or abdomen. 2. Mild celiac and mesenteric stenosis related to arcuate ligament and mild atherosclerotic disease at the origin of the celiac. The HUANG is patent. 3. No acute abnormality in the chest, abdomen or pelvis. 4. Mild constipation. I directly reviewed all recent imaging studies as well as pertinent prior studies. Radiology reports may or may not be available at the time of my review. EKG:  New and pertinent prior tracings were directly reviewed. My interpretation is as follows:  Normal sinus rhythm.   No other specific or diagnostic abnormalities of acute clinical significance per my read. Active Hospital Problems    Diagnosis Date Noted    Essential hypertension [I10]      Priority: High    Nausea & vomiting [R11.2] 10/27/2021    Nonobstructive atherosclerosis of coronary artery [I25.10] 10/27/2021    Type 2 diabetes mellitus with diabetic neuropathy [E11.40] 10/05/2021    Esophageal stricture [K22.2] 03/12/2021    Gastroesophageal reflux disease without esophagitis [K21.9] 06/07/2019    COPD, severe [address] [address] [J44.9] 02/19/2015    Hyperlipidemia [E78.5] 06/17/2014       ASSESSMENT & PLAN  Epigastric Pain, Nausea/Vomiting  -  H/O esophageal stricture and gastritis dating back to May, 2019. No current e/o active upper GI bleed. -  Continue home pantoprazole 40mg daily. Resume sucralfate as prescribed 1g QIDACHS. Monitor response. It seems unlikely that repeating an EGD would  at this point. Chest Pain, Nonobstructive CAD  -  [facility] 5/7/2018:  Mild nonobstructive CAD. -  Lexiscan/MPI 11/30/2020:  Normal  -  ACS unlikely. Will trend out troponin and complete risk stratification w/ lexiscan/MPI stress tomorrow morinng.  -  Continue home regimen of metoprolol XL (held for stress), atorvastatin, and ASA. COPD  -  Continue home LAMA or formularly equivalent. Short acting bronchodilators made available prn. DM2 w/ neuropathy.  -  Hold all oral antidiabetic agents. Start s.c. Insulin regimen based on weight.  -  Continue home pregabalin    DVT prophylaxis: SCDs, lovenox  Code Status:  Full  Disposition:  Observation.     Jarad Brewer MD MD

## 2021-10-27 NOTE — ED NOTES
Dr. Joel Siemens of pts hypotension at this time. Pt is asymptomatic. Will continue to monitor.      Richard Scherer RN  10/27/21 [id number]

## 2021-10-27 NOTE — ED NOTES
Writer received call from Floor RN and report given at this time. All questions answered. Updated vitals entered and MEWS score of 0.      Dion Al RN  10/27/21 [id number]

## 2021-10-27 NOTE — ED NOTES
Bed: B-04  Expected date: 10/27/21  Expected time:   Means of arrival: [facility]  Comments:  Ulcers?      Valerie Pittman RN  10/27/21 [id number]

## 2021-10-27 NOTE — PROGRESS NOTES
Pt arrived to room 4264. Pt A&O, VSS. Tele placed on pt. Pt resting in bed comfortably. All safety measures in place.  Will continue to montior

## 2021-10-27 NOTE — ED PROVIDER NOTES
[address] [facility]      Pt Name: Chloé Neil  MRN: [id number]  [place] 1960  Date of evaluation: 10/27/2021  Provider: Moira Merrill DO    CHIEF COMPLAINT       Chief Complaint   Patient presents with    Chest Pain     PT arrives via ems with chest pain, emesisx1 and sob onset one hour ago-hx mi \"few years ago\", ulcers    Abdominal Pain         HISTORY OF PRESENT ILLNESS   (Location/Symptom, Timing/Onset, Context/Setting, Quality, Duration, Modifying Factors, Severity)  Note limiting factors. Chloé Neil is a 57 y.o. female who presents to the emergency department with a complaint of chest pain and abdominal pain as well as some mild shortness of breath. She vomited once prior to arrival.  Patient points to the subxiphoid midepigastric area as the location of her pain. She describes it as sharp and cutting. She states that the pain radiates up into the chest.    She reports that she felt fine yesterday. However last night before bed she felt nauseated and vomited once. There was no associated pain. She got up this morning and was able to drink some water and some soda and felt fine but later vomited. She denies any melena or medic easier. She denies any back or flank pain. She denies any neck pain or stiffness. She denies any headache. She denies any fall trauma or injury. She denies any lower abdominal pain. She does report some mild shortness of breath. She denies any cough or sputum production. She denies any fever or chills. No exposure to Covid. No history of Covid. Surgical history is significant for prior cholecystectomy. Nursing Notes were reviewed. HPI        REVIEW OF SYSTEMS    (2-9 systems for level 4, 10 or more for level 5)       Constitutional: Negative for fever or chills. HENT: Negative for rhinorrhea and sore throat. Eyes: Negative for redness or drainage.     Genitourinary: Negative for flank pain. Negative for dysuria. Negative for hematuria. Neurological: Negative for headache. Musculoskeletal:  Negative edema. Hematological: Negative for adenopathy. All systems are reviewed and are negative except for those listed above in the history of present illness and ROS. PAST MEDICAL HISTORY     Past Medical History:   Diagnosis Date    Back pain     CAD (coronary artery disease)     COPD (chronic obstructive pulmonary disease) (HCC)     Foot pain     from bone spurs    GERD (gastroesophageal reflux disease)     Headache     sharp pain going through head    Heart attack ([address]     Hip pain     Hyperlipidemia     Hypertension      (normal spontaneous vaginal delivery)     Osteoporosis     Vision abnormalities          SURGICAL HISTORY       Past Surgical History:   Procedure Laterality Date    CARDIAC CATHETERIZATION      CHOLECYSTECTOMY      COLONOSCOPY      ESOPHAGEAL DILATATION N/A 2019    ESOPHAGEAL DILATION [place] performed by Josiah Pina MD at [address] [facility] N/A 2019    EGD BIOPSY performed by Josiah Pina MD at [facility]       Previous Medications    ACETAMINOPHEN (TYLENOL) 500 MG TABLET    Take 2 tablets by mouth every 6 hours as needed for Pain Do not take with other medications containing acetaminophen.     ALBUTEROL (PROVENTIL) (5 MG/ML) 0.5% NEBULIZER SOLUTION    Take 0.5 mLs by nebulization every 6 hours as needed for Wheezing    ALBUTEROL SULFATE HFA (PROVENTIL HFA) 108 (90 BASE) MCG/ACT INHALER    Inhale 2 puffs into the lungs every 6 hours as needed for Wheezing or Shortness of Breath    ASPIRIN 81 MG CHEWABLE TABLET    CHEW ONE TABLET BY MOUTH DAILY    ATORVASTATIN (LIPITOR) 80 MG TABLET    Take 1 tablet by mouth daily    DIPHENHYDRAMINE-APAP, SLEEP, (TYLENOL PM EXTRA STRENGTH)  MG TABLET    Take 1 tablet by mouth nightly as needed     FOLIC ACID (FOLVITE) 1 MG TABLET    Take 1 tablet by mouth daily    IPRATROPIUM-ALBUTEROL (DUONEB) 0.5-2.5 (3) MG/3ML SOLN NEBULIZER SOLUTION    Inhale 3 mLs into the lungs every 4 hours    METFORMIN (GLUCOPHAGE) 500 MG TABLET    Take 1 tablet by mouth daily (with breakfast)    METOPROLOL SUCCINATE (TOPROL XL) 25 MG EXTENDED RELEASE TABLET    TAKE 1/2 TABLET BY MOUTH DAILY    MINERAL OIL-HYDROPHILIC PETROLATUM (AQUAPHOR) OINTMENT    Apply topically as needed. NAPROXEN (NAPROSYN) 500 MG TABLET    Take 1 tablet by mouth 2 times daily as needed for Pain    PANTOPRAZOLE (PROTONIX) 40 MG TABLET    Take 1 tablet by mouth every morning (before breakfast)    PREGABALIN (LYRICA) 75 MG CAPSULE    TAKE ONE CAPSULE BY MOUTH TWICE A DAY    SERTRALINE (ZOLOFT) 100 MG TABLET    TAKE ONE TABLET BY MOUTH DAILY    SUCRALFATE (CARAFATE) 1 GM TABLET    Take 1 tablet by mouth 4 times daily    TIZANIDINE (ZANAFLEX) 2 MG TABLET    Take 1 tablet by mouth 3 times daily as needed (back pain)    TRIAMCINOLONE (KENALOG) 0.1 % CREAM    Apply topically 2 times daily.     UMECLIDINIUM BROMIDE 62.5 MCG/INH AEPB    Inhale 1 puff into the lungs daily       ALLERGIES     Iodine, Other, Eggs or egg-derived products [eggs or egg-derived products], Eggs [egg white], Flexeril [cyclobenzaprine hcl], Levofloxacin, and Omeprazole    FAMILY HISTORY       Family History   Problem Relation Age of Onset    Heart Failure Mother         heart disease    Hypertension Mother     Emphysema Mother     Heart Failure Father         heart disease    COPD Father     Hypertension Paternal Grandmother           SOCIAL HISTORY       Social History     Socioeconomic History    Marital status: Legally      Spouse name: Not on file    Number of children: Not on file    Years of education: Not on file    Highest education level: Not on file   Occupational History    Not on file   Tobacco Use    Smoking status: Former Smoker     Packs/day: 1.00     Years: 37.00 Pack years: 37.00     Types: Cigarettes     Quit date: 2017     Years since quittin.8    Smokeless tobacco: Never Used   Vaping Use    Vaping Use: Never used   Substance and Sexual Activity    Alcohol use: No     Alcohol/week: 0.0 standard drinks    Drug use: No    Sexual activity: Not Currently   Other Topics Concern    Not on file   Social History Narrative    Not on file     Social Determinants of Health     Financial Resource Strain: Low Risk     Difficulty of Paying Living Expenses: Not hard at all   Food Insecurity: No Food Insecurity    Worried About Running Out of Food in the Last Year: Never true    Devonte of Food in the Last Year: Never true   Transportation Needs:     Lack of Transportation (Medical):  Lack of Transportation (Non-Medical):    Physical Activity:     Days of Exercise per Week:     Minutes of Exercise per Session:    Stress:     Feeling of Stress :    Social Connections:     Frequency of Communication with Friends and Family:     Frequency of Social Gatherings with Friends and Family:     Attends Spiritism Services:     Active Member of Clubs or Organizations:     Attends Club or Organization Meetings:     Marital Status:    Intimate Partner Violence:     Fear of Current or Ex-Partner:     Emotionally Abused:     Physically Abused:     Sexually Abused:        SCREENINGS             PHYSICAL EXAM    (up to 7 for level 4, 8 or more for level 5)     ED Triage Vitals   BP Temp Temp src Pulse Resp SpO2 Height Weight   -- -- -- -- -- -- -- --         Physical Exam   Constitutional: Awake and alert. Moderate discomfort. Head: No visible evidence of trauma. Normocephalic. Eyes: Pupils equal and reactive. No photophobia. Conjunctiva normal.    HENT: Oral mucosa moist.  Airway patent. Pharynx without erythema. Nares were clear. Neck:  Soft and supple. Nontender. Heart:  Regular rate and rhythm. No murmur. Lungs:  Clear to auscultation.   No wheezes, rales, or ronchi. No conversational dyspnea or accessory muscle use. Chest: Chest wall non-tender. No evidence of trauma. Abdomen:  Soft, nondistended, bowel sounds present. It is noted in the epigastric area that reproduced her pain. BMI 26.8. No guarding rigidity or rebound. No masses. Musculoskeletal: Extremities non-tender with full range of motion. Radial and dorsalis pedis pulses were intact. No calf tenderness erythema or edema. Neurological: Alert and oriented x 3. Speech clear. Cranial nerves II-XII intact. No facial droop. No acute focal motor or sensory deficits. Skin: Skin is warm and dry. No rash. Lymphatic:  No lympadenopathy. Psychiatric: Normal mood and affect. Behavior is normal.         DIAGNOSTIC RESULTS     EKG: All EKG's are interpreted by the Emergency Department Physician who either signs or Co-signs this chart in the absence of a cardiologist.    Normal sinus rhythm. Rate 67. CO interval 128 ms. QRS duration 80 ms. QTc 469 ms.  R axis 70 degrees. Nonspecific T wave abnormalities with subtle slight ST depression in the lateral leads. EKG was similar in appearance to a prior EKG from October 17, 2021    RADIOLOGY:   Non-plain film images such as CT, Ultrasound and MRI are read by the radiologist. Plain radiographic images are visualized and preliminarily interpreted by the emergency physician with the below findings:        Interpretation per the Radiologist below, if available at the time of this note:    CTA CHEST [address]   Final Result   1. No evidence of aortic aneurysm or dissection in the chest or abdomen. 2. Mild celiac and mesenteric stenosis related to arcuate ligament and mild   atherosclerotic disease at the origin of the celiac. The HUANG is patent. 3. No acute abnormality in the chest, abdomen or pelvis. 4. Mild constipation. XR CHEST PORTABLE   Final Result   1.  No definite radiographic evidence of acute cardiopulmonary disease. 2. Pulmonary sequela typical of that seen with smoking, including COPD;   correlate with clinical history. 3. Calcific atherosclerotic disease aorta.                ED BEDSIDE ULTRASOUND:   Performed by ED Physician - none    LABS:  Labs Reviewed   CBC WITH AUTO DIFFERENTIAL - Abnormal; Notable for the following components:       Result Value    RDW 16.1 (*)     All other components within normal limits    Narrative:     Performed at:  [facility]  [address] [facility] [place] [address]   Phone [phone]   COMPREHENSIVE METABOLIC PANEL W/ REFLEX TO MG FOR LOW K - Abnormal; Notable for the following components:    Chloride 98 (*)     Glucose 123 (*)     Alkaline Phosphatase 133 (*)     All other components within normal limits    Narrative:     Performed at:  [facility]  [address] [facility] [place] [address]   Phone [phone]   URINE RT REFLEX TO CULTURE - Abnormal; Notable for the following components:    Leukocyte Esterase, Urine TRACE (*)     All other components within normal limits    Narrative:     Performed at:  [facility]  [address], [facility] [place] [address]   Phone [phone]   BRAIN NATRIURETIC PEPTIDE - Abnormal; Notable for the following components:    Pro- (*)     All other components within normal limits    Narrative:     Performed at:  [facility]  [address], [facility] [place] [address]   Phone [phone]   LIPASE    Narrative:     Performed at:  [facility]  [address], [facility] [place] [address]   Phone [phone]   TROPONIN    Narrative:     Performed at:  [facility]  [address] [facility] [place] [address]   Phone [phone]   MICROSCOPIC URINALYSIS    Narrative:     Performed at:  [facility]  [address] [place] [address]   Phone [phone]       All other labs were within normal range or not returned as of this dictation. EMERGENCY DEPARTMENT COURSE and DIFFERENTIAL DIAGNOSIS/MDM:   Vitals:    Vitals:    10/27/21 1334 10/27/21 1346 10/27/21 1408 10/27/21 1501   BP: 119/73 119/62 100/61 110/64   Pulse: 63 58 59 63   Resp: 16 9 10    Temp:       TempSrc:       SpO2: 92% 97% 98%    Weight:       Height:             MDM      The patient presents with epigastric abdominal pain that radiates up into the chest as noted above. Pain has been continuous since this morning. She was given morphine 4 mg IV and Zofran 4 mg IV. Etiology of her pain is unclear. Given the fact that her pain radiates up into the chest and she describes it as a cutting pain, CTA chest abdomen and pelvis was obtained. She is documented to have a prior iodine allergy, however, she has received CT contrast studies in the past without difficulty. She was premedicated with Solu-Medrol 125 mg IV and Benadryl 25 mg IV. Benefit outweighs any risk. EKG was obtained which reveals nonspecific T wave abnormalities. No ST elevation. Subtle slight ST depression noted in the lateral leads but EKG is similar to prior tracing. REASSESSMENT          12:31 PM: Lipase is 22. Troponin is normal.  Creatinine is normal.  White blood cell count is 7.1 with a hemoglobin of 14.7. BNP is 320. Chest x-ray reveals no acute findings. COPD. CT is pending. 2:54 PM: CTA chest abdomen pelvis reveals no evidence of aneurysm or dissection. Mild celiac and mesenteric stenosis noted. No acute occlusion. HUANG is patent. Mild constipation. The patient was reexamined. Chest and abdominal pain is resolved. Etiology is unclear. She does have history of coronary artery disease and multiple risk factors. I cannot exclude an ischemic cause of her pain. She did have a mild drop in her blood pressure to 89 systolic.   However this was after receiving morphine. Currently her blood pressure is 100/61. Heart rate is 61. She is awake and alert. She was given aspirin 324 mg p.o. A call was placed to the hospitalist on-call for admission. CRITICAL CARE TIME   Total Critical Care time was 0 minutes, excluding separately reportable procedures. There was a high probability of clinically significant/life threatening deterioration in the patient's condition which required my urgent intervention. CONSULTS:  None    PROCEDURES:  Unless otherwise noted below, none     Procedures        FINAL IMPRESSION      1. Chest pain, unspecified type    2. Abdominal pain, epigastric          DISPOSITION/PLAN   DISPOSITION        PATIENT REFERRED TO:  No follow-up provider specified. DISCHARGE MEDICATIONS:  New Prescriptions    No medications on file     Controlled Substances Monitoring:     No flowsheet data found. (Please note that portions of this note were completed with a voice recognition program.  Efforts were made to edit the dictations but occasionally words are mis-transcribed. )    [address] Newton Merrill DO (electronically signed)  Attending Emergency Physician          Juli Kendall DO  10/27/21 [id number]

## 2021-10-27 NOTE — ED TRIAGE NOTES
Pt arrives via ems for eval of chest pain, sob and emesisx1 onset 1 hour ago. Pt sts that she has hx of MI and ulcers. Pt sts pain is 9/10. Pt sts hx of heartburn. Pt is a/ox4, resp nonlabored and pwd.

## 2021-10-27 NOTE — CARE COORDINATION
INITIAL CASE MANAGEMENT ASSESSMENT    Reviewed chart, met with patient to assess possible discharge needs. Explained Case Management role/services. Living Situation:Patient lives in her sister's home. The patient rents from her sister, and has 6 SHILPI. ADLs: Independent- Patient takes breaks when needed- feet hurt and knees give out. DME: Nebulizer and leg braces    PT/OT Recs: TBD     Active Services: None- Provided COA brochure     Transportation: Non active  but has drivers license- Family transports when needed and does not think the patient should drive. Sister Nadine Shea or Daughter Horacio Coy will transport home when medically ready. Medications: [facility] on [address]- Patient has difficulties picking up medications from time to time- Patient will call insurance fotobabble on how to receive mail order rx. PCP: Matthew Parra DO      HD/PD: N/A    PLAN/COMMENTS: Patient is to retun home with family support. No known discharge needs at this time. SNF and Home Care lists provided      The Plan for Transition of Care is related to the following treatment goals: Return Home    The Patient was provided with a choice of provider and agrees   with the discharge plan. [x] Yes [] No    Freedom of choice list was provided with basic dialogue that supports the patient's individualized plan of care/goals, treatment preferences and shares the quality data associated with the providers. [x] Yes [] No    SW/CM provided contact information for patient or family to call with any questions. SW/CM will follow and assist as needed.     ACP Complete

## 2021-10-28 LAB
ANION GAP SERPL CALCULATED.3IONS-SCNC: 11 MMOL/L (ref 3–16)
BASOPHILS ABSOLUTE: 0 K/UL (ref 0–0.2)
BASOPHILS RELATIVE PERCENT: 0.2 %
BUN BLDV-MCNC: 13 MG/DL (ref 7–20)
CALCIUM SERPL-MCNC: 9.6 MG/DL (ref 8.3–10.6)
CHLORIDE BLD-SCNC: 103 MMOL/L (ref 99–110)
CO2: 28 MMOL/L (ref 21–32)
CREAT SERPL-MCNC: 0.6 MG/DL (ref 0.6–1.2)
EOSINOPHILS ABSOLUTE: 0 K/UL (ref 0–0.6)
EOSINOPHILS RELATIVE PERCENT: 0 %
ESTIMATED AVERAGE GLUCOSE: 119.8 MG/DL
GFR AFRICAN AMERICAN: >60
GFR NON-AFRICAN AMERICAN: >60
GLUCOSE BLD-MCNC: 116 MG/DL (ref 70–99)
GLUCOSE BLD-MCNC: 129 MG/DL (ref 70–99)
GLUCOSE BLD-MCNC: 133 MG/DL (ref 70–99)
GLUCOSE BLD-MCNC: 134 MG/DL (ref 70–99)
GLUCOSE BLD-MCNC: 134 MG/DL (ref 70–99)
HBA1C MFR BLD: 5.8 %
HCT VFR BLD CALC: 42.2 % (ref 36–48)
HEMOGLOBIN: 14.1 G/DL (ref 12–16)
LV EF: 58 %
LVEF MODALITY: NORMAL
LYMPHOCYTES ABSOLUTE: 0.5 K/UL (ref 1–5.1)
LYMPHOCYTES RELATIVE PERCENT: 4.7 %
MAGNESIUM: 2.3 MG/DL (ref 1.8–2.4)
MCH RBC QN AUTO: 31.4 PG (ref 26–34)
MCHC RBC AUTO-ENTMCNC: 33.4 G/DL (ref 31–36)
MCV RBC AUTO: 94.1 FL (ref 80–100)
MONOCYTES ABSOLUTE: 0.3 K/UL (ref 0–1.3)
MONOCYTES RELATIVE PERCENT: 2.7 %
NEUTROPHILS ABSOLUTE: 10.1 K/UL (ref 1.7–7.7)
NEUTROPHILS RELATIVE PERCENT: 92.4 %
PDW BLD-RTO: 16.2 % (ref 12.4–15.4)
PERFORMED ON: ABNORMAL
PLATELET # BLD: 251 K/UL (ref 135–450)
PMV BLD AUTO: 8.3 FL (ref 5–10.5)
POTASSIUM SERPL-SCNC: 5 MMOL/L (ref 3.5–5.1)
RBC # BLD: 4.48 M/UL (ref 4–5.2)
SODIUM BLD-SCNC: 142 MMOL/L (ref 136–145)
WBC # BLD: 11 K/UL (ref 4–11)

## 2021-10-28 PROCEDURE — G0378 HOSPITAL OBSERVATION PER HR: HCPCS

## 2021-10-28 PROCEDURE — 6360000002 HC RX W HCPCS: Performed by: INTERNAL MEDICINE

## 2021-10-28 PROCEDURE — 93306 TTE W/DOPPLER COMPLETE: CPT

## 2021-10-28 PROCEDURE — 2580000003 HC RX 258: Performed by: INTERNAL MEDICINE

## 2021-10-28 PROCEDURE — 85025 COMPLETE CBC W/AUTO DIFF WBC: CPT

## 2021-10-28 PROCEDURE — 83735 ASSAY OF MAGNESIUM: CPT

## 2021-10-28 PROCEDURE — 36415 COLL VENOUS BLD VENIPUNCTURE: CPT

## 2021-10-28 PROCEDURE — 6370000000 HC RX 637 (ALT 250 FOR IP): Performed by: INTERNAL MEDICINE

## 2021-10-28 PROCEDURE — 83036 HEMOGLOBIN GLYCOSYLATED A1C: CPT

## 2021-10-28 PROCEDURE — 80048 BASIC METABOLIC PNL TOTAL CA: CPT

## 2021-10-28 PROCEDURE — 94640 AIRWAY INHALATION TREATMENT: CPT

## 2021-10-28 PROCEDURE — 2700000000 HC OXYGEN THERAPY PER DAY

## 2021-10-28 PROCEDURE — 94760 N-INVAS EAR/PLS OXIMETRY 1: CPT

## 2021-10-28 RX ADMIN — FOLIC ACID 1 MG: 1 TABLET ORAL at 08:06

## 2021-10-28 RX ADMIN — SUCRALFATE 1 G: 1 TABLET ORAL at 08:06

## 2021-10-28 RX ADMIN — METOPROLOL SUCCINATE 12.5 MG: 25 TABLET, EXTENDED RELEASE ORAL at 08:11

## 2021-10-28 RX ADMIN — PREGABALIN 75 MG: 75 CAPSULE ORAL at 08:06

## 2021-10-28 RX ADMIN — PREGABALIN 75 MG: 75 CAPSULE ORAL at 20:31

## 2021-10-28 RX ADMIN — SERTRALINE 100 MG: 100 TABLET, FILM COATED ORAL at 08:06

## 2021-10-28 RX ADMIN — TIOTROPIUM BROMIDE INHALATION SPRAY 2 PUFF: 3.12 SPRAY, METERED RESPIRATORY (INHALATION) at 08:15

## 2021-10-28 RX ADMIN — INSULIN GLARGINE 16 UNITS: 100 INJECTION, SOLUTION SUBCUTANEOUS at 20:47

## 2021-10-28 RX ADMIN — SUCRALFATE 1 G: 1 TABLET ORAL at 18:31

## 2021-10-28 RX ADMIN — ASPIRIN 81 MG: 81 TABLET, CHEWABLE ORAL at 08:06

## 2021-10-28 RX ADMIN — SODIUM CHLORIDE, PRESERVATIVE FREE 10 ML: 5 INJECTION INTRAVENOUS at 20:31

## 2021-10-28 RX ADMIN — ATORVASTATIN CALCIUM 80 MG: 80 TABLET, FILM COATED ORAL at 20:31

## 2021-10-28 RX ADMIN — SUCRALFATE 1 G: 1 TABLET ORAL at 20:31

## 2021-10-28 RX ADMIN — PANTOPRAZOLE SODIUM 40 MG: 40 TABLET, DELAYED RELEASE ORAL at 08:06

## 2021-10-28 RX ADMIN — ENOXAPARIN SODIUM 40 MG: 100 INJECTION SUBCUTANEOUS at 20:31

## 2021-10-28 ASSESSMENT — PAIN SCALES - GENERAL
PAINLEVEL_OUTOF10: 0

## 2021-10-28 NOTE — PRE-PROCEDURE INSTRUCTIONS
Writer spoke with patient's nurse, Haley Bolaños, regarding elevated Troponin T [peak 0.04] and new LBBB. Will hold on stress test until further notice. Please keep patient caffeine-free; may have water.   Thank you  Electronically signed by Eddi Mcnair RN on 10/28/2021 at 8:20 AM

## 2021-10-28 NOTE — PROGRESS NOTES
Hospital Medicine Progress Note      Admit Date: 10/27/2021       CC: F/U for chest pain, ulcers    HPI: Tiffanie Pace is a 57 y.o. female. She presents from home via ambulance. She complains of nausea, vomiting, and epigastric pain. Onset was 9-10am this morning soon after taking her morning medications. She describes the pain as epigastric, sharp, cutting, and burning w/ radiation to her chest, between both shoulders anteriorly, and her throat. She vomited once shortly after onset. She also vomited once last night though at the time wasn't having any pain. She denies hematemesis and coffee grounds. Stools have been \"dark\" but not clearly melenic. States she has a h/o peptic ulcers. Also her esophagus has been dilated in the past.     Per chart review she underwent EGD for dysphagia May, 2019 by Dr. Bel Ashley. An esophageal stricture was found and dilated. Gastritis was also noted. She was prescribed pantoprazole and sucralfate. the ER MD documented that she has only been taking protonix, but she told me she has been out of it for quite some time. She has not followed up with GI. Interval History/Subjective: Consult to Cardiology. Review of Systems:       The patient denied headaches, visual changes, LOC, SOB, CP, ABD pain, N/V/D, skin changes, new or worsening weakness or neuromuscular deficits. Comprehensive ROS negative except as mentioned above.     Past Medical History:        Diagnosis Date    Back pain     CAD (coronary artery disease)     COPD (chronic obstructive pulmonary disease) (HCC)     Foot pain     from bone spurs    GERD (gastroesophageal reflux disease)     Headache     sharp pain going through head    Heart attack ([address]     Hip pain     Hyperlipidemia     Hypertension      (normal spontaneous vaginal delivery)     Osteoporosis     Vision abnormalities        Past Surgical History:        Procedure Laterality Date    CARDIAC CATHETERIZATION      0.08 Units/kg SubCUTAneous TID     insulin lispro  0-6 Units SubCUTAneous TID WC    insulin lispro  0-3 Units SubCUTAneous Nightly     Continuous Infusions:   dextrose      sodium chloride       PRN Meds:.ondansetron, ondansetron, promethazine, promethazine, glucose, dextrose, glucagon (rDNA), dextrose, sodium chloride flush, sodium chloride, potassium chloride **OR** potassium alternative oral replacement **OR** potassium chloride, magnesium sulfate, acetaminophen **OR** acetaminophen, regadenoson, melatonin, aluminum & magnesium hydroxide-simethicone, ipratropium-albuterol    Assessment & Plan:        Epigastric Pain, Nausea/Vomiting  -  H/O esophageal stricture and gastritis dating back to May, 2019.     - no active bleeding  -  Continue home pantoprazole 40mg daily.    - Resume sucralfate as prescribed 1g QIDACHS. -  Monitor response.  -defer to GI for repeat EGD if needed     - carafate + protonix (home meds) . She has been out of her protonix for awhile before coming in.  - GI consulted - can f/u with Dr. Chiqui Mora outpatient for EGD      Chest Pain, Nonobstructive CAD  -  [facility] 5/7/2018:  Mild nonobstructive CAD. -  Lexiscan/MPI 11/30/2020:  Normal  -  ACS unlikely. -  Troponin 0.01--> 0.04 --> 0.02  -  w/ lexiscan/MPI stress unable to be done due to abnormal EKG and elevated troponin.  -  Continue home regimen of metoprolol XL (held for stress), atorvastatin, and ASA. -echo for SOB, dizziness  - pending   - cardiology consulted for elevated troponin, EKG abn and chest pain      COPD  - not in exacerbation  -  albuterol PRN  - duonebs q4h   - not on home O2  - O2 > 92%      DM2, T2   -  Hold all oral antidiabetic agents from home while admitted  - ssi while inpatient        Continue current regimen/therapies. Monitor. Adjust medical regimen as appropriate. Body mass index is 27.03 kg/m².     The patient and / or the family were informed of the results of any tests, a time was given to answer questions, a plan was proposed and they agreed with plan.       DVT ppx: lovenox      Diet: Diet NPO Exceptions are: Ice Chips, Sips of Water with Meds    Consults:  IP CONSULT TO CARDIOLOGY    DISPO/placement plan: pending     Code Status: Full Code      JOSE ANGEL Ga - DEEPTI  10/28/21

## 2021-10-28 NOTE — CONSULTS
[facility]   GI Consult Note      Reason for Consult:  GERD  Requesting Physician:  Alma Stallworth    CHIEF COMPLAINT:  Chest pain. History Obtained From:  patient    HISTORY OF PRESENT ILLNESS:                The patient is a 57 y.o. female with significant past medical history of GERD and esophageal stricture which was dilated in 2019. She is taking her protonix. She is admitted with chest pain. Her CTA and CT of the chest was unremarkable. She is scheduled for a stress test today.     Past Medical History:        Diagnosis Date    Back pain     CAD (coronary artery disease)     COPD (chronic obstructive pulmonary disease) (HCC)     Foot pain     from bone spurs    GERD (gastroesophageal reflux disease)     Headache     sharp pain going through head    Heart attack ([address]     Hip pain     Hyperlipidemia     Hypertension      (normal spontaneous vaginal delivery)     Osteoporosis     Vision abnormalities      Past Surgical History:        Procedure Laterality Date    CARDIAC CATHETERIZATION      CHOLECYSTECTOMY      COLONOSCOPY      ESOPHAGEAL DILATATION N/A 2019    ESOPHAGEAL DILATION [place] Alter performed by Wong Pearson MD at [address] [place] [address],[address] Floor N/A 2019    EGD BIOPSY performed by Wong Pearson MD at [facility] ENDOSCOPY     Current Medications:    Current Facility-Administered Medications: ondansetron (ZOFRAN) injection 4 mg, 4 mg, IntraVENous, Q4H PRN  ondansetron (ZOFRAN-ODT) disintegrating tablet 4 mg, 4 mg, Oral, Q4H PRN  promethazine (PHENERGAN) injection 12.5 mg, 12.5 mg, IntraVENous, Q4H PRN  promethazine (PHENERGAN) tablet 12.5 mg, 12.5 mg, Oral, Q4H PRN  aspirin chewable tablet 81 mg, 81 mg, Oral, Daily  atorvastatin (LIPITOR) tablet 80 mg, 80 mg, Oral, Nightly  folic acid (FOLVITE) tablet 1 mg, 1 mg, Oral, Daily  metoprolol succinate (TOPROL XL) extended release tablet 12.5 mg, 12.5 mg, Oral, Daily  pantoprazole (PROTONIX) tablet 40 mg, 40 mg, Oral, QAM AC  pregabalin (LYRICA) capsule 75 mg, 75 mg, Oral, BID  sertraline (ZOLOFT) tablet 100 mg, 100 mg, Oral, Daily  sucralfate (CARAFATE) tablet 1 g, 1 g, Oral, 4x Daily  tiotropium (SPIRIVA RESPIMAT) 2.5 MCG/ACT inhaler 2 puff, 2 puff, Inhalation, Daily  glucose (GLUTOSE) 40 % oral gel 15 g, 15 g, Oral, PRN  dextrose 50 % IV solution, 12.5 g, IntraVENous, PRN  glucagon (rDNA) injection 1 mg, 1 mg, IntraMUSCular, PRN  dextrose 5 % solution, 100 mL/hr, IntraVENous, PRN  sodium chloride flush 0.9 % injection 10 mL, 10 mL, IntraVENous, PRN  0.9 % sodium chloride infusion, 25 mL, IntraVENous, PRN  potassium chloride (KLOR-CON M) extended release tablet 40 mEq, 40 mEq, Oral, PRN **OR** potassium bicarb-citric acid (EFFER-K) effervescent tablet 40 mEq, 40 mEq, Oral, PRN **OR** potassium chloride 10 mEq/100 mL IVPB (Peripheral Line), 10 mEq, IntraVENous, PRN  magnesium sulfate 1000 mg in dextrose 5% 100 mL IVPB, 1,000 mg, IntraVENous, PRN  acetaminophen (TYLENOL) tablet 650 mg, 650 mg, Oral, Q6H PRN **OR** acetaminophen (TYLENOL) suppository 650 mg, 650 mg, Rectal, Q6H PRN  enoxaparin (LOVENOX) injection 40 mg, 40 mg, SubCUTAneous, Daily  insulin glargine (LANTUS) injection vial 16 Units, 0.25 Units/kg, SubCUTAneous, Nightly  insulin lispro (HUMALOG) injection vial 5 Units, 0.08 Units/kg, SubCUTAneous, TID WC  insulin lispro (HUMALOG) injection vial 0-6 Units, 0-6 Units, SubCUTAneous, TID WC  insulin lispro (HUMALOG) injection vial 0-3 Units, 0-3 Units, SubCUTAneous, Nightly  regadenoson (LEXISCAN) injection 0.4 mg, 0.4 mg, IntraVENous, ONCE PRN  melatonin tablet 3 mg, 3 mg, Oral, Nightly PRN  aluminum & magnesium hydroxide-simethicone (MAALOX) 200-200-20 MG/5ML suspension 30 mL, 30 mL, Oral, Q6H PRN  ipratropium-albuterol (DUONEB) nebulizer solution 1 ampule, 1 ampule, Inhalation, 4x Daily PRN  Allergies:  Iodine, Other, Eggs or egg-derived products [eggs or egg-derived products], Eggs [egg white], Flexeril [cyclobenzaprine hcl], Levofloxacin, and Omeprazole    Social History:    Social History     Socioeconomic History    Marital status: Legally      Spouse name: Not on file    Number of children: Not on file    Years of education: Not on file    Highest education level: Not on file   Occupational History    Not on file   Tobacco Use    Smoking status: Former Smoker     Packs/day: 1.00     Years: 37.00     Pack years: 37.00     Types: Cigarettes     Quit date: 2017     Years since quittin.8    Smokeless tobacco: Never Used   Vaping Use    Vaping Use: Never used   Substance and Sexual Activity    Alcohol use: No     Alcohol/week: 0.0 standard drinks    Drug use: No    Sexual activity: Not Currently   Other Topics Concern    Not on file   Social History Narrative    Not on file     Social Determinants of Health     Financial Resource Strain: Low Risk     Difficulty of Paying Living Expenses: Not hard at all   Food Insecurity: No Food Insecurity    Worried About Running Out of Food in the Last Year: Never true    Devonte of Food in the Last Year: Never true   Transportation Needs:     Lack of Transportation (Medical):      Lack of Transportation (Non-Medical):    Physical Activity:     Days of Exercise per Week:     Minutes of Exercise per Session:    Stress:     Feeling of Stress :    Social Connections:     Frequency of Communication with Friends and Family:     Frequency of Social Gatherings with Friends and Family:     Attends Christian Services:     Active Member of Clubs or Organizations:     Attends Club or Organization Meetings:     Marital Status:    Intimate Partner Violence:     Fear of Current or Ex-Partner:     Emotionally Abused:     Physically Abused:     Sexually Abused:        Family History:       Problem Relation Age of Onset    Heart Failure Mother         heart disease    Hypertension Mother     Emphysema Mother     Heart Failure Father heart disease    COPD Father     Hypertension Paternal Grandmother      REVIEW OF SYSTEMS:    CONSTITUTIONAL:  negative  EYES:  negative  HEENT:  negative  RESPIRATORY:  negative  CARDIOVASCULAR:  negative  GASTROINTESTINAL:  negative  INTEGUMENT/BREAST:  negative  HEMATOLOGIC/LYMPHATIC:  negative  ENDOCRINE:  negative  MUSCULOSKELETAL:  negative  NEUROLOGICAL:  negative  PHYSICAL EXAM:    General Appearance: alert and oriented to person, place and time, well developed and well- nourished, in no acute distress  Skin: warm and dry, no rash or erythema  Head: normocephalic and atraumatic  Eyes: pupils equal, round, and reactive to light, extraocular eye movements intact, conjunctivae normal  ENT: tympanic membrane, external ear and ear canal normal bilaterally, nose without deformity, nasal mucosa and turbinates normal without polyps  Neck: supple and non-tender without mass, no thyromegaly or thyroid nodules, no cervical lymphadenopathy  Pulmonary/Chest: clear to auscultation bilaterally- no wheezes, rales or rhonchi, normal air movement, no respiratory distress  Cardiovascular: normal rate, regular rhythm, normal S1 and S2, no murmurs, rubs, clicks, or gallops, distal pulses intact, no carotid bruits  Abdomen: soft, non-tender, non-distended, normal bowel sounds, no masses or organomegaly  Extremities: no cyanosis, clubbing or edema  Musculoskeletal: normal range of motion, no joint swelling, deformity or tenderness  Neurologic: reflexes normal and symmetric, no cranial nerve deficit, gait, coordination and speech normal  Vitals:    /69   Pulse 60   Temp 97.5 °F (36.4 °C) (Oral)   Resp 16   Ht 5' 1\" (1.549 m)   Wt 143 lb 1.3 oz (64.9 kg)   SpO2 95%   BMI 27.03 kg/m²     DATA:    CBC with Differential:    Lab Results   Component Value Date    WBC 11.0 10/28/2021    RBC 4.48 10/28/2021    HGB 14.1 10/28/2021    HCT 42.2 10/28/2021     10/28/2021    MCV 94.1 10/28/2021    MCH 31.4 10/28/2021 MCHC 33.4 10/28/2021    RDW 16.2 10/28/2021    LYMPHOPCT 4.7 10/28/2021    MONOPCT 2.7 10/28/2021    BASOPCT 0.2 10/28/2021    MONOSABS 0.3 10/28/2021    LYMPHSABS 0.5 10/28/2021    EOSABS 0.0 10/28/2021    BASOSABS 0.0 10/28/2021     CMP:    Lab Results   Component Value Date     10/28/2021    K 5.0 10/28/2021    K 3.9 10/27/2021     10/28/2021    CO2 28 10/28/2021    BUN 13 10/28/2021    CREATININE 0.6 10/28/2021    GFRAA >60 10/28/2021    AGRATIO 1.7 10/27/2021    LABGLOM >60 10/28/2021    GLUCOSE 134 10/28/2021    PROT 7.4 10/27/2021    LABALBU 4.7 10/27/2021    CALCIUM 9.6 10/28/2021    BILITOT 0.4 10/27/2021    ALKPHOS 133 10/27/2021    AST 17 10/27/2021    ALT 15 10/27/2021       IMPRESSION/RECOMMENDATIONS:      1. Admitted with chest pain. She is scheduled for a stress test. If that is negative she can eat and schedule an EGD with Dr Thiago Gonsales as an outpatient.     Electronically signed by Noreen Manzano MD on 10/28/2021 at 11:07 AM

## 2021-10-29 ENCOUNTER — APPOINTMENT (OUTPATIENT)
Dept: CARDIAC CATH/INVASIVE PROCEDURES | Age: 61
End: 2021-10-29
Payer: MEDICARE

## 2021-10-29 PROBLEM — E11.65 TYPE 2 DIABETES MELLITUS WITH HYPERGLYCEMIA, WITHOUT LONG-TERM CURRENT USE OF INSULIN (HCC): Status: ACTIVE | Noted: 2019-11-06

## 2021-10-29 PROBLEM — I25.110 CORONARY ARTERY DISEASE INVOLVING NATIVE CORONARY ARTERY OF NATIVE HEART WITH UNSTABLE ANGINA PECTORIS (HCC): Status: ACTIVE | Noted: 2021-10-27

## 2021-10-29 LAB
ANION GAP SERPL CALCULATED.3IONS-SCNC: 9 MMOL/L (ref 3–16)
BASOPHILS ABSOLUTE: 0 K/UL (ref 0–0.2)
BASOPHILS RELATIVE PERCENT: 0.5 %
BUN BLDV-MCNC: 19 MG/DL (ref 7–20)
CALCIUM SERPL-MCNC: 8.8 MG/DL (ref 8.3–10.6)
CHLORIDE BLD-SCNC: 103 MMOL/L (ref 99–110)
CO2: 31 MMOL/L (ref 21–32)
CREAT SERPL-MCNC: 0.6 MG/DL (ref 0.6–1.2)
EOSINOPHILS ABSOLUTE: 0.2 K/UL (ref 0–0.6)
EOSINOPHILS RELATIVE PERCENT: 2.2 %
GFR AFRICAN AMERICAN: >60
GFR NON-AFRICAN AMERICAN: >60
GLUCOSE BLD-MCNC: 115 MG/DL (ref 70–99)
GLUCOSE BLD-MCNC: 122 MG/DL (ref 70–99)
GLUCOSE BLD-MCNC: 122 MG/DL (ref 70–99)
GLUCOSE BLD-MCNC: 240 MG/DL (ref 70–99)
GLUCOSE BLD-MCNC: 88 MG/DL (ref 70–99)
HCT VFR BLD CALC: 38.1 % (ref 36–48)
HEMOGLOBIN: 12.6 G/DL (ref 12–16)
LYMPHOCYTES ABSOLUTE: 2.9 K/UL (ref 1–5.1)
LYMPHOCYTES RELATIVE PERCENT: 35.6 %
MAGNESIUM: 2 MG/DL (ref 1.8–2.4)
MCH RBC QN AUTO: 31.4 PG (ref 26–34)
MCHC RBC AUTO-ENTMCNC: 33 G/DL (ref 31–36)
MCV RBC AUTO: 95.1 FL (ref 80–100)
MONOCYTES ABSOLUTE: 0.4 K/UL (ref 0–1.3)
MONOCYTES RELATIVE PERCENT: 5.4 %
NEUTROPHILS ABSOLUTE: 4.6 K/UL (ref 1.7–7.7)
NEUTROPHILS RELATIVE PERCENT: 56.3 %
PDW BLD-RTO: 16.3 % (ref 12.4–15.4)
PERFORMED ON: ABNORMAL
PLATELET # BLD: 202 K/UL (ref 135–450)
PMV BLD AUTO: 8.4 FL (ref 5–10.5)
POTASSIUM SERPL-SCNC: 4.2 MMOL/L (ref 3.5–5.1)
RBC # BLD: 4 M/UL (ref 4–5.2)
SODIUM BLD-SCNC: 143 MMOL/L (ref 136–145)
WBC # BLD: 8.1 K/UL (ref 4–11)

## 2021-10-29 PROCEDURE — 2500000003 HC RX 250 WO HCPCS

## 2021-10-29 PROCEDURE — 85025 COMPLETE CBC W/AUTO DIFF WBC: CPT

## 2021-10-29 PROCEDURE — 6370000000 HC RX 637 (ALT 250 FOR IP): Performed by: INTERNAL MEDICINE

## 2021-10-29 PROCEDURE — 6360000002 HC RX W HCPCS: Performed by: INTERNAL MEDICINE

## 2021-10-29 PROCEDURE — C1894 INTRO/SHEATH, NON-LASER: HCPCS

## 2021-10-29 PROCEDURE — 6360000004 HC RX CONTRAST MEDICATION: Performed by: INTERNAL MEDICINE

## 2021-10-29 PROCEDURE — 83735 ASSAY OF MAGNESIUM: CPT

## 2021-10-29 PROCEDURE — 80048 BASIC METABOLIC PNL TOTAL CA: CPT

## 2021-10-29 PROCEDURE — 2709999900 HC NON-CHARGEABLE SUPPLY

## 2021-10-29 PROCEDURE — 94640 AIRWAY INHALATION TREATMENT: CPT

## 2021-10-29 PROCEDURE — 6360000002 HC RX W HCPCS

## 2021-10-29 PROCEDURE — 2580000003 HC RX 258: Performed by: NURSE PRACTITIONER

## 2021-10-29 PROCEDURE — C1769 GUIDE WIRE: HCPCS

## 2021-10-29 PROCEDURE — 36415 COLL VENOUS BLD VENIPUNCTURE: CPT

## 2021-10-29 PROCEDURE — 2580000003 HC RX 258: Performed by: INTERNAL MEDICINE

## 2021-10-29 PROCEDURE — 99215 OFFICE O/P EST HI 40 MIN: CPT | Performed by: INTERNAL MEDICINE

## 2021-10-29 PROCEDURE — 99152 MOD SED SAME PHYS/QHP 5/>YRS: CPT

## 2021-10-29 PROCEDURE — 94761 N-INVAS EAR/PLS OXIMETRY MLT: CPT

## 2021-10-29 PROCEDURE — 2700000000 HC OXYGEN THERAPY PER DAY

## 2021-10-29 PROCEDURE — 93454 CORONARY ARTERY ANGIO S&I: CPT

## 2021-10-29 PROCEDURE — G0378 HOSPITAL OBSERVATION PER HR: HCPCS

## 2021-10-29 RX ORDER — 0.9 % SODIUM CHLORIDE 0.9 %
500 INTRAVENOUS SOLUTION INTRAVENOUS ONCE
Status: COMPLETED | OUTPATIENT
Start: 2021-10-29 | End: 2021-10-29

## 2021-10-29 RX ORDER — SODIUM CHLORIDE 9 MG/ML
25 INJECTION, SOLUTION INTRAVENOUS PRN
Status: DISCONTINUED | OUTPATIENT
Start: 2021-10-29 | End: 2021-10-29 | Stop reason: SDUPTHER

## 2021-10-29 RX ORDER — SODIUM CHLORIDE 9 MG/ML
25 INJECTION, SOLUTION INTRAVENOUS PRN
Status: DISCONTINUED | OUTPATIENT
Start: 2021-10-29 | End: 2021-10-30 | Stop reason: HOSPADM

## 2021-10-29 RX ORDER — SODIUM CHLORIDE 0.9 % (FLUSH) 0.9 %
5-40 SYRINGE (ML) INJECTION EVERY 12 HOURS SCHEDULED
Status: DISCONTINUED | OUTPATIENT
Start: 2021-10-29 | End: 2021-10-29 | Stop reason: SDUPTHER

## 2021-10-29 RX ORDER — SODIUM CHLORIDE 0.9 % (FLUSH) 0.9 %
5-40 SYRINGE (ML) INJECTION EVERY 12 HOURS SCHEDULED
Status: DISCONTINUED | OUTPATIENT
Start: 2021-10-29 | End: 2021-10-30 | Stop reason: HOSPADM

## 2021-10-29 RX ORDER — SODIUM CHLORIDE 0.9 % (FLUSH) 0.9 %
5-40 SYRINGE (ML) INJECTION PRN
Status: DISCONTINUED | OUTPATIENT
Start: 2021-10-29 | End: 2021-10-29 | Stop reason: SDUPTHER

## 2021-10-29 RX ORDER — ACETAMINOPHEN 325 MG/1
650 TABLET ORAL EVERY 4 HOURS PRN
Status: DISCONTINUED | OUTPATIENT
Start: 2021-10-29 | End: 2021-10-30 | Stop reason: HOSPADM

## 2021-10-29 RX ORDER — IODIXANOL 320 MG/ML
35 INJECTION, SOLUTION INTRAVASCULAR
Status: COMPLETED | OUTPATIENT
Start: 2021-10-29 | End: 2021-10-29

## 2021-10-29 RX ORDER — SODIUM CHLORIDE 0.9 % (FLUSH) 0.9 %
5-40 SYRINGE (ML) INJECTION PRN
Status: DISCONTINUED | OUTPATIENT
Start: 2021-10-29 | End: 2021-10-30 | Stop reason: HOSPADM

## 2021-10-29 RX ORDER — SODIUM CHLORIDE 9 MG/ML
INJECTION, SOLUTION INTRAVENOUS CONTINUOUS
Status: DISCONTINUED | OUTPATIENT
Start: 2021-10-29 | End: 2021-10-30 | Stop reason: HOSPADM

## 2021-10-29 RX ADMIN — ATORVASTATIN CALCIUM 80 MG: 80 TABLET, FILM COATED ORAL at 21:10

## 2021-10-29 RX ADMIN — ENOXAPARIN SODIUM 40 MG: 100 INJECTION SUBCUTANEOUS at 08:42

## 2021-10-29 RX ADMIN — ACETAMINOPHEN 650 MG: 325 TABLET ORAL at 08:49

## 2021-10-29 RX ADMIN — ACETAMINOPHEN 650 MG: 325 TABLET ORAL at 01:05

## 2021-10-29 RX ADMIN — SODIUM CHLORIDE, PRESERVATIVE FREE 10 ML: 5 INJECTION INTRAVENOUS at 13:54

## 2021-10-29 RX ADMIN — FOLIC ACID 1 MG: 1 TABLET ORAL at 08:43

## 2021-10-29 RX ADMIN — TIOTROPIUM BROMIDE INHALATION SPRAY 2 PUFF: 3.12 SPRAY, METERED RESPIRATORY (INHALATION) at 08:24

## 2021-10-29 RX ADMIN — SODIUM CHLORIDE: 9 INJECTION, SOLUTION INTRAVENOUS at 11:54

## 2021-10-29 RX ADMIN — SUCRALFATE 1 G: 1 TABLET ORAL at 17:41

## 2021-10-29 RX ADMIN — SODIUM CHLORIDE, PRESERVATIVE FREE 10 ML: 5 INJECTION INTRAVENOUS at 08:45

## 2021-10-29 RX ADMIN — ASPIRIN 81 MG: 81 TABLET, CHEWABLE ORAL at 08:43

## 2021-10-29 RX ADMIN — PREGABALIN 75 MG: 75 CAPSULE ORAL at 08:43

## 2021-10-29 RX ADMIN — SODIUM CHLORIDE 500 ML: 9 INJECTION, SOLUTION INTRAVENOUS at 10:32

## 2021-10-29 RX ADMIN — PANTOPRAZOLE SODIUM 40 MG: 40 TABLET, DELAYED RELEASE ORAL at 06:40

## 2021-10-29 RX ADMIN — IODIXANOL 35 ML: 320 INJECTION, SOLUTION INTRAVASCULAR at 14:07

## 2021-10-29 RX ADMIN — SUCRALFATE 1 G: 1 TABLET ORAL at 08:43

## 2021-10-29 RX ADMIN — SUCRALFATE 1 G: 1 TABLET ORAL at 21:10

## 2021-10-29 RX ADMIN — SERTRALINE 100 MG: 100 TABLET, FILM COATED ORAL at 08:43

## 2021-10-29 RX ADMIN — SODIUM CHLORIDE: 9 INJECTION, SOLUTION INTRAVENOUS at 16:48

## 2021-10-29 RX ADMIN — PREGABALIN 75 MG: 75 CAPSULE ORAL at 21:10

## 2021-10-29 RX ADMIN — INSULIN LISPRO 1 UNITS: 100 INJECTION, SOLUTION INTRAVENOUS; SUBCUTANEOUS at 21:13

## 2021-10-29 RX ADMIN — INSULIN GLARGINE 16 UNITS: 100 INJECTION, SOLUTION SUBCUTANEOUS at 21:14

## 2021-10-29 ASSESSMENT — PAIN DESCRIPTION - FREQUENCY: FREQUENCY: CONTINUOUS

## 2021-10-29 ASSESSMENT — PAIN SCALES - GENERAL
PAINLEVEL_OUTOF10: 0
PAINLEVEL_OUTOF10: 0
PAINLEVEL_OUTOF10: 4
PAINLEVEL_OUTOF10: 0
PAINLEVEL_OUTOF10: 4
PAINLEVEL_OUTOF10: 7
PAINLEVEL_OUTOF10: 0

## 2021-10-29 ASSESSMENT — PAIN DESCRIPTION - DESCRIPTORS: DESCRIPTORS: ACHING

## 2021-10-29 ASSESSMENT — PAIN DESCRIPTION - ORIENTATION: ORIENTATION: RIGHT

## 2021-10-29 ASSESSMENT — PAIN DESCRIPTION - PAIN TYPE: TYPE: ACUTE PAIN

## 2021-10-29 ASSESSMENT — PAIN DESCRIPTION - LOCATION: LOCATION: HEAD

## 2021-10-29 NOTE — PROGRESS NOTES
Hospital Medicine Progress Note      Admit Date: 10/27/2021       CC: F/U for chest pain, ulcers    HPI: Greta Whiting is a 57 y. o. female.   She presents from home via ambulance.  She complains of nausea, vomiting, and epigastric pain.  Onset was 9-10am this morning soon after taking her morning medications.  She describes the pain as epigastric, sharp, cutting, and burning w/ radiation to her chest, between both shoulders anteriorly, and her throat.  She vomited once shortly after onset.  She also vomited once last night though at the time wasn't having any pain.  She denies hematemesis and coffee grounds.  Stools have been \"dark\" but not clearly melenic.  States she has a h/o peptic ulcers.  Also her esophagus has been dilated in the past.     Per chart review she underwent EGD for dysphagia May, 2019 by Dr. Sandip Hatch.  An esophageal stricture was found and dilated.  Gastritis was also noted.  She was prescribed pantoprazole and sucralfate.  the ER MD documented that she has only been taking protonix, but she told me she has been out of it for quite some time. She has not followed up with GI.     10/28: Consult to Cardiology. Interval History/Subjective: heart cath today for persistent chest pain. Had some symptomatic hypotension earlier today. Patient was upgraded to in-pt. Review of Systems:   The patient denied headaches, visual changes, LOC, SOB, CP, ABD pain, N/V/D, skin changes, new or worsening weakness or neuromuscular deficits. Comprehensive ROS negative except as mentioned above.     Past Medical History:        Diagnosis Date    Back pain     CAD (coronary artery disease)     [facility]     Foot pain     from bone spurs    GERD (gastroesophageal reflux disease)     Headache     sharp pain going through head    [facility] ([facility] [address]     Hip pain     Hyperlipidemia     Hypertension      (normal spontaneous vaginal delivery)     Osteoporosis  Vision abnormalities        Past Surgical History:        Procedure Laterality Date    CARDIAC CATHETERIZATION      CHOLECYSTECTOMY  1999    COLONOSCOPY      ESOPHAGEAL DILATATION N/A 5/1/2019    ESOPHAGEAL DILATION Oriana Gander performed by Noreen Manzano MD at [address] N/A 5/1/2019    EGD BIOPSY performed by Noreen Manzano MD at [address]:  Iodine, Other, Eggs or egg-derived products [eggs or egg-derived products], Eggs [egg white], Flexeril [cyclobenzaprine hcl], Levofloxacin, and Omeprazole    Past medical and surgical history reviewed. Any changes have been noted. PHYSICAL EXAM:  /61   Pulse 69   Temp 97.7 °F (36.5 °C) (Oral)   Resp 16   Ht 5' 1\" (1.549 m)   Wt 146 lb 2.6 oz (66.3 kg)   SpO2 90%   BMI 27.62 kg/m²       Intake/Output Summary (Last 24 hours) at 10/29/2021 0911  Last data filed at 10/29/2021 0641  Gross per 24 hour   Intake 900 ml   Output --   Net 900 ml           General appearance:   No apparent distress, appears stated age. Cooperative. HEENT:  Normocephalic, atraumatic. PERRLA. EOMi. Conjunctivae/corneas clear, no icterus, non-injected. Neck: Supple, with full range of motion. No jugular venous distention. Trachea midline. Respiratory:  Normal respiratory effort. Clear to auscultation, bilaterally without Rales/Wheezes/Rhonchi. Cardiovascular:  Non-reproductive pain with palpation across chest. Regular rate and rhythm without murmurs, rubs or gallops. Abdomen:  Slight discomfort with palpation over epigastric and left Upper and lower abdomen, Soft,, non-distended, without rebound or guarding. Normal bowel sounds. Musculoskeletal:  No clubbing, cyanosis or edema bilaterally. Full range of motion without deformity. Skin: Skin color, texture, turgor normal.  No rashes or lesions. Neurologic:  Neurovascularly intact without any focal sensory/motor deficits.  Cranial nerves: II-XII intact, grossly intact. No facial asymmetry, tongue midline. Psychiatric:  Alert and oriented, thought content appropriate  Capillary Refill: Brisk,< 3 seconds   Peripheral Pulses: +2 palpable, equal bilaterally     LABS:    Lab Results   Component Value Date    WBC 8.1 10/29/2021    HGB 12.6 10/29/2021    HCT 38.1 10/29/2021    MCV 95.1 10/29/2021     10/29/2021    LYMPHOPCT 35.6 10/29/2021    RBC 4.00 10/29/2021    MCH 31.4 10/29/2021    MCHC 33.0 10/29/2021    RDW 16.3 (H) 10/29/2021       Lab Results   Component Value Date    CREATININE 0.6 10/29/2021    BUN 19 10/29/2021     10/29/2021    K 4.2 10/29/2021     10/29/2021    CO2 31 10/29/2021       Lab Results   Component Value Date    MG 2.00 10/29/2021       Lab Results   Component Value Date    ALT 15 10/27/2021    AST 17 10/27/2021    ALKPHOS 133 (H) 10/27/2021    BILITOT 0.4 10/27/2021        No flowsheet data found. Lab Results   Component Value Date    LABA1C 5.8 10/28/2021       Imaging:  CTA CHEST ABDOMEN PELVIS W CONTRAST   Final Result   1. No evidence of aortic aneurysm or dissection in the chest or abdomen. 2. Mild celiac and mesenteric stenosis related to arcuate ligament and mild   atherosclerotic disease at the origin of the celiac. The HUANG is patent. 3. No acute abnormality in the chest, abdomen or pelvis. 4. Mild constipation. XR CHEST PORTABLE   Final Result   1. No definite radiographic evidence of acute cardiopulmonary disease. 2. Pulmonary sequela typical of that seen with smoking, including COPD;   correlate with clinical history. 3. Calcific atherosclerotic disease aorta.          NM Cardiac Stress Test Nuclear Imaging    (Results Pending)       Scheduled and prn Medications:    Scheduled Meds:   aspirin  81 mg Oral Daily    atorvastatin  80 mg Oral Nightly    folic acid  1 mg Oral Daily    metoprolol succinate  12.5 mg Oral Daily    pantoprazole  40 mg Oral QAM AC    pregabalin  75 mg Oral BID    sertraline  100 mg Oral Daily    sucralfate  1 g Oral 4x Daily    tiotropium  2 puff Inhalation Daily    enoxaparin  40 mg SubCUTAneous Daily    insulin glargine  0.25 Units/kg SubCUTAneous Nightly    insulin lispro  0.08 Units/kg SubCUTAneous TID WC    insulin lispro  0-6 Units SubCUTAneous TID WC    insulin lispro  0-3 Units SubCUTAneous Nightly     Continuous Infusions:   dextrose      sodium chloride       PRN Meds:.ondansetron, ondansetron, promethazine, promethazine, glucose, dextrose, glucagon (rDNA), dextrose, sodium chloride flush, sodium chloride, potassium chloride **OR** potassium alternative oral replacement **OR** potassium chloride, magnesium sulfate, acetaminophen **OR** acetaminophen, regadenoson, melatonin, aluminum & magnesium hydroxide-simethicone, ipratropium-albuterol    Assessment & Plan:        Epigastric Pain, Nausea/Vomiting  -  H/O esophageal stricture and gastritis dating back to May, 2019.     - no active bleeding  -  Continue home pantoprazole 40mg daily.    - Resume sucralfate as prescribed 1g QIDACHS. -  Monitor response.  -defer to GI for repeat EGD if needed     - carafate + protonix (home meds) . She has been out of her protonix for awhile before coming in.  - GI consulted - cr f/u with Dr. Sophie Burt outpatient for EGD      Chest Pain, Nonobstructive CAD  - YUE RETREAT 5/7/2018:  Mild nonobstructive CAD. -  Lexiscan/MPI 11/30/2020:  Normal  -  ACS unlikely.    -  Troponin 0.01--> 0.04 --> 0.02  -  w/ lexiscan/MPI stress unable to be done due to abnormal EKG and elevated troponin. -  Continue home regimen of metoprolol XL (held for stress), atorvastatin, and ASA. -echo for SOB, dizziness EF  55-60%.  No regional wall motion abnormalities are seen.    Normal diastolic function.    The right ventricle is normal in size and function.    Trivial MR,TR    - cardiology consulted for elevated troponin, EKG abn and chest pain   -  heart cath 10/29 - mild disease of LAD and L cx, otherwise normal Rt coronary and left main arteries. Medical mgmt.  - had some symptomatic hypotension this morning. 1L ns bolus with improvement      COPD  - not in exacerbation  -  albuterol PRN  - duonebs q4h   - not on home O2  - O2 > 92%        DM2, T2   -  Hold all oral antidiabetic agents from home while admitted  - ssi while inpatient    Continue current regimen/therapies. Monitor. Adjust medical regimen as appropriate. Body mass index is 27.62 kg/m². The patient and / or the family were informed of the results of any tests, a time was given to answer questions, a plan was proposed and they agreed with plan.       DVT ppx: lovenox      Diet: Diet NPO Exceptions are: Ice Chips, Sips of Water with Meds    Consults:  IP CONSULT TO CARDIOLOGY  IP CONSULT TO GI    DISPO/placement plan: pending    Code Status: Full Code      JOSE ANGEL García - DEEPTI  10/29/21

## 2021-10-29 NOTE — PRE SEDATION
Sedation Pre-Procedure Note    Patient Name: Silvio Mooney   YOB: 1960  Room/Bed: P0C-4410/2164-38  Medical Record Number: [id number]  Date: 10/29/2021   Time: 2:04 PM       Indication:  nstemi    Consent: I have discussed with the patient and/or the patient representative the indication, alternatives, and the possible risks and/or complications of the planned procedure and the anesthesia methods. The patient and/or patient representative appear to understand and agree to proceed. Vital Signs:   Vitals:    10/29/21 1024   BP: 111/67   Pulse: 58   Resp:    Temp:    SpO2:        Past Medical History:   has a past medical history of Back pain, CAD (coronary artery disease), COPD (chronic obstructive pulmonary disease) ([facility] [address] Foot pain, GERD (gastroesophageal reflux disease), Headache, Heart attack ([facility] [facility] Hip pain, Hyperlipidemia, Hypertension,  (normal spontaneous vaginal delivery), Osteoporosis, and Vision abnormalities. Past Surgical History:   has a past surgical history that includes Cardiac catheterization; Cholecystectomy (); Colonoscopy; Upper gastrointestinal endoscopy (N/A, 2019); and Esophagus dilation (N/A, 2019).     Medications:   Scheduled Meds:    sodium chloride flush  5-40 mL IntraVENous 2 times per day    aspirin  81 mg Oral Daily    atorvastatin  80 mg Oral Nightly    folic acid  1 mg Oral Daily    metoprolol succinate  12.5 mg Oral Daily    pantoprazole  40 mg Oral QAM AC    pregabalin  75 mg Oral BID    sertraline  100 mg Oral Daily    sucralfate  1 g Oral 4x Daily    tiotropium  2 puff Inhalation Daily    enoxaparin  40 mg SubCUTAneous Daily    insulin glargine  0.25 Units/kg SubCUTAneous Nightly    insulin lispro  0.08 Units/kg SubCUTAneous TID WC    insulin lispro  0-6 Units SubCUTAneous TID WC    insulin lispro  0-3 Units SubCUTAneous Nightly     Continuous Infusions:    sodium chloride 75 mL/hr at 10/29/21 1154    sodium chloride      dextrose       PRN Meds: sodium chloride flush, sodium chloride, ondansetron, ondansetron, promethazine, promethazine, glucose, dextrose, glucagon (rDNA), dextrose, potassium chloride **OR** potassium alternative oral replacement **OR** potassium chloride, magnesium sulfate, acetaminophen **OR** acetaminophen, regadenoson, melatonin, aluminum & magnesium hydroxide-simethicone, ipratropium-albuterol  Home Meds:   Prior to Admission medications    Medication Sig Start Date End Date Taking?  Authorizing Provider   vitamin B-12 (CYANOCOBALAMIN) 1000 MCG tablet Take 1,000 mcg by mouth daily   Yes Historical Provider, MD   Cholecalciferol (VITAMIN D) 50 MCG (2000 UT) CAPS capsule Take 1 capsule by mouth daily   Yes Historical Provider, MD   albuterol sulfate HFA (PROVENTIL HFA) 108 (90 Base) MCG/ACT inhaler Inhale 2 puffs into the lungs every 6 hours as needed for Wheezing or Shortness of Breath 9/29/21  Yes Amandeep Craig DO   atorvastatin (LIPITOR) 80 MG tablet Take 1 tablet by mouth daily 9/23/21  Yes Kevin Zapata DO   metoprolol succinate (TOPROL XL) 25 MG extended release tablet TAKE 1/2 TABLET BY MOUTH DAILY 8/25/21  Yes Kevin Zapata DO   sertraline (ZOLOFT) 100 MG tablet TAKE ONE TABLET BY MOUTH DAILY 7/26/21  Yes Amandeep Craig DO   pantoprazole (PROTONIX) 40 MG tablet Take 1 tablet by mouth every morning (before breakfast) 7/18/21 10/27/21 Yes Latricia Addison MD   pregabalin (LYRICA) 75 MG capsule TAKE ONE CAPSULE BY MOUTH TWICE A DAY 5/21/21 10/27/21 Yes Amandeep Craig DO   Umeclidinium Bromide 62.5 MCG/INH AEPB Inhale 1 puff into the lungs daily 1/27/21  Yes Precious Mack MD   ipratropium-albuterol (DUONEB) 0.5-2.5 (3) MG/3ML SOLN nebulizer solution Inhale 3 mLs into the lungs every 4 hours 1/27/21  Yes Precious Mack MD   folic acid (FOLVITE) 1 MG tablet Take 1 tablet by mouth daily 8/5/20  Yes Kevin C Bramlee, DO   metFORMIN (GLUCOPHAGE) 500 MG tablet Take 1 tablet by mouth daily (with breakfast) 11/6/19  Yes Maryanne JOSE ANGEL Reveles - CNP   aspirin 81 MG chewable tablet CHEW ONE TABLET BY MOUTH DAILY 6/7/19  Yes Maryanne Puneet Thakur APRN - CNP   diphenhydrAMINE-APAP, sleep, (TYLENOL PM EXTRA STRENGTH)  MG tablet Take 1 tablet by mouth nightly as needed for Sleep    Yes Historical Provider, MD   mineral oil-hydrophilic petrolatum (AQUAPHOR) ointment Apply topically as needed. 2/6/19  Yes Maryanne JOSE ANGEL Reveles CNP   albuterol (PROVENTIL) (5 MG/ML) 0.5% nebulizer solution Take 0.5 mLs by nebulization every 6 hours as needed for Wheezing 5/9/21   Laura Mosley PA-C   triamcinolone (KENALOG) 0.1 % cream Apply topically 2 times daily. Patient taking differently: 2 times daily as needed (rash) Apply topically 2 times daily. 10/9/19   Maryanne JOSE ANGEL Reveles CNP   acetaminophen (TYLENOL) 500 MG tablet Take 2 tablets by mouth every 6 hours as needed for Pain Do not take with other medications containing acetaminophen. 9/2/18   Mati CARIDAD Lee     Coumadin Use Last 7 Days:  no  Antiplatelet drug therapy use last 7 days: yes, asa   Other anticoagulant use last 7 days: no  Additional Medication Information:  n/a      Pre-Sedation Documentation and Exam:   I have personally completed a history, physical exam & review of systems for this patient (see notes).     Mallampati Airway Assessment:  Mallampati Class I - (soft palate, fauces, uvula & anterior/posterior tonsillar pillars are visible)    Prior History of Anesthesia Complications:   none    ASA Classification:  Class 2 - A normal healthy patient with mild systemic disease    Sedation/ Anesthesia Plan:   intravenous sedation    Medications Planned:   midazolam (Versed) intravenously    Patient is an appropriate candidate for plan of sedation: yes    Electronically signed by Dylan Bermudez MD on 10/29/2021 at 2:04 PM

## 2021-10-29 NOTE — OP NOTE
[address]   Procedure Note    CLINICAL HISTORY:       Nuha Kruse is a 57 y.o. female with a history of coronary artery disease. She was admitted with complaints of chest pain . Cardiac markers were positive. EKG showed nonspecific ST-T wave abnormality. Patient Active Problem List   Diagnosis    COPD, severe ([facility]    Former smoker    Osteoporosis    Chronic back pain    Positive test for human papillomavirus (HPV)    Anxiety    Hyperlipidemia    Emotional instability ([facility]    Osteoarthritis of hip    Body mass index between 25-29, adult    Chest pain    NSTEMI (non-ST elevated myocardial infarction) ([facility]    Essential hypertension    Tachycardia    Vitamin D deficiency    Gastroesophageal reflux disease without esophagitis    Type 2 diabetes mellitus with hyperglycemia, without long-term current use of insulin ([facility])    Diarrhea    Sepsis ([facility])    Other hypotension    Peripheral vascular disease ([facility])    Atherosclerosis of superior mesenteric artery ([facility])    Closed displaced fracture of fifth metatarsal bone of left foot    Esophageal stricture    PSVT (paroxysmal supraventricular tachycardia) ([facility])    Type 2 diabetes mellitus with diabetic neuropathy    Nausea & vomiting    Coronary artery disease involving native coronary artery of native heart with unstable angina pectoris ([facility]       Prior to Admission medications    Medication Sig Start Date End Date Taking?  Authorizing Provider   vitamin B-12 (CYANOCOBALAMIN) 1000 MCG tablet Take 1,000 mcg by mouth daily   Yes Historical Provider, MD   Cholecalciferol (VITAMIN D) 50 MCG (2000 UT) CAPS capsule Take 1 capsule by mouth daily   Yes Historical Provider, MD   albuterol sulfate HFA (PROVENTIL HFA) 108 (90 Base) MCG/ACT inhaler Inhale 2 puffs into the lungs every 6 hours as needed for Wheezing or Shortness of Breath 9/29/21  Yes Lesta Stage, DO   atorvastatin (LIPITOR) 80 MG tablet Take 1 tablet by mouth daily 9/23/21 of the procedure were explained to the patient. The patient verbalized understanding and wanted to proceed. Informed written consent was obtained. INDICATION:  nstemi    PROCEDURES PERFORMED:   Coronary angiogram     PROCEDURE TECHNIQUE:  The patient was approached from the right radial artery using a 5-6  Uzbek slender sheath. Left coronary angiography was done using a Sabra L3.5 diagnostic catheter. Right coronary angiography was done using a Sabra R4 guide catheter. CONTRAST:  Total of 35 cc. COMPLICATIONS:  None. At the end of the procedure a TR device was used for hemostasis. EBL: 5 cc    Moderate Sedation:  Start time: 1353  Stop time: 1405  1.5 mg versed   50 mcg fentanyl   An independent trained observer pushed medications at my direction. We monitored the patient's level of consciousness and vital signs/physiologic status throughout the procedure duration (see start and start times above). HEMODYNAMICS:  Aortic pressure was 101/56/71    ANGIOGRAM/CORONARY ARTERIOGRAM:       The left main coronary artery is normal .    The left anterior descending artery has mild disease. The left circumflex artery has mild disease. The right coronary artery is normal     INTERVENTION  1.  None     SUMMARY:   Mild nonobstructive CAD     RECOMMENDATION:      - medical therapy   - risk factor modification     MD Elodia Rosario 13, Interventional Cardiology, and Peripheral Vascular [address] [facility]   (C): [phone]  (O): [phone]

## 2021-10-29 NOTE — PROGRESS NOTES
Pt returned to room 4264 from cath lab. VSS. Pt still very sleepy. Bed alarm on, call light and bedside table with in reach. Will continue to monitor.

## 2021-10-29 NOTE — PROGRESS NOTES
Called pt daughter per pt request and notified of cardiac cath at 2pm. Pt resting in bed IV 0.9% ns running @250 ml/hr. Call light within reach. Will continue to monitor.

## 2021-10-29 NOTE — PROGRESS NOTES
This RN callled Stress test and changed pt to NPO status. Pt ate some breakfast prior to notification. Pt states she had no caffeine or chocolate since 2000 10/28/21. Stress test pending Cardiac evaluation. Hold beta blockers per Stress test.Will continue to monitor.

## 2021-10-29 NOTE — PLAN OF CARE
Problem: Falls - Risk of:  Goal: Will remain free from falls  Description: Will remain free from falls  Outcome: Ongoing  Goal: Absence of physical injury  Description: Absence of physical injury  Outcome: Ongoing     Problem: Infection:  Goal: Will remain free from infection  Description: Will remain free from infection  Outcome: Ongoing     Problem: Safety:  Goal: Free from accidental physical injury  Description: Free from accidental physical injury  Outcome: Ongoing  Goal: Free from intentional harm  Description: Free from intentional harm  Outcome: Ongoing     Problem: Daily Care:  Goal: Daily care needs are met  Description: Daily care needs are met  Outcome: Ongoing     Problem: Skin Integrity:  Goal: Skin integrity will stabilize  Description: Skin integrity will stabilize  Outcome: Ongoing

## 2021-10-29 NOTE — CONSULTS
Referring practitioner: Renata Cheng NP  Reason for Consultation: \"Chest pain and abnormal\"  Chief Complaint: Chest pain    Subjective:   History of Present Illness:  Juan Pablo Thacker is a 57 y.o. patient who presented to the hospital with complaints of severe substernal chest pain. The patient typically follows with Dr. Shira Soni for nonobstructive CAD. Regardless, she had the sudden onset of multiple symptoms essentially at the same time including substernal chest pain, nausea, shortness of breath, and diaphoresis. She describes the pain as a \"burning pressure. \"  She states that she vomited several times and the pain persisted. She rated the intensity of the pain as 10/10. The pain also radiated toward both shoulders and her neck. She is currently chest pain-free. Her initial troponin was normal but up trended to 0.04. She has a history of an esophageal stricture and GI was consulted. Past Medical History:   has a past medical history of Back pain, CAD (coronary artery disease), COPD (chronic obstructive pulmonary disease) ([address] Foot pain, GERD (gastroesophageal reflux disease), Headache, Heart attack ([address] Hip pain, Hyperlipidemia, Hypertension,  (normal spontaneous vaginal delivery), Osteoporosis, and Vision abnormalities. Surgical History:   has a past surgical history that includes Cardiac catheterization; Cholecystectomy (); Colonoscopy; Upper gastrointestinal endoscopy (N/A, 2019); and Esophagus dilation (N/A, 2019). Social History:   reports that she quit smoking about 4 years ago. Her smoking use included cigarettes. She has a 37.00 pack-year smoking history. She has never used smokeless tobacco. She reports that she does not drink alcohol and does not use drugs. Family History:  family history includes COPD in her father; Emphysema in her mother; Heart Failure in her father and mother; Hypertension in her mother and paternal grandmother.     Home Medications:  Were reviewed and are listed in nursing record and/or below  Prior to Admission medications    Medication Sig Start Date End Date Taking?  Authorizing Provider   vitamin B-12 (CYANOCOBALAMIN) 1000 MCG tablet Take 1,000 mcg by mouth daily   Yes Historical Provider, MD   Cholecalciferol (VITAMIN D) 50 MCG (2000 UT) CAPS capsule Take 1 capsule by mouth daily   Yes Historical Provider, MD   albuterol sulfate HFA (PROVENTIL HFA) 108 (90 Base) MCG/ACT inhaler Inhale 2 puffs into the lungs every 6 hours as needed for Wheezing or Shortness of Breath 9/29/21  Yes Andrew Vilma, DO   atorvastatin (LIPITOR) 80 MG tablet Take 1 tablet by mouth daily 9/23/21  Yes Kevni Zapata, DO   metoprolol succinate (TOPROL XL) 25 MG extended release tablet TAKE 1/2 TABLET BY MOUTH DAILY 8/25/21  Yes Kevin Zapata, DO   sertraline (ZOLOFT) 100 MG tablet TAKE ONE TABLET BY MOUTH DAILY 7/26/21  Yes Andrew Vilma, DO   pantoprazole (PROTONIX) 40 MG tablet Take 1 tablet by mouth every morning (before breakfast) 7/18/21 10/27/21 Yes Jonatan Lynch MD   pregabalin (LYRICA) 75 MG capsule TAKE ONE CAPSULE BY MOUTH TWICE A DAY 5/21/21 10/27/21 Yes Andrew Esparza, DO   Umeclidinium Bromide 62.5 MCG/INH AEPB Inhale 1 puff into the lungs daily 1/27/21  Yes Wood Vides MD   ipratropium-albuterol (DUONEB) 0.5-2.5 (3) MG/3ML SOLN nebulizer solution Inhale 3 mLs into the lungs every 4 hours 1/27/21  Yes Wood Vides MD   folic acid (FOLVITE) 1 MG tablet Take 1 tablet by mouth daily 8/5/20  Yes Andrew Poor, DO   metFORMIN (GLUCOPHAGE) 500 MG tablet Take 1 tablet by mouth daily (with breakfast) 11/6/19  Yes JOSE ANGEL Sutton CNP   aspirin 81 MG chewable tablet CHEW ONE TABLET BY MOUTH DAILY 6/7/19  Yes JOSE ANGEL Sutton CNP   diphenhydrAMINE-APAP, sleep, (TYLENOL PM EXTRA STRENGTH)  MG tablet Take 1 tablet by mouth nightly as needed for Sleep    Yes Historical Provider, MD   mineral oil-hydrophilic petrolatum (AQUAPHOR) ointment Apply topically as needed. 2/6/19  Yes JOSE ANGEL Huffman - CNP   albuterol (PROVENTIL) (5 MG/ML) 0.5% nebulizer solution Take 0.5 mLs by nebulization every 6 hours as needed for Wheezing 5/9/21   Laura Mosley PA-C   triamcinolone (KENALOG) 0.1 % cream Apply topically 2 times daily. Patient taking differently: 2 times daily as needed (rash) Apply topically 2 times daily. 10/9/19   JOSE ANGEL Huffman CNP   acetaminophen (TYLENOL) 500 MG tablet Take 2 tablets by mouth every 6 hours as needed for Pain Do not take with other medications containing acetaminophen.  9/2/18   Mati CARIDAD Small        CURRENT Medications:  ondansetron (ZOFRAN) injection 4 mg, Q4H PRN  ondansetron (ZOFRAN-ODT) disintegrating tablet 4 mg, Q4H PRN  promethazine (PHENERGAN) injection 12.5 mg, Q4H PRN  promethazine (PHENERGAN) tablet 12.5 mg, Q4H PRN  aspirin chewable tablet 81 mg, Daily  atorvastatin (LIPITOR) tablet 80 mg, Nightly  folic acid (FOLVITE) tablet 1 mg, Daily  metoprolol succinate (TOPROL XL) extended release tablet 12.5 mg, Daily  pantoprazole (PROTONIX) tablet 40 mg, QAM AC  pregabalin (LYRICA) capsule 75 mg, BID  sertraline (ZOLOFT) tablet 100 mg, Daily  sucralfate (CARAFATE) tablet 1 g, 4x Daily  tiotropium (SPIRIVA RESPIMAT) 2.5 MCG/ACT inhaler 2 puff, Daily  glucose (GLUTOSE) 40 % oral gel 15 g, PRN  dextrose 50 % IV solution, PRN  glucagon (rDNA) injection 1 mg, PRN  dextrose 5 % solution, PRN  sodium chloride flush 0.9 % injection 10 mL, PRN  0.9 % sodium chloride infusion, PRN  potassium chloride (KLOR-CON M) extended release tablet 40 mEq, PRN   Or  potassium bicarb-citric acid (EFFER-K) effervescent tablet 40 mEq, PRN   Or  potassium chloride 10 mEq/100 mL IVPB (Peripheral Line), PRN  magnesium sulfate 1000 mg in dextrose 5% 100 mL IVPB, PRN  acetaminophen (TYLENOL) tablet 650 mg, Q6H PRN   Or  acetaminophen (TYLENOL) suppository 650 mg, Q6H PRN  enoxaparin (LOVENOX) injection 40 mg, Daily  insulin glargine (LANTUS) injection vial 16 Units, Nightly  insulin lispro (HUMALOG) injection vial 5 Units, TID WC  insulin lispro (HUMALOG) injection vial 0-6 Units, TID WC  insulin lispro (HUMALOG) injection vial 0-3 Units, Nightly  regadenoson (LEXISCAN) injection 0.4 mg, ONCE PRN  melatonin tablet 3 mg, Nightly PRN  aluminum & magnesium hydroxide-simethicone (MAALOX) 200-200-20 MG/5ML suspension 30 mL, Q6H PRN  ipratropium-albuterol (DUONEB) nebulizer solution 1 ampule, 4x Daily PRN      Allergies:  Iodine, Other, Eggs or egg-derived products [eggs or egg-derived products], Eggs [egg white], Flexeril [cyclobenzaprine hcl], Levofloxacin, and Omeprazole     Review of Systems:   · Constitutional: no unanticipated weight loss. There's been no change in energy level, sleep pattern, or activity level. No fevers, chills. · Eyes: No visual changes or diplopia. No scleral icterus. · ENT: No Headaches, hearing loss or vertigo. No mouth sores or sore throat. · Cardiovascular: as reviewed in HPI  · Respiratory: No cough or wheezing, no sputum production. No hemoptysis. · Gastrointestinal: No abdominal pain, appetite loss, blood in stools. No change in bowel or bladder habits. · Genitourinary: No dysuria, trouble voiding, or hematuria. · Musculoskeletal:  No gait disturbance, no joint complaints. · Integumentary: No rash or pruritis. · Neurological: No headache, diplopia, change in muscle strength, numbness or tingling. · Psychiatric: No anxiety or depression. · Endocrine: No temperature intolerance. No excessive thirst, fluid intake, or urination. No tremor. · Hematologic/Lymphatic: No abnormal bruising or bleeding, blood clots or swollen lymph nodes. · Allergic/Immunologic: No nasal congestion or hives.     Objective:   PHYSICAL EXAM:    Vitals:    10/29/21 0827   BP: 101/61   Pulse: 69   Resp: 14   Temp: 97.7   SpO2: 95%    Weight: 146 lb 2.6 oz (66.3 kg)       General Appearance:  Alert, cooperative, no distress, appears stated age. Head:  Normocephalic, without obvious abnormality, atraumatic. Eyes:  Pupils equal and round. No scleral icterus. Mouth: Moist mucosa, no pharyngeal erythema. Nose: Nares normal. No drainage or sinus tenderness. Neck: Supple, symmetrical, trachea midline. No adenopathy. No tenderness/mass/nodules. No carotid bruit or elevated JVD. Lungs:   Clear to auscultation bilaterally, respirations unlabored. No wheeze, rales, or rhonchi. Chest Wall:  No tenderness or deformity. Heart:  Regular rate. S1/S2 normal. No murmur, rub, or gallop. Abdomen:   Soft, non-tender, bowel sounds active. Musculoskeletal: No muscle wasting or digital clubbing. Extremities: Extremities normal, atraumatic. No cyanosis or edema. Pulses: 2+ radial and carotid pulses, symmetric. Skin: No rashes or lesions. Pysch: Normal mood and affect. Alert and oriented x 4. Neurologic: Normal gross motor and sensory exam.       Labs     CBC:   Lab Results   Component Value Date    WBC 8.1 10/29/2021    RBC 4.00 10/29/2021    HGB 12.6 10/29/2021    HCT 38.1 10/29/2021    MCV 95.1 10/29/2021    RDW 16.3 10/29/2021     10/29/2021     CMP:  Lab Results   Component Value Date     10/29/2021    K 4.2 10/29/2021    K 3.9 10/27/2021     10/29/2021    CO2 31 10/29/2021    BUN 19 10/29/2021    CREATININE 0.6 10/29/2021    GFRAA >60 10/29/2021    AGRATIO 1.7 10/27/2021    LABGLOM >60 10/29/2021    GLUCOSE 88 10/29/2021    PROT 7.4 10/27/2021    CALCIUM 8.8 10/29/2021    BILITOT 0.4 10/27/2021    ALKPHOS 133 10/27/2021    AST 17 10/27/2021    ALT 15 10/27/2021     PT/INR:  No results found for: PTINR  HgBA1c:  Lab Results   Component Value Date    LABA1C 5.8 10/28/2021     Lab Results   Component Value Date    TROPONINI 0.02 (H) 10/27/2021       Cardiac Data     EKG: Personally interpreted. 10/27/2021. Sinus rhythm with left bundle branch block. LBBB new from prior.     Echo: 10/28/21 Normal left ventricle size, wall thickness, and systolic function with an estimated ejection fraction of 55-60%. No regional wall motion abnormalities are seen. Normal diastolic function. The right ventricle is normal in size and function. Trivial MR and TR. Cath: 5/2018  1. Patent right coronary artery with mild disease of the posterior descending artery branch of the RCA. 2.  Patent left main trunk. 3.  Mild nonobstructive plaque buildup in proximal LAD. 4.  Mild 30% plaque of the obtuse marginal branch of the circumflex artery. 5.  Normal left ventricular systolic function with estimated EF of 50% to 65%. Telemetry: Personally interpreted. Sinus. Assessment and Plan   1) NSTEMI. Patient currently chest pain-free. I discussed the risks and benefits of cardiac catheterization with the patient. I also discussed the possible therapies and alternatives including medical management, angioplasty and stenting or coronary bypass surgery. The patient is amenable to undergoing the procedure and voices understanding of the risks. We will have the procedure scheduled with the interventional cardiologist.     2) CAD. Continue with medical management and risk factor modification including aspirin, statin, and B-blocker. 3) Type 2 diabetes mellitus. Will defer diabetes management to primary team.    4) Hyperlipidemia. Continue high intensity statin with Lipitor. Overall, the problems requiring hospitalization are high in severity. Addendum: Coronary angiography completed. Nonobstructive CAD requiring no acute intervention. Continue medical management. Will sign off. Call with questions. Continue outpatient evaluation with GI. Thank you for allowing us to participate in the care of [address] [place] [address].  Viviana Casey, [facility] [address]  10/29/2021 8:41 AM

## 2021-10-29 NOTE — PLAN OF CARE
Problem: Falls - Risk of:  Goal: Will remain free from falls  Description: Will remain free from falls  10/29/2021 0940 by Roderick Beck RN  Outcome: Ongoing  10/29/2021 0053 by Anais Styles RN  Outcome: Ongoing  Goal: Absence of physical injury  Description: Absence of physical injury  10/29/2021 0940 by Roderick Beck RN  Outcome: Ongoing  10/29/2021 0053 by Anais Styles RN  Outcome: Ongoing     Problem: Infection:  Goal: Will remain free from infection  Description: Will remain free from infection  10/29/2021 0940 by Roderick Beck RN  Outcome: Ongoing  10/29/2021 0053 by Anais Styles RN  Outcome: Ongoing     Problem: Safety:  Goal: Free from accidental physical injury  Description: Free from accidental physical injury  10/29/2021 0940 by Roderick Beck RN  Outcome: Ongoing  10/29/2021 0053 by Anais Styles RN  Outcome: Ongoing  Goal: Free from intentional harm  Description: Free from intentional harm  10/29/2021 0940 by Roderick Beck RN  Outcome: Ongoing  10/29/2021 0053 by Anais Styles RN  Outcome: Ongoing     Problem: Daily Care:  Goal: Daily care needs are met  Description: Daily care needs are met  10/29/2021 0940 by Roderick Beck RN  Outcome: Ongoing  10/29/2021 0053 by Anais Styles RN  Outcome: Ongoing     Problem: Pain:  Goal: Patient's pain/discomfort is manageable  Description: Patient's pain/discomfort is manageable  10/29/2021 0940 by Roderick Beck RN  Outcome: Ongoing  10/29/2021 0053 by Anais Styles RN  Outcome: Ongoing     Problem: Skin Integrity:  Goal: Skin integrity will stabilize  Description: Skin integrity will stabilize  10/29/2021 0940 by Roderick Beck RN  Outcome: Ongoing  10/29/2021 0053 by Anais Styles RN  Outcome: Ongoing     Problem: Discharge Planning:  Goal: Patients continuum of care needs are met  Description: Patients continuum of care needs are met  10/29/2021 0940 by Roderick Beck RN  Outcome: Ongoing  10/29/2021 0053 by Ronney Spatz, RN  Outcome: Ongoing

## 2021-10-30 VITALS
OXYGEN SATURATION: 97 % | HEIGHT: 61 IN | TEMPERATURE: 97.8 F | WEIGHT: 146.16 LBS | RESPIRATION RATE: 18 BRPM | SYSTOLIC BLOOD PRESSURE: 131 MMHG | DIASTOLIC BLOOD PRESSURE: 66 MMHG | BODY MASS INDEX: 27.6 KG/M2 | HEART RATE: 67 BPM

## 2021-10-30 LAB
ANION GAP SERPL CALCULATED.3IONS-SCNC: 10 MMOL/L (ref 3–16)
BASOPHILS ABSOLUTE: 0 K/UL (ref 0–0.2)
BASOPHILS RELATIVE PERCENT: 0.3 %
BUN BLDV-MCNC: 11 MG/DL (ref 7–20)
CALCIUM SERPL-MCNC: 8.8 MG/DL (ref 8.3–10.6)
CHLORIDE BLD-SCNC: 105 MMOL/L (ref 99–110)
CO2: 26 MMOL/L (ref 21–32)
CREAT SERPL-MCNC: <0.5 MG/DL (ref 0.6–1.2)
EOSINOPHILS ABSOLUTE: 0 K/UL (ref 0–0.6)
EOSINOPHILS RELATIVE PERCENT: 0.1 %
GFR AFRICAN AMERICAN: >60
GFR NON-AFRICAN AMERICAN: >60
GLUCOSE BLD-MCNC: 115 MG/DL (ref 70–99)
GLUCOSE BLD-MCNC: 148 MG/DL (ref 70–99)
GLUCOSE BLD-MCNC: 88 MG/DL (ref 70–99)
GLUCOSE BLD-MCNC: 98 MG/DL (ref 70–99)
HCT VFR BLD CALC: 37.8 % (ref 36–48)
HEMOGLOBIN: 12.4 G/DL (ref 12–16)
LYMPHOCYTES ABSOLUTE: 0.8 K/UL (ref 1–5.1)
LYMPHOCYTES RELATIVE PERCENT: 6.3 %
MAGNESIUM: 2.1 MG/DL (ref 1.8–2.4)
MCH RBC QN AUTO: 31 PG (ref 26–34)
MCHC RBC AUTO-ENTMCNC: 32.8 G/DL (ref 31–36)
MCV RBC AUTO: 94.6 FL (ref 80–100)
MONOCYTES ABSOLUTE: 0.4 K/UL (ref 0–1.3)
MONOCYTES RELATIVE PERCENT: 3.3 %
NEUTROPHILS ABSOLUTE: 10.8 K/UL (ref 1.7–7.7)
NEUTROPHILS RELATIVE PERCENT: 90 %
PDW BLD-RTO: 15.9 % (ref 12.4–15.4)
PERFORMED ON: ABNORMAL
PERFORMED ON: NORMAL
PERFORMED ON: NORMAL
PLATELET # BLD: 214 K/UL (ref 135–450)
PMV BLD AUTO: 8.5 FL (ref 5–10.5)
POTASSIUM SERPL-SCNC: 4.3 MMOL/L (ref 3.5–5.1)
RBC # BLD: 4 M/UL (ref 4–5.2)
SODIUM BLD-SCNC: 141 MMOL/L (ref 136–145)
WBC # BLD: 12 K/UL (ref 4–11)

## 2021-10-30 PROCEDURE — 83735 ASSAY OF MAGNESIUM: CPT

## 2021-10-30 PROCEDURE — 80048 BASIC METABOLIC PNL TOTAL CA: CPT

## 2021-10-30 PROCEDURE — 2700000000 HC OXYGEN THERAPY PER DAY

## 2021-10-30 PROCEDURE — 85025 COMPLETE CBC W/AUTO DIFF WBC: CPT

## 2021-10-30 PROCEDURE — 6370000000 HC RX 637 (ALT 250 FOR IP): Performed by: INTERNAL MEDICINE

## 2021-10-30 PROCEDURE — G0378 HOSPITAL OBSERVATION PER HR: HCPCS

## 2021-10-30 PROCEDURE — 6360000002 HC RX W HCPCS: Performed by: INTERNAL MEDICINE

## 2021-10-30 PROCEDURE — 94640 AIRWAY INHALATION TREATMENT: CPT

## 2021-10-30 PROCEDURE — 36415 COLL VENOUS BLD VENIPUNCTURE: CPT

## 2021-10-30 PROCEDURE — 94680 O2 UPTK RST&XERS DIR SIMPLE: CPT

## 2021-10-30 PROCEDURE — 2580000003 HC RX 258: Performed by: INTERNAL MEDICINE

## 2021-10-30 RX ADMIN — ACETAMINOPHEN 650 MG: 325 TABLET ORAL at 10:57

## 2021-10-30 RX ADMIN — SODIUM CHLORIDE: 9 INJECTION, SOLUTION INTRAVENOUS at 08:17

## 2021-10-30 RX ADMIN — INSULIN LISPRO 1 UNITS: 100 INJECTION, SOLUTION INTRAVENOUS; SUBCUTANEOUS at 08:22

## 2021-10-30 RX ADMIN — SODIUM CHLORIDE: 9 INJECTION, SOLUTION INTRAVENOUS at 09:29

## 2021-10-30 RX ADMIN — METOPROLOL SUCCINATE 12.5 MG: 25 TABLET, EXTENDED RELEASE ORAL at 08:19

## 2021-10-30 RX ADMIN — ACETAMINOPHEN 650 MG: 325 TABLET ORAL at 02:30

## 2021-10-30 RX ADMIN — PREGABALIN 75 MG: 75 CAPSULE ORAL at 08:19

## 2021-10-30 RX ADMIN — SERTRALINE 100 MG: 100 TABLET, FILM COATED ORAL at 08:19

## 2021-10-30 RX ADMIN — ASPIRIN 81 MG: 81 TABLET, CHEWABLE ORAL at 08:19

## 2021-10-30 RX ADMIN — ENOXAPARIN SODIUM 40 MG: 100 INJECTION SUBCUTANEOUS at 08:19

## 2021-10-30 RX ADMIN — PANTOPRAZOLE SODIUM 40 MG: 40 TABLET, DELAYED RELEASE ORAL at 06:34

## 2021-10-30 RX ADMIN — SUCRALFATE 1 G: 1 TABLET ORAL at 08:19

## 2021-10-30 RX ADMIN — TIOTROPIUM BROMIDE INHALATION SPRAY 2 PUFF: 3.12 SPRAY, METERED RESPIRATORY (INHALATION) at 08:09

## 2021-10-30 RX ADMIN — FOLIC ACID 1 MG: 1 TABLET ORAL at 08:19

## 2021-10-30 RX ADMIN — SUCRALFATE 1 G: 1 TABLET ORAL at 12:53

## 2021-10-30 RX ADMIN — SODIUM CHLORIDE, PRESERVATIVE FREE 10 ML: 5 INJECTION INTRAVENOUS at 08:22

## 2021-10-30 ASSESSMENT — PAIN DESCRIPTION - DESCRIPTORS: DESCRIPTORS: BURNING

## 2021-10-30 ASSESSMENT — PAIN DESCRIPTION - ORIENTATION: ORIENTATION: RIGHT;LEFT

## 2021-10-30 ASSESSMENT — PAIN SCALES - GENERAL
PAINLEVEL_OUTOF10: 6
PAINLEVEL_OUTOF10: 8

## 2021-10-30 ASSESSMENT — PAIN DESCRIPTION - LOCATION: LOCATION: FOOT

## 2021-10-30 ASSESSMENT — PAIN DESCRIPTION - PAIN TYPE: TYPE: CHRONIC PAIN

## 2021-10-30 ASSESSMENT — PAIN DESCRIPTION - FREQUENCY: FREQUENCY: INTERMITTENT

## 2021-10-30 ASSESSMENT — PAIN DESCRIPTION - ONSET: ONSET: ON-GOING

## 2021-10-30 NOTE — CARE COORDINATION
SW spoke to patient via telephone today and she stated that she would feel more comfortable with the discharge if she was 100% sure she did not need oxygen. She stated that we stopped it this AM. Coy Carrillo MD for a consult and will follow up with patient momentarily. Respectfully submitted,    LILA De Leon  [facility]   [phone]    Electronically signed by LILA Haley on 10/30/2021 at 3:35 PM

## 2021-10-30 NOTE — PLAN OF CARE
Problem: Falls - Risk of:  Goal: Will remain free from falls  Description: Will remain free from falls  10/30/2021 1627 by Dolores Ag RN  Outcome: Completed  10/30/2021 1017 by Dolores Ag RN  Outcome: Ongoing  Goal: Absence of physical injury  Description: Absence of physical injury  10/30/2021 1627 by Dolores Ag RN  Outcome: Completed  10/30/2021 1017 by Dolores Ag RN  Outcome: Ongoing     Problem: Infection:  Goal: Will remain free from infection  Description: Will remain free from infection  10/30/2021 1627 by Dolores Ag RN  Outcome: Completed  10/30/2021 1017 by Dolores Ag RN  Outcome: Ongoing     Problem: Safety:  Goal: Free from accidental physical injury  Description: Free from accidental physical injury  10/30/2021 1627 by Dolores Ag RN  Outcome: Completed  10/30/2021 1017 by Dolores Ag RN  Outcome: Ongoing  Goal: Free from intentional harm  Description: Free from intentional harm  10/30/2021 1627 by Dolores Ag RN  Outcome: Completed  10/30/2021 1017 by Dolores Ag RN  Outcome: Ongoing     Problem: Daily Care:  Goal: Daily care needs are met  Description: Daily care needs are met  10/30/2021 1627 by Dolores Ag RN  Outcome: Completed  10/30/2021 1017 by Dolores Ag RN  Outcome: Ongoing     Problem: Pain:  Goal: Patient's pain/discomfort is manageable  Description: Patient's pain/discomfort is manageable  10/30/2021 1627 by Dolores Ag RN  Outcome: Completed  10/30/2021 1017 by Dolores Ag RN  Outcome: Ongoing     Problem: Skin Integrity:  Goal: Skin integrity will stabilize  Description: Skin integrity will stabilize  10/30/2021 1627 by Dolores Ag RN  Outcome: Completed  10/30/2021 1017 by Dolores Ag RN  Outcome: Ongoing     Problem: Discharge Planning:  Goal: Patients continuum of care needs are met  Description: Patients continuum of care needs are met  10/30/2021 1627 by Dolores Ag RN  Outcome: Completed  10/30/2021 1017 by Jaydon Alonzo RN  Outcome: Ongoing

## 2021-10-30 NOTE — CARE COORDINATION
DISCHARGE SUMMARY     DATE OF DISCHARGE: 10/30/2021    DISCHARGE DESTINATION: Home with family    FACILITY: None    TRANSPORTATION: Family    COMMENTS: Patient has no home oxygen needs and is in agreement with discharge to home. No further needs noted unless indicated by patient, family and/or medical staff. Respectfully submitted,    LILA Hernandez  [facility]   [phone]    Electronically signed by LILA Garcia on 10/30/2021 at 5:05 PM

## 2021-10-30 NOTE — DISCHARGE SUMMARY
Hospital Medicine Discharge Summary    Patient ID: Sohan Cohen      Patient's PCP: Dulce Rossi DO    Admit Date: 10/27/2021     Discharge Date:   10/30/21    Admitting Physician: Trenton Weir MD     Discharge Physician: Mario Alberto Washington MD     Discharge Diagnoses: Active Hospital Problems    Diagnosis     Essential hypertension [I10]      Priority: High    Nausea & vomiting [R11.2]     Coronary artery disease involving native coronary artery of native heart with unstable angina pectoris ([facility] [address] [I25.110]     Type 2 diabetes mellitus with diabetic neuropathy [E11.40]     Esophageal stricture [K22.2]     Type 2 diabetes mellitus with hyperglycemia, without long-term current use of insulin (HCC) [E11.65]     Gastroesophageal reflux disease without esophagitis [K21.9]     Chest pain [R07.9]     COPD, severe ([address] [J44.9]     Hyperlipidemia [E78.5]    chest pain, nonobstructive CAD      The patient was seen and examined on day of discharge and this discharge summary is in conjunction with any daily progress note from day of discharge. Hospital Course:   57 y.o. female with a history of coronary artery disease. She was admitted with complaints of chest pain, NV. onset 9-10am after taking her morning meds.  describes the pain as epigastric, sharp, cutting, and burning w/ radiation to her chest, between both shoulders anteriorly, and her throat.  She vomited once shortly after onset.  She also vomited once last night though at the time wasn't having any pain.  She denies hematemesis and coffee grounds.  Stools have been \"dark\" but not clearly melenic.  States she has a h/o peptic ulcers.  Also her esophagus has been dilated in the past. underwent EGD for dysphagia May, 2019 by Dr. Chiqui Mora.  An esophageal stricture was found and dilated.  Gastritis was also noted.  She was prescribed pantoprazole and sucralfate.  the ER MD documented that she has only been taking protonix, but she told me she has been out of it for quite some time. She has not followed up with GI.     Cardiac markers were minimally  positive. EKG showed nonspecific ST-T wave abnormality. Underwent cardiac cath 10/29 with mild LAD and left cx dz. Seen by GI can FU outpt for EGD and dilation if warranted       Physical Exam Performed:     /66   Pulse 67   Temp 97.8 °F (36.6 °C) (Oral)   Resp 18   Ht 5' 1\" (1.549 m)   Wt 146 lb 2.6 oz (66.3 kg)   SpO2 95%   BMI 27.62 kg/m²       General appearance:  No apparent distress, appears stated age and cooperative. HEENT:  Pupils equal, round, and reactive to light. Extra ocular muscles intact. Mucosa moist   Neck: Supple, wfull range of motion. No JVD  Respiratory:  Normal effort. Clear to auscultation,   Cardiovascular:  Regular rate and rhythm with normal S1/S2 without murmurs, rubs or gallops. Abdomen: Soft, non-tender, non-distended with normal bowel sounds. Musculoskeletal:  No clubbing, cyanosis or edema   Skin: Skin color, texture, turgor normal.  No rashes or lesions. Neurologic: without any focal deficits. Cranial nerves: II-XII intact  Psychiatric:  Alert and oriented, thought content appropriate, normal insight   Peripheral Pulses: +2 palpable, equal bilaterally     Labs: For convenience and continuity at follow-up the following most recent labs are provided:      CBC:    Lab Results   Component Value Date    WBC 12.0 10/30/2021    HGB 12.4 10/30/2021    HCT 37.8 10/30/2021     10/30/2021       Renal:    Lab Results   Component Value Date     10/30/2021    K 4.3 10/30/2021    K 3.9 10/27/2021     10/30/2021    CO2 26 10/30/2021    BUN 11 10/30/2021    CREATININE <0.5 10/30/2021    CALCIUM 8.8 10/30/2021         Significant Diagnostic Studies    Radiology:   CTA CHEST ABDOMEN PELVIS W CONTRAST   Final Result   1. No evidence of aortic aneurysm or dissection in the chest or abdomen.    2. Mild celiac and mesenteric stenosis related to arcuate ligament and mild   atherosclerotic disease at the origin of the celiac. The HUANG is patent. 3. No acute abnormality in the chest, abdomen or pelvis. 4. Mild constipation. XR CHEST PORTABLE   Final Result   1. No definite radiographic evidence of acute cardiopulmonary disease. 2. Pulmonary sequela typical of that seen with smoking, including COPD;   correlate with clinical history. 3. Calcific atherosclerotic disease aorta.                 Consults:     IP CONSULT TO CARDIOLOGY  IP CONSULT TO GI    Disposition:  home     Condition at Discharge: Stable    Discharge Instructions/Follow-up:  Gi outpt     Code Status:  Full Code     Activity: activity as tolerated    Diet: regular diet      Discharge Medications:     Current Discharge Medication List           Details   vitamin B-12 (CYANOCOBALAMIN) 1000 MCG tablet Take 1,000 mcg by mouth daily      Cholecalciferol (VITAMIN D) 50 MCG (2000 UT) CAPS capsule Take 1 capsule by mouth daily      albuterol sulfate HFA (PROVENTIL HFA) 108 (90 Base) MCG/ACT inhaler Inhale 2 puffs into the lungs every 6 hours as needed for Wheezing or Shortness of Breath  Qty: 1 each, Refills: 1    Associated Diagnoses: COPD, severe (HCC)      atorvastatin (LIPITOR) 80 MG tablet Take 1 tablet by mouth daily  Qty: 90 tablet, Refills: 1      metoprolol succinate (TOPROL XL) 25 MG extended release tablet TAKE 1/2 TABLET BY MOUTH DAILY  Qty: 30 tablet, Refills: 1    Associated Diagnoses: Essential hypertension      sertraline (ZOLOFT) 100 MG tablet TAKE ONE TABLET BY MOUTH DAILY  Qty: 60 tablet, Refills: 0    Associated Diagnoses: Anxiety      pantoprazole (PROTONIX) 40 MG tablet Take 1 tablet by mouth every morning (before breakfast)  Qty: 30 tablet, Refills: 0      pregabalin (LYRICA) 75 MG capsule TAKE ONE CAPSULE BY MOUTH TWICE A DAY  Qty: 60 capsule, Refills: 2    Associated Diagnoses: Fibromyalgia      Umeclidinium Bromide 62.5 MCG/INH AEPB Inhale 1 puff into the lungs daily  Qty: 1 each, Refills: 11    Associated Diagnoses: COPD, severe ([facility] [address]      ipratropium-albuterol (DUONEB) 0.5-2.5 (3) MG/3ML SOLN nebulizer solution Inhale 3 mLs into the lungs every 4 hours  Qty: 360 mL, Refills: 11    Associated Diagnoses: COPD, severe (HCC)      folic acid (FOLVITE) 1 MG tablet Take 1 tablet by mouth daily  Qty: 30 tablet, Refills: 1      metFORMIN (GLUCOPHAGE) 500 MG tablet Take 1 tablet by mouth daily (with breakfast)  Qty: 90 tablet, Refills: 0    Associated Diagnoses: Type 2 diabetes mellitus without complication, without long-term current use of insulin (HCC)      aspirin 81 MG chewable tablet CHEW ONE TABLET BY MOUTH DAILY  Qty: 90 tablet, Refills: 1    Associated Diagnoses: Essential hypertension      diphenhydrAMINE-APAP, sleep, (TYLENOL PM EXTRA STRENGTH)  MG tablet Take 1 tablet by mouth nightly as needed for Sleep       mineral oil-hydrophilic petrolatum (AQUAPHOR) ointment Apply topically as needed. Qty: 396 g, Refills: 0    Associated Diagnoses: Irritant contact dermatitis, unspecified trigger      albuterol (PROVENTIL) (5 MG/ML) 0.5% nebulizer solution Take 0.5 mLs by nebulization every 6 hours as needed for Wheezing  Qty: 30 vial, Refills: 0      triamcinolone (KENALOG) 0.1 % cream Apply topically 2 times daily. Qty: 45 g, Refills: 0    Associated Diagnoses: Irritant contact dermatitis, unspecified trigger      acetaminophen (TYLENOL) 500 MG tablet Take 2 tablets by mouth every 6 hours as needed for Pain Do not take with other medications containing acetaminophen. Qty: 30 tablet, Refills: 0             Time Spent on discharge is more than 45 minutes in the examination, evaluation, counseling and review of medications and discharge plan. Signed:    Mayte Cuevas MD   10/30/2021      Thank you Edie Cohen DO for the opportunity to be involved in this patient's care. If you have any questions or concerns please feel free to contact me at [phone].

## 2021-10-30 NOTE — PLAN OF CARE
Problem: Falls - Risk of:  Goal: Will remain free from falls  Description: Will remain free from falls  10/30/2021 1017 by Roderick Beck RN  Outcome: Ongoing  10/30/2021 0127 by Ayla Tirado RN  Outcome: Ongoing  Goal: Absence of physical injury  Description: Absence of physical injury  10/30/2021 1017 by Roderick Beck RN  Outcome: Ongoing  10/30/2021 0127 by Ayla Tirado RN  Outcome: Ongoing     Problem: Infection:  Goal: Will remain free from infection  Description: Will remain free from infection  10/30/2021 1017 by Roderick Beck RN  Outcome: Ongoing  10/30/2021 0127 by Ayla Tirado RN  Outcome: Ongoing     Problem: Safety:  Goal: Free from accidental physical injury  Description: Free from accidental physical injury  Outcome: Ongoing  Goal: Free from intentional harm  Description: Free from intentional harm  Outcome: Ongoing     Problem: Daily Care:  Goal: Daily care needs are met  Description: Daily care needs are met  10/30/2021 1017 by Roderick Beck RN  Outcome: Ongoing  10/30/2021 0127 by Ayla Tirado RN  Outcome: Ongoing     Problem: Pain:  Goal: Patient's pain/discomfort is manageable  Description: Patient's pain/discomfort is manageable  10/30/2021 1017 by Roderick Beck RN  Outcome: Ongoing  10/30/2021 0127 by Ayla Tirado RN  Outcome: Ongoing     Problem: Skin Integrity:  Goal: Skin integrity will stabilize  Description: Skin integrity will stabilize  Outcome: Ongoing     Problem: Discharge Planning:  Goal: Patients continuum of care needs are met  Description: Patients continuum of care needs are met  10/30/2021 1017 by Roderick Beck RN  Outcome: Ongoing  10/30/2021 0127 by Ayla Tirado RN  Outcome: Ongoing

## 2021-10-30 NOTE — PROGRESS NOTES
Discussed discharge instructions with pt. All questions answered. IV removed without complication. Pt discharged in wheelchair via transport.

## 2021-10-30 NOTE — PROGRESS NOTES
[facility]    Respiratory Therapy   Home Oxygen Evaluation        Name: Sofia Rodrigues  Medical Record Number: [id number]  Age: 57 y.o.   Gender:  female   : 1960  Today's date: 10/30/2021  Room: [address]      Assessment        /66   Pulse 67   Temp 97.8 °F (36.6 °C) (Oral)   Resp 18   Ht 5' 1\" (1.549 m)   Wt 146 lb 2.6 oz (66.3 kg)   SpO2 95%   BMI 27.62 kg/m²     Patient Active Problem List   Diagnosis    COPD, severe ([facility] [address]    Former smoker    Osteoporosis    Chronic back pain    Positive test for human papillomavirus (HPV)    Anxiety    Hyperlipidemia    Emotional instability (HCC)    Osteoarthritis of hip    Body mass index between 25-29, adult    Chest pain    NSTEMI (non-ST elevated myocardial infarction) ([facility]    Essential hypertension    Tachycardia    Vitamin D deficiency    Gastroesophageal reflux disease without esophagitis    Type 2 diabetes mellitus with hyperglycemia, without long-term current use of insulin (HCC)    Diarrhea    Sepsis (HCC)    Other hypotension    Peripheral vascular disease (HCC)    Atherosclerosis of superior mesenteric artery (HCC)    Closed displaced fracture of fifth metatarsal bone of left foot    Esophageal stricture    PSVT (paroxysmal supraventricular tachycardia) ([facility])    Type 2 diabetes mellitus with diabetic neuropathy    Nausea & vomiting    Coronary artery disease involving native coronary artery of native heart with unstable angina pectoris ([facility])       Social History:  Social History     Tobacco Use    Smoking status: Former Smoker     Packs/day: 1.00     Years: 37.00     Pack years: 37.00     Types: Cigarettes     Quit date: 2017     Years since quittin.8    Smokeless tobacco: Never Used   Vaping Use    Vaping Use: Never used   Substance Use Topics    Alcohol use: No     Alcohol/week: 0.0 standard drinks    Drug use: No       Patient Room Air saturation at rest 97%  Patient Room Air saturation upon ambulation 95 %    Pt does not qualify for home oxygen at this time. In your clinical assessment does the Patient Require Portable Oxygen Tanks?     No             Time Spent with Home O2 Set Up:  10  minutes     Wale Emery RCP on 10/30/2021 at 4:37 PM

## 2021-10-30 NOTE — PLAN OF CARE
Problem: Falls - Risk of:  Goal: Will remain free from falls  Description: Will remain free from falls  Outcome: Ongoing     Problem: Falls - Risk of:  Goal: Absence of physical injury  Description: Absence of physical injury  Outcome: Ongoing     Problem: Infection:  Goal: Will remain free from infection  Description: Will remain free from infection  Outcome: Ongoing     Problem: Daily Care:  Goal: Daily care needs are met  Description: Daily care needs are met  Outcome: Ongoing     Problem: Pain:  Goal: Patient's pain/discomfort is manageable  Description: Patient's pain/discomfort is manageable  Outcome: Ongoing     Problem: Discharge Planning:  Goal: Patients continuum of care needs are met  Description: Patients continuum of care needs are met  Outcome: Ongoing

## 2021-10-31 ENCOUNTER — HOSPITAL ENCOUNTER (EMERGENCY)
Age: 61
Discharge: HOME | End: 2021-10-31
Attending: EMERGENCY MEDICINE
Payer: MEDICARE

## 2021-10-31 ENCOUNTER — APPOINTMENT (OUTPATIENT)
Dept: GENERAL RADIOLOGY | Age: 61
End: 2021-10-31
Payer: MEDICARE

## 2021-10-31 VITALS
SYSTOLIC BLOOD PRESSURE: 130 MMHG | DIASTOLIC BLOOD PRESSURE: 62 MMHG | WEIGHT: 143 LBS | BODY MASS INDEX: 27 KG/M2 | TEMPERATURE: 97.2 F | HEART RATE: 59 BPM | RESPIRATION RATE: 12 BRPM | HEIGHT: 61 IN | OXYGEN SATURATION: 94 %

## 2021-10-31 DIAGNOSIS — R07.9 NONSPECIFIC CHEST PAIN: Primary | ICD-10-CM

## 2021-10-31 DIAGNOSIS — R10.13 ABDOMINAL PAIN, EPIGASTRIC: ICD-10-CM

## 2021-10-31 LAB
A/G RATIO: 2 (ref 1.1–2.2)
ALBUMIN SERPL-MCNC: 4.3 G/DL (ref 3.4–5)
ALP BLD-CCNC: 98 U/L (ref 40–129)
ALT SERPL-CCNC: 12 U/L (ref 10–40)
ANION GAP SERPL CALCULATED.3IONS-SCNC: 14 MMOL/L (ref 3–16)
AST SERPL-CCNC: 12 U/L (ref 15–37)
BASOPHILS ABSOLUTE: 0 K/UL (ref 0–0.2)
BASOPHILS RELATIVE PERCENT: 0.5 %
BILIRUB SERPL-MCNC: <0.2 MG/DL (ref 0–1)
BILIRUBIN URINE: NEGATIVE
BLOOD, URINE: NEGATIVE
BUN BLDV-MCNC: 12 MG/DL (ref 7–20)
CALCIUM SERPL-MCNC: 8.8 MG/DL (ref 8.3–10.6)
CHLORIDE BLD-SCNC: 103 MMOL/L (ref 99–110)
CLARITY: ABNORMAL
CO2: 27 MMOL/L (ref 21–32)
COLOR: YELLOW
CREAT SERPL-MCNC: <0.5 MG/DL (ref 0.6–1.2)
EOSINOPHILS ABSOLUTE: 0.2 K/UL (ref 0–0.6)
EOSINOPHILS RELATIVE PERCENT: 2.3 %
EPITHELIAL CELLS, UA: 1 /HPF (ref 0–5)
GFR AFRICAN AMERICAN: >60
GFR NON-AFRICAN AMERICAN: >60
GLUCOSE BLD-MCNC: 104 MG/DL (ref 70–99)
GLUCOSE URINE: NEGATIVE MG/DL
HCT VFR BLD CALC: 40 % (ref 36–48)
HEMOGLOBIN: 13.3 G/DL (ref 12–16)
HYALINE CASTS: 0 /LPF (ref 0–8)
KETONES, URINE: NEGATIVE MG/DL
LEUKOCYTE ESTERASE, URINE: NEGATIVE
LIPASE: 29 U/L (ref 13–60)
LYMPHOCYTES ABSOLUTE: 2.9 K/UL (ref 1–5.1)
LYMPHOCYTES RELATIVE PERCENT: 27.7 %
MCH RBC QN AUTO: 31.3 PG (ref 26–34)
MCHC RBC AUTO-ENTMCNC: 33.3 G/DL (ref 31–36)
MCV RBC AUTO: 93.9 FL (ref 80–100)
MICROSCOPIC EXAMINATION: YES
MONOCYTES ABSOLUTE: 0.6 K/UL (ref 0–1.3)
MONOCYTES RELATIVE PERCENT: 6.1 %
NEUTROPHILS ABSOLUTE: 6.7 K/UL (ref 1.7–7.7)
NEUTROPHILS RELATIVE PERCENT: 63.4 %
NITRITE, URINE: NEGATIVE
PDW BLD-RTO: 16.6 % (ref 12.4–15.4)
PH UA: 7 (ref 5–8)
PLATELET # BLD: 205 K/UL (ref 135–450)
PMV BLD AUTO: 8.3 FL (ref 5–10.5)
POTASSIUM REFLEX MAGNESIUM: 3.7 MMOL/L (ref 3.5–5.1)
PROTEIN UA: 30 MG/DL
RBC # BLD: 4.26 M/UL (ref 4–5.2)
RBC UA: 1 /HPF (ref 0–4)
SODIUM BLD-SCNC: 144 MMOL/L (ref 136–145)
SPECIFIC GRAVITY UA: 1.01 (ref 1–1.03)
TOTAL PROTEIN: 6.4 G/DL (ref 6.4–8.2)
TROPONIN: <0.01 NG/ML
TROPONIN: <0.01 NG/ML
URINE REFLEX TO CULTURE: ABNORMAL
URINE TYPE: ABNORMAL
UROBILINOGEN, URINE: 0.2 E.U./DL
WBC # BLD: 10.5 K/UL (ref 4–11)
WBC UA: 1 /HPF (ref 0–5)

## 2021-10-31 PROCEDURE — 81001 URINALYSIS AUTO W/SCOPE: CPT

## 2021-10-31 PROCEDURE — 84484 ASSAY OF TROPONIN QUANT: CPT

## 2021-10-31 PROCEDURE — 83690 ASSAY OF LIPASE: CPT

## 2021-10-31 PROCEDURE — 99284 EMERGENCY DEPT VISIT MOD MDM: CPT

## 2021-10-31 PROCEDURE — 6370000000 HC RX 637 (ALT 250 FOR IP): Performed by: EMERGENCY MEDICINE

## 2021-10-31 PROCEDURE — 71045 X-RAY EXAM CHEST 1 VIEW: CPT

## 2021-10-31 PROCEDURE — 36415 COLL VENOUS BLD VENIPUNCTURE: CPT

## 2021-10-31 PROCEDURE — 6360000002 HC RX W HCPCS: Performed by: EMERGENCY MEDICINE

## 2021-10-31 PROCEDURE — 2500000003 HC RX 250 WO HCPCS: Performed by: EMERGENCY MEDICINE

## 2021-10-31 PROCEDURE — 93005 ELECTROCARDIOGRAM TRACING: CPT | Performed by: EMERGENCY MEDICINE

## 2021-10-31 PROCEDURE — 96375 TX/PRO/DX INJ NEW DRUG ADDON: CPT

## 2021-10-31 PROCEDURE — 85025 COMPLETE CBC W/AUTO DIFF WBC: CPT

## 2021-10-31 PROCEDURE — 96374 THER/PROPH/DIAG INJ IV PUSH: CPT

## 2021-10-31 PROCEDURE — 80053 COMPREHEN METABOLIC PANEL: CPT

## 2021-10-31 RX ORDER — PROMETHAZINE HYDROCHLORIDE 25 MG/ML
12.5 INJECTION, SOLUTION INTRAMUSCULAR; INTRAVENOUS ONCE
Status: COMPLETED | OUTPATIENT
Start: 2021-10-31 | End: 2021-10-31

## 2021-10-31 RX ORDER — ONDANSETRON 4 MG/1
4 TABLET, ORALLY DISINTEGRATING ORAL EVERY 8 HOURS PRN
Qty: 20 TABLET | Refills: 0 | Status: SHIPPED | OUTPATIENT
Start: 2021-10-31 | End: 2021-12-25

## 2021-10-31 RX ORDER — SUCRALFATE 1 G/1
1 TABLET ORAL
Qty: 60 TABLET | Refills: 0 | Status: SHIPPED | OUTPATIENT
Start: 2021-10-31 | End: 2021-12-08

## 2021-10-31 RX ADMIN — LIDOCAINE HYDROCHLORIDE: 20 SOLUTION ORAL; TOPICAL at 10:19

## 2021-10-31 RX ADMIN — FAMOTIDINE 20 MG: 10 INJECTION, SOLUTION INTRAVENOUS at 10:19

## 2021-10-31 RX ADMIN — PROMETHAZINE HYDROCHLORIDE 12.5 MG: 25 INJECTION INTRAMUSCULAR; INTRAVENOUS at 10:19

## 2021-10-31 ASSESSMENT — PAIN DESCRIPTION - PAIN TYPE: TYPE: ACUTE PAIN

## 2021-10-31 ASSESSMENT — PAIN SCALES - GENERAL
PAINLEVEL_OUTOF10: 10
PAINLEVEL_OUTOF10: 0

## 2021-10-31 ASSESSMENT — PAIN DESCRIPTION - FREQUENCY: FREQUENCY: CONTINUOUS

## 2021-10-31 ASSESSMENT — PAIN DESCRIPTION - LOCATION: LOCATION: CHEST

## 2021-10-31 NOTE — ED PROVIDER NOTES
EMERGENCY DEPARTMENT PROVIDER NOTE    Patient Identification  Pt Name: Enzo Singh  MRN: [id number]  [place] 1960  Date of evaluation: 10/31/2021  Provider: Jose Mathias DO  PCP: Edmar Carrington DO    Chief Complaint  Chest Pain (came from home, CP all morning w  N/V, recently discharged from inpatient for CP. PTA  IV Zofran, ,  SBPs > 200)      HPI  (History provided by patient)  This is a 57 y.o. female with pertinent past medical history of GERD, CAD, anxiety, COPD who was brought in by EMS transportation for chest pain which began on waking up this morning around 07 100. Pain is described as most severe in her upper abdomen, burning in character, radiates up into her chest.  Associated with nausea and nonbloody vomiting. Nothing seems to make the symptoms any better or worse. Patient received aspirin and Zofran by EMS in the prehospital setting. Radha Schafer      ROS    Const:  No fevers, no chills, no generalized weakness  Skin:  No rash, no lesions  Eyes:  No visual changes, no blurry or double vision, no pain  ENT:  No sore throat, no difficulty swallowing, no ear pain, no sinus pain or congestion  Card:  +chest pain, no palpitations, no edema  Resp:  No shortness of breath, no cough, no wheezing  Abd:  +abdominal pain, +nausea, +vomiting, no diarrhea  Genitourinary:  No dysuria, no hematuria, no vaginal discharge, no vaginal bleeding  MSK:  No joint pain, +myalgia  Neuro:  No focal weakness, no headache, no paresthesia    All other systems reviewed and negative unless otherwise noted in HPI      I have reviewed the following nursing documentation:  Allergies: Iodine, Other, Eggs or egg-derived products [eggs or egg-derived products], Eggs [egg white], Flexeril [cyclobenzaprine hcl], Levofloxacin, and Omeprazole    Past medical history:   Past Medical History:   Diagnosis Date    Back pain     CAD (coronary artery disease)     [facility]     Foot pain     from bone spurs    GERD (gastroesophageal reflux disease)     Headache     sharp pain going through head    Heart attack ([address]     Hip pain     Hyperlipidemia     Hypertension      (normal spontaneous vaginal delivery)     Osteoporosis     Vision abnormalities      Past surgical history:   Past Surgical History:   Procedure Laterality Date    CARDIAC CATHETERIZATION      CHOLECYSTECTOMY      COLONOSCOPY      ESOPHAGEAL DILATATION N/A 2019    ESOPHAGEAL DILATION Oriana Gancammy performed by Noreen Manzano MD at [address] [place] [address] N/A 2019    EGD BIOPSY performed by Noreen Manzano MD at [facility] medications:   Discharge Medication List as of 10/31/2021  3:42 PM      CONTINUE these medications which have NOT CHANGED    Details   vitamin B-12 (CYANOCOBALAMIN) 1000 MCG tablet Take 1,000 mcg by mouth dailyHistorical Med      Cholecalciferol (VITAMIN D) 50 MCG ( UT) CAPS capsule Take 1 capsule by mouth dailyHistorical Med      albuterol sulfate HFA (PROVENTIL HFA) 108 (90 Base) MCG/ACT inhaler Inhale 2 puffs into the lungs every 6 hours as needed for Wheezing or Shortness of Breath, Disp-1 each, R-1Normal      atorvastatin (LIPITOR) 80 MG tablet Take 1 tablet by mouth daily, Disp-90 tablet, R-1Normal      metoprolol succinate (TOPROL XL) 25 MG extended release tablet TAKE 1/2 TABLET BY MOUTH DAILY, Disp-30 tablet, R-1Normal      sertraline (ZOLOFT) 100 MG tablet TAKE ONE TABLET BY MOUTH DAILY, Disp-60 tablet, R-0Normal      pantoprazole (PROTONIX) 40 MG tablet Take 1 tablet by mouth every morning (before breakfast), Disp-30 tablet, R-0Normal      pregabalin (LYRICA) 75 MG capsule TAKE ONE CAPSULE BY MOUTH TWICE A DAY, Disp-60 capsule, R-2Normal      albuterol (PROVENTIL) (5 MG/ML) 0.5% nebulizer solution Take 0.5 mLs by nebulization every 6 hours as needed for Wheezing, Disp-30 vial, R-0Print      Umeclidinium Bromide 62.5 MCG/INH AEPB Inhale 1 puff into the lungs daily, Disp-1 each, R-11Normal      ipratropium-albuterol (DUONEB) 0.5-2.5 (3) MG/3ML SOLN nebulizer solution Inhale 3 mLs into the lungs every 4 hours, Disp-360 mL, [id number]      folic acid (FOLVITE) 1 MG tablet Take 1 tablet by mouth daily, Disp-30 tablet,R-1Normal      metFORMIN (GLUCOPHAGE) 500 MG tablet Take 1 tablet by mouth daily (with breakfast), Disp-90 tablet, R-0Normal      triamcinolone (KENALOG) 0.1 % cream Apply topically 2 times daily. , Disp-45 g, R-0, Normal      aspirin 81 MG chewable tablet CHEW ONE TABLET BY MOUTH DAILY, Disp-90 tablet, R-1Normal      diphenhydrAMINE-APAP, sleep, (TYLENOL PM EXTRA STRENGTH)  MG tablet Take 1 tablet by mouth nightly as needed for Sleep Historical Med      mineral oil-hydrophilic petrolatum (AQUAPHOR) ointment Apply topically as needed. , Disp-396 g, R-0, Normal      acetaminophen (TYLENOL) 500 MG tablet Take 2 tablets by mouth every 6 hours as needed for Pain Do not take with other medications containing acetaminophen., Disp-30 tablet, R-0Print             Social history:  reports that she quit smoking about 4 years ago. Her smoking use included cigarettes. She has a 37.00 pack-year smoking history. She has never used smokeless tobacco. She reports that she does not drink alcohol and does not use drugs. Family history:    Family History   Problem Relation Age of Onset    Heart Failure Mother         heart disease    Hypertension Mother     Emphysema Mother     Heart Failure Father         heart disease    COPD Father     Hypertension Paternal Grandmother          Exam  ED Triage Vitals [10/31/21 0941]   BP Temp Temp Source Pulse Resp SpO2 Height Weight   (!) 200/86 97.2 °F (36.2 °C) Oral 63 16 100 % 5' 1\" (1.549 m) 143 lb (64.9 kg)     Nursing note and vitals reviewed. Constitutional: Well developed, well nourished. Non-toxic in appearance. HENT:      Head: Normocephalic and atraumatic.       Ears: External ears normal.      Nose: Nose normal.     Mouth: Membrane mucosa moist and pink. Eyes: Anicteric sclera. No discharge. Neck: Supple. Trachea midline. Cardiovascular: RRR; no murmurs, rubs, or gallops. Pulmonary/Chest: Effort normal. No respiratory distress. CTAB. No stridor. No wheezes. No rales. Abdominal: Soft. No distension. Mild tenderness palpation of midepigastrium, negative High. No lower abdominal tenderness. No rebound, no rigidity, no guarding. Musculoskeletal: Moves all extremities. No gross deformity. Neurological: Alert and oriented. Face symmetric. Speech is clear. Skin: Warm and dry. No rash. Psychiatric: Normal mood and affect. Behavior is normal.    Procedures      EKG    EKG was reviewed by emergency department physician in the absence of a cardiologist    Narrow complex sinus rhythm, rate 61, normal axis, normal HI and QRS intervals, normal Qtc, no ST elevations or depressions, aphasic t wave aVL impression NSR with nonspecific T wave morphology, no STEMI      Radiology  XR CHEST PORTABLE   Final Result   No acute process.              Labs  Results for orders placed or performed during the hospital encounter of 10/31/21   CBC Auto Differential   Result Value Ref Range    WBC 10.5 4.0 - 11.0 K/uL    RBC 4.26 4.00 - 5.20 M/uL    Hemoglobin 13.3 12.0 - 16.0 g/dL    Hematocrit 40.0 36.0 - 48.0 %    MCV 93.9 80.0 - 100.0 fL    MCH 31.3 26.0 - 34.0 pg    MCHC 33.3 31.0 - 36.0 g/dL    RDW 16.6 (H) 12.4 - 15.4 %    Platelets 960 408 - 201 K/uL    MPV 8.3 5.0 - 10.5 fL    Neutrophils % 63.4 %    Lymphocytes % 27.7 %    Monocytes % 6.1 %    Eosinophils % 2.3 %    Basophils % 0.5 %    Neutrophils Absolute 6.7 1.7 - 7.7 K/uL    Lymphocytes Absolute 2.9 1.0 - 5.1 K/uL    Monocytes Absolute 0.6 0.0 - 1.3 K/uL    Eosinophils Absolute 0.2 0.0 - 0.6 K/uL    Basophils Absolute 0.0 0.0 - 0.2 K/uL   Comprehensive Metabolic Panel w/ Reflex to MG   Result Value Ref Range    Sodium 144 136 - 145 mmol/L Potassium reflex Magnesium 3.7 3.5 - 5.1 mmol/L    Chloride 103 99 - 110 mmol/L    CO2 27 21 - 32 mmol/L    Anion Gap 14 3 - 16    Glucose 104 (H) 70 - 99 mg/dL    BUN 12 7 - 20 mg/dL    CREATININE <0.5 (L) 0.6 - 1.2 mg/dL    GFR Non-African American >60 >60    GFR African American >60 >60    Calcium 8.8 8.3 - 10.6 mg/dL    Total Protein 6.4 6.4 - 8.2 g/dL    Albumin 4.3 3.4 - 5.0 g/dL    Albumin/Globulin Ratio 2.0 1.1 - 2.2    Total Bilirubin <0.2 0.0 - 1.0 mg/dL    Alkaline Phosphatase 98 40 - 129 U/L    ALT 12 10 - 40 U/L    AST 12 (L) 15 - 37 U/L   Lipase   Result Value Ref Range    Lipase 29.0 13.0 - 60.0 U/L   Troponin   Result Value Ref Range    Troponin <0.01 <0.01 ng/mL   Urinalysis Reflex to Culture    Specimen: Urine, clean catch   Result Value Ref Range    Color, UA YELLOW Straw/Yellow    Clarity, UA CLOUDY (A) Clear    Glucose, Ur Negative Negative mg/dL    Bilirubin Urine Negative Negative    Ketones, Urine Negative Negative mg/dL    Specific Gravity, UA 1.011 1.005 - 1.030    Blood, Urine Negative Negative    pH, UA 7.0 5.0 - 8.0    Protein, UA 30 (A) Negative mg/dL    Urobilinogen, Urine 0.2 <2.0 E.U./dL    Nitrite, Urine Negative Negative    Leukocyte Esterase, Urine Negative Negative    Microscopic Examination YES     Urine Type NotGiven     Urine Reflex to Culture Not Indicated    Microscopic Urinalysis   Result Value Ref Range    Hyaline Casts, UA 0 0 - 8 /LPF    WBC, UA 1 0 - 5 /HPF    RBC, UA 1 0 - 4 /HPF    Epithelial Cells, UA 1 0 - 5 /HPF   Troponin   Result Value Ref Range    Troponin <0.01 <0.01 ng/mL       Screenings           MDM and ED Course    Patient afebrile and nontoxic. No distress. EKG without evidence of acute ischemia, initial troponin is normal.  A repeat 3-hour troponin and EKG remained unchanged. No findings to suggest ACS at this time. No malignant dysrhythmia.   Patient's prior records reviewed, had [facility]21 which revealed very minimal nonobstructive coronary artery disease. Large work-up is otherwise reassuring with no evidence of endorgan dysfunction or clinically significant electrolyte derangement. Chest x-ray evidence of pneumonia, pneumothorax or other acute process. Patient's pain resolved with PPI and GI cocktail in the emergency department. While considered, my clinical suspicion for coronary artery dissection, hemorrhage or great vessel dissection is extremely low. Findings and plan were discussed with patient, she is agreeable to return to home. Discussed importance of close PCP follow-up and she verbalized her understanding. Strict return precautions discussed. Final Impression  1. Nonspecific chest pain    2. Abdominal pain, epigastric        Blood pressure 130/62, pulse 59, temperature 97.2 °F (36.2 °C), temperature source Oral, resp. rate 12, height 5' 1\" (1.549 m), weight 143 lb (64.9 kg), SpO2 94 %, not currently breastfeeding. Disposition:  DISPOSITION Decision To Discharge 10/31/2021 02:00:14 PM      Patient Referrals:  César Hill MD  [address] [facility]  [phone]            Discharge Medications:  Discharge Medication List as of 10/31/2021  3:42 PM      START taking these medications    Details   sucralfate (CARAFATE) 1 GM tablet Take 1 tablet by mouth every 6-8 hours as needed (abdominal pain), Disp-60 tablet, R-0Print      ondansetron (ZOFRAN-ODT) 4 MG disintegrating tablet Take 1 tablet by mouth every 8 hours as needed for Nausea or Vomiting, Disp-20 tablet, R-0Print             Discontinued Medications:  Discharge Medication List as of 10/31/2021  3:42 PM          This chart was generated using the [address] [place] [address] dictation system. I created this record but it may contain dictation errors given the limitations of this technology.     Cory Schwartz DO (electronically signed)  Attending Emergency Physician       Cory Schwartz DO  10/31/21 2103

## 2021-10-31 NOTE — ED NOTES
Bed: A-15  Expected date: 10/31/21  Expected time: 9:29 AM  Means of arrival: [facility]  Comments:  61F DUNCAN Fountain RN  10/31/21 9834

## 2021-11-01 ENCOUNTER — CARE COORDINATION (OUTPATIENT)
Dept: CASE MANAGEMENT | Age: 61
End: 2021-11-01

## 2021-11-01 LAB
EKG ATRIAL RATE: 58 BPM
EKG ATRIAL RATE: 61 BPM
EKG DIAGNOSIS: NORMAL
EKG DIAGNOSIS: NORMAL
EKG P AXIS: 47 DEGREES
EKG P AXIS: 51 DEGREES
EKG P-R INTERVAL: 122 MS
EKG P-R INTERVAL: 124 MS
EKG Q-T INTERVAL: 418 MS
EKG Q-T INTERVAL: 452 MS
EKG QRS DURATION: 76 MS
EKG QRS DURATION: 82 MS
EKG QTC CALCULATION (BAZETT): 420 MS
EKG QTC CALCULATION (BAZETT): 443 MS
EKG R AXIS: 49 DEGREES
EKG R AXIS: 52 DEGREES
EKG T AXIS: 68 DEGREES
EKG T AXIS: 78 DEGREES
EKG VENTRICULAR RATE: 58 BPM
EKG VENTRICULAR RATE: 61 BPM

## 2021-11-01 PROCEDURE — 93010 ELECTROCARDIOGRAM REPORT: CPT | Performed by: INTERNAL MEDICINE

## 2021-11-01 NOTE — CARE COORDINATION
[facility] Transitions Initial Follow Up Call    Call within 2 business days of discharge: Yes    Patient: Hollie Celis Patient : 1960   MRN: [id number]  Reason for Admission: CP  Discharge Date: 10/31/21 RARS: Readmission Risk Score: 14      Last Discharge [facility]       Complaint Diagnosis Description Type Department Provider    10/31/21 Chest Pain Nonspecific chest pain . .. ED (DISCHARGE) [facility] ED Kane Barker DO           Spoke with: no one    Facility: [facility] Transitions 24 Hour Call    Do you have all of your prescriptions and are they filled?: Yes  Patient DME: Straight cane  Do you have support at home?: Alone  Are you an active caregiver in your home?: No  Care Transitions Interventions         Follow Up  Future Appointments   Date Time Provider Lashell Prabhakar   2021  1:30 PM Bj Rueda DO [facility] [place]  [place]       First follow up outreach call attempt, no answer. CTN left VM with contact information and request for return call. CTN will continue with outreach call attempts.     Cliff Mooney, NISHAN, RN   Care Transition Nurse  Mobile: [phone]

## 2021-11-02 ENCOUNTER — CARE COORDINATION (OUTPATIENT)
Dept: CASE MANAGEMENT | Age: 61
End: 2021-11-02

## 2021-11-02 NOTE — CARE COORDINATION
[facility] Transitions Initial Follow Up Call    Call within 2 business days of discharge: Yes    Patient: Ziyad Vaughn Patient : 1960   MRN: [id number]  Reason for Admission: Cp  Discharge Date: 10/31/21 RARS: Readmission Risk Score: 14      Last Discharge [facility]       Complaint Diagnosis Description Type Department Provider    10/31/21 Chest Pain Nonspecific chest pain . .. ED (DISCHARGE) [facility] ED Federica Singleton DO           Spoke with: no one    Facility: [facility] Transitions 24 Hour Call    Do you have all of your prescriptions and are they filled?: Yes  Patient DME: Luc cane  Do you have support at home?: Alone  Are you an active caregiver in your home?: No  Care Transitions Interventions         Follow Up  Future Appointments   Date Time Provider Lashell Prabhakar   2021  1:30 PM Padmini Elena DO [facility] [place] - [place]       Second and final follow up outreach call attempt, no answer. CTN left  with contact information and request for return call. CTN will resolve episode and remain available.     JOE Robison, RN   Care Transition Nurse  Mobile: [phone]

## 2021-11-04 ENCOUNTER — OFFICE VISIT (OUTPATIENT)
Dept: FAMILY MEDICINE CLINIC | Age: 61
End: 2021-11-04
Payer: MEDICARE

## 2021-11-04 VITALS
OXYGEN SATURATION: 96 % | BODY MASS INDEX: 26.38 KG/M2 | HEART RATE: 90 BPM | SYSTOLIC BLOOD PRESSURE: 118 MMHG | DIASTOLIC BLOOD PRESSURE: 74 MMHG | WEIGHT: 139.6 LBS

## 2021-11-04 DIAGNOSIS — I10 ESSENTIAL HYPERTENSION: ICD-10-CM

## 2021-11-04 DIAGNOSIS — I21.4 NSTEMI (NON-ST ELEVATED MYOCARDIAL INFARCTION) (HCC): ICD-10-CM

## 2021-11-04 DIAGNOSIS — F60.3 EMOTIONAL INSTABILITY (HCC): ICD-10-CM

## 2021-11-04 DIAGNOSIS — E11.65 TYPE 2 DIABETES MELLITUS WITH HYPERGLYCEMIA, WITHOUT LONG-TERM CURRENT USE OF INSULIN (HCC): ICD-10-CM

## 2021-11-04 DIAGNOSIS — E11.65 TYPE 2 DIABETES MELLITUS WITH HYPERGLYCEMIA, WITHOUT LONG-TERM CURRENT USE OF INSULIN (HCC): Primary | ICD-10-CM

## 2021-11-04 LAB
A/G RATIO: 1.8 (ref 1.1–2.2)
ALBUMIN SERPL-MCNC: 4.8 G/DL (ref 3.4–5)
ALP BLD-CCNC: 131 U/L (ref 40–129)
ALT SERPL-CCNC: 21 U/L (ref 10–40)
AMMONIA: 18 UMOL/L (ref 11–51)
ANION GAP SERPL CALCULATED.3IONS-SCNC: 13 MMOL/L (ref 3–16)
AST SERPL-CCNC: 20 U/L (ref 15–37)
BACTERIA: ABNORMAL /HPF
BASOPHILS ABSOLUTE: 0.1 K/UL (ref 0–0.2)
BASOPHILS RELATIVE PERCENT: 0.6 %
BILIRUB SERPL-MCNC: 0.5 MG/DL (ref 0–1)
BILIRUBIN URINE: ABNORMAL
BLOOD, URINE: NEGATIVE
BUN BLDV-MCNC: 8 MG/DL (ref 7–20)
CALCIUM SERPL-MCNC: 9.6 MG/DL (ref 8.3–10.6)
CHLORIDE BLD-SCNC: 99 MMOL/L (ref 99–110)
CLARITY: ABNORMAL
CO2: 27 MMOL/L (ref 21–32)
COLOR: YELLOW
CREAT SERPL-MCNC: 0.6 MG/DL (ref 0.6–1.2)
EOSINOPHILS ABSOLUTE: 0.4 K/UL (ref 0–0.6)
EOSINOPHILS RELATIVE PERCENT: 4.5 %
EPITHELIAL CELLS, UA: 15 /HPF (ref 0–5)
GFR AFRICAN AMERICAN: >60
GFR NON-AFRICAN AMERICAN: >60
GLUCOSE BLD-MCNC: 113 MG/DL (ref 70–99)
GLUCOSE URINE: NEGATIVE MG/DL
HCT VFR BLD CALC: 44.4 % (ref 36–48)
HEMOGLOBIN: 14.5 G/DL (ref 12–16)
KETONES, URINE: NEGATIVE MG/DL
LEUKOCYTE ESTERASE, URINE: ABNORMAL
LYMPHOCYTES ABSOLUTE: 1.9 K/UL (ref 1–5.1)
LYMPHOCYTES RELATIVE PERCENT: 19.5 %
MCH RBC QN AUTO: 31 PG (ref 26–34)
MCHC RBC AUTO-ENTMCNC: 32.7 G/DL (ref 31–36)
MCV RBC AUTO: 94.7 FL (ref 80–100)
MICROSCOPIC EXAMINATION: YES
MONOCYTES ABSOLUTE: 0.8 K/UL (ref 0–1.3)
MONOCYTES RELATIVE PERCENT: 7.9 %
NEUTROPHILS ABSOLUTE: 6.6 K/UL (ref 1.7–7.7)
NEUTROPHILS RELATIVE PERCENT: 67.5 %
NITRITE, URINE: NEGATIVE
PDW BLD-RTO: 16.1 % (ref 12.4–15.4)
PH UA: 5.5 (ref 5–8)
PLATELET # BLD: 242 K/UL (ref 135–450)
PMV BLD AUTO: 8.4 FL (ref 5–10.5)
POTASSIUM SERPL-SCNC: 4.3 MMOL/L (ref 3.5–5.1)
PROTEIN UA: NEGATIVE MG/DL
RBC # BLD: 4.69 M/UL (ref 4–5.2)
RBC UA: ABNORMAL /HPF (ref 0–4)
SODIUM BLD-SCNC: 139 MMOL/L (ref 136–145)
SPECIFIC GRAVITY UA: 1.02 (ref 1–1.03)
TOTAL PROTEIN: 7.4 G/DL (ref 6.4–8.2)
TSH REFLEX FT4: 1.9 UIU/ML (ref 0.27–4.2)
URINE REFLEX TO CULTURE: ABNORMAL
URINE TYPE: ABNORMAL
UROBILINOGEN, URINE: 0.2 E.U./DL
WBC # BLD: 9.7 K/UL (ref 4–11)
WBC UA: 8 /HPF (ref 0–5)

## 2021-11-04 PROCEDURE — 3017F COLORECTAL CA SCREEN DOC REV: CPT | Performed by: FAMILY MEDICINE

## 2021-11-04 PROCEDURE — 1036F TOBACCO NON-USER: CPT | Performed by: FAMILY MEDICINE

## 2021-11-04 PROCEDURE — 3044F HG A1C LEVEL LT 7.0%: CPT | Performed by: FAMILY MEDICINE

## 2021-11-04 PROCEDURE — G8419 CALC BMI OUT NRM PARAM NOF/U: HCPCS | Performed by: FAMILY MEDICINE

## 2021-11-04 PROCEDURE — G8484 FLU IMMUNIZE NO ADMIN: HCPCS | Performed by: FAMILY MEDICINE

## 2021-11-04 PROCEDURE — 99214 OFFICE O/P EST MOD 30 MIN: CPT | Performed by: FAMILY MEDICINE

## 2021-11-04 PROCEDURE — G8427 DOCREV CUR MEDS BY ELIG CLIN: HCPCS | Performed by: FAMILY MEDICINE

## 2021-11-04 PROCEDURE — 2022F DILAT RTA XM EVC RTNOPTHY: CPT | Performed by: FAMILY MEDICINE

## 2021-11-04 ASSESSMENT — ENCOUNTER SYMPTOMS
SHORTNESS OF BREATH: 0
FACIAL SWELLING: 0
COUGH: 0
SINUS PRESSURE: 0
NAUSEA: 0
DIARRHEA: 0
ABDOMINAL PAIN: 0
VOMITING: 0
SORE THROAT: 0
RHINORRHEA: 0

## 2021-11-04 NOTE — PROGRESS NOTES
m      2021     Greta Whiting (:  1960) is a 57 y.o. female, here for evaluation of the following medical concerns:    HPI     Patient presented for follow up from recent hospitalization, with sister to help with history feels improved today.  Essential hypertension [I10]         Priority: High    Nausea & vomiting [R11.2]      Coronary artery disease involving native coronary artery of native heart with unstable angina pectoris (HCC) [I25.110]      Type 2 diabetes mellitus with diabetic neuropathy [E11.40]      Esophageal stricture [K22.2]      Type 2 diabetes mellitus with hyperglycemia, without long-term current use of insulin (HCC) [E11.65]      Gastroesophageal reflux disease without esophagitis [K21.9]      Chest pain [R07.9]      COPD, severe (HCC) [J44.9]      Hyperlipidemia [E78.5]     chest pain, nonobstructive CAD        The patient was seen and examined on day of discharge and this discharge summary is in conjunction with any daily progress note from day of discharge.     Hospital Course:   57 y. o. female with a history of coronary artery disease. She was admitted with complaints of chest pain, NV. onset 9-10am after taking her morning meds.  describes the pain as epigastric, sharp, cutting, and burning w/ radiation to her chest, between both shoulders anteriorly, and her throat.  She vomited once shortly after onset.  She also vomited once last night though at the time wasn't having any pain.  She denies hematemesis and coffee grounds.  Stools have been \"dark\" but not clearly melenic.  States she has a h/o peptic ulcers.  Also her esophagus has been dilated in the past. underwent EGD for dysphagia May, 2019 by Dr. Ander Bernstein.  An esophageal stricture was found and dilated.  Gastritis was also noted.  She was prescribed pantoprazole and sucralfate.  the ER MD documented that she has only been taking protonix, but she told me she has been out of it for quite some time. She has not followed up with GI.     Cardiac markers were minimally  positive. EKG showed nonspecific ST-T wave abnormality. Underwent cardiac cath 10/29 with mild LAD and left cx dz. Seen by GI can FU outpt for EGD and dilation if warranted     Nstemi? \"       GERD/EGD- has follow up with Dr. Jose Grajeda for evaluation and treatment. Today, denied sob, n,v, or diarrhea. Review of Systems   Constitutional: Positive for activity change and fatigue. Negative for fever and unexpected weight change. HENT: Negative for congestion, ear pain, facial swelling, rhinorrhea, sinus pressure and sore throat. Respiratory: Negative for cough and shortness of breath. Cardiovascular: Negative for chest pain and leg swelling. Gastrointestinal: Negative for abdominal pain, diarrhea, nausea and vomiting. Endocrine: Negative for cold intolerance, heat intolerance, polydipsia and polyphagia. Musculoskeletal: Positive for arthralgias. Skin: Negative for rash. Neurological: Positive for light-headedness. Negative for dizziness, tremors, syncope, numbness and headaches. Hematological: Does not bruise/bleed easily. Psychiatric/Behavioral: Negative for dysphoric mood. The patient is not nervous/anxious. Prior to Visit Medications    Medication Sig Taking?  Authorizing Provider   ondansetron (ZOFRAN-ODT) 4 MG disintegrating tablet Take 1 tablet by mouth every 8 hours as needed for Nausea or Vomiting Yes Twylla Skeans D Pendl, DO   sucralfate (CARAFATE) 1 GM tablet Take 1 tablet by mouth every 6-8 hours as needed (abdominal pain)  Twylla Skeans D Pendl, DO   vitamin B-12 (CYANOCOBALAMIN) 1000 MCG tablet Take 1,000 mcg by mouth daily  Patient not taking: Reported on 11/11/2021  Historical Provider, MD   Cholecalciferol (VITAMIN D) 50 MCG (2000 UT) CAPS capsule Take 1 capsule by mouth daily  Patient not taking: Reported on 11/4/2021  Historical Provider, MD   albuterol sulfate HFA (PROVENTIL HFA) 108 (90 Base) MCG/ACT inhaler Inhale 2 puffs Social History     Tobacco Use    Smoking status: Former Smoker     Packs/day: 1.00     Years: 37.00     Pack years: 37.00     Types: Cigarettes     Quit date: 2017     Years since quittin.8    Smokeless tobacco: Never Used   Substance Use Topics    Alcohol use: No     Alcohol/week: 0.0 standard drinks        Vitals:    21 1334   BP: 118/74   Pulse: 90   SpO2: 96%   Weight: 139 lb 9.6 oz (63.3 kg)     Estimated body mass index is 26.38 kg/m² as calculated from the following:    Height as of 10/31/21: 5' 1\" (1.549 m). Weight as of this encounter: 139 lb 9.6 oz (63.3 kg). Physical Exam  Vitals and nursing note reviewed. Constitutional:       Appearance: She is well-developed. She is ill-appearing. HENT:      Head: Normocephalic and atraumatic. Right Ear: Tympanic membrane, ear canal and external ear normal.      Left Ear: Tympanic membrane, ear canal and external ear normal.      Nose: Nose normal.      Mouth/Throat:      Mouth: Mucous membranes are moist.   Eyes:      Conjunctiva/sclera: Conjunctivae normal.      Pupils: Pupils are equal, round, and reactive to light. Cardiovascular:      Rate and Rhythm: Normal rate and regular rhythm. Heart sounds: Normal heart sounds. No murmur heard. Pulmonary:      Effort: Pulmonary effort is normal.      Breath sounds: Normal breath sounds. No wheezing. Abdominal:      General: Bowel sounds are normal.      Palpations: Abdomen is soft. Musculoskeletal:      Cervical back: Normal range of motion and neck supple. Skin:     General: Skin is warm and dry. Neurological:      Mental Status: She is alert and oriented to person, place, and time. Psychiatric:         Behavior: Behavior normal.         ASSESSMENT/PLAN:  1. Type 2 diabetes mellitus with hyperglycemia, without long-term current use of insulin ([address]  Patient will need to keep a log of his glucose readings and bring them to the office.    He needs to monitor his diet and exercise. Attempt to lose weight. Discussed proper eating habits. Continue to take medication as prescribed. Will obtain A1C at this visit. Educated on signs and symptoms for immediate evaluation in the ER.   - URINE RT REFLEX TO CULTURE; Future  - AMMONIA; Future  - TSH WITH REFLEX TO FT4; Future  - CBC Auto Differential; Future  - Comprehensive Metabolic Panel; Future    2. Emotional instability ([facility]    Medication provided  Discussed side effects of medication  Discussed signs and symptoms for immediate evaluation in ER. Answered questions. - URINE RT REFLEX TO CULTURE; Future  - AMMONIA; Future  - TSH WITH REFLEX TO FT4; Future  - CBC Auto Differential; Future  - Comprehensive Metabolic Panel; Future    3. Essential hypertension  Difficult and poorly controlled. Discussed signs and symptoms for immediate evaluation in the ER. Reduce sodium. Monitor diet, exercise and lose weight. Keep a BP log and bring it to your next appointment. Needs to rtc in one month for BP check  - URINE RT REFLEX TO CULTURE; Future  - AMMONIA; Future  - TSH WITH REFLEX TO FT4; Future  - CBC Auto Differential; Future  - Comprehensive Metabolic Panel; Future    4. NSTEMI (non-ST elevated myocardial infarction) ([facility]  Stable  Continue with medication  Keep appointments with specialist.   Ayanna Burks MD, Cardiology, [facility]  - URINE RT REFLEX TO CULTURE; Future  - AMMONIA; Future  - TSH WITH REFLEX TO FT4; Future  - CBC Auto Differential; Future  - Comprehensive Metabolic Panel; Future      No follow-ups on file.

## 2021-11-09 ENCOUNTER — HOSPITAL ENCOUNTER (OUTPATIENT)
Dept: GENERAL RADIOLOGY | Age: 61
Discharge: HOME | End: 2021-11-09
Payer: MEDICARE

## 2021-11-09 DIAGNOSIS — R13.10 DYSPHAGIA, UNSPECIFIED TYPE: ICD-10-CM

## 2021-11-09 PROCEDURE — 74220 X-RAY XM ESOPHAGUS 1CNTRST: CPT

## 2021-11-09 NOTE — PROGRESS NOTES
[address]  Office Visit    Meghna Shelley  1960 November 11, 2021    CC:   Chief Complaint   Patient presents with    Follow-up     chest discomfort      HPI:  The patient is 57 y.o. female with a past medical history significant for CAD, HTN, type II diabetes mellitus, severe COPD and HLP here for hospital follow up. She was hospitalized at [facility] from 10/27/2021-10/30/2021 after presenting with c/o chest pain associated with vomiting. Cardiac markers were slightly elevated and ECG showed nonspecific changes. She underwent cardiac cath on 10/29/2021 with demonstrated mild LAD and LCX disease. She was seen by GI and advised to follow up as an outpatient for EGD and dilation. Continues to have episodes of soreness that worsened after her barium swallow earlier this week. Her chest discomfort is continuous and has never been completely eliminated. She is to follow up with GI. She has been rather sedentary. Has chronic SOBOE (unchanged). Quit smoking 5 years ago. Follows Dr. Jones Brought for her COPD. Denies PND, cough, palpitations, syncope, edema , weight change or claudication. + occasional lightheadedness when she gets up and passes quickly. Review of Systems:  Constitutional: Denies weakness, night sweats or fever. + occasional fatigue  HEENT: Denies new visual changes, ringing in ears, nosebleeds, nasal congestion  Respiratory: Denies new or change in SOB, PND, orthopnea or cough. Cardiovascular: see HPI  GI: Denies diarrhea, constipation, abdominal pain, change in bowel habits, melena or hematochezia. + chronic nausea  : Denies urinary frequency, urgency, incontinence, hematuria or dysuria. Skin: Denies rash, hives, or cyanosis  Musculoskeletal: + generalized arthritic pain  Neurological: Denies syncope or TIA-like symptoms.   Psychiatric: Denies anxiety, insomnia or depression     Past Medical History:   Diagnosis Date    Back pain     CAD (coronary artery disease)     COPD (chronic obstructive pulmonary disease) (HCC)     Foot pain     from bone spurs    GERD (gastroesophageal reflux disease)     Headache     sharp pain going through head    Heart attack ([address]     Hip pain     Hyperlipidemia     Hypertension      (normal spontaneous vaginal delivery)     Osteoporosis     Vision abnormalities      Past Surgical History:   Procedure Laterality Date    CARDIAC CATHETERIZATION      CHOLECYSTECTOMY      COLONOSCOPY      DIAGNOSTIC CARDIAC CATH LAB PROCEDURE  10/29/2021    mild LAD and LCX disease    ESOPHAGEAL DILATATION N/A 2019    ESOPHAGEAL DILATION Arleene Cleaves performed by Joaquina Fabian MD at [address] [place] [address] N/A 2019    EGD BIOPSY performed by Joaquina Fabian MD at [place] [address]     Family History   Problem Relation Age of Onset    Heart Failure Mother         heart disease    Hypertension Mother     Emphysema Mother     Heart Failure Father         heart disease    COPD Father     Hypertension Paternal Grandmother      Social History     Tobacco Use    Smoking status: Former Smoker     Packs/day: 1.00     Years: 37.00     Pack years: 37.00     Types: Cigarettes     Quit date: 2017     Years since quittin.8    Smokeless tobacco: Never Used   Vaping Use    Vaping Use: Never used   Substance Use Topics    Alcohol use: No     Alcohol/week: 0.0 standard drinks    Drug use: No       Allergies   Allergen Reactions    Iodine Hives    Other Other (See Comments)     Flu vaccine  Felt like bones were on the outside of her body    Eggs Or Egg-Derived Products [Eggs Or Egg-Derived Products] Hives and Nausea And Vomiting    Eggs [Egg White] Nausea And Vomiting     PT STATES CAN EAT EGG WHITE BUT NOT EGG YOLK    Flexeril [Cyclobenzaprine Hcl] Rash    Levofloxacin Rash    Omeprazole Nausea And Vomiting and Other (See Comments)     Burns my stomach up        Current Outpatient Medications   Medication Sig Dispense Refill    sucralfate (CARAFATE) 1 GM tablet Take 1 tablet by mouth every 6-8 hours as needed (abdominal pain) 60 tablet 0    albuterol sulfate HFA (PROVENTIL HFA) 108 (90 Base) MCG/ACT inhaler Inhale 2 puffs into the lungs every 6 hours as needed for Wheezing or Shortness of Breath 1 each 1    atorvastatin (LIPITOR) 80 MG tablet Take 1 tablet by mouth daily 90 tablet 1    metoprolol succinate (TOPROL XL) 25 MG extended release tablet TAKE 1/2 TABLET BY MOUTH DAILY 30 tablet 1    sertraline (ZOLOFT) 100 MG tablet TAKE ONE TABLET BY MOUTH DAILY 60 tablet 0    pantoprazole (PROTONIX) 40 MG tablet Take 1 tablet by mouth every morning (before breakfast) 30 tablet 0    pregabalin (LYRICA) 75 MG capsule TAKE ONE CAPSULE BY MOUTH TWICE A DAY 60 capsule 2    albuterol (PROVENTIL) (5 MG/ML) 0.5% nebulizer solution Take 0.5 mLs by nebulization every 6 hours as needed for Wheezing 30 vial 0    Umeclidinium Bromide 62.5 MCG/INH AEPB Inhale 1 puff into the lungs daily 1 each 11    ipratropium-albuterol (DUONEB) 0.5-2.5 (3) MG/3ML SOLN nebulizer solution Inhale 3 mLs into the lungs every 4 hours 360 mL 11    aspirin 81 MG chewable tablet CHEW ONE TABLET BY MOUTH DAILY 90 tablet 1    diphenhydrAMINE-APAP, sleep, (TYLENOL PM EXTRA STRENGTH)  MG tablet Take 1 tablet by mouth nightly as needed for Sleep       mineral oil-hydrophilic petrolatum (AQUAPHOR) ointment Apply topically as needed. 396 g 0    acetaminophen (TYLENOL) 500 MG tablet Take 2 tablets by mouth every 6 hours as needed for Pain Do not take with other medications containing acetaminophen.  30 tablet 0    ondansetron (ZOFRAN-ODT) 4 MG disintegrating tablet Take 1 tablet by mouth every 8 hours as needed for Nausea or Vomiting 20 tablet 0    vitamin B-12 (CYANOCOBALAMIN) 1000 MCG tablet Take 1,000 mcg by mouth daily (Patient not taking: Reported on 11/11/2021)      Cholecalciferol (VITAMIN D) 50 MCG (2000 UT) CAPS capsule Take 1 capsule by mouth daily (Patient not taking: Reported on 16/7/1170)      folic acid (FOLVITE) 1 MG tablet Take 1 tablet by mouth daily (Patient not taking: Reported on 11/4/2021) 30 tablet 1    metFORMIN (GLUCOPHAGE) 500 MG tablet Take 1 tablet by mouth daily (with breakfast) (Patient not taking: Reported on 11/4/2021) 90 tablet 0    triamcinolone (KENALOG) 0.1 % cream Apply topically 2 times daily. (Patient not taking: Reported on 11/4/2021) 45 g 0     No current facility-administered medications for this visit. Physical Exam:   /88   Pulse 75   Ht 5' 1\" (1.549 m)   Wt 138 lb (62.6 kg)   SpO2 96%   BMI 26.07 kg/m²   Wt Readings from Last 2 Encounters:   11/11/21 138 lb (62.6 kg)   11/04/21 139 lb 9.6 oz (63.3 kg)     Constitutional: She is oriented to person, place, and time. She appears chronically ill in appearance. In no acute distress. HEENT: Normocephalic and atraumatic. Sclerae anicteric. No xanthelasmas. EOM's intact. Neck: Supple. No JVD present. Carotids without bruits. No thyromegaly present. Cardiovascular: RRR, normal S1 and S2; no murmur/gallop or rub  Pulmonary/Chest: Effort normal.  Lungs clear to auscultation. Chest wall nontender  Abdominal: soft, nontender, nondistended. + bowel sounds; no hepatomegaly  Extremities: No edema, cyanosis, or clubbing. Pulses are 2+ radial/carotid/DP/PT bilaterally. Cap refill brisk. Neurological: No focal deficit. Skin: Skin is warm and dry. Psychiatric: She has a normal mood and affect.  Her speech is normal and behavior is normal.     Lab Review:   Lab Results   Component Value Date    TRIG 133 10/27/2021    HDL 40 10/27/2021    LDLCALC 118 10/27/2021    LABVLDL 27 10/27/2021     Lab Results   Component Value Date     11/04/2021    K 4.3 11/04/2021    K 3.7 10/31/2021    CL 99 11/04/2021    CO2 27 11/04/2021    BUN 8 11/04/2021    CREATININE 0.6 11/04/2021    GLUCOSE 113 11/04/2021    CALCIUM 9.6 11/04/2021      Lab Results   Component Value Date    WBC 9.7 11/04/2021    HGB 14.5 11/04/2021    HCT 44.4 11/04/2021    MCV 94.7 11/04/2021     11/04/2021       ECG 11/11/2021:  Sinus rhythm    10/29/201 LHC:  ANGIOGRAM/CORONARY ARTERIOGRAM:     The left main coronary artery is normal .  The left anterior descending artery has mild disease. The left circumflex artery has mild disease. The right coronary artery is normal      SUMMARY:   Mild nonobstructive CAD     10/28/2021 Echo:   Normal left ventricle size, wall thickness, and systolic function with an   estimated ejection fraction of 55-60%. No regional wall motion abnormalities   are seen. Normal diastolic function. The right ventricle is normal in size and function. Trivial MR,TR    Cath: 5/2018  1.  Patent right coronary artery with mild disease of the posterior descending artery branch of the RCA. 2.  Patent left main trunk. 3.  Mild nonobstructive plaque buildup in proximal LAD. 4.  Mild 30% plaque of the obtuse marginal branch of the circumflex artery. 5.  Normal left ventricular systolic function with estimated EF of 50% to 65%. Assessment:    1. Chest discomfort  -noncardiac  -scheduled to follow up with GI for EGD and dilation    2. Coronary artery disease involving native coronary artery of native heart without angina pectoris  -recently with slightly elevated troponin (? NSTEMI)  -cardiac cath with very mild CAD (nonobstructive)  -continue ASA, statin, Toprol  -risk factor modification    3. Essential hypertension  -goal BP < 130/80  -controlled  -continue medical management    4. Dyslipidemia  -LDL < 100 goal  -continue high intensity statin    5. Type II diabetes mellitus  -RX per Primary MD    Plan:  Continue ASA, statin, Toprol  Discussed low fat/low sodium diet and reinforced regular aerobic exercise.   Reviewed angiogram results with patient and explained that follow up with GI as this is noncardiac chest pain  Follow up with GI regarding EGD and further testing  Follow up with Dr. Morgan Padilla in 6 months or sooner if needed    Return in about 6 months (around 5/11/2022) for 6-9 months with Dr. Morgan Padilla. Thanks for allowing me to participate in the care of this patient.       ANGELO Tabor  [facility], [address] [place] [address] [address] [facility] [address], [address] [place] [address]  Office: [phone]  Fax: [phone]      Electronically signed by JOSE ANGEL Marrero CNP on 11/11/2021 at 11:32 AM

## 2021-11-11 ENCOUNTER — OFFICE VISIT (OUTPATIENT)
Dept: CARDIOLOGY CLINIC | Age: 61
End: 2021-11-11
Payer: MEDICARE

## 2021-11-11 VITALS
HEIGHT: 61 IN | OXYGEN SATURATION: 96 % | HEART RATE: 75 BPM | DIASTOLIC BLOOD PRESSURE: 88 MMHG | SYSTOLIC BLOOD PRESSURE: 134 MMHG | BODY MASS INDEX: 26.06 KG/M2 | WEIGHT: 138 LBS

## 2021-11-11 DIAGNOSIS — I25.10 CORONARY ARTERY DISEASE INVOLVING NATIVE CORONARY ARTERY OF NATIVE HEART WITHOUT ANGINA PECTORIS: ICD-10-CM

## 2021-11-11 DIAGNOSIS — I10 ESSENTIAL HYPERTENSION: ICD-10-CM

## 2021-11-11 DIAGNOSIS — E78.5 DYSLIPIDEMIA: ICD-10-CM

## 2021-11-11 DIAGNOSIS — R07.89 CHEST DISCOMFORT: Primary | ICD-10-CM

## 2021-11-11 PROCEDURE — 93000 ELECTROCARDIOGRAM COMPLETE: CPT | Performed by: NURSE PRACTITIONER

## 2021-11-11 PROCEDURE — 3017F COLORECTAL CA SCREEN DOC REV: CPT | Performed by: NURSE PRACTITIONER

## 2021-11-11 PROCEDURE — G8427 DOCREV CUR MEDS BY ELIG CLIN: HCPCS | Performed by: NURSE PRACTITIONER

## 2021-11-11 PROCEDURE — 99214 OFFICE O/P EST MOD 30 MIN: CPT | Performed by: NURSE PRACTITIONER

## 2021-11-11 PROCEDURE — G8484 FLU IMMUNIZE NO ADMIN: HCPCS | Performed by: NURSE PRACTITIONER

## 2021-11-11 PROCEDURE — G8419 CALC BMI OUT NRM PARAM NOF/U: HCPCS | Performed by: NURSE PRACTITIONER

## 2021-11-11 PROCEDURE — 1036F TOBACCO NON-USER: CPT | Performed by: NURSE PRACTITIONER

## 2021-12-03 ENCOUNTER — OFFICE VISIT (OUTPATIENT)
Dept: FAMILY MEDICINE CLINIC | Age: 61
End: 2021-12-03
Payer: MEDICARE

## 2021-12-03 VITALS
BODY MASS INDEX: 26.13 KG/M2 | RESPIRATION RATE: 18 BRPM | WEIGHT: 142 LBS | SYSTOLIC BLOOD PRESSURE: 136 MMHG | OXYGEN SATURATION: 95 % | DIASTOLIC BLOOD PRESSURE: 80 MMHG | HEIGHT: 62 IN | HEART RATE: 76 BPM

## 2021-12-03 DIAGNOSIS — I10 ESSENTIAL HYPERTENSION: Primary | ICD-10-CM

## 2021-12-03 DIAGNOSIS — K21.9 GASTROESOPHAGEAL REFLUX DISEASE WITHOUT ESOPHAGITIS: ICD-10-CM

## 2021-12-03 DIAGNOSIS — R07.9 CHEST PAIN, UNSPECIFIED TYPE: ICD-10-CM

## 2021-12-03 DIAGNOSIS — J44.9 COPD, SEVERE (HCC): ICD-10-CM

## 2021-12-03 PROCEDURE — G8427 DOCREV CUR MEDS BY ELIG CLIN: HCPCS | Performed by: FAMILY MEDICINE

## 2021-12-03 PROCEDURE — 3023F SPIROM DOC REV: CPT | Performed by: FAMILY MEDICINE

## 2021-12-03 PROCEDURE — G8419 CALC BMI OUT NRM PARAM NOF/U: HCPCS | Performed by: FAMILY MEDICINE

## 2021-12-03 PROCEDURE — G8926 SPIRO NO PERF OR DOC: HCPCS | Performed by: FAMILY MEDICINE

## 2021-12-03 PROCEDURE — 99214 OFFICE O/P EST MOD 30 MIN: CPT | Performed by: FAMILY MEDICINE

## 2021-12-03 PROCEDURE — G8484 FLU IMMUNIZE NO ADMIN: HCPCS | Performed by: FAMILY MEDICINE

## 2021-12-03 PROCEDURE — 1036F TOBACCO NON-USER: CPT | Performed by: FAMILY MEDICINE

## 2021-12-03 PROCEDURE — 3017F COLORECTAL CA SCREEN DOC REV: CPT | Performed by: FAMILY MEDICINE

## 2021-12-03 NOTE — PROGRESS NOTES
(90 Base) MCG/ACT inhaler Inhale 2 puffs into the lungs every 6 hours as needed for Wheezing or Shortness of Breath Yes Kevin Zapata, DO   atorvastatin (LIPITOR) 80 MG tablet Take 1 tablet by mouth daily Yes Kevin Zapata DO   metoprolol succinate (TOPROL XL) 25 MG extended release tablet TAKE 1/2 TABLET BY MOUTH DAILY Yes Kevin Zapata, DO   sertraline (ZOLOFT) 100 MG tablet TAKE ONE TABLET BY MOUTH DAILY Yes Kevin Zapata, DO   albuterol (PROVENTIL) (5 MG/ML) 0.5% nebulizer solution Take 0.5 mLs by nebulization every 6 hours as needed for Wheezing Yes Laura Mosley PA-C   Umeclidinium Bromide 62.5 MCG/INH AEPB Inhale 1 puff into the lungs daily Yes Malcom Navarrete MD   ipratropium-albuterol (DUONEB) 0.5-2.5 (3) MG/3ML SOLN nebulizer solution Inhale 3 mLs into the lungs every 4 hours Yes Malcom Navarrete MD   folic acid (FOLVITE) 1 MG tablet Take 1 tablet by mouth daily Yes Kevin Zapata DO   metFORMIN (GLUCOPHAGE) 500 MG tablet Take 1 tablet by mouth daily (with breakfast) Yes JOSE ANGEL Bone CNP   triamcinolone (KENALOG) 0.1 % cream Apply topically 2 times daily. Yes JOSE ANGEL Bone CNP   aspirin 81 MG chewable tablet CHEW ONE TABLET BY MOUTH DAILY Yes JOSE ANGEL Wilson CNP   diphenhydrAMINE-APAP, sleep, (TYLENOL PM EXTRA STRENGTH)  MG tablet Take 1 tablet by mouth nightly as needed for Sleep  Yes Historical Provider, MD   mineral oil-hydrophilic petrolatum (AQUAPHOR) ointment Apply topically as needed. Yes JOSE ANGEL Bone CNP   acetaminophen (TYLENOL) 500 MG tablet Take 2 tablets by mouth every 6 hours as needed for Pain Do not take with other medications containing acetaminophen.  Yes CARIDAD Rios   sucralfate (CARAFATE) 1 GM tablet Take 1 tablet by mouth every 6-8 hours as needed (abdominal pain)  Vale Ravi,    pantoprazole (PROTONIX) 40 MG tablet Take 1 tablet by mouth every morning (before breakfast)  Josh Roberts MD   pregabalin (LYRICA) 75 MG capsule TAKE ONE CAPSULE BY MOUTH TWICE A DAY  Chon Liu DO        Social History     Tobacco Use    Smoking status: Former Smoker     Packs/day: 1.00     Years: 37.00     Pack years: 37.00     Types: Cigarettes     Quit date: 2017     Years since quittin.9    Smokeless tobacco: Never Used   Substance Use Topics    Alcohol use: No     Alcohol/week: 0.0 standard drinks        Vitals:    21 1354   BP: 136/80   Pulse: 76   Resp: 18   SpO2: 95%   Weight: 142 lb (64.4 kg)   Height: 5' 1.5\" (1.562 m)     Estimated body mass index is 26.4 kg/m² as calculated from the following:    Height as of this encounter: 5' 1.5\" (1.562 m). Weight as of this encounter: 142 lb (64.4 kg). Physical Exam  Vitals and nursing note reviewed. Constitutional:       Appearance: She is well-developed. HENT:      Head: Normocephalic and atraumatic. Right Ear: External ear normal.      Left Ear: External ear normal.   Cardiovascular:      Rate and Rhythm: Normal rate and regular rhythm. Heart sounds: Normal heart sounds. No murmur heard. Pulmonary:      Effort: Pulmonary effort is normal.      Breath sounds: Normal breath sounds. No wheezing. Abdominal:      General: Bowel sounds are normal.      Palpations: Abdomen is soft. Tenderness: There is no abdominal tenderness. Musculoskeletal:         General: Normal range of motion. Skin:     General: Skin is warm and dry. Neurological:      Mental Status: She is alert and oriented to person, place, and time. Psychiatric:         Behavior: Behavior normal.         Judgment: Judgment normal.         ASSESSMENT/PLAN:  1. Essential hypertension  well controlled. Discussed signs and symptoms for immediate evaluation in the ER. Reduce sodium. Monitor diet, exercise and lose weight. Keep a BP log and bring it to your next appointment. Needs to rtc in one month for BP check    2.  Chest pain, unspecified type  *Stable  Continue with medication  Keep appointments with specialist.   Gurpreet Kelly questions    3. COPD, severe ([address]  Stable  Continue with medication  Keep appointments with specialist.   Gurpreet Kelly questions    4. Gastroesophageal reflux disease without esophagitis  Has upcoming procedure  Educated on the importance of having this done. Answered questions      Return in about 6 months (around 6/3/2022).

## 2021-12-05 ASSESSMENT — ENCOUNTER SYMPTOMS
ABDOMINAL PAIN: 1
SHORTNESS OF BREATH: 0
RHINORRHEA: 0
SINUS PRESSURE: 0
NAUSEA: 0
DIARRHEA: 0
FACIAL SWELLING: 0
TROUBLE SWALLOWING: 0
COUGH: 0
WHEEZING: 0
CHEST TIGHTNESS: 0
SORE THROAT: 0
VOMITING: 0

## 2021-12-08 ENCOUNTER — ANESTHESIA EVENT (OUTPATIENT)
Dept: ENDOSCOPY | Age: 61
End: 2021-12-08
Payer: MEDICARE

## 2021-12-08 NOTE — FLOWSHEET NOTE
Pt. Is scheduled for an EGD on 12/9/21 with Dr. Glenroy Nance. She is having this because she has been dilated in the past.  Has history of COPD-severe at times, but stable per her primary Dr. , chest pain-negative cardiac workup, more from GERD, history of heart attack and diabetes. Case discussed with Dr. Vero Li, ok to do procedure at [facility].

## 2021-12-08 NOTE — FLOWSHEET NOTE
Preoperative Screening for Elective Surgery/Invasive Procedures While COVID-19 present in the community     Have you tested positive or have been told to self-isolate for COVID-19 like symptoms within the past 28 days?  Do you currently have any of the following symptoms? o Fever >100.0 F or 99.9 F in immunocompromised patients? o New onset cough, shortness of breath or difficulty breathing?  o New onset sore throat, myalgia (muscle aches and pains), headache, loss of taste/smell or diarrhea?  Have you had a potential exposure to COVID-19 within the past 14 days by:  o Close contact with a confirmed case? o Close contact with a healthcare worker,  or essential infrastructure worker (grocery store, [employer], gas station, public utilities or transportation)? o Do you reside in a congregate setting such as; skilled nursing facility, adult home, correctional facility, homeless shelter or other institutional setting?  o Have you had recent travel to a known COVID-19 hotspot? Indicate if the patient has a positive screen by answering yes to one or more of the above questions. Patients who test positive or screen positive prior to surgery or on the day of surgery should be evaluated in conjunction with the surgeon/proceduralist/anesthesiologist to determine the urgency of the procedure. no to all above.  Vaccinated, proof in [employer]

## 2021-12-08 NOTE — PROGRESS NOTES
[facility] time____12/9/21 0615________        Surgery time___0700_________    Take the following medications with a sip of water:    Do not eat or drink anything after 12:00 midnight prior to your surgery. This includes water chewing gum, mints and ice chips. You may brush your teeth and gargle the morning of your surgery, but do not swallow the water     Please see your family doctor/pediatrician for a history and physical and/or concerning medications. Bring any test results/reports from your physicians office. If you are under the care of a heart doctor or specialist doctor, please be aware that you may be asked to them for clearance    You may be asked to stop blood thinners such as Coumadin, Plavix, Fragmin, Lovenox, etc., or any anti-inflammatories such as:  Aspirin, Ibuprofen, Advil, Naproxen prior to your surgery. We also ask that you stop any OTC medications such as fish oil, vitamin E, glucosamine, garlic, Multivitamins, COQ 10, etc.    We ask that you do not smoke 24 hours prior to surgery  We ask that you do not  drink any alcoholic beverages 24 hours prior to surgery     You must make arrangements for a responsible adult to take you home after your surgery. For your safety you will not be allowed to leave alone or drive yourself home. Your surgery will be cancelled if you do not have a ride home. Also for your safety, it is strongly suggested that someone stay with you the first 24 hours after your surgery. A parent or legal guardian must accompany a child scheduled for surgery and plan to stay at the hospital until the child is discharged. Please do not bring other children with you. For your comfort, please wear simple loose fitting clothing to the hospital.  Please do not bring valuables.     Do not wear any make-up or nail polish on your fingers or toes      For your safety, please do not wear any jewelry or body piercing's on the day of surgery. All jewelry must be removed. If you have dentures, they will be removed before going to operating room. For your convenience, we will provide you with a container. If you wear contact lenses or glasses, they will be removed, please bring a case for them. If you have a living will and a durable power of  for healthcare, please bring in a copy. As part of our patient safety program to minimize surgical site infections, we ask you to do the following:    · Please notify your surgeon if you develop any illness between         now and the  day of your surgery. · This includes a cough, cold, fever, sore throat, nausea,         or vomiting, and diarrhea, etc.  ·  Please notify your surgeon if you experience dizziness, shortness         of breath or blurred vision between now and the time of your surgery. Do not shave your operative site 96 hours prior to surgery. For face and neck surgery, men may use an electric razor 48 hours   prior to surgery. You may shower the night before surgery or the morning of   your surgery with an antibacterial soap. You will need to bring a photo ID and insurance card    [facility] has an onsite pharmacy, would you like to utilize our pharmacy     If you will be staying overnight and use a C-pap machine, please bring   your C-pap to hospital     Our goal is to provide you with excellent care, therefore, visitors will be limited to two(2) in the room at a time so that we may focus on providing this care for you. Please contact pre-admission testing if you have any further questions. [facility] phone number:  [phone]  Please note these are generalized instructions for all surgical cases, you may be provided with more specific instructions according to your surgery.

## 2021-12-09 ENCOUNTER — HOSPITAL ENCOUNTER (OUTPATIENT)
Age: 61
Setting detail: OUTPATIENT SURGERY
Discharge: HOME | End: 2021-12-09
Attending: INTERNAL MEDICINE | Admitting: INTERNAL MEDICINE
Payer: MEDICARE

## 2021-12-09 ENCOUNTER — ANESTHESIA (OUTPATIENT)
Dept: ENDOSCOPY | Age: 61
End: 2021-12-09
Payer: MEDICARE

## 2021-12-09 VITALS
BODY MASS INDEX: 26.88 KG/M2 | DIASTOLIC BLOOD PRESSURE: 77 MMHG | OXYGEN SATURATION: 97 % | HEART RATE: 68 BPM | SYSTOLIC BLOOD PRESSURE: 136 MMHG | WEIGHT: 142.4 LBS | TEMPERATURE: 97.1 F | HEIGHT: 61 IN | RESPIRATION RATE: 16 BRPM

## 2021-12-09 VITALS — DIASTOLIC BLOOD PRESSURE: 75 MMHG | OXYGEN SATURATION: 89 % | SYSTOLIC BLOOD PRESSURE: 154 MMHG

## 2021-12-09 LAB
GLUCOSE BLD-MCNC: 100 MG/DL (ref 70–99)
PERFORMED ON: ABNORMAL

## 2021-12-09 PROCEDURE — 7100000011 HC PHASE II RECOVERY - ADDTL 15 MIN: Performed by: INTERNAL MEDICINE

## 2021-12-09 PROCEDURE — 6360000002 HC RX W HCPCS

## 2021-12-09 PROCEDURE — 3700000000 HC ANESTHESIA ATTENDED CARE: Performed by: INTERNAL MEDICINE

## 2021-12-09 PROCEDURE — 7100000010 HC PHASE II RECOVERY - FIRST 15 MIN: Performed by: INTERNAL MEDICINE

## 2021-12-09 PROCEDURE — 2500000003 HC RX 250 WO HCPCS

## 2021-12-09 PROCEDURE — 3609017100 HC EGD: Performed by: INTERNAL MEDICINE

## 2021-12-09 PROCEDURE — 2580000003 HC RX 258: Performed by: ANESTHESIOLOGY

## 2021-12-09 RX ORDER — PROPOFOL 10 MG/ML
INJECTION, EMULSION INTRAVENOUS PRN
Status: DISCONTINUED | OUTPATIENT
Start: 2021-12-09 | End: 2021-12-09 | Stop reason: SDUPTHER

## 2021-12-09 RX ORDER — LIDOCAINE HYDROCHLORIDE 20 MG/ML
INJECTION, SOLUTION EPIDURAL; INFILTRATION; INTRACAUDAL; PERINEURAL PRN
Status: DISCONTINUED | OUTPATIENT
Start: 2021-12-09 | End: 2021-12-09 | Stop reason: SDUPTHER

## 2021-12-09 RX ORDER — SODIUM CHLORIDE 9 MG/ML
INJECTION, SOLUTION INTRAVENOUS CONTINUOUS
Status: DISCONTINUED | OUTPATIENT
Start: 2021-12-09 | End: 2021-12-09 | Stop reason: HOSPADM

## 2021-12-09 RX ORDER — SODIUM CHLORIDE 0.9 % (FLUSH) 0.9 %
10 SYRINGE (ML) INJECTION EVERY 12 HOURS SCHEDULED
Status: DISCONTINUED | OUTPATIENT
Start: 2021-12-09 | End: 2021-12-09 | Stop reason: HOSPADM

## 2021-12-09 RX ORDER — SODIUM CHLORIDE 0.9 % (FLUSH) 0.9 %
10 SYRINGE (ML) INJECTION PRN
Status: DISCONTINUED | OUTPATIENT
Start: 2021-12-09 | End: 2021-12-09 | Stop reason: HOSPADM

## 2021-12-09 RX ORDER — SODIUM CHLORIDE 9 MG/ML
25 INJECTION, SOLUTION INTRAVENOUS PRN
Status: DISCONTINUED | OUTPATIENT
Start: 2021-12-09 | End: 2021-12-09 | Stop reason: HOSPADM

## 2021-12-09 RX ADMIN — PROPOFOL 80 MG: 10 INJECTION, EMULSION INTRAVENOUS at 07:15

## 2021-12-09 RX ADMIN — LIDOCAINE HYDROCHLORIDE 60 MG: 20 INJECTION, SOLUTION EPIDURAL; INFILTRATION; INTRACAUDAL; PERINEURAL at 07:15

## 2021-12-09 RX ADMIN — SODIUM CHLORIDE: 9 INJECTION, SOLUTION INTRAVENOUS at 06:51

## 2021-12-09 RX ADMIN — PROPOFOL 20 MG: 10 INJECTION, EMULSION INTRAVENOUS at 07:17

## 2021-12-09 ASSESSMENT — PAIN SCALES - GENERAL
PAINLEVEL_OUTOF10: 0
PAINLEVEL_OUTOF10: 0

## 2021-12-09 ASSESSMENT — COPD QUESTIONNAIRES: CAT_SEVERITY: SEVERE

## 2021-12-09 ASSESSMENT — PAIN - FUNCTIONAL ASSESSMENT: PAIN_FUNCTIONAL_ASSESSMENT: 0-10

## 2021-12-09 NOTE — PROGRESS NOTES
Family waiting in car in parking lot during procedure. Explained dc instructions and patients signed and verbalized understanding prior to procedure.

## 2021-12-09 NOTE — ANESTHESIA POSTPROCEDURE EVALUATION
Department of Anesthesiology  Postprocedure Note    Patient: Sohan Cohen  MRN: [id number]  YOB: 1960  Date of evaluation: 12/9/2021  Time:  9:51 AM     Procedure Summary     Date: 12/09/21 Room / Location: [address] [place] [address]    Anesthesia Start: 7690 Anesthesia Stop: 0720    Procedure: EGD ESOPHAGOGASTRODUODENOSCOPY (N/A ) Diagnosis:       Dysphagia, unspecified type      (Dysphagia)    Surgeons: Nela Sevilla MD Responsible Provider: Sri Ramos MD    Anesthesia Type: MAC ASA Status: 3          Anesthesia Type: MAC    Jovon Phase I: Jovon Score: 10    Jovon Phase II: Jovon Score: 10    Last vitals: Reviewed and per EMR flowsheets.        Anesthesia Post Evaluation    Patient location during evaluation: PACU  Patient participation: complete - patient participated  Level of consciousness: awake and alert  Pain score: 0  Airway patency: patent  Nausea & Vomiting: no nausea and no vomiting  Complications: no  Cardiovascular status: blood pressure returned to baseline  Respiratory status: acceptable  Hydration status: euvolemic

## 2021-12-09 NOTE — ANESTHESIA PRE PROCEDURE
Department of Anesthesiology  Preprocedure Note       Name:  Elizabet Pack   Age:  57 y.o.  :  1960                                          MRN:  [id number]         Date:  2021      Surgeon: Maritza Galan):  Courtney Wolf MD    Procedure: EGD ESOPHAGOGASTRODUODENOSCOPY (N/A )    Medications prior to admission:   Prior to Admission medications    Medication Sig Start Date End Date Taking?  Authorizing Provider   sucralfate (CARAFATE) 1 GM tablet Take 1 tablet by mouth every 6-8 hours as needed (abdominal pain) 10/31/21 12/8/21  Kahlil ROJAS Pendmike, DO   ondansetron (ZOFRAN-ODT) 4 MG disintegrating tablet Take 1 tablet by mouth every 8 hours as needed for Nausea or Vomiting 10/31/21   Halima Rodriguez Pendmike, DO   vitamin B-12 (CYANOCOBALAMIN) 1000 MCG tablet Take 1,000 mcg by mouth daily     Historical Provider, MD   Cholecalciferol (VITAMIN D) 50 MCG (2000 UT) CAPS capsule Take 1 capsule by mouth daily     Historical Provider, MD   albuterol sulfate HFA (PROVENTIL HFA) 108 (90 Base) MCG/ACT inhaler Inhale 2 puffs into the lungs every 6 hours as needed for Wheezing or Shortness of Breath 21   Marvin Chirinos, DO   atorvastatin (LIPITOR) 80 MG tablet Take 1 tablet by mouth daily 21   Marvin Chirinos, DO   metoprolol succinate (TOPROL XL) 25 MG extended release tablet TAKE 1/2 TABLET BY MOUTH DAILY 21   Marvin Chirinos, DO   sertraline (ZOLOFT) 100 MG tablet TAKE ONE TABLET BY MOUTH DAILY 21   Marvin Chirinos, DO   pantoprazole (PROTONIX) 40 MG tablet Take 1 tablet by mouth every morning (before breakfast) 21  Sohan Reyes MD   pregabalin (LYRICA) 75 MG capsule TAKE ONE CAPSULE BY MOUTH TWICE A DAY 21  Marvin Chirinos, DO   albuterol (PROVENTIL) (5 MG/ML) 0.5% nebulizer solution Take 0.5 mLs by nebulization every 6 hours as needed for Wheezing 21   Laura Mosley PA-C   Umeclidinium Bromide 62.5 MCG/INH AEPB Inhale 1 puff into the lungs daily 21   Jeana Walters MD ipratropium-albuterol (DUONEB) 0.5-2.5 (3) MG/3ML SOLN nebulizer solution Inhale 3 mLs into the lungs every 4 hours 1/27/21   Hair Hsieh MD   metFORMIN (GLUCOPHAGE) 500 MG tablet Take 1 tablet by mouth daily (with breakfast)  Patient taking differently: Take 500 mg by mouth daily (with breakfast) Indications: pt has not been takinyg due to side effects  11/6/19   JOSE ANGEL Mccurdy CNP   aspirin 81 MG chewable tablet CHEW ONE TABLET BY MOUTH DAILY 6/7/19   JOSE ANGEL Mccurdy CNP   diphenhydrAMINE-APAP, sleep, (TYLENOL PM EXTRA STRENGTH)  MG tablet Take 1 tablet by mouth nightly as needed for Sleep     Historical Provider, MD   mineral oil-hydrophilic petrolatum (AQUAPHOR) ointment Apply topically as needed. 2/6/19   JOSE ANGEL Mccurdy CNP   acetaminophen (TYLENOL) 500 MG tablet Take 2 tablets by mouth every 6 hours as needed for Pain Do not take with other medications containing acetaminophen. 9/2/18   CARIDAD Ruiz       Current medications:    No current facility-administered medications for this visit. No current outpatient medications on file. Facility-Administered Medications Ordered in Other Visits   Medication Dose Route Frequency Provider Last Rate Last Admin    0.9 % sodium chloride infusion   IntraVENous Continuous Lazarus Damon MD        sodium chloride flush 0.9 % injection 10 mL  10 mL IntraVENous 2 times per day Lazarus Damon MD        sodium chloride flush 0.9 % injection 10 mL  10 mL IntraVENous PRN Lazarus Damon MD        0.9 % sodium chloride infusion  25 mL IntraVENous PRN Lazarus Damon MD           Allergies:     Allergies   Allergen Reactions    Iodine Hives    Other Other (See Comments)     Flu vaccine  Felt like bones were on the outside of her body    Eggs Or Egg-Derived Products [Eggs Or Egg-Derived Products] Hives and Nausea And Vomiting    Eggs [Egg White] Nausea And Vomiting     PT STATES CAN EAT EGG WHITE BUT NOT EGG YOLK    Flexeril [Cyclobenzaprine Hcl] Rash    Levofloxacin Rash    Omeprazole Nausea And Vomiting and Other (See Comments)     Burns my stomach up          Problem List:    Patient Active Problem List   Diagnosis Code    COPD, severe ([facility] J44.9    Former smoker Z87.891    Osteoporosis M81.0    Chronic back pain M54.9, G89.29    Positive test for human papillomavirus (HPV) [id number]    Anxiety F41.9    Hyperlipidemia E78.5    Emotional instability ([facility]) F60.3    Osteoarthritis of hip M16.9    Body mass index between 25-29, adult [id number]    Chest pain R07.9    NSTEMI (non-ST elevated myocardial infarction) ([facility] I21.4    Essential hypertension I10    Tachycardia R00.0    Vitamin D deficiency E55.9    Gastroesophageal reflux disease without esophagitis K21.9    Type 2 diabetes mellitus with hyperglycemia, without long-term current use of insulin ([facility]) E11.65    Diarrhea R19.7    Sepsis ([facility]) A41.9    Other hypotension I95.89    Peripheral vascular disease ([facility]) I73.9    Atherosclerosis of superior mesenteric artery ([facility]) K55.1    Closed displaced fracture of fifth metatarsal bone of left foot S92.352A    Esophageal stricture K22.2    PSVT (paroxysmal supraventricular tachycardia) ([facility]) I47.1    Type 2 diabetes mellitus with diabetic neuropathy E11.40    Nausea & vomiting R11.2    Coronary artery disease involving native coronary artery of native heart with unstable angina pectoris ([facility]) I25.110       Past Medical History:        Diagnosis Date    Back pain     CAD (coronary artery disease)     COPD (chronic obstructive pulmonary disease) ([facility])     Foot pain     from bone spurs    GERD (gastroesophageal reflux disease)     Headache     sharp pain going through head    [facility] 2019    Hip pain     Hyperlipidemia     Hypertension      (normal spontaneous vaginal delivery)     Osteoporosis     Vision abnormalities     amblyopia       Past Surgical History:        Procedure Laterality Date    CARDIAC CATHETERIZATION      CHOLECYSTECTOMY      COLONOSCOPY      DIAGNOSTIC CARDIAC CATH LAB PROCEDURE  10/29/2021    mild LAD and LCX disease    ESOPHAGEAL DILATATION N/A 2019    ESOPHAGEAL DILATION Sammy Farfana performed by César Hill MD at [facility] N/A 2019    EGD BIOPSY performed by César Hill MD at [address] History:    Social History     Tobacco Use    Smoking status: Former Smoker     Packs/day: 1.00     Years: 37.00     Pack years: 37.00     Types: Cigarettes     Quit date: 2017     Years since quittin.9    Smokeless tobacco: Never Used   Substance Use Topics    Alcohol use: No     Alcohol/week: 0.0 standard drinks                                Counseling given: Not Answered      Vital Signs (Current): There were no vitals filed for this visit.                                            BP Readings from Last 3 Encounters:   21 136/80   21 134/88   21 118/74       NPO Status:  >8h                                                                               BMI:   Wt Readings from Last 3 Encounters:   21 142 lb (64.4 kg)   21 142 lb (64.4 kg)   21 138 lb (62.6 kg)     There is no height or weight on file to calculate BMI.    CBC:   Lab Results   Component Value Date    WBC 9.7 2021    RBC 4.69 2021    HGB 14.5 2021    HCT 44.4 2021    MCV 94.7 2021    RDW 16.1 2021     2021       CMP:   Lab Results   Component Value Date     2021    K 4.3 2021    K 3.7 10/31/2021    CL 99 2021    CO2 27 2021    BUN 8 2021    CREATININE 0.6 2021    GFRAA >60 2021    AGRATIO 1.8 2021    LABGLOM >60 2021    GLUCOSE 113 2021    PROT 7.4 2021    CALCIUM 9.6 2021    BILITOT 0.5 2021    ALKPHOS 131 2021 AST 20 11/04/2021    ALT 21 11/04/2021       POC Tests: No results for input(s): POCGLU, POCNA, POCK, POCCL, POCBUN, POCHEMO, POCHCT in the last 72 hours. Coags:   Lab Results   Component Value Date    PROTIME 11.4 08/24/2020    INR 0.98 08/24/2020    APTT 49.1 12/01/2020       HCG (If Applicable): No results found for: PREGTESTUR, PREGSERUM, HCG, HCGQUANT     ABGs: No results found for: PHART, PO2ART, XJZ8LZS, PAW7ZWO, BEART, R1XMJYKN     Type & Screen (If Applicable):  No results found for: LABABO, [address]    Anesthesia Evaluation  Patient summary reviewed no history of anesthetic complications:   Airway: Mallampati: II  TM distance: >3 FB   Neck ROM: full  Mouth opening: > = 3 FB Dental:    (+) upper dentures and lower dentures      Pulmonary:   (+) COPD: severe,                             Cardiovascular:    (+) hypertension:, past MI:, CAD:, hyperlipidemia                  Neuro/Psych:   (+) headaches:,             GI/Hepatic/Renal:   (+) GERD:,           Endo/Other:    (+) DiabetesType II DM, , .                 Abdominal:             Vascular:   + PVD, aortic or cerebral, . Other Findings:               Anesthesia Plan      MAC     ASA 3     (  )  Induction: intravenous. Anesthetic plan and risks discussed with patient. Plan discussed with CRNA.             Mikki Aguero MD   12/9/2021

## 2021-12-09 NOTE — H&P
[facility]   Pre-operative History and Physical    Patient: Diaz Zeng  : 1960  Acct#: [de-identified]    History Obtained From: electronic medical record    HISTORY OF PRESENT ILLNESS  Procedure:EGD  Indications:GERD with dysphagia  Past Medical History:        Diagnosis Date    Back pain     CAD (coronary artery disease)     [facility]     Foot pain     from bone spurs    GERD (gastroesophageal reflux disease)     Headache     sharp pain going through head    Heart attack ([address] 2019    Hip pain     Hyperlipidemia     Hypertension      (normal spontaneous vaginal delivery)     Osteoporosis     Vision abnormalities     amblyopia     Past Surgical History:        Procedure Laterality Date    CARDIAC CATHETERIZATION      CHOLECYSTECTOMY      COLONOSCOPY      DIAGNOSTIC CARDIAC CATH LAB PROCEDURE  10/29/2021    mild LAD and LCX disease    ESOPHAGEAL DILATATION N/A 2019    ESOPHAGEAL DILATION Oriana Gancammy performed by Noreen Manzano MD at [address] [place] [address] N/A 2019    EGD BIOPSY performed by Noreen Manzano MD at [facility] ENDOSCOPY     Medications prior to admission:   Prior to Admission medications    Medication Sig Start Date End Date Taking?  Authorizing Provider   sucralfate (CARAFATE) 1 GM tablet Take 1 tablet by mouth every 6-8 hours as needed (abdominal pain) 10/31/21 12/8/21 Yes Josef Ravi DO   ondansetron (ZOFRAN-ODT) 4 MG disintegrating tablet Take 1 tablet by mouth every 8 hours as needed for Nausea or Vomiting 10/31/21  Yes Josef Ravi DO   vitamin B-12 (CYANOCOBALAMIN) 1000 MCG tablet Take 1,000 mcg by mouth daily    Yes Historical Provider, MD   Cholecalciferol (VITAMIN D) 50 MCG ( UT) CAPS capsule Take 1 capsule by mouth daily    Yes Historical Provider, MD   albuterol sulfate HFA (PROVENTIL HFA) 108 (90 Base) MCG/ACT inhaler Inhale 2 puffs into the lungs every 6 hours as needed for Wheezing or Shortness of Breath 9/29/21  Yes Miguel Borges,    atorvastatin (LIPITOR) 80 MG tablet Take 1 tablet by mouth daily 9/23/21  Yes Miguel Borges DO   metoprolol succinate (TOPROL XL) 25 MG extended release tablet TAKE 1/2 TABLET BY MOUTH DAILY 8/25/21  Yes Miguel Borges DO   sertraline (ZOLOFT) 100 MG tablet TAKE ONE TABLET BY MOUTH DAILY 7/26/21  Yes Miguel Borges DO   pantoprazole (PROTONIX) 40 MG tablet Take 1 tablet by mouth every morning (before breakfast) 7/18/21 12/8/21 Yes Herbie Andrew MD   pregabalin (LYRICA) 75 MG capsule TAKE ONE CAPSULE BY MOUTH TWICE A DAY 5/21/21 12/8/21 Yes Kevin Zapata DO   albuterol (PROVENTIL) (5 MG/ML) 0.5% nebulizer solution Take 0.5 mLs by nebulization every 6 hours as needed for Wheezing 5/9/21  Yes Laura Mosley PA-C   Umeclidinium Bromide 62.5 MCG/INH AEPB Inhale 1 puff into the lungs daily 1/27/21  Yes Tresa Blizzard, MD   ipratropium-albuterol (DUONEB) 0.5-2.5 (3) MG/3ML SOLN nebulizer solution Inhale 3 mLs into the lungs every 4 hours 1/27/21  Yes Tresa Blizzard, MD   metFORMIN (GLUCOPHAGE) 500 MG tablet Take 1 tablet by mouth daily (with breakfast)  Patient taking differently: Take 500 mg by mouth daily (with breakfast) Indications: pt has not been takinyg due to side effects  11/6/19  Yes JOSE ANGEL Norwood CNP   aspirin 81 MG chewable tablet CHEW ONE TABLET BY MOUTH DAILY 6/7/19  Yes JOSE ANGEL Norwood CNP   diphenhydrAMINE-APAP, sleep, (TYLENOL PM EXTRA STRENGTH)  MG tablet Take 1 tablet by mouth nightly as needed for Sleep    Yes Historical Provider, MD   mineral oil-hydrophilic petrolatum (AQUAPHOR) ointment Apply topically as needed. 2/6/19  Yes Maryanne Suleiman Mayans, APRN - CNP   acetaminophen (TYLENOL) 500 MG tablet Take 2 tablets by mouth every 6 hours as needed for Pain Do not take with other medications containing acetaminophen.  9/2/18  Yes Mati CARIDAD Collazo     Allergies:   Iodine, Other, Eggs or egg-derived products [eggs or egg-derived products], Eggs [egg white], Flexeril [cyclobenzaprine hcl], Levofloxacin, and Omeprazole    Social History     Socioeconomic History    Marital status: Legally      Spouse name: Not on file    Number of children: Not on file    Years of education: Not on file    Highest education level: Not on file   Occupational History    Not on file   Tobacco Use    Smoking status: Former Smoker     Packs/day: 1.00     Years: 37.00     Pack years: 37.00     Types: Cigarettes     Quit date: 2017     Years since quittin.9    Smokeless tobacco: Never Used   Vaping Use    Vaping Use: Never used   Substance and Sexual Activity    Alcohol use: No     Alcohol/week: 0.0 standard drinks    Drug use: No    Sexual activity: Not Currently   Other Topics Concern    Not on file   Social History Narrative    Not on file     Social Determinants of Health     Financial Resource Strain: Low Risk     Difficulty of Paying Living Expenses: Not hard at all   Food Insecurity: No Food Insecurity    Worried About Running Out of Food in the Last Year: Never true    Devonte of Food in the Last Year: Never true   Transportation Needs:     Lack of Transportation (Medical): Not on file    Lack of Transportation (Non-Medical):  Not on file   Physical Activity:     Days of Exercise per Week: Not on file    Minutes of Exercise per Session: Not on file   Stress:     Feeling of Stress : Not on file   Social Connections:     Frequency of Communication with Friends and Family: Not on file    Frequency of Social Gatherings with Friends and Family: Not on file    Attends Anabaptist Services: Not on file    Active Member of Clubs or Organizations: Not on file    Attends Club or Organization Meetings: Not on file    Marital Status: Not on file   Intimate Partner Violence:     Fear of Current or Ex-Partner: Not on file    Emotionally Abused: Not on file    Physically Abused: Not on file    Sexually Abused: Not on file   Housing Stability:     Unable to Pay for Housing in the Last Year: Not on file    Number of Places Lived in the Last Year: Not on file    Unstable Housing in the Last Year: Not on file     Family History   Problem Relation Age of Onset    Heart Failure Mother         heart disease    Hypertension Mother    Fela Hcandler Emphysema Mother     Heart Failure Father         heart disease    COPD Father     Hypertension Paternal Grandmother     Hypertension Sister     Other Sister         thyroid disease         PHYSICAL EXAM:      BP (!) 172/70   Pulse 85   Temp 96.8 °F (36 °C) (Temporal)   Resp 16   Ht 5' 1\" (1.549 m)   Wt 142 lb 6.4 oz (64.6 kg)   SpO2 94%   BMI 26.91 kg/m²  I        Heart:normal    Lungs: normal    Abdomen: normal      ASA Grade:  See anesthesia note      ASSESSMENT AND PLAN:    1. Procedure options, risks and benefits reviewed with patient and expresses understanding.

## 2021-12-09 NOTE — PROCEDURES
[facility]  Endoscopy Note    Patient: Nancy Hill  : 1960  Acct#: [de-identified]    Procedure: Esophagogastroduodenoscopy with esophageal dilation    Date:  2021     Surgeon:  Bony Mcfadden MD, MD    Referring Physician:  Deb España    Preoperative Diagnosis:  dysphagia    Postoperative Diagnosis:  Esophageal stricture dilated with a 50 French bougie. Anesthesia: see anesthesia note. Indications: This is a 57y.o. year old female who presents today with dysphagia. Description of Procedure:  Informed consent was obtained from the patient after explanation of indications, benefits and possible risks and complications of the procedure. The patient was then taken to the endoscopy suite, placed in the left lateral decubitus position and the above IV sedation was administrered. The Olympus videoendoscope was placed in the patient's mouth and under direct visualization passed into the esophagus. Visualization of the esophagus demonstrated a stricture that was dilated with a 50 French bougie. .     The scope was then advanced into the stomach. Visualization of the gastric body and antrum demonstrated gastritis. .  A retroflexed exam of the gastric cardia and fundus demonstrated normal..  The pylorus was patent and the scope was advanced into the duodenum. Visualization of the duodenal bulb demonstrated normal..  The second portion of the duodenum demonstrated normal..    The scope was then withdrawn back into the stomach, it was decompressed, and the scope was completely withdrawn. The patient tolerated the procedure well and was taken to the post anesthesia care unit in good condition. Estimated Blood loss:  none    Impression: Esophageal stricture dilated with a 50 French bougie. Recommendations:Dilate as needed.     Bony Mcfadden MD, MD  [facility]

## 2021-12-21 ENCOUNTER — TELEPHONE (OUTPATIENT)
Dept: FAMILY MEDICINE CLINIC | Age: 61
End: 2021-12-21

## 2021-12-21 RX ORDER — VALACYCLOVIR HYDROCHLORIDE 1 G/1
1000 TABLET, FILM COATED ORAL 3 TIMES DAILY
Qty: 21 TABLET | Refills: 0 | Status: SHIPPED | OUTPATIENT
Start: 2021-12-21 | End: 2021-12-28

## 2021-12-21 NOTE — TELEPHONE ENCOUNTER
Patient states she has shingles around her mouth and on her lips in blister form. States it is very painful. Requesting whatever medication Dr. Yessica Olivares can prescribe to treat this. Please return call.

## 2021-12-22 DIAGNOSIS — M79.7 FIBROMYALGIA: ICD-10-CM

## 2021-12-22 RX ORDER — PREGABALIN 75 MG/1
CAPSULE ORAL
Qty: 60 CAPSULE | Refills: 1 | Status: SHIPPED | OUTPATIENT
Start: 2021-12-22 | End: 2022-02-20

## 2021-12-22 NOTE — TELEPHONE ENCOUNTER
Medication has been sent. I she starts to notice this in her eyes she needs to be seen as soon as possible.

## 2021-12-25 ENCOUNTER — HOSPITAL ENCOUNTER (EMERGENCY)
Age: 61
Discharge: HOME | End: 2021-12-25
Attending: EMERGENCY MEDICINE
Payer: MEDICARE

## 2021-12-25 ENCOUNTER — APPOINTMENT (OUTPATIENT)
Dept: CT IMAGING | Age: 61
End: 2021-12-25
Payer: MEDICARE

## 2021-12-25 VITALS
HEIGHT: 61 IN | DIASTOLIC BLOOD PRESSURE: 70 MMHG | HEART RATE: 80 BPM | SYSTOLIC BLOOD PRESSURE: 122 MMHG | BODY MASS INDEX: 26.06 KG/M2 | RESPIRATION RATE: 17 BRPM | OXYGEN SATURATION: 96 % | WEIGHT: 138.01 LBS | TEMPERATURE: 98.2 F

## 2021-12-25 DIAGNOSIS — K52.9 ENTERITIS: Primary | ICD-10-CM

## 2021-12-25 LAB
A/G RATIO: 1.3 (ref 1.1–2.2)
ALBUMIN SERPL-MCNC: 4.3 G/DL (ref 3.4–5)
ALP BLD-CCNC: 145 U/L (ref 40–129)
ALT SERPL-CCNC: 16 U/L (ref 10–40)
ANION GAP SERPL CALCULATED.3IONS-SCNC: 14 MMOL/L (ref 3–16)
AST SERPL-CCNC: 24 U/L (ref 15–37)
BASOPHILS ABSOLUTE: 0 K/UL (ref 0–0.2)
BASOPHILS RELATIVE PERCENT: 0.3 %
BILIRUB SERPL-MCNC: 0.5 MG/DL (ref 0–1)
BUN BLDV-MCNC: 11 MG/DL (ref 7–20)
CALCIUM SERPL-MCNC: 9.2 MG/DL (ref 8.3–10.6)
CHLORIDE BLD-SCNC: 103 MMOL/L (ref 99–110)
CO2: 21 MMOL/L (ref 21–32)
CREAT SERPL-MCNC: 0.5 MG/DL (ref 0.6–1.2)
EOSINOPHILS ABSOLUTE: 0.2 K/UL (ref 0–0.6)
EOSINOPHILS RELATIVE PERCENT: 1.6 %
GFR AFRICAN AMERICAN: >60
GFR NON-AFRICAN AMERICAN: >60
GLUCOSE BLD-MCNC: 111 MG/DL (ref 70–99)
HCT VFR BLD CALC: 44.2 % (ref 36–48)
HEMOGLOBIN: 14.4 G/DL (ref 12–16)
LACTIC ACID, SEPSIS: 0.9 MMOL/L (ref 0.4–1.9)
LIPASE: 13 U/L (ref 13–60)
LYMPHOCYTES ABSOLUTE: 1 K/UL (ref 1–5.1)
LYMPHOCYTES RELATIVE PERCENT: 9.9 %
MCH RBC QN AUTO: 31 PG (ref 26–34)
MCHC RBC AUTO-ENTMCNC: 32.7 G/DL (ref 31–36)
MCV RBC AUTO: 94.7 FL (ref 80–100)
MONOCYTES ABSOLUTE: 0.5 K/UL (ref 0–1.3)
MONOCYTES RELATIVE PERCENT: 4.7 %
NEUTROPHILS ABSOLUTE: 8 K/UL (ref 1.7–7.7)
NEUTROPHILS RELATIVE PERCENT: 83.5 %
PDW BLD-RTO: 15.1 % (ref 12.4–15.4)
PLATELET # BLD: 219 K/UL (ref 135–450)
PMV BLD AUTO: 8.1 FL (ref 5–10.5)
POTASSIUM REFLEX MAGNESIUM: 4.1 MMOL/L (ref 3.5–5.1)
RBC # BLD: 4.67 M/UL (ref 4–5.2)
SODIUM BLD-SCNC: 138 MMOL/L (ref 136–145)
TOTAL PROTEIN: 7.5 G/DL (ref 6.4–8.2)
TROPONIN: <0.01 NG/ML
WBC # BLD: 9.6 K/UL (ref 4–11)

## 2021-12-25 PROCEDURE — 83605 ASSAY OF LACTIC ACID: CPT

## 2021-12-25 PROCEDURE — 2580000003 HC RX 258: Performed by: EMERGENCY MEDICINE

## 2021-12-25 PROCEDURE — 96374 THER/PROPH/DIAG INJ IV PUSH: CPT

## 2021-12-25 PROCEDURE — 99284 EMERGENCY DEPT VISIT MOD MDM: CPT

## 2021-12-25 PROCEDURE — 83690 ASSAY OF LIPASE: CPT

## 2021-12-25 PROCEDURE — 93005 ELECTROCARDIOGRAM TRACING: CPT | Performed by: EMERGENCY MEDICINE

## 2021-12-25 PROCEDURE — 84484 ASSAY OF TROPONIN QUANT: CPT

## 2021-12-25 PROCEDURE — 74176 CT ABD & PELVIS W/O CONTRAST: CPT

## 2021-12-25 PROCEDURE — 96375 TX/PRO/DX INJ NEW DRUG ADDON: CPT

## 2021-12-25 PROCEDURE — 80053 COMPREHEN METABOLIC PANEL: CPT

## 2021-12-25 PROCEDURE — 85025 COMPLETE CBC W/AUTO DIFF WBC: CPT

## 2021-12-25 PROCEDURE — 6360000002 HC RX W HCPCS: Performed by: EMERGENCY MEDICINE

## 2021-12-25 RX ORDER — ONDANSETRON 2 MG/ML
4 INJECTION INTRAMUSCULAR; INTRAVENOUS ONCE
Status: COMPLETED | OUTPATIENT
Start: 2021-12-25 | End: 2021-12-25

## 2021-12-25 RX ORDER — ONDANSETRON 4 MG/1
4 TABLET, ORALLY DISINTEGRATING ORAL EVERY 8 HOURS PRN
Qty: 20 TABLET | Refills: 0 | Status: SHIPPED | OUTPATIENT
Start: 2021-12-25

## 2021-12-25 RX ORDER — TRAMADOL HYDROCHLORIDE 50 MG/1
50 TABLET ORAL EVERY 8 HOURS PRN
Qty: 9 TABLET | Refills: 0 | Status: SHIPPED | OUTPATIENT
Start: 2021-12-25 | End: 2021-12-28

## 2021-12-25 RX ORDER — FENTANYL CITRATE 50 UG/ML
50 INJECTION, SOLUTION INTRAMUSCULAR; INTRAVENOUS ONCE
Status: COMPLETED | OUTPATIENT
Start: 2021-12-25 | End: 2021-12-25

## 2021-12-25 RX ORDER — 0.9 % SODIUM CHLORIDE 0.9 %
500 INTRAVENOUS SOLUTION INTRAVENOUS ONCE
Status: COMPLETED | OUTPATIENT
Start: 2021-12-25 | End: 2021-12-25

## 2021-12-25 RX ADMIN — FENTANYL CITRATE 50 MCG: 50 INJECTION, SOLUTION INTRAMUSCULAR; INTRAVENOUS at 08:08

## 2021-12-25 RX ADMIN — SODIUM CHLORIDE 500 ML: 9 INJECTION, SOLUTION INTRAVENOUS at 08:07

## 2021-12-25 RX ADMIN — ONDANSETRON 4 MG: 2 INJECTION INTRAMUSCULAR; INTRAVENOUS at 08:08

## 2021-12-25 ASSESSMENT — ENCOUNTER SYMPTOMS
EYE REDNESS: 0
RHINORRHEA: 0
SHORTNESS OF BREATH: 1
ABDOMINAL PAIN: 1
VOMITING: 1
BACK PAIN: 0
NAUSEA: 1
DIARRHEA: 1
SORE THROAT: 0

## 2021-12-25 ASSESSMENT — PAIN DESCRIPTION - FREQUENCY: FREQUENCY: INTERMITTENT

## 2021-12-25 ASSESSMENT — PAIN DESCRIPTION - LOCATION: LOCATION: ABDOMEN

## 2021-12-25 ASSESSMENT — PAIN SCALES - GENERAL: PAINLEVEL_OUTOF10: 5

## 2021-12-25 ASSESSMENT — PAIN DESCRIPTION - PAIN TYPE: TYPE: ACUTE PAIN

## 2021-12-25 ASSESSMENT — PAIN DESCRIPTION - ORIENTATION: ORIENTATION: UPPER

## 2021-12-25 ASSESSMENT — PAIN DESCRIPTION - DESCRIPTORS: DESCRIPTORS: SHARP

## 2021-12-25 NOTE — ED PROVIDER NOTES
as noted above the remainder of the review of systems was reviewed and negative.        PAST MEDICAL HISTORY     Past Medical History:   Diagnosis Date    Back pain     CAD (coronary artery disease)     COPD (chronic obstructive pulmonary disease) (HCC)     Foot pain     from bone spurs    GERD (gastroesophageal reflux disease)     Headache     sharp pain going through head    Heart attack ([address]     Hip pain     Hyperlipidemia     Hypertension      (normal spontaneous vaginal delivery)     Osteoporosis     Vision abnormalities     amblyopia         SURGICALHISTORY       Past Surgical History:   Procedure Laterality Date    CARDIAC CATHETERIZATION      CHOLECYSTECTOMY      COLONOSCOPY      DIAGNOSTIC CARDIAC CATH LAB PROCEDURE  10/29/2021    mild LAD and LCX disease    ESOPHAGEAL DILATATION N/A 2019    ESOPHAGEAL DILATION Frutoso Parvin performed by Courtney Wolf MD at [facility] N/A 2019    EGD BIOPSY performed by Courtney Wolf MD at [address] [place] [address] N/A 2021    EGD ESOPHAGOGASTRODUODENOSCOPY performed by Courtney Wolf MD at [address] [facility] Medication List as of 2021 10:15 AM      CONTINUE these medications which have NOT CHANGED    Details   pregabalin (LYRICA) 75 MG capsule TAKE ONE CAPSULE BY MOUTH TWICE A DAY, Disp-60 capsule, R-1Normal      valACYclovir (VALTREX) 1 g tablet Take 1 tablet by mouth 3 times daily for 7 days, Disp-21 tablet, R-0Normal      sucralfate (CARAFATE) 1 GM tablet Take 1 tablet by mouth every 6-8 hours as needed (abdominal pain), Disp-60 tablet, R-0Print      vitamin B-12 (CYANOCOBALAMIN) 1000 MCG tablet Take 1,000 mcg by mouth daily Historical Med      Cholecalciferol (VITAMIN D) 50 MCG ( UT) CAPS capsule Take 1 capsule by mouth daily Historical Med      albuterol sulfate HFA (PROVENTIL HFA) 108 (90 Base) MCG/ACT inhaler Inhale 2 puffs into the lungs every 6 hours as needed for Wheezing or Shortness of Breath, Disp-1 each, R-1Normal      atorvastatin (LIPITOR) 80 MG tablet Take 1 tablet by mouth daily, Disp-90 tablet, R-1Normal      metoprolol succinate (TOPROL XL) 25 MG extended release tablet TAKE 1/2 TABLET BY MOUTH DAILY, Disp-30 tablet, R-1Normal      sertraline (ZOLOFT) 100 MG tablet TAKE ONE TABLET BY MOUTH DAILY, Disp-60 tablet, R-0Normal      pantoprazole (PROTONIX) 40 MG tablet Take 1 tablet by mouth every morning (before breakfast), Disp-30 tablet, R-0Normal      albuterol (PROVENTIL) (5 MG/ML) 0.5% nebulizer solution Take 0.5 mLs by nebulization every 6 hours as needed for Wheezing, Disp-30 vial, R-0Print      Umeclidinium Bromide 62.5 MCG/INH AEPB Inhale 1 puff into the lungs daily, Disp-1 each, R-11Normal      ipratropium-albuterol (DUONEB) 0.5-2.5 (3) MG/3ML SOLN nebulizer solution Inhale 3 mLs into the lungs every 4 hours, Disp-360 mL, R-11Normal      metFORMIN (GLUCOPHAGE) 500 MG tablet Take 1 tablet by mouth daily (with breakfast), Disp-90 tablet, R-0Normal      aspirin 81 MG chewable tablet CHEW ONE TABLET BY MOUTH DAILY, Disp-90 tablet, R-1Normal      diphenhydrAMINE-APAP, sleep, (TYLENOL PM EXTRA STRENGTH)  MG tablet Take 1 tablet by mouth nightly as needed for Sleep Historical Med      mineral oil-hydrophilic petrolatum (AQUAPHOR) ointment Apply topically as needed. , Disp-396 g, R-0, Normal      acetaminophen (TYLENOL) 500 MG tablet Take 2 tablets by mouth every 6 hours as needed for Pain Do not take with other medications containing acetaminophen., Disp-30 tablet, R-0Print             ALLERGIES     Iodine, Other, Eggs or egg-derived products [eggs or egg-derived products], Eggs [egg white], Flexeril [cyclobenzaprine hcl], Levofloxacin, and Omeprazole    FAMILY HISTORY       Family History   Problem Relation Age of Onset    Heart Failure Mother         heart disease    Hypertension Mother    Cornell Anatoly  Physically Abused: Not on file    Sexually Abused: Not on file   Housing Stability:     Unable to Pay for Housing in the Last Year: Not on file    Number of Places Lived in the Last Year: Not on file    Unstable Housing in the Last Year: Not on file       SCREENINGS             PHYSICAL EXAM    (up to 7 for level 4, 8 or more for level 5)     ED Triage Vitals [12/25/21 0735]   BP Temp Temp Source Pulse Resp SpO2 Height Weight   113/85 98.2 °F (36.8 °C) Oral 87 18 92 % 5' 1\" (1.549 m) 138 lb 0.1 oz (62.6 kg)       Physical Exam  Vitals and nursing note reviewed. Constitutional:       General: She is not in acute distress. Appearance: She is well-developed. She is not diaphoretic. Comments: Uncomfortable female in no acute distress. HENT:      Head: Normocephalic and atraumatic. Eyes:      General: No scleral icterus. Right eye: No discharge. Left eye: No discharge. Conjunctiva/sclera: Conjunctivae normal.   Cardiovascular:      Rate and Rhythm: Normal rate. Pulses: Normal pulses. Heart sounds: No murmur heard. Pulmonary:      Effort: Pulmonary effort is normal. No respiratory distress. Comments: Decreased breath sounds throughout. No wheezing rales or rhonchi appreciated. Abdominal:      Palpations: Abdomen is soft. Comments: Generalized mild tenderness to palpation. No rebound or guarding. Bowel sounds are slightly decreased. Musculoskeletal:         General: Normal range of motion. Cervical back: Neck supple. Skin:     General: Skin is warm and dry. Capillary Refill: Capillary refill takes less than 2 seconds. Neurological:      Mental Status: She is alert and oriented to person, place, and time.    Psychiatric:         Behavior: Behavior normal.         DIAGNOSTIC RESULTS     EKG: All EKG's are interpreted by the Emergency Department Physician who either signs or Co-signsthis chart in the absence of a cardiologist.    The Ekg interpreted by me shows  normal sinus rhythm with a rate of 68  Axis is   Normal  QTc is  459 msec  Intervals and Durations are unremarkable. ST Segments: T wave inversion in leads V1, V2, V3. When compared to an EKG performed on November 11, 2021 the patient had biphasic ST-T wave changes in leads V1 and V2.        RADIOLOGY:   Non-plain filmimages such as CT, Ultrasound and MRI are read by the radiologist. Plain radiographic images are visualized and preliminarily interpreted by the emergency physician with the below findings:        Interpretation per the Radiologist below, if available at the time ofthis note:    CT ABDOMEN PELVIS WO CONTRAST Additional Contrast? None   Final Result   1. Findings concerning for diffuse mild-to-moderate enteritis with dominant   involvement of the ileum. Mild increased fluid in the ascending colon is   well. No drainable fluid collections or abscess or free air noted. 2. Moderate changes of avascular necrosis in the left femoral head.    3. Stable mild nodular hyperplasia left adrenal.               ED BEDSIDE ULTRASOUND:   Performed by ED Physician - none    LABS:  Labs Reviewed   CBC WITH AUTO DIFFERENTIAL - Abnormal; Notable for the following components:       Result Value    Neutrophils Absolute 8.0 (*)     All other components within normal limits    Narrative:     Performed at:  [address] [address]   Phone [phone]   COMPREHENSIVE METABOLIC PANEL W/ REFLEX TO MG FOR LOW K - Abnormal; Notable for the following components:    Glucose 111 (*)     CREATININE 0.5 (*)     Alkaline Phosphatase 145 (*)     All other components within normal limits    Narrative:     Performed at:  [address] [address]   Phone [phone]   LACTATE, SEPSIS    Narrative:     Performed at:  [facility]  [address] [place] [address] none     Procedures    FINAL IMPRESSION      1. Enteritis          DISPOSITION/PLAN   DISPOSITION Decision To Discharge 12/25/2021 10:00:34 AM      PATIENT REFERRED TO:  Amandeep Craig DO  [address] [place] [address]  [phone]    Schedule an appointment as soon as possible for a visit in 3 days        DISCHARGE MEDICATIONS:  Discharge Medication List as of 12/25/2021 10:15 AM      START taking these medications    Details   traMADol (ULTRAM) 50 MG tablet Take 1 tablet by mouth every 8 hours as needed for Pain for up to 3 days. Intended supply: 3 days.  Take lowest dose possible to manage pain, Disp-9 tablet, R-0Print                (Please note that portions of this note were completed with a voice recognition program.Efforts were made to edit the dictations but occasionally words are mis-transcribed.)    Benigno Monroy MD (electronically signed)  Attending Emergency Physician         Benigno Monroy MD  12/25/21 [phone]

## 2021-12-26 LAB
EKG ATRIAL RATE: 68 BPM
EKG DIAGNOSIS: NORMAL
EKG P AXIS: 53 DEGREES
EKG P-R INTERVAL: 126 MS
EKG Q-T INTERVAL: 432 MS
EKG QRS DURATION: 82 MS
EKG QTC CALCULATION (BAZETT): 459 MS
EKG R AXIS: 48 DEGREES
EKG T AXIS: 61 DEGREES
EKG VENTRICULAR RATE: 68 BPM

## 2021-12-26 PROCEDURE — 93010 ELECTROCARDIOGRAM REPORT: CPT | Performed by: INTERNAL MEDICINE

## 2021-12-27 ENCOUNTER — TELEPHONE (OUTPATIENT)
Dept: PHARMACY | Facility: CLINIC | Age: 61
End: 2021-12-27

## 2021-12-27 NOTE — TELEPHONE ENCOUNTER
For Pharmacy [address] in place:  No   Recommendation Provided To: Patient/Caregiver: 1 via Telephone   Intervention Detail: Adherence Monitorin   Gap Closed?: Yes    Intervention Accepted By:   Lyndsey Wolf Time Spent (min): 15

## 2021-12-27 NOTE — TELEPHONE ENCOUNTER
[facility] CLINICAL PHARMACY REVIEW: ADHERENCE REVIEW  Identified care gap per Blake; fills at [address]: Statin adherence      Last Visit: 21    ASSESSMENT  STATIN ADHERENCE  Per Insurance Records through 12.15.21 :   RX:  Atorvastatin 80 mg tab  last filled on 21 for 90 day supply. Next refill due: 21. Fail Date:  21. Per Reconciled Dispense Report:  (same as above). Per Pharmacy: Raghav Betancourt: Atorvastatin 80 mg tab   last picked up on 21 for 90 day supply. Refills remainin  Billed through [employer]   Component Value Date    CHOL 185 10/27/2021    TRIG 133 10/27/2021    HDL 40 10/27/2021    LDLCALC 118 (H) 10/27/2021     ALT   Date Value Ref Range Status   2021 16 10 - 40 U/L Final     AST   Date Value Ref Range Status   2021 24 15 - 37 U/L Final     The ASCVD Risk score (Dulce Maria Crandall., et al., 2013) failed to calculate for the following reasons: The patient has a prior MI or stroke diagnosis     PLAN  The following are interventions that have been identified:     - Patient overdue refilling Atorvastatin. - Need to verify if patient is currently taking Atorvastatin 80 mg tab 1 QD (IS on med list). - if yes, pharmacy has refills available to fill. (processing refill today)      Attempting to reach patient to review; left message advising pt of refill available for  at local pharmacy.         Future Appointments   Date Time Provider Lashell Prabhakar   3/4/2022  8:30 AM DO Sarah Mc  Beulah Hayes LPN  [facility]   [address] [facility] Clinical Pharm[facility]  Phone: toll free [phone]

## 2022-01-20 DIAGNOSIS — I10 ESSENTIAL HYPERTENSION: ICD-10-CM

## 2022-01-20 RX ORDER — METOPROLOL SUCCINATE 25 MG/1
TABLET, EXTENDED RELEASE ORAL
Qty: 30 TABLET | Refills: 1 | Status: SHIPPED | OUTPATIENT
Start: 2022-01-20 | End: 2022-04-29

## 2022-01-21 DIAGNOSIS — F41.9 ANXIETY: ICD-10-CM

## 2022-01-21 RX ORDER — SERTRALINE HYDROCHLORIDE 100 MG/1
TABLET, FILM COATED ORAL
Qty: 90 TABLET | Refills: 1 | Status: SHIPPED | OUTPATIENT
Start: 2022-01-21 | End: 2022-05-05 | Stop reason: SDUPTHER

## 2022-03-21 LAB
AVERAGE GLUCOSE: NORMAL
HBA1C MFR BLD: NORMAL %

## 2022-04-29 DIAGNOSIS — I10 ESSENTIAL HYPERTENSION: ICD-10-CM

## 2022-04-29 RX ORDER — METOPROLOL SUCCINATE 25 MG/1
TABLET, EXTENDED RELEASE ORAL
Qty: 30 TABLET | Refills: 1 | Status: SHIPPED | OUTPATIENT
Start: 2022-04-29

## 2022-05-05 DIAGNOSIS — F41.9 ANXIETY: ICD-10-CM

## 2022-05-05 RX ORDER — SERTRALINE HYDROCHLORIDE 100 MG/1
100 TABLET, FILM COATED ORAL DAILY
Qty: 90 TABLET | Refills: 1 | Status: SHIPPED | OUTPATIENT
Start: 2022-05-05

## 2022-05-09 ENCOUNTER — OFFICE VISIT (OUTPATIENT)
Dept: FAMILY MEDICINE CLINIC | Age: 62
End: 2022-05-09
Payer: MEDICARE

## 2022-05-09 DIAGNOSIS — Z00.00 INITIAL MEDICARE ANNUAL WELLNESS VISIT: Primary | ICD-10-CM

## 2022-05-09 PROCEDURE — G0438 PPPS, INITIAL VISIT: HCPCS | Performed by: FAMILY MEDICINE

## 2022-05-09 PROCEDURE — 3017F COLORECTAL CA SCREEN DOC REV: CPT | Performed by: FAMILY MEDICINE

## 2022-05-09 ASSESSMENT — PATIENT HEALTH QUESTIONNAIRE - PHQ9
SUM OF ALL RESPONSES TO PHQ QUESTIONS 1-9: 0
SUM OF ALL RESPONSES TO PHQ9 QUESTIONS 1 & 2: 0
2. FEELING DOWN, DEPRESSED OR HOPELESS: 0
SUM OF ALL RESPONSES TO PHQ QUESTIONS 1-9: 0
1. LITTLE INTEREST OR PLEASURE IN DOING THINGS: 0
SUM OF ALL RESPONSES TO PHQ QUESTIONS 1-9: 0
SUM OF ALL RESPONSES TO PHQ QUESTIONS 1-9: 0

## 2022-05-09 ASSESSMENT — LIFESTYLE VARIABLES: HOW OFTEN DO YOU HAVE A DRINK CONTAINING ALCOHOL: NEVER

## 2022-05-09 NOTE — PATIENT INSTRUCTIONS
Personalized Preventive Plan for Mario Keyes - 5/9/2022  Medicare offers a range of preventive health benefits. Some of the tests and screenings are paid in full while other may be subject to a deductible, co-insurance, and/or copay. Some of these benefits include a comprehensive review of your medical history including lifestyle, illnesses that may run in your family, and various assessments and screenings as appropriate. After reviewing your medical record and screening and assessments performed today your provider may have ordered immunizations, labs, imaging, and/or referrals for you. A list of these orders (if applicable) as well as your Preventive Care list are included within your After Visit Summary for your review. Other Preventive Recommendations:    · A preventive eye exam performed by an eye specialist is recommended every 1-2 years to screen for glaucoma; cataracts, macular degeneration, and other eye disorders. · A preventive dental visit is recommended every 6 months. · Try to get at least 150 minutes of exercise per week or 10,000 steps per day on a pedometer . · Order or download the FREE \"Exercise & Physical Activity: Your Everyday Guide\" from [employer]. Call 6-639.840.6300 or search [employer] online. · You need 0188-6071 mg of calcium and 0764-5171 IU of vitamin D per day. It is possible to meet your calcium requirement with diet alone, but a vitamin D supplement is usually necessary to meet this goal.  · When exposed to the sun, use a sunscreen that protects against both UVA and UVB radiation with an SPF of 30 or greater. Reapply every 2 to 3 hours or after sweating, drying off with a towel, or swimming. · Always wear a seat belt when traveling in a car. Always wear a helmet when riding a bicycle or motorcycle.

## 2022-05-09 NOTE — PROGRESS NOTES
Medicare Annual Wellness Visit      [address] is here for No chief complaint on file. Assessment & Plan    Recommendations for Preventive Services Due: see orders and patient instructions/AVS.  Recommended screening schedule for the next 5-10 years is provided to the patient in written form: see Patient Instructions/AVS.     No follow-ups on file. Subjective   The following acute and/or chronic problems were also addressed today:  No new problem    Patient's complete Health Risk Assessment and screening values have been reviewed and are found in Flowsheets. The following problems were reviewed today and where indicated follow up appointments were made and/or referrals ordered. Positive Risk Factor Screenings with Interventions:             General Health and ACP:       Advance Directives     Power of  Living Will ACP-Advance Directive ACP-Power of     Not on File Not on File Not on File Not on File      General Health Risk Interventions:  · No Living Will: Patient declines ACP discussion/assistance              Objective   There were no vitals filed for this visit. There is no height or weight on file to calculate BMI. No PE       Allergies   Allergen Reactions    Iodine Hives    Other Other (See Comments)     Flu vaccine  Felt like bones were on the outside of her body    Eggs Or Egg-Derived Products [Eggs Or Egg-Derived Products] Hives and Nausea And Vomiting    Eggs [Egg White] Nausea And Vomiting     PT STATES CAN EAT EGG WHITE BUT NOT EGG YOLK    Flexeril [Cyclobenzaprine Hcl] Rash    Levofloxacin Rash    Omeprazole Nausea And Vomiting and Other (See Comments)     Burns my stomach up        Prior to Visit Medications    Medication Sig Taking?  Authorizing Provider   sertraline (ZOLOFT) 100 MG tablet Take 1 tablet by mouth daily  Kevin Zapata,    metoprolol succinate (TOPROL XL) 25 MG extended release tablet TAKE 1/2 TABLET BY MOUTH DAILY  Gene Goodell, DO ondansetron (ZOFRAN ODT) 4 MG disintegrating tablet Take 1 tablet by mouth every 8 hours as needed for Nausea  Zeyad Jaimes MD   pregabalin (LYRICA) 75 MG capsule TAKE ONE CAPSULE BY MOUTH TWICE A DAY  Kevin Zapata DO   sucralfate (CARAFATE) 1 GM tablet Take 1 tablet by mouth every 6-8 hours as needed (abdominal pain)  Jimena Ravi,    vitamin B-12 (CYANOCOBALAMIN) 1000 MCG tablet Take 1,000 mcg by mouth daily   Historical Provider, MD   Cholecalciferol (VITAMIN D) 50 MCG (2000 UT) CAPS capsule Take 1 capsule by mouth daily   Historical Provider, MD   albuterol sulfate HFA (PROVENTIL HFA) 108 (90 Base) MCG/ACT inhaler Inhale 2 puffs into the lungs every 6 hours as needed for Wheezing or Shortness of Breath  Kevin Zapata DO   atorvastatin (LIPITOR) 80 MG tablet Take 1 tablet by mouth daily  Kevin Zapata DO   pantoprazole (PROTONIX) 40 MG tablet Take 1 tablet by mouth every morning (before breakfast)  Celi Mcguire MD   albuterol (PROVENTIL) (5 MG/ML) 0.5% nebulizer solution Take 0.5 mLs by nebulization every 6 hours as needed for Wheezing  Laura Mosley PA-C   Umeclidinium Bromide 62.5 MCG/INH AEPB Inhale 1 puff into the lungs daily  Lucinda Barr MD   ipratropium-albuterol (DUONEB) 0.5-2.5 (3) MG/3ML SOLN nebulizer solution Inhale 3 mLs into the lungs every 4 hours  Lucinda Barr MD   metFORMIN (GLUCOPHAGE) 500 MG tablet Take 1 tablet by mouth daily (with breakfast)  Patient taking differently: Take 500 mg by mouth daily (with breakfast) Indications: pt has not been takinyg due to side effects   JOSE ANGEL Rhodes CNP   aspirin 81 MG chewable tablet CHEW ONE TABLET BY MOUTH DAILY  JOSE ANGEL Rhodes CNP   diphenhydrAMINE-APAP, sleep, (TYLENOL PM EXTRA STRENGTH)  MG tablet Take 1 tablet by mouth nightly as needed for Sleep   Historical Provider, MD   mineral oil-hydrophilic petrolatum (AQUAPHOR) ointment Apply topically as needed.   JOSE ANGEL Vargas - CNP   acetaminophen (TYLENOL) 500 MG tablet Take 2 tablets by mouth every 6 hours as needed for Pain Do not take with other medications containing acetaminophen.   Mati CARIDAD Welch       [place] [facility] (Including outside providers/suppliers regularly involved in providing care):   Patient Care Team:  Marisa Sykes DO as PCP - General (Family Medicine)  Mraisa Sykes DO as PCP - [facility] Empaneled Provider  Sue Cutler MD as Consulting Physician (Gastroenterology)    Reviewed and updated this visit:

## 2022-06-02 DIAGNOSIS — J44.9 COPD, SEVERE (HCC): ICD-10-CM

## 2022-06-02 DIAGNOSIS — M79.7 FIBROMYALGIA: ICD-10-CM

## 2022-06-02 RX ORDER — PREGABALIN 75 MG/1
CAPSULE ORAL
Qty: 60 CAPSULE | OUTPATIENT
Start: 2022-06-02 | End: 2022-08-01

## 2022-06-02 RX ORDER — UMECLIDINIUM 62.5 UG/1
AEROSOL, POWDER ORAL
Qty: 30 EACH | Refills: 5 | Status: SHIPPED | OUTPATIENT
Start: 2022-06-02

## 2022-06-02 NOTE — TELEPHONE ENCOUNTER
Last office visit 5/9/2022     Last written     Next office visit scheduled Visit date not found    Requested Prescriptions     Pending Prescriptions Disp Refills    pregabalin (LYRICA) 75 MG capsule [Pharmacy Med Name: PREGABALIN 75 MG CAPSULE] 60 capsule      Sig: TAKE ONE CAPSULE BY MOUTH TWICE A DAY

## 2022-07-05 ENCOUNTER — TELEPHONE (OUTPATIENT)
Dept: PHARMACY | Facility: CLINIC | Age: 62
End: 2022-07-05

## 2022-07-05 NOTE — LETTER
[place]  [address], [address] [place] [address]        Nancy Hill   [address] [facility] [address]           07/05/22     Dear Nancy Hill,    We are reaching out to you regarding your Atorvastatin 80 mg daily. This medication can be filled for a 3-month supply to save you time and trips to the pharmacy. It appears that this medication has not been filled at proper times, it is important that you take your medications regularly and try not to miss a single dose. It also appears that your care is not being managed by a [facility] provider. Please call your health insurance [address] and provide them with the name of your primary care provider (Ana Frazier) as soon as possible. It is important that your health insurance company has your correct primary care provider on file so that they are able to communicate with your provider if necessary.       Sincerely,    [address]  Phone # [phone] option 1

## 2022-07-05 NOTE — TELEPHONE ENCOUNTER
[facility] CLINICAL PHARMACY: ADHERENCE REVIEW  Identified care gap per [employer]: fills at [place]: Statin adherence    Last Visit: 05.09.22    Patient identified as LIS = 2, therefore patient may be able to receive a 90-day supply for the same cost as a 30-day supply         Andre JESSICA Suazo Records claims through 06.14.22 (Prior Year [facility] = FAILED; YTD Oliver Curryra = Filled only once; Potential Fail Date: 07.11.22): Atorvastatin last filled on 04.22.22 for 30 day supply. Next refill due: 05.22.22    Per [facility] Portal:  (same as above). Lab Results   Component Value Date    CHOL 185 10/27/2021    TRIG 133 10/27/2021    HDL 40 10/27/2021    LDLCALC 118 (H) 10/27/2021     ALT   Date Value Ref Range Status   12/25/2021 16 10 - 40 U/L Final     AST   Date Value Ref Range Status   12/25/2021 24 15 - 37 U/L Final     The ASCVD Risk score (Rich Rodríguez, et al., 2013) failed to calculate for the following reasons: The patient has a prior MI or stroke diagnosis     PLAN  The following are interventions that have been identified:   - Patient overdue refilling Atorvastatin and active on home medication list.     Letter sent to patient        No future appointments.     Jocy Robison, [address] [facility]  Phone: toll free [phone]

## 2022-07-18 LAB
AVERAGE GLUCOSE: NORMAL
HBA1C MFR BLD: 6.3 %

## 2022-11-11 DIAGNOSIS — J44.9 COPD, SEVERE (HCC): ICD-10-CM

## 2022-11-30 DIAGNOSIS — I10 ESSENTIAL HYPERTENSION: ICD-10-CM

## 2022-11-30 RX ORDER — METOPROLOL SUCCINATE 25 MG/1
TABLET, EXTENDED RELEASE ORAL
Qty: 30 TABLET | Refills: 1 | OUTPATIENT
Start: 2022-11-30

## 2022-12-05 DIAGNOSIS — I10 ESSENTIAL HYPERTENSION: ICD-10-CM

## 2022-12-06 RX ORDER — METOPROLOL SUCCINATE 25 MG/1
TABLET, EXTENDED RELEASE ORAL
Qty: 30 TABLET | Refills: 1 | OUTPATIENT
Start: 2022-12-06

## 2022-12-06 NOTE — TELEPHONE ENCOUNTER
Last OV: 05/09/2022          metoprolol succinate ER 25 mg tablet,extended release 24 hr        Will file in chart as: metoprolol succinate (TOPROL XL) 25 MG extended release tablet    Possible duplicate: Jaredemigdio to review recent actions on this medication   Sig: TAKE 1/2 TABLET BY MOUTH DAILY AT 9AM   Disp:  30 tablet    Refills:  1   Start: 12/5/2022   Class: Normal   Non-formulary For: Essential hypertension   To pharmacy: Please send 6 refills as they will be placed in patients file until ready to dispense  Thank you   Last ordered: 7 months ago by Herman Trevizo DO Last refill: 11/1/2022   Rx #: [id number]

## 2022-12-30 DIAGNOSIS — I10 ESSENTIAL HYPERTENSION: ICD-10-CM

## 2022-12-30 RX ORDER — METOPROLOL SUCCINATE 25 MG/1
TABLET, EXTENDED RELEASE ORAL
Qty: 30 TABLET | Refills: 1 | OUTPATIENT
Start: 2022-12-30

## 2023-01-05 DIAGNOSIS — I10 ESSENTIAL HYPERTENSION: ICD-10-CM

## 2023-01-06 RX ORDER — METOPROLOL SUCCINATE 25 MG/1
TABLET, EXTENDED RELEASE ORAL
Qty: 30 TABLET | Refills: 1 | OUTPATIENT
Start: 2023-01-06

## 2023-01-30 DIAGNOSIS — I10 ESSENTIAL HYPERTENSION: ICD-10-CM

## 2023-01-30 RX ORDER — METOPROLOL SUCCINATE 25 MG/1
TABLET, EXTENDED RELEASE ORAL
Qty: 30 TABLET | Refills: 1 | OUTPATIENT
Start: 2023-01-30

## 2023-02-28 DIAGNOSIS — I10 ESSENTIAL HYPERTENSION: ICD-10-CM

## 2023-02-28 RX ORDER — METOPROLOL SUCCINATE 25 MG/1
TABLET, EXTENDED RELEASE ORAL
Qty: 30 TABLET | Refills: 1 | OUTPATIENT
Start: 2023-02-28

## 2023-05-10 DIAGNOSIS — F41.9 ANXIETY: ICD-10-CM

## 2023-05-10 RX ORDER — SERTRALINE HYDROCHLORIDE 100 MG/1
TABLET, FILM COATED ORAL
Qty: 30 TABLET | Refills: 11 | OUTPATIENT
Start: 2023-05-10

## 2023-05-31 DIAGNOSIS — F41.9 ANXIETY: ICD-10-CM

## 2023-06-01 RX ORDER — SERTRALINE HYDROCHLORIDE 100 MG/1
TABLET, FILM COATED ORAL
Qty: 30 TABLET | Refills: 5 | OUTPATIENT
Start: 2023-06-01

## 2023-11-06 DIAGNOSIS — J44.9 COPD, SEVERE (HCC): ICD-10-CM

## 2023-11-07 RX ORDER — UMECLIDINIUM 62.5 UG/1
AEROSOL, POWDER ORAL
Refills: 11 | OUTPATIENT
Start: 2023-11-07

## 2023-11-27 DIAGNOSIS — J44.9 COPD, SEVERE (HCC): ICD-10-CM

## 2023-11-27 RX ORDER — UMECLIDINIUM 62.5 UG/1
AEROSOL, POWDER ORAL
Refills: 11 | OUTPATIENT
Start: 2023-11-27

## 2023-12-07 ENCOUNTER — TELEPHONE (OUTPATIENT)
Dept: PULMONOLOGY | Age: 63
End: 2023-12-07

## 2023-12-07 NOTE — TELEPHONE ENCOUNTER
Called RX pharmacy to let them know wrong provider they keep sending refill request an to.   [facility]  refill of Adam Ashford      P# [phone]  [facility]

## 2024-01-12 DIAGNOSIS — J44.9 COPD, SEVERE (HCC): ICD-10-CM

## 2024-01-12 RX ORDER — UMECLIDINIUM 62.5 UG/1
AEROSOL, POWDER ORAL
Refills: 11 | OUTPATIENT
Start: 2024-01-12

## 2024-01-31 DIAGNOSIS — J44.9 COPD, SEVERE (HCC): ICD-10-CM

## 2024-01-31 RX ORDER — UMECLIDINIUM 62.5 UG/1
AEROSOL, POWDER ORAL
Refills: 11 | OUTPATIENT
Start: 2024-01-31

## 2024-02-23 DIAGNOSIS — J44.9 COPD, SEVERE (HCC): ICD-10-CM

## 2024-02-23 RX ORDER — UMECLIDINIUM 62.5 UG/1
AEROSOL, POWDER ORAL
Refills: 11 | OUTPATIENT
Start: 2024-02-23

## 2024-05-16 NOTE — PROGRESS NOTES
9/15/2020     Greta Whiting (:  1960) is a 63 y.o. female, here for evaluation of the following medical concerns:    HPI    1.  ER follow up- patient apparently was admitted to the hospital via the ER on 2020, she was found to be hypotensive at that point. She was admitted for 2 days and underwent additional evaluation. Today, she is feeling much better. She has several concerns to follow up on, she was septic in the hosptial    2. HTN- patient is well controlled at this point, had medication adjusted, again feels well today, was found to have artherosclerosis of SMA, followed up with vascular surgery, was found to be stable at this time. 3. DM II- A1C is 6.1 today, controlled with medication, taking Metformin, no side effects from the medication, will have A1C obtained today. 4. COPD- patient is apparently controlled with albuterol rescue inhaler, doesn't follow with a Pulmonologist on regular basis. 5. Lower back pain- across lumbar area for years, waxes and wanes, has slowly became worse recently,  goes into both legs at times, does admit to shooting pain down both legs,but stated the majority of her pain pain is located in the front of her thighs, denied loss of baddle or bowel control, denied saddle anesthesia, denied falling at this time, does admit to weakness and problems ambulating, remaining in one position is difficult, she does report symptoms of claudication, continues to point to her back at the problem, tires otc medication but has limited relief, no ice or heat at this time. She doesn't believe she can do PT at this time due to access.      MRI-    Moderate-severe right L5 neural foraminal narrowing due to facet hypertrophy    and foraminal disc protrusion.         No significant spinal canal stenosis.             Today, denied chest pain, sob, n, v, or diarrhea. Review of Systems   Constitutional: Positive for fatigue.  Negative for activity change, fever and unexpected weight change. HENT: Negative for congestion, ear pain, rhinorrhea and sore throat. Respiratory: Negative for shortness of breath. Cardiovascular: Negative for chest pain, palpitations and leg swelling. Gastrointestinal: Positive for abdominal pain. Negative for diarrhea, nausea and vomiting. Musculoskeletal: Positive for arthralgias, back pain and gait problem. Neurological: Positive for weakness. Negative for dizziness, syncope and headaches. Psychiatric/Behavioral: Negative for dysphoric mood. The patient is nervous/anxious. Prior to Visit Medications    Medication Sig Taking? Authorizing Provider   pantoprazole (PROTONIX) 20 MG tablet TAKE TWO TABLETS BY MOUTH DAILY Yes Kevin Zapata DO   folic acid (FOLVITE) 1 MG tablet Take 1 tablet by mouth daily Yes Kevin Zapata DO   albuterol sulfate HFA (PROVENTIL HFA) 108 (90 Base) MCG/ACT inhaler Inhale 4-6 puffs into the lungs every 4 hours as needed for Wheezing or Shortness of Breath Yes Raghu Asher MD   sertraline (ZOLOFT) 100 MG tablet TAKE ONE TABLET BY MOUTH DAILY Yes Kevin Zapata DO   metoprolol succinate (TOPROL XL) 25 MG extended release tablet Take 0.5 tablets by mouth daily Yes Kevin Zapata DO   Umeclidinium Bromide 62.5 MCG/INH AEPB Inhale 1 puff into the lungs daily Yes Raghu Asher MD   Respiratory Therapy Supplies (NEBULIZER/TUBING/MOUTHPIECE) KIT 1 kit by Does not apply route daily Yes Raghu Asher MD   blood glucose test strips [facility] BLOOD GLUCOSE TEST) strip 1 each by In Vitro route daily As needed. Yes JOSE ANGEL Luong CNP   metFORMIN (GLUCOPHAGE) 500 MG tablet Take 1 tablet by mouth daily (with breakfast) Yes JOSE ANGEL Luong CNP   Blood Glucose Monitoring Suppl (ACURA BLOOD GLUCOSE METER) w/Device KIT Test for blood sugar before meals Yes JOSE ANGEL Luong CNP   triamcinolone (KENALOG) 0.1 % cream Apply topically 2 times daily.  Yes JOSE ANGEL Luong CNP mesenteric artery ([facility] [address]  Stable  Continue with medication  Keep appointments with specialist.   Inna Messer questions      No follow-ups on file. 2 = A lot of assistance

## (undated) DEVICE — FORCEPS BX 240CM 2.4MM L NDL RAD JAW 4 M00513334